# Patient Record
Sex: MALE | Race: WHITE | Employment: FULL TIME | URBAN - NONMETROPOLITAN AREA
[De-identification: names, ages, dates, MRNs, and addresses within clinical notes are randomized per-mention and may not be internally consistent; named-entity substitution may affect disease eponyms.]

---

## 2018-06-04 ENCOUNTER — HOSPITAL ENCOUNTER (EMERGENCY)
Age: 48
Discharge: LEFT BEFORE DISCHARGE | End: 2018-06-04
Attending: EMERGENCY MEDICINE
Payer: COMMERCIAL

## 2018-06-04 ENCOUNTER — APPOINTMENT (OUTPATIENT)
Dept: CT IMAGING | Age: 48
End: 2018-06-04
Payer: COMMERCIAL

## 2018-06-04 ENCOUNTER — APPOINTMENT (OUTPATIENT)
Dept: GENERAL RADIOLOGY | Age: 48
End: 2018-06-04
Payer: COMMERCIAL

## 2018-06-04 VITALS
BODY MASS INDEX: 37.93 KG/M2 | RESPIRATION RATE: 16 BRPM | SYSTOLIC BLOOD PRESSURE: 117 MMHG | HEIGHT: 72 IN | DIASTOLIC BLOOD PRESSURE: 66 MMHG | OXYGEN SATURATION: 95 % | TEMPERATURE: 100.8 F | HEART RATE: 112 BPM | WEIGHT: 280 LBS

## 2018-06-04 DIAGNOSIS — Z91.199 MEDICALLY NONCOMPLIANT: ICD-10-CM

## 2018-06-04 DIAGNOSIS — Z53.29 LEFT AGAINST MEDICAL ADVICE: Primary | ICD-10-CM

## 2018-06-04 DIAGNOSIS — E11.65 TYPE 2 DIABETES MELLITUS WITH HYPERGLYCEMIA, WITHOUT LONG-TERM CURRENT USE OF INSULIN (HCC): ICD-10-CM

## 2018-06-04 DIAGNOSIS — L02.211 ABSCESS OF SKIN OF ABDOMEN: ICD-10-CM

## 2018-06-04 DIAGNOSIS — J40 BRONCHITIS: ICD-10-CM

## 2018-06-04 DIAGNOSIS — J18.9 PNEUMONIA DUE TO ORGANISM: ICD-10-CM

## 2018-06-04 DIAGNOSIS — L03.115 CELLULITIS OF BOTH LOWER EXTREMITIES: ICD-10-CM

## 2018-06-04 DIAGNOSIS — L03.116 CELLULITIS OF BOTH LOWER EXTREMITIES: ICD-10-CM

## 2018-06-04 LAB
ALBUMIN SERPL-MCNC: 3.6 G/DL (ref 3.5–5.1)
ALLEN TEST: POSITIVE
ALP BLD-CCNC: 71 U/L (ref 38–126)
ALT SERPL-CCNC: 16 U/L (ref 11–66)
ANION GAP SERPL CALCULATED.3IONS-SCNC: 11 MEQ/L (ref 8–16)
AST SERPL-CCNC: 15 U/L (ref 5–40)
AVERAGE GLUCOSE: 201 MG/DL (ref 70–126)
BASE EXCESS (CALCULATED): 4.9 MMOL/L (ref -2.5–2.5)
BASOPHILS # BLD: 0.5 %
BASOPHILS ABSOLUTE: 0.1 THOU/MM3 (ref 0–0.1)
BETA-HYDROXYBUTYRATE: 2.13 MG/DL (ref 0.2–2.81)
BILIRUB SERPL-MCNC: 1 MG/DL (ref 0.3–1.2)
BUN BLDV-MCNC: 17 MG/DL (ref 7–22)
CALCIUM SERPL-MCNC: 8.2 MG/DL (ref 8.5–10.5)
CHLORIDE BLD-SCNC: 91 MEQ/L (ref 98–111)
CO2: 30 MEQ/L (ref 23–33)
COLLECTED BY:: ABNORMAL
CREAT SERPL-MCNC: 0.8 MG/DL (ref 0.4–1.2)
DEVICE: ABNORMAL
EKG ATRIAL RATE: 119 BPM
EKG P AXIS: 76 DEGREES
EKG P-R INTERVAL: 182 MS
EKG Q-T INTERVAL: 328 MS
EKG QRS DURATION: 104 MS
EKG QTC CALCULATION (BAZETT): 461 MS
EKG R AXIS: -143 DEGREES
EKG T AXIS: 44 DEGREES
EKG VENTRICULAR RATE: 119 BPM
EOSINOPHIL # BLD: 0 %
EOSINOPHILS ABSOLUTE: 0 THOU/MM3 (ref 0–0.4)
FLU A ANTIGEN: NEGATIVE
FLU B ANTIGEN: NEGATIVE
GFR SERPL CREATININE-BSD FRML MDRD: > 90 ML/MIN/1.73M2
GLUCOSE BLD-MCNC: 275 MG/DL
GLUCOSE BLD-MCNC: 275 MG/DL (ref 70–108)
GLUCOSE BLD-MCNC: 290 MG/DL (ref 70–108)
HBA1C MFR BLD: 8.7 % (ref 4.4–6.4)
HCO3: 29 MMOL/L (ref 23–28)
HCT VFR BLD CALC: 47.5 % (ref 42–52)
HEMOGLOBIN: 16.1 GM/DL (ref 14–18)
LACTIC ACID: 1.8 MMOL/L (ref 0.5–2.2)
LYMPHOCYTES # BLD: 6.6 %
LYMPHOCYTES ABSOLUTE: 0.8 THOU/MM3 (ref 1–4.8)
MCH RBC QN AUTO: 30.6 PG (ref 27–31)
MCHC RBC AUTO-ENTMCNC: 33.9 GM/DL (ref 33–37)
MCV RBC AUTO: 90.5 FL (ref 80–94)
MONOCYTES # BLD: 6.1 %
MONOCYTES ABSOLUTE: 0.8 THOU/MM3 (ref 0.4–1.3)
NUCLEATED RED BLOOD CELLS: 0 /100 WBC
O2 SATURATION: 92 %
OSMOLALITY CALCULATION: 276.7 MOSMOL/KG (ref 275–300)
PCO2: 41 MMHG (ref 35–45)
PDW BLD-RTO: 14.1 % (ref 11.5–14.5)
PH BLOOD GAS: 7.46 (ref 7.35–7.45)
PLATELET # BLD: 153 THOU/MM3 (ref 130–400)
PMV BLD AUTO: 9.9 FL (ref 7.4–10.4)
PO2: 61 MMHG (ref 71–104)
POTASSIUM SERPL-SCNC: 4.2 MEQ/L (ref 3.5–5.2)
PROCALCITONIN: 1.23 NG/ML (ref 0.01–0.09)
RBC # BLD: 5.25 MILL/MM3 (ref 4.7–6.1)
SEG NEUTROPHILS: 86.8 %
SEGMENTED NEUTROPHILS ABSOLUTE COUNT: 10.9 THOU/MM3 (ref 1.8–7.7)
SODIUM BLD-SCNC: 132 MEQ/L (ref 135–145)
SOURCE, BLOOD GAS: ABNORMAL
TOTAL PROTEIN: 7.2 G/DL (ref 6.1–8)
WBC # BLD: 12.6 THOU/MM3 (ref 4.8–10.8)

## 2018-06-04 PROCEDURE — 6360000002 HC RX W HCPCS: Performed by: EMERGENCY MEDICINE

## 2018-06-04 PROCEDURE — 6370000000 HC RX 637 (ALT 250 FOR IP): Performed by: EMERGENCY MEDICINE

## 2018-06-04 PROCEDURE — 83605 ASSAY OF LACTIC ACID: CPT

## 2018-06-04 PROCEDURE — 93005 ELECTROCARDIOGRAM TRACING: CPT | Performed by: EMERGENCY MEDICINE

## 2018-06-04 PROCEDURE — 2580000003 HC RX 258: Performed by: EMERGENCY MEDICINE

## 2018-06-04 PROCEDURE — 93010 ELECTROCARDIOGRAM REPORT: CPT | Performed by: INTERNAL MEDICINE

## 2018-06-04 PROCEDURE — 82010 KETONE BODYS QUAN: CPT

## 2018-06-04 PROCEDURE — 36600 WITHDRAWAL OF ARTERIAL BLOOD: CPT

## 2018-06-04 PROCEDURE — 71275 CT ANGIOGRAPHY CHEST: CPT

## 2018-06-04 PROCEDURE — 85025 COMPLETE CBC W/AUTO DIFF WBC: CPT

## 2018-06-04 PROCEDURE — 36415 COLL VENOUS BLD VENIPUNCTURE: CPT

## 2018-06-04 PROCEDURE — 96367 TX/PROPH/DG ADDL SEQ IV INF: CPT

## 2018-06-04 PROCEDURE — 6360000004 HC RX CONTRAST MEDICATION: Performed by: EMERGENCY MEDICINE

## 2018-06-04 PROCEDURE — 82803 BLOOD GASES ANY COMBINATION: CPT

## 2018-06-04 PROCEDURE — 84145 PROCALCITONIN (PCT): CPT

## 2018-06-04 PROCEDURE — 87804 INFLUENZA ASSAY W/OPTIC: CPT

## 2018-06-04 PROCEDURE — 99284 EMERGENCY DEPT VISIT MOD MDM: CPT

## 2018-06-04 PROCEDURE — 80053 COMPREHEN METABOLIC PANEL: CPT

## 2018-06-04 PROCEDURE — 83036 HEMOGLOBIN GLYCOSYLATED A1C: CPT

## 2018-06-04 PROCEDURE — 71046 X-RAY EXAM CHEST 2 VIEWS: CPT

## 2018-06-04 PROCEDURE — 87040 BLOOD CULTURE FOR BACTERIA: CPT

## 2018-06-04 PROCEDURE — 96365 THER/PROPH/DIAG IV INF INIT: CPT

## 2018-06-04 PROCEDURE — 82948 REAGENT STRIP/BLOOD GLUCOSE: CPT

## 2018-06-04 PROCEDURE — 94640 AIRWAY INHALATION TREATMENT: CPT

## 2018-06-04 RX ORDER — 0.9 % SODIUM CHLORIDE 0.9 %
500 INTRAVENOUS SOLUTION INTRAVENOUS ONCE
Status: COMPLETED | OUTPATIENT
Start: 2018-06-04 | End: 2018-06-04

## 2018-06-04 RX ORDER — IPRATROPIUM BROMIDE AND ALBUTEROL SULFATE 2.5; .5 MG/3ML; MG/3ML
1 SOLUTION RESPIRATORY (INHALATION)
Status: DISCONTINUED | OUTPATIENT
Start: 2018-06-04 | End: 2018-06-04

## 2018-06-04 RX ORDER — IPRATROPIUM BROMIDE AND ALBUTEROL SULFATE 2.5; .5 MG/3ML; MG/3ML
1 SOLUTION RESPIRATORY (INHALATION) ONCE
Status: COMPLETED | OUTPATIENT
Start: 2018-06-04 | End: 2018-06-04

## 2018-06-04 RX ORDER — SODIUM CHLORIDE 9 MG/ML
INJECTION, SOLUTION INTRAVENOUS CONTINUOUS
Status: DISCONTINUED | OUTPATIENT
Start: 2018-06-04 | End: 2018-06-04 | Stop reason: HOSPADM

## 2018-06-04 RX ORDER — ACETAMINOPHEN 325 MG/1
650 TABLET ORAL ONCE
Status: COMPLETED | OUTPATIENT
Start: 2018-06-04 | End: 2018-06-04

## 2018-06-04 RX ADMIN — IOPAMIDOL 85 ML: 755 INJECTION, SOLUTION INTRAVENOUS at 18:00

## 2018-06-04 RX ADMIN — VANCOMYCIN HYDROCHLORIDE 1000 MG: 1 INJECTION, POWDER, LYOPHILIZED, FOR SOLUTION INTRAVENOUS at 18:26

## 2018-06-04 RX ADMIN — SODIUM CHLORIDE: 9 INJECTION, SOLUTION INTRAVENOUS at 18:32

## 2018-06-04 RX ADMIN — SODIUM CHLORIDE 500 ML: 9 INJECTION, SOLUTION INTRAVENOUS at 16:30

## 2018-06-04 RX ADMIN — CEFTRIAXONE SODIUM 1 G: 1 INJECTION, POWDER, FOR SOLUTION INTRAMUSCULAR; INTRAVENOUS at 17:41

## 2018-06-04 RX ADMIN — IPRATROPIUM BROMIDE AND ALBUTEROL SULFATE 1 AMPULE: .5; 3 SOLUTION RESPIRATORY (INHALATION) at 16:48

## 2018-06-04 RX ADMIN — ACETAMINOPHEN 650 MG: 325 TABLET ORAL at 17:18

## 2018-06-04 ASSESSMENT — PAIN DESCRIPTION - DESCRIPTORS: DESCRIPTORS: TIGHTNESS

## 2018-06-04 ASSESSMENT — PAIN SCALES - GENERAL: PAINLEVEL_OUTOF10: 0

## 2018-06-04 ASSESSMENT — ENCOUNTER SYMPTOMS
SHORTNESS OF BREATH: 1
NAUSEA: 0
SINUS PRESSURE: 0
COUGH: 1
TROUBLE SWALLOWING: 0
VOMITING: 0
RHINORRHEA: 0
SORE THROAT: 0
BACK PAIN: 0
CONSTIPATION: 0
CHEST TIGHTNESS: 0
ABDOMINAL PAIN: 0
WHEEZING: 1
VOICE CHANGE: 0
DIARRHEA: 0

## 2018-06-04 ASSESSMENT — PAIN DESCRIPTION - ORIENTATION: ORIENTATION: RIGHT

## 2018-06-04 ASSESSMENT — PAIN DESCRIPTION - FREQUENCY: FREQUENCY: CONTINUOUS

## 2018-06-04 ASSESSMENT — PAIN DESCRIPTION - PAIN TYPE: TYPE: ACUTE PAIN

## 2018-06-04 ASSESSMENT — PAIN DESCRIPTION - LOCATION: LOCATION: LEG

## 2018-06-10 LAB
BLOOD CULTURE, ROUTINE: NORMAL
BLOOD CULTURE, ROUTINE: NORMAL

## 2020-03-25 ENCOUNTER — HOSPITAL ENCOUNTER (INPATIENT)
Age: 50
LOS: 5 days | Discharge: HOME | DRG: 872 | End: 2020-03-30
Attending: EMERGENCY MEDICINE | Admitting: INTERNAL MEDICINE
Payer: COMMERCIAL

## 2020-03-25 ENCOUNTER — APPOINTMENT (OUTPATIENT)
Dept: GENERAL RADIOLOGY | Age: 50
DRG: 872 | End: 2020-03-25
Payer: COMMERCIAL

## 2020-03-25 ENCOUNTER — APPOINTMENT (OUTPATIENT)
Dept: CT IMAGING | Age: 50
DRG: 872 | End: 2020-03-25
Payer: COMMERCIAL

## 2020-03-25 ENCOUNTER — APPOINTMENT (OUTPATIENT)
Dept: INTERVENTIONAL RADIOLOGY/VASCULAR | Age: 50
DRG: 872 | End: 2020-03-25
Payer: COMMERCIAL

## 2020-03-25 PROBLEM — M21.6X2: Status: ACTIVE | Noted: 2020-03-25

## 2020-03-25 PROBLEM — M24.572 EQUINUS CONTRACTURE OF LEFT ANKLE: Status: ACTIVE | Noted: 2020-03-25

## 2020-03-25 PROBLEM — E11.65 TYPE 2 DIABETES MELLITUS WITH HYPERGLYCEMIA, WITH LONG-TERM CURRENT USE OF INSULIN (HCC): Status: ACTIVE | Noted: 2020-03-25

## 2020-03-25 PROBLEM — L03.90 CELLULITIS: Status: ACTIVE | Noted: 2020-03-25

## 2020-03-25 PROBLEM — L97.522 ULCER OF LEFT FOOT, WITH FAT LAYER EXPOSED (HCC): Status: ACTIVE | Noted: 2020-03-25

## 2020-03-25 PROBLEM — Z79.4 TYPE 2 DIABETES MELLITUS WITH HYPERGLYCEMIA, WITH LONG-TERM CURRENT USE OF INSULIN (HCC): Status: ACTIVE | Noted: 2020-03-25

## 2020-03-25 LAB
ALBUMIN SERPL-MCNC: 3.9 G/DL (ref 3.5–5.1)
ALP BLD-CCNC: 95 U/L (ref 38–126)
ALT SERPL-CCNC: 16 U/L (ref 11–66)
ANION GAP SERPL CALCULATED.3IONS-SCNC: 14 MEQ/L (ref 8–16)
AST SERPL-CCNC: 17 U/L (ref 5–40)
AVERAGE GLUCOSE: 228 MG/DL (ref 70–126)
BASOPHILS # BLD: 0.6 %
BASOPHILS ABSOLUTE: 0.1 THOU/MM3 (ref 0–0.1)
BILIRUB SERPL-MCNC: 0.6 MG/DL (ref 0.3–1.2)
BILIRUBIN DIRECT: < 0.2 MG/DL (ref 0–0.3)
BUN BLDV-MCNC: 15 MG/DL (ref 7–22)
CALCIUM SERPL-MCNC: 9.4 MG/DL (ref 8.5–10.5)
CHLORIDE BLD-SCNC: 95 MEQ/L (ref 98–111)
CHP ED QC CHECK: YES
CO2: 23 MEQ/L (ref 23–33)
CREAT SERPL-MCNC: 0.6 MG/DL (ref 0.4–1.2)
EOSINOPHIL # BLD: 0.5 %
EOSINOPHILS ABSOLUTE: 0.1 THOU/MM3 (ref 0–0.4)
ERYTHROCYTE [DISTWIDTH] IN BLOOD BY AUTOMATED COUNT: 13.1 % (ref 11.5–14.5)
ERYTHROCYTE [DISTWIDTH] IN BLOOD BY AUTOMATED COUNT: 44.6 FL (ref 35–45)
GFR SERPL CREATININE-BSD FRML MDRD: > 90 ML/MIN/1.73M2
GLUCOSE BLD-MCNC: 216 MG/DL (ref 70–108)
GLUCOSE BLD-MCNC: 232 MG/DL (ref 70–108)
GLUCOSE BLD-MCNC: 251 MG/DL (ref 70–108)
GLUCOSE BLD-MCNC: 254 MG/DL (ref 70–108)
GLUCOSE BLD-MCNC: 258 MG/DL
GLUCOSE BLD-MCNC: 258 MG/DL (ref 70–108)
HBA1C MFR BLD: 9.6 % (ref 4.4–6.4)
HCT VFR BLD CALC: 54.6 % (ref 42–52)
HEMOGLOBIN: 17.9 GM/DL (ref 14–18)
IMMATURE GRANS (ABS): 0.03 THOU/MM3 (ref 0–0.07)
IMMATURE GRANULOCYTES: 0.2 %
LACTIC ACID, SEPSIS: 1.9 MMOL/L (ref 0.5–1.9)
LACTIC ACID, SEPSIS: 2.2 MMOL/L (ref 0.5–1.9)
LIPASE: 18.4 U/L (ref 5.6–51.3)
LYMPHOCYTES # BLD: 11.5 %
LYMPHOCYTES ABSOLUTE: 1.4 THOU/MM3 (ref 1–4.8)
MAGNESIUM: 1.6 MG/DL (ref 1.6–2.4)
MCH RBC QN AUTO: 30.7 PG (ref 26–33)
MCHC RBC AUTO-ENTMCNC: 32.8 GM/DL (ref 32.2–35.5)
MCV RBC AUTO: 93.7 FL (ref 80–94)
MONOCYTES # BLD: 5 %
MONOCYTES ABSOLUTE: 0.6 THOU/MM3 (ref 0.4–1.3)
NUCLEATED RED BLOOD CELLS: 0 /100 WBC
OSMOLALITY CALCULATION: 274 MOSMOL/KG (ref 275–300)
PLATELET # BLD: 187 THOU/MM3 (ref 130–400)
PMV BLD AUTO: 11.3 FL (ref 9.4–12.4)
POTASSIUM SERPL-SCNC: 4.7 MEQ/L (ref 3.5–5.2)
RBC # BLD: 5.83 MILL/MM3 (ref 4.7–6.1)
SEG NEUTROPHILS: 82.2 %
SEGMENTED NEUTROPHILS ABSOLUTE COUNT: 9.9 THOU/MM3 (ref 1.8–7.7)
SODIUM BLD-SCNC: 132 MEQ/L (ref 135–145)
TOTAL PROTEIN: 8.4 G/DL (ref 6.1–8)
TSH SERPL DL<=0.05 MIU/L-ACNC: 1.85 UIU/ML (ref 0.4–4.2)
WBC # BLD: 12.1 THOU/MM3 (ref 4.8–10.8)

## 2020-03-25 PROCEDURE — 83036 HEMOGLOBIN GLYCOSYLATED A1C: CPT

## 2020-03-25 PROCEDURE — 6360000002 HC RX W HCPCS: Performed by: EMERGENCY MEDICINE

## 2020-03-25 PROCEDURE — 83735 ASSAY OF MAGNESIUM: CPT

## 2020-03-25 PROCEDURE — 6360000004 HC RX CONTRAST MEDICATION: Performed by: EMERGENCY MEDICINE

## 2020-03-25 PROCEDURE — 93970 EXTREMITY STUDY: CPT

## 2020-03-25 PROCEDURE — 2580000003 HC RX 258: Performed by: EMERGENCY MEDICINE

## 2020-03-25 PROCEDURE — 2580000003 HC RX 258: Performed by: NURSE PRACTITIONER

## 2020-03-25 PROCEDURE — 96375 TX/PRO/DX INJ NEW DRUG ADDON: CPT

## 2020-03-25 PROCEDURE — 87070 CULTURE OTHR SPECIMN AEROBIC: CPT

## 2020-03-25 PROCEDURE — 82948 REAGENT STRIP/BLOOD GLUCOSE: CPT

## 2020-03-25 PROCEDURE — 87075 CULTR BACTERIA EXCEPT BLOOD: CPT

## 2020-03-25 PROCEDURE — 80053 COMPREHEN METABOLIC PANEL: CPT

## 2020-03-25 PROCEDURE — 82248 BILIRUBIN DIRECT: CPT

## 2020-03-25 PROCEDURE — 87040 BLOOD CULTURE FOR BACTERIA: CPT

## 2020-03-25 PROCEDURE — 75635 CT ANGIO ABDOMINAL ARTERIES: CPT

## 2020-03-25 PROCEDURE — 85025 COMPLETE CBC W/AUTO DIFF WBC: CPT

## 2020-03-25 PROCEDURE — 83690 ASSAY OF LIPASE: CPT

## 2020-03-25 PROCEDURE — 87077 CULTURE AEROBIC IDENTIFY: CPT

## 2020-03-25 PROCEDURE — 99223 1ST HOSP IP/OBS HIGH 75: CPT | Performed by: NURSE PRACTITIONER

## 2020-03-25 PROCEDURE — 87205 SMEAR GRAM STAIN: CPT

## 2020-03-25 PROCEDURE — 99285 EMERGENCY DEPT VISIT HI MDM: CPT

## 2020-03-25 PROCEDURE — 96365 THER/PROPH/DIAG IV INF INIT: CPT

## 2020-03-25 PROCEDURE — 83605 ASSAY OF LACTIC ACID: CPT

## 2020-03-25 PROCEDURE — 87147 CULTURE TYPE IMMUNOLOGIC: CPT

## 2020-03-25 PROCEDURE — 73630 X-RAY EXAM OF FOOT: CPT

## 2020-03-25 PROCEDURE — 36415 COLL VENOUS BLD VENIPUNCTURE: CPT

## 2020-03-25 PROCEDURE — 96376 TX/PRO/DX INJ SAME DRUG ADON: CPT

## 2020-03-25 PROCEDURE — 84443 ASSAY THYROID STIM HORMONE: CPT

## 2020-03-25 PROCEDURE — 87186 SC STD MICRODIL/AGAR DIL: CPT

## 2020-03-25 PROCEDURE — 2709999900 HC NON-CHARGEABLE SUPPLY

## 2020-03-25 PROCEDURE — 1200000000 HC SEMI PRIVATE

## 2020-03-25 PROCEDURE — 94760 N-INVAS EAR/PLS OXIMETRY 1: CPT

## 2020-03-25 PROCEDURE — 6360000002 HC RX W HCPCS: Performed by: NURSE PRACTITIONER

## 2020-03-25 RX ORDER — NICOTINE 21 MG/24HR
1 PATCH, TRANSDERMAL 24 HOURS TRANSDERMAL DAILY
Status: DISCONTINUED | OUTPATIENT
Start: 2020-03-25 | End: 2020-03-30 | Stop reason: HOSPADM

## 2020-03-25 RX ORDER — POTASSIUM CHLORIDE 20 MEQ/1
40 TABLET, EXTENDED RELEASE ORAL PRN
Status: DISCONTINUED | OUTPATIENT
Start: 2020-03-25 | End: 2020-03-30 | Stop reason: HOSPADM

## 2020-03-25 RX ORDER — FENTANYL CITRATE 50 UG/ML
50 INJECTION, SOLUTION INTRAMUSCULAR; INTRAVENOUS ONCE
Status: COMPLETED | OUTPATIENT
Start: 2020-03-25 | End: 2020-03-25

## 2020-03-25 RX ORDER — IBUPROFEN 200 MG
1000 TABLET ORAL PRN
Status: ON HOLD | COMMUNITY
End: 2020-03-30 | Stop reason: HOSPADM

## 2020-03-25 RX ORDER — ACETAMINOPHEN 325 MG/1
650 TABLET ORAL EVERY 6 HOURS PRN
Status: DISCONTINUED | OUTPATIENT
Start: 2020-03-25 | End: 2020-03-30 | Stop reason: HOSPADM

## 2020-03-25 RX ORDER — ALBUTEROL SULFATE 2.5 MG/3ML
2.5 SOLUTION RESPIRATORY (INHALATION) EVERY 6 HOURS PRN
Status: DISCONTINUED | OUTPATIENT
Start: 2020-03-25 | End: 2020-03-30 | Stop reason: HOSPADM

## 2020-03-25 RX ORDER — SODIUM CHLORIDE 9 MG/ML
INJECTION, SOLUTION INTRAVENOUS CONTINUOUS
Status: DISCONTINUED | OUTPATIENT
Start: 2020-03-25 | End: 2020-03-30

## 2020-03-25 RX ORDER — MAGNESIUM SULFATE IN WATER 40 MG/ML
2 INJECTION, SOLUTION INTRAVENOUS PRN
Status: DISCONTINUED | OUTPATIENT
Start: 2020-03-25 | End: 2020-03-30 | Stop reason: HOSPADM

## 2020-03-25 RX ORDER — OXYCODONE HYDROCHLORIDE AND ACETAMINOPHEN 5; 325 MG/1; MG/1
2 TABLET ORAL EVERY 4 HOURS PRN
Status: DISCONTINUED | OUTPATIENT
Start: 2020-03-25 | End: 2020-03-30 | Stop reason: HOSPADM

## 2020-03-25 RX ORDER — SODIUM CHLORIDE 0.9 % (FLUSH) 0.9 %
10 SYRINGE (ML) INJECTION PRN
Status: DISCONTINUED | OUTPATIENT
Start: 2020-03-25 | End: 2020-03-30 | Stop reason: HOSPADM

## 2020-03-25 RX ORDER — SODIUM CHLORIDE 0.9 % (FLUSH) 0.9 %
10 SYRINGE (ML) INJECTION EVERY 12 HOURS SCHEDULED
Status: DISCONTINUED | OUTPATIENT
Start: 2020-03-25 | End: 2020-03-30 | Stop reason: HOSPADM

## 2020-03-25 RX ORDER — ACETAMINOPHEN 650 MG/1
650 SUPPOSITORY RECTAL EVERY 6 HOURS PRN
Status: DISCONTINUED | OUTPATIENT
Start: 2020-03-25 | End: 2020-03-30 | Stop reason: HOSPADM

## 2020-03-25 RX ORDER — ONDANSETRON 2 MG/ML
4 INJECTION INTRAMUSCULAR; INTRAVENOUS EVERY 6 HOURS PRN
Status: DISCONTINUED | OUTPATIENT
Start: 2020-03-25 | End: 2020-03-30 | Stop reason: HOSPADM

## 2020-03-25 RX ORDER — 0.9 % SODIUM CHLORIDE 0.9 %
1000 INTRAVENOUS SOLUTION INTRAVENOUS ONCE
Status: COMPLETED | OUTPATIENT
Start: 2020-03-25 | End: 2020-03-25

## 2020-03-25 RX ORDER — DEXTROSE MONOHYDRATE 50 MG/ML
100 INJECTION, SOLUTION INTRAVENOUS PRN
Status: DISCONTINUED | OUTPATIENT
Start: 2020-03-25 | End: 2020-03-30 | Stop reason: HOSPADM

## 2020-03-25 RX ORDER — DEXTROSE MONOHYDRATE 25 G/50ML
12.5 INJECTION, SOLUTION INTRAVENOUS PRN
Status: DISCONTINUED | OUTPATIENT
Start: 2020-03-25 | End: 2020-03-30 | Stop reason: HOSPADM

## 2020-03-25 RX ORDER — NICOTINE POLACRILEX 4 MG
15 LOZENGE BUCCAL PRN
Status: DISCONTINUED | OUTPATIENT
Start: 2020-03-25 | End: 2020-03-30 | Stop reason: HOSPADM

## 2020-03-25 RX ORDER — OXYCODONE HYDROCHLORIDE AND ACETAMINOPHEN 5; 325 MG/1; MG/1
1 TABLET ORAL EVERY 4 HOURS PRN
Status: DISCONTINUED | OUTPATIENT
Start: 2020-03-25 | End: 2020-03-30 | Stop reason: HOSPADM

## 2020-03-25 RX ORDER — POTASSIUM CHLORIDE 7.45 MG/ML
10 INJECTION INTRAVENOUS PRN
Status: DISCONTINUED | OUTPATIENT
Start: 2020-03-25 | End: 2020-03-30 | Stop reason: HOSPADM

## 2020-03-25 RX ORDER — PROMETHAZINE HYDROCHLORIDE 25 MG/1
12.5 TABLET ORAL EVERY 6 HOURS PRN
Status: DISCONTINUED | OUTPATIENT
Start: 2020-03-25 | End: 2020-03-30 | Stop reason: HOSPADM

## 2020-03-25 RX ADMIN — IOPAMIDOL 100 ML: 755 INJECTION, SOLUTION INTRAVENOUS at 08:04

## 2020-03-25 RX ADMIN — PIPERACILLIN AND TAZOBACTAM 3.38 G: 3; .375 INJECTION, POWDER, LYOPHILIZED, FOR SOLUTION INTRAVENOUS at 06:17

## 2020-03-25 RX ADMIN — SODIUM CHLORIDE: 9 INJECTION, SOLUTION INTRAVENOUS at 12:42

## 2020-03-25 RX ADMIN — PIPERACILLIN AND TAZOBACTAM 3.38 G: 3; .375 INJECTION, POWDER, FOR SOLUTION INTRAVENOUS at 21:57

## 2020-03-25 RX ADMIN — SODIUM CHLORIDE 1000 ML: 9 INJECTION, SOLUTION INTRAVENOUS at 05:55

## 2020-03-25 RX ADMIN — FENTANYL CITRATE 50 MCG: 50 INJECTION INTRAMUSCULAR; INTRAVENOUS at 09:43

## 2020-03-25 RX ADMIN — VANCOMYCIN HYDROCHLORIDE 2000 MG: 1 INJECTION, POWDER, LYOPHILIZED, FOR SOLUTION INTRAVENOUS at 10:37

## 2020-03-25 RX ADMIN — PIPERACILLIN AND TAZOBACTAM 3.38 G: 3; .375 INJECTION, POWDER, FOR SOLUTION INTRAVENOUS at 12:42

## 2020-03-25 RX ADMIN — FENTANYL CITRATE 50 MCG: 50 INJECTION INTRAMUSCULAR; INTRAVENOUS at 05:55

## 2020-03-25 ASSESSMENT — PAIN SCALES - GENERAL
PAINLEVEL_OUTOF10: 10
PAINLEVEL_OUTOF10: 10
PAINLEVEL_OUTOF10: 0
PAINLEVEL_OUTOF10: 0
PAINLEVEL_OUTOF10: 10

## 2020-03-25 ASSESSMENT — ENCOUNTER SYMPTOMS
SORE THROAT: 0
WHEEZING: 0
TROUBLE SWALLOWING: 0
SINUS PRESSURE: 0
EYE REDNESS: 0
SHORTNESS OF BREATH: 0
EYE DISCHARGE: 0
DIARRHEA: 0
ABDOMINAL PAIN: 0
NAUSEA: 0
BLOOD IN STOOL: 0
PHOTOPHOBIA: 0
CHEST TIGHTNESS: 0
VOMITING: 0
RHINORRHEA: 0
CHOKING: 0
EYE PAIN: 0
BACK PAIN: 0
ABDOMINAL DISTENTION: 0
EYE ITCHING: 0
VOICE CHANGE: 0
COUGH: 0
CONSTIPATION: 0

## 2020-03-25 ASSESSMENT — PAIN - FUNCTIONAL ASSESSMENT: PAIN_FUNCTIONAL_ASSESSMENT: PREVENTS OR INTERFERES SOME ACTIVE ACTIVITIES AND ADLS

## 2020-03-25 ASSESSMENT — PAIN DESCRIPTION - DESCRIPTORS
DESCRIPTORS: ACHING;TIGHTNESS;PRESSURE
DESCRIPTORS: BURNING

## 2020-03-25 ASSESSMENT — PAIN DESCRIPTION - FREQUENCY
FREQUENCY: CONTINUOUS
FREQUENCY: CONTINUOUS

## 2020-03-25 ASSESSMENT — PAIN DESCRIPTION - LOCATION
LOCATION: LEG
LOCATION: LEG

## 2020-03-25 ASSESSMENT — PAIN DESCRIPTION - PROGRESSION
CLINICAL_PROGRESSION: GRADUALLY WORSENING
CLINICAL_PROGRESSION: NOT CHANGED

## 2020-03-25 ASSESSMENT — PAIN DESCRIPTION - PAIN TYPE
TYPE: ACUTE PAIN
TYPE: ACUTE PAIN

## 2020-03-25 ASSESSMENT — PAIN DESCRIPTION - ORIENTATION: ORIENTATION: LEFT

## 2020-03-25 ASSESSMENT — PAIN DESCRIPTION - ONSET
ONSET: ON-GOING
ONSET: PROGRESSIVE

## 2020-03-25 NOTE — ED PROVIDER NOTES
in acute distress. Appearance: He is well-developed. He is not diaphoretic. HENT:      Head: Normocephalic and atraumatic. Right Ear: External ear normal.      Left Ear: External ear normal.      Nose: Nose normal.   Eyes:      General: Lids are normal. No scleral icterus. Right eye: No discharge. Left eye: No discharge. Conjunctiva/sclera: Conjunctivae normal.      Right eye: No exudate. Left eye: No exudate. Pupils: Pupils are equal, round, and reactive to light. Neck:      Musculoskeletal: Normal range of motion and neck supple. Normal range of motion. Thyroid: No thyromegaly. Vascular: No JVD. Trachea: No tracheal deviation. Cardiovascular:      Rate and Rhythm: Normal rate and regular rhythm. Pulses: Normal pulses. Heart sounds: Normal heart sounds, S1 normal and S2 normal. No murmur. No friction rub. No gallop. Pulmonary:      Effort: Pulmonary effort is normal. No respiratory distress. Breath sounds: Normal breath sounds. No stridor. No wheezing or rales. Chest:      Chest wall: No tenderness. Abdominal:      General: Bowel sounds are normal. There is no distension. Palpations: Abdomen is soft. There is no mass. Tenderness: There is no abdominal tenderness. There is no guarding or rebound. Musculoskeletal: Normal range of motion. Right shoulder: He exhibits no tenderness, no bony tenderness, no crepitus and normal strength. Left upper leg: He exhibits tenderness. He exhibits no bony tenderness, no swelling, no edema, no deformity and no laceration. Right lower leg: He exhibits swelling. He exhibits no tenderness, no bony tenderness, no deformity and no laceration. No edema. Left lower leg: He exhibits tenderness and swelling. He exhibits no bony tenderness, no deformity and no laceration. No edema. Feet:       Comments: Severe varicosities bilateral lower extremities.   Severe venous stasis Value    WBC 12.1 (*)     Hematocrit 54.6 (*)     Segs Absolute 9.9 (*)     All other components within normal limits   BASIC METABOLIC PANEL - Abnormal; Notable for the following components:    Sodium 132 (*)     Chloride 95 (*)     Glucose 254 (*)     All other components within normal limits   HEPATIC FUNCTION PANEL - Abnormal; Notable for the following components: Total Protein 8.4 (*)     All other components within normal limits   LACTATE, SEPSIS - Abnormal; Notable for the following components:    Lactic Acid, Sepsis 2.2 (*)     All other components within normal limits   HEMOGLOBIN A1C - Abnormal; Notable for the following components:    Hemoglobin A1C 9.6 (*)     AVERAGE GLUCOSE 228 (*)     All other components within normal limits   OSMOLALITY - Abnormal; Notable for the following components:    Osmolality Calc 274.0 (*)     All other components within normal limits   POCT GLUCOSE - Abnormal; Notable for the following components:    POC Glucose 258 (*)     All other components within normal limits   POCT GLUCOSE - Normal   CULTURE, BLOOD 1   CULTURE, BLOOD 2   LIPASE   MAGNESIUM   TSH WITHOUT REFLEX   ANION GAP   GLOMERULAR FILTRATION RATE, ESTIMATED   LACTATE, SEPSIS       EMERGENCY DEPARTMENT COURSE:   Vitals:    Vitals:    03/25/20 0532 03/25/20 0602   BP: (!) 156/89 138/79   Pulse: 110    Resp: 18 18   Temp: 100.8 °F (38.2 °C)    SpO2: 98% 92%   Weight: 280 lb (127 kg)    Height: 6' (1.829 m)      Patient was assessed at bedside appropriate labs and imaging were ordered. Patient was given fluids and pain control at bedside. At this point I came to the end of my shift. Patient is signed out to my morning colleague in stable condition. CRITICAL CARE:   None    CONSULTS:  None    PROCEDURES:  None    FINAL IMPRESSION      1. Cellulitis of left lower extremity    2. Varicose veins of both lower extremities with complications    3. Foot ulcer, left, with fat layer exposed ([address]    4.  Uncontrolled

## 2020-03-25 NOTE — H&P
History & Physical        Patient:  Elizabeth Echeverria  YOB: 1970    MRN: [id number]     Acct: [de-identified]    PCP: No primary care provider on file. Date of Admission: 3/25/2020    Date of Service: Pt seen/examined on 03/25/20  and Admitted to Inpatient with expected LOS greater than two midnights due to medical therapy. ASSESSMENT/PLAN:    1. Sepsis likely 2nd to Cellulitis, LLE (POA)--Zosyn 3/25; Vanc x 1 dose 3/25; doppler (-) DVT; I marked the area involved with a pen in the emergency department when I assessed the patient; monitor response as we may need infectious disease input  2. Wound to left foot, plantar aspect (POA)--appreciate podiatry input  3. DM-2, uncontrolled--AIC at 9.6; add medium dose Humalog; add hypoglycemia protocol  4. Hyponatremia--corrected Na at 134; monitor  5. Lactic Acidosis--resolved  6. Essential HTN, uncontrolled--add lisinopril 5 mg p.o. daily and monitor  7. Tobacco abuse--cessation counseling; Nicoderm patch 21 mg topically daily  8.  Morbid Obesity with BMI 38.1    Chief Complaint: Left leg pain      History Of Present Illness:    47 y.o. male who presented to [facility] with left leg pain and swelling; patient states in either October or November 2019 he had a wound removed from his left foot the plantar aspect by Dr. Blaire Guillory; he states he was doing well until yesterday he noticed that his left leg was getting a \"rash\" he states it was reddened, swollen, warm to the touch; he states that has progressed today; he is currently rating his pain 5 out of 10 on a 0-to-10 scale and is to the left lower leg the medial aspect of his thigh; he denies lightheadedness or dizziness, unaware that he had a fever, denies shortness of breath he relates to a \"smoker's cough\" that has not changed here recently; no recent travel; he is a diabetic however he does not take any medications; he smokes 1 pack/day; he was started on Zosyn and was given Vanco x1 dose; 6. There is a 0.9 cm hypodense lesion along the anteromedial aspect of the lower pole the left kidney measuring 19 Hounsfield units consistent with a cyst. BOWEL PATTERN: 1. The bowel gas pattern is nonobstructive. There is a moderate amount stool within the colon. The appendix is unremarkable. 2. The distal esophagus, stomach and small bowel are unremarkable. INFLAMMATION: 1. There is no inflammatory process. LYMPHADENOPATHY: 1. There is no pathologically enlarged lymphadenopathy. PELVIS: 1. The urinary bladder is collapsed and not adequately visualized. 2. There are calcifications are within the prostate gland. MUSCULOSKELETAL: 1. Findings there is slight dextroscoliosis of the lumbar spine and mild degenerative changes at several levels along the spine. 2. There are mild degenerative changes at the right hip joint. 1. There is no hemodynamically significant stenosis. 2. There is mild patchy atelectasis and/or infiltrate at the left lung base. 3. Additional chronic findings as described in the body the report. **This report has been created using voice recognition software. It may contain minor errors which are inherent in voice recognition technology. ** Final report electronically signed by Dr. Yvon Muhammad on 3/25/2020 8:55 AM    Vl Dup Lower Extremity Venous Bilateral    Result Date: 3/25/2020  PROCEDURE: VL DUP LOWER EXTREMITY VENOUS BILATERAL CLINICAL INFORMATION: Bilateral lower extremity swelling and pain. . COMPARISON: 4/21/2016 TECHNIQUE: Earna Ng scale compression, Color Flow, and Doppler evaluation of the bilateral common femoral , femoral  popliteal, posterior tibial and peroneal veins was performed. In addition, the origin of the greater saphenous and profunda femoral veins was  evaluated.   Exam limitations: None FINDINGS: The study is normal. There is no evidence of deep vein thrombosis in the right and left lower extremities on the basis of this exam. Spontaneous phasic and augmented venous Doppler signals were obtained at the femoral and popliteal levels. Normal Color Flow is demonstrated in the above named venous segments. There is no evidence of venous reflux at the femoral or popliteal levels using augmentation maneuvers. No evidence of deep vein thrombosis. **This report has been created using voice recognition software. It may contain minor errors which are inherent in voice recognition technology. ** Final report electronically signed by Dr. Oscar Marquez on 3/25/2020 9:16 AM    Thank you No primary care provider on file. for the opportunity to be involved in this patient's care.     Electronically signed by ANA Dumont CNP on 3/25/2020 at 10:13 AM

## 2020-03-25 NOTE — ED NOTES
ED to inpatient nurses report    Chief Complaint   Patient presents with    Leg Pain      Present to ED from home  LOC: alert and orientated to name, place, date  Vital signs   Vitals:    03/25/20 0602 03/25/20 0920 03/25/20 1009 03/25/20 1045   BP: 138/79 (!) 144/75 127/73 (!) 159/87   Pulse:  100 104 106   Resp: 18 16 16 18   Temp:    99.6 °F (37.6 °C)   TempSrc:    Oral   SpO2: 92% 92% 93% 91%   Weight:       Height:          Oxygen Baseline 92 room air    Current needs required None Bipap/Cpap No  LDAs:   Peripheral IV 03/25/20 Left Forearm (Active)   Site Assessment Clean;Dry; Intact 3/25/2020  6:30 AM   Line Status Infusing 3/25/2020  6:30 AM   Dressing Status Clean;Dry; Intact 3/25/2020  6:30 AM     Mobility: Requires assistance * 1  Pending ED orders: None  Present condition: Stable    Electronically signed by Merle Díaz RN on 3/25/2020 at 11:04 AM     Merle Díaz RN  03/25/20 2407

## 2020-03-25 NOTE — ED NOTES
ED nurse-to-nurse bedside report    Chief Complaint   Patient presents with    Leg Pain      LOC: alert and orientated to name, place, date  Vital signs   Vitals:    03/25/20 0532 03/25/20 0602   BP: (!) 156/89 138/79   Pulse: 110    Resp: 18 18   Temp: 100.8 °F (38.2 °C)    SpO2: 98% 92%   Weight: 280 lb (127 kg)    Height: 6' (1.829 m)       Pain:  yes   10/10    Pain Interventions:   Fentanyl 50 given  Pain Goal: 4-5  Oxygen: No    Current needs required        Doppler and CT   Telemetry: No  LDAs:   Peripheral IV 03/25/20 Left Forearm (Active)     Continuous Infusions: 1000 ml infused - none   Mobility: Independent  [place] Fall Risk Score: No flowsheet data found.   Fall Interventions: side rails up x 2    Call light in reach  Report given to: Narinder Shen and Mohan Polanco RN  03/25/20 5299

## 2020-03-25 NOTE — ED PROVIDER NOTES
Transfer of Care Note:   Physician Signing out: Adelai Correia  Receiving Physician: Da Moreira M.D. Sign out time: 7 AM      Brief history:  Left medial thigh cellulitis, possibly developing over hours, multiple stasis ulcers, one particular one of interest on the left fifth metatarsal base    Items pending that need to be checked:  Labs  CT      Tentative Impression of patient:  1. Cellulitis  2. Stasis ulcers  3. Venous stasis    Expected disposition of patient:  Pending results, Possibly admitted. Additional MDM and disposition:   I have personally performed a face to face diagnostic evaluation on this patient. The patient's initial evaluation and plan have been discussed with the prior physician who initially evaluated the patient. Nursing Notes, Past Medical Hx, Past Surgical Hx, Social Hx, Allergies, vital signs and Family Hx were all reviewed. Vitals:    03/25/20 0602   BP: 138/79   Pulse:    Resp: 18   Temp:    SpO2: 92%     Physical Exam  Musculoskeletal:      Comments: Chronically infected appearing ulcer on the left sole with some purulence. Skin:     Comments: Sniffing and erythema of the medial left leg and thigh. It appeared to be progressing throughout observation before antibiotics given by night shift, after my evaluation progression seems to have slowed down or stopped. Labs Reviewed   CBC WITH AUTO DIFFERENTIAL - Abnormal; Notable for the following components:       Result Value    WBC 12.1 (*)     Hematocrit 54.6 (*)     Segs Absolute 9.9 (*)     All other components within normal limits   BASIC METABOLIC PANEL - Abnormal; Notable for the following components:    Sodium 132 (*)     Chloride 95 (*)     Glucose 254 (*)     All other components within normal limits   HEPATIC FUNCTION PANEL - Abnormal; Notable for the following components:     Total Protein 8.4 (*)     All other components within normal limits   LACTATE, SEPSIS - Abnormal; Notable for the following components:    Lactic Acid, Sepsis 2.2 (*)     All other components within normal limits   HEMOGLOBIN A1C - Abnormal; Notable for the following components:    Hemoglobin A1C 9.6 (*)     AVERAGE GLUCOSE 228 (*)     All other components within normal limits   OSMOLALITY - Abnormal; Notable for the following components:    Osmolality Calc 274.0 (*)     All other components within normal limits   POCT GLUCOSE - Abnormal; Notable for the following components:    POC Glucose 258 (*)     All other components within normal limits   POCT GLUCOSE - Normal   CULTURE, BLOOD 1   CULTURE, BLOOD 2   LIPASE   MAGNESIUM   TSH WITHOUT REFLEX   ANION GAP   GLOMERULAR FILTRATION RATE, ESTIMATED   LACTATE, SEPSIS         Medications   0.9 % sodium chloride bolus (0 mLs Intravenous Stopped 3/25/20 0650)   piperacillin-tazobactam (ZOSYN) 3.375 g in dextrose 5 % 50 mL IVPB (mini-bag) (0 g Intravenous Stopped 3/25/20 0645)   fentaNYL (SUBLIMAZE) injection 50 mcg (50 mcg Intravenous Given 3/25/20 0555)         XR FOOT LEFT (MIN 3 VIEWS)   Final Result   . 1. Left lateral plantar soft tissue ulcer. 2. No evidence of obvious osteomyelitis or fracture. 3. Degenerative arthropathy changes present. **This report has been created using voice recognition software. It may contain minor errors which are inherent in voice recognition technology. **      Final report electronically signed by Dr. Ebonie Estrada on 3/25/2020 6:24 AM      VL DUP LOWER EXTREMITY VENOUS BILATERAL    (Results Pending)   CTA ABDOMINAL AORTA W BILAT RUNOFF W WO CONTRAST    (Results Pending)                MDM  Assessment: Colitis, foot ulcer. Plan:  Will admit medical team.    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 It was dictated by use of voice recognition software and electronically transcribed. The transcription may contain errors not detected in proofreading.          Neville Cornell MD  03/25/20 3633

## 2020-03-25 NOTE — CONSULTS
Podiatric Surgery Consult    Reason for Consult: Left foot wound and cellulitis  Requesting Physician: Dr. Coats Share: Left leg pain    HISTORY OF PRESENT ILLNESS:                The patient is a 47 y.o. male with significant past medical history of diabetes and poor medical compliance who being seen at bedside on behalf of of Dr. Margaret Torres. Patient states that he has had a wound to his left foot for some time but he does not know exactly how long. Patient does not know his blood sugar and does not know his A1c. Patient states that he has poor relationship with physicians. Patient states that we have 24-hour to fix him and then he is out the door. Patient denies any fever, chills, nausea, vomiting, diarrhea, chest pain or shortness of breath. Patient states that he smokes a pack a day but is interested in quitting but has no help. Patient wants to do it on his own. Patient has no other pedal complaints at this time. Past Medical History:        Diagnosis Date    CHF (congestive heart failure) ([facility] [address] 02/12/2019    [facility]     Hypertension      Past Surgical History:        Procedure Laterality Date    CHOLECYSTECTOMY      FIBULA FRACTURE SURGERY Right     screws    TIBIA FRACTURE SURGERY Right     screws     Current Medications:    Current Facility-Administered Medications: vancomycin (VANCOCIN) 2,000 mg in dextrose 5 % 500 mL IVPB, 2,000 mg, Intravenous, Once  Allergies:  Morphine    Social History:    TOBACCO:  Currently smokes. Type of tobacco used:  Cigarettes. Quantity per day:  1 pack(s). Family History:   No family history on file. REVIEW OF SYSTEMS:    CONSTITUTIONAL:  negative    PHYSICAL EXAM:    General Appearance: Awake, alert, and oriented. In no acute distress. Well nourished. Resting in bed. HEENT: Atraumatic, normocephalic. Hearing grossly intact b/l. Pupils equal and round. Respiratory: Non-labored breathing.   Cardiovascular: Pulses palpable upper and lower extremity  Neuro: Normal speech. No focal findings  Abdomen: Soft, non-tender, non-distended. Extremities: 5/5 muscle strength of the upper and lower extremities b/l. No joint swelling, deformity, or tenderness. Vitals:    /73   Pulse 104   Temp 100.8 °F (38.2 °C)   Resp 16   Ht 6' (1.829 m)   Wt 280 lb (127 kg)   SpO2 93%   BMI 37.97 kg/m²     Exam:   No dressing with gross erythema and lymphangitis of the left leg    Vascular: Dorsalis pedis and posterior tibial pulses are palpable bilaterally. Skin temperature is warm to warm with left greater than right from proximal tibial tuberosity to distal digits. CFT brisk to exposed digits. Edema grossly pitting. Hair growth absent. Quality of skin taut and shiny    Dermatologic: Nails 1-5 bilaterally are thickened, elongated and dystrophic, with presence of subungual debris. Webspaces 1-4 bilaterally are clean, dry and intact. Hyperkeratotic wound present to the left fifth metatarsal head. The base of the wound is granular with a hyperkeratotic rim. There is extensive periwound erythema extending to the dorsum of the foot leg and lymphangitis extending to the medial thigh. There is serous drainage from the wound but no malodor appreciated. Neurovascular: Gross sensation diminished    Musculoskeletal: Muscle strength testing deferred at this time. Range of motion of ankle joint was decreased and subtalar joint midtarsal joint range of motion within normal limits. Foot has a rectus alignment. Imaging:  Xr Foot Left (min 3 Views)    Result Date: 3/25/2020  PROCEDURE: XR FOOT LEFT (MIN 3 VIEWS) CLINICAL INFORMATION: Foot pain with ulcer at base of left fifth digit. . COMPARISON: No prior study. TECHNIQUE: 4 views of the left foot. FINDINGS:  There are no acute fractures visualized.  Soft tissue swelling is present, lateral and plantar aspects of the foot with a shallow ulcer visualized along the plantar aspect adjacent to the fifth metatarsal head. There are no acute appearing periosteal changes. There is mild degenerative arthropathy. Plantar spurring is present. . 1. Left lateral plantar soft tissue ulcer. 2. No evidence of obvious osteomyelitis or fracture. 3. Degenerative arthropathy changes present. **This report has been created using voice recognition software. It may contain minor errors which are inherent in voice recognition technology. ** Final report electronically signed by Dr. Joselito Carver on 3/25/2020 6:24 AM    Cta Abdominal Aorta W Bilat Runoff W Wo Contrast    Result Date: 3/25/2020  PROCEDURE: CTA ABDOMINAL AORTA W BILAT RUNOFF W WO CONTRAST CLINICAL INFORMATION: Severe varicosities; decreased pulses; uncontrolled diabetes. COMPARISON: No prior study. TECHNIQUE: A noncontrast localizer was obtained. 3 mm axial images were obtained through the abdomen, pelvis and both lower extremities after the administration of intravenous contrast.  3D reconstructions were performed on a dedicated 3-D workstation to include sagittal and coronal mid images through the vasculature. Centerline reconstructions were also obtained. All CT scans at this facility use dose modulation, iterative reconstruction, and/or weight based dosing when appropriate to reduce the radiation dose to as low as reasonably achievable. CONTRAST: 80 mL Isovue 370 intravenously. Next film FINDINGS: ABDOMINAL AORTA: 1. Mild atheromatous plaque distal abdominal aorta. 2. There is no abdominal aortic aneurysm or hemodynamically significant stenosis. CELIAC TRUNK: 1. Unremarkable SUPERIOR/INFERIOR MESENTERIC ARTERIES: 1. There is a small calcified plaque along the mid superior mesenteric artery which is otherwise unremarkable. 2. The inferior mesenteric arteries is unremarkable. RENAL ARTERIES: 1. Unremarkable ILIAC ARTERIES: 1.  Atheromatous calcification common and internal iliac arteries bilaterally and a few small calcified plaques along the external iliac arteries bilaterally. There is no hemodynamically significant stenosis. RIGHT LOWER EXTREMITY: 1. There is mild atheromatous plaque at the right common femoral artery without hemodynamically significant stenosis. 2. There is mild atheromatous plaque along the right femoral artery without hemodynamically significant stenosis. 3. The popliteal artery is unremarkable. 3. The anterior tibial artery is unremarkable. 4. There is mild atheromatous plaque at the right tibioperoneal trunk without hemodynamically significant stenosis. The right posterior tibial and right peroneal arteries are otherwise unremarkable. LEFT LOWER EXTREMITY: 1. There is mild atheromatous plaque at the left common femoral artery without hemodynamically significant stenosis. 2. There are small foci of atheromatous calcification along the left femoral artery without hemodynamically significant stenosis. 3. There is a small calcified plaque along the left anterior tibial artery which is otherwise unremarkable. 4. There is mild atheromatous calcification along the left tibioperitoneal trunk. The right posterior tibial and right peroneal arteries are otherwise unremarkable. LUNG BASES: 1. Mild patchy atelectasis and/or infiltrate at the left lung base. 2. Moderate aortic valvular calcification. 3. Coronary artery calcification present however not quantified. LIVER/GALLBLADDER/BILIARY TREE/PANCREAS/SPLEEN/ADRENAL GLANDS/KIDNEYS: 1. There is fatty infiltration of the liver. 2. Cholecystectomy. 3. The common duct is mildly prominent measuring 0.9 to 1.0 cm. 4. The pancreas and spleen are unremarkable. 5. The bilateral adrenal glands are unremarkable. 6. There is a 0.9 cm hypodense lesion along the anteromedial aspect of the lower pole the left kidney measuring 19 Hounsfield units consistent with a cyst. BOWEL PATTERN: 1. The bowel gas pattern is nonobstructive. There is a moderate amount stool within the colon. The appendix is unremarkable.  2. The distal esophagus, stomach and small bowel are unremarkable. INFLAMMATION: 1. There is no inflammatory process. LYMPHADENOPATHY: 1. There is no pathologically enlarged lymphadenopathy. PELVIS: 1. The urinary bladder is collapsed and not adequately visualized. 2. There are calcifications are within the prostate gland. MUSCULOSKELETAL: 1. Findings there is slight dextroscoliosis of the lumbar spine and mild degenerative changes at several levels along the spine. 2. There are mild degenerative changes at the right hip joint. 1. There is no hemodynamically significant stenosis. 2. There is mild patchy atelectasis and/or infiltrate at the left lung base. 3. Additional chronic findings as described in the body the report. **This report has been created using voice recognition software. It may contain minor errors which are inherent in voice recognition technology. ** Final report electronically signed by Dr. Rosemary Velazquez on 3/25/2020 8:55 AM    Vl Dup Lower Extremity Venous Bilateral    Result Date: 3/25/2020  PROCEDURE: VL DUP LOWER EXTREMITY VENOUS BILATERAL CLINICAL INFORMATION: Bilateral lower extremity swelling and pain. . COMPARISON: 4/21/2016 TECHNIQUE: Cristel Foil scale compression, Color Flow, and Doppler evaluation of the bilateral common femoral , femoral  popliteal, posterior tibial and peroneal veins was performed. In addition, the origin of the greater saphenous and profunda femoral veins was  evaluated. Exam limitations: None FINDINGS: The study is normal. There is no evidence of deep vein thrombosis in the right and left lower extremities on the basis of this exam. Spontaneous phasic and augmented venous Doppler signals were obtained at the femoral and popliteal levels. Normal Color Flow is demonstrated in the above named venous segments. There is no evidence of venous reflux at the femoral or popliteal levels using augmentation maneuvers. No evidence of deep vein thrombosis.  **This report has been created using voice recognition software. It may contain minor errors which are inherent in voice recognition technology. ** Final report electronically signed by Dr. Jayson Littlejohn on 3/25/2020 9:16 AM      LABS:    Recent Labs     03/25/20  0530   WBC 12.1*   HGB 17.9   HCT 54.6*           Recent Labs     03/25/20  0530   *   K 4.7   CL 95*   CO2 23   BUN 15   CREATININE 0.6        Recent Labs     03/25/20  0530   PROT 8.4*      No results for input(s): CKTOTAL, CKMB, CKMBINDEX, TROPONINI in the last 72 hours. Assessment  Principle cellulitis, left lower extremity  Diabetic foot ulceration, left foot    Chronic  Patient Active Problem List   Diagnosis    Cellulitis       Plan  Patient initially examined and evaluated  Radiographs reviewed, impression above  Labs reviewed WBC approximately 12, afebrile  Dressing was applied with iodoform packing to wound with dry sterile dressing  Heel weightbearing to left lower extremity, patient is refusing a shoe  Aerobic and anaerobic cultures taken  Continue IV antibiotics, will monitor cultures  Discussed with patient necessity for IV antibiotics and risk to limb and life and patient stated that we have 24 hours to make him better and then he is out the door. I discussed the risks of leaving too early and patient was confrontational.  Discussed with patient that we will see how he responds in 24 hours and then revisit his stay. Podiatry will continue to follow patient    Dr. Romero Hammonds thank you for the consultation, allowing podiatry to assist in the medical welfare of this patient. Podiatry will continue to follow this patient throughout the duration of hospitalization.      Julianna Correias DPOSEAS  3/25/2020 10:30 AM

## 2020-03-25 NOTE — PROGRESS NOTES
Pt admitted to  5K16 via from ED and via cart/stretcher from ED. Complaints: Cellulitis. IV normal saline infusing into the forearm left, condition patent and no redness at a rate of 75 mls/ hour with about 1,000 mls in the bag still. IV site free of s/s of infection or infiltration. Vital signs obtained. Assessment and data collection initiated. Two nurse skin assessment performed by Tara Byers and Linda Sanchez. Oriented to room. Policies and procedures for  explained. All questions answered with no further questions at this time. Fall prevention and safety brochure discussed with patient. Bed alarm on. Call light in reach. Oriented to room. Machelle Abraham RN 3/25/2020 11:19 AM     Explained patients right to have family, representative or physician notified of their admission. Patient has Declined for physician to be notified. Patient has Declined for family/representative to be notified.

## 2020-03-25 NOTE — ED NOTES
Pt leaving with transport for CTA at this time.          Sharee, [address] [facility]  03/25/20 1180

## 2020-03-25 NOTE — CONSULTS
for exam:     Answer:   suspicious for osteomyelitis of 5th metatarsal head. Order Specific Question:   Reason for exam:     Answer:   Longstanding open wound that does not probe to bone but did at one time. Now has extensive cellulitis to left fower extremity.     CBC auto differential     Standing Status:   Standing     Number of Occurrences:   1    Basic Metabolic Panel     Standing Status:   Standing     Number of Occurrences:   1    Hepatic function panel     Standing Status:   Standing     Number of Occurrences:   1    Lipase     Standing Status:   Standing     Number of Occurrences:   1    Magnesium     Standing Status:   Standing     Number of Occurrences:   1    TSH without Reflex     Standing Status:   Standing     Number of Occurrences:   1    Lactate, Sepsis     Standing Status:   Standing     Number of Occurrences:   2    Hemoglobin A1c     Standing Status:   Standing     Number of Occurrences:   1    Anion Gap     Standing Status:   Standing     Number of Occurrences:   1    Osmolality     Standing Status:   Standing     Number of Occurrences:   1    Glomerular Filtration Rate, Estimated     Standing Status:   Standing     Number of Occurrences:   1    Basic Metabolic Panel w/ Reflex to MG     Standing Status:   Standing     Number of Occurrences:   1    CBC     Standing Status:   Standing     Number of Occurrences:   1    DIET CARB CONTROL;     Standing Status:   Standing     Number of Occurrences:   1    Tobacco cessation education     Standing Status:   Standing     Number of Occurrences:   1    Elevate affected limb     Standing Status:   Standing     Number of Occurrences:   1    Vital signs per unit routine     Standing Status:   Standing     Number of Occurrences:   1    Notify physician     Notify physician for pulse less than 50 or greater than 120, respiratory rate less than 12 or greater than 25, oral temperature greater than 101.3 F (11.5 C), systolic BP less than 90 or greater than 489, diastolic BP less than 50 or greater than 100. Standing Status:   Standing     Number of Occurrences:   1    Up with assistance     Standing Status:   Standing     Number of Occurrences:   41078    Daily weights     Standing Status:   Standing     Number of Occurrences:   1    Intake and output     Call for urine ouput less than 120ml in 4 hours     Standing Status:   Standing     Number of Occurrences:   10    HYPOGLYCEMIA TREATMENT: blood glucose less than 50 mg/dL and patient  ALERT and TOLERATING PO     Give 8 ounces juice or regular soda or 2 tubes glucose gel. Repeat blood glucose in 15 minutes. If blood glucose is less than 70 mg/dL, repeat treatment and recheck blood glucose in 15 minutes x2 and notify provider. Standing Status:   Standing     Number of Occurrences:   58833    HYPOGLYCEMIA TREATMENT: blood glucose less than 70 mg/dL and patient ALERT and TOLERATING PO     Give 4 ounces juice or regular soda or 1 tube glucose gel. Repeat blood glucose in 15 minutes. If blood glucose is less than 70 mg/dL, repeat treatment and recheck blood glucose in 15 minutes x2. If blood glucose remains less than 70 mg/dL, notify provider. Standing Status:   Standing     Number of Occurrences:   95084    HYPOGLYCEMIA TREATMENT: blood glucose less than 70 mg/dL and patient NOT ALERT or NPO     Give dextrose 50% intravenous. If patient does not respond within 5 minutes, repeat dose x1. Start D5W at 100 mL/hour until ordering provider can be reached. Repeat blood glucose in 15 minutes. If blood glucose is less than 70 mg/dL, repeat treatment and recheck blood glucose in 15 minutes x2. Notify provider. If no intravenous access, administer glucagon 1 mg. After administration, attempt intravenous access and start D5W at 100 mL/hr. Repeat blood glucose in 15 minutes x2 and notify provider.      Standing Status:   Standing     Number of Occurrences:   [address] communication     Please obtain and place iodoflex bedside for dressing change tomorrow. Standing Status:   Standing     Number of Occurrences:   1    Full Code     Standing Status:   Standing     Number of Occurrences:   1    Inpatient consult to Podiatry     Standing Status:   Standing     Number of Occurrences:   1     Order Specific Question:   Reason for Consult? Answer:   Infected foot ulcer. Order Specific Question:   Provider Contacted? Answer: Yes    Initiate Oxygen Therapy Protocol     Initiate oxygen therapy if the patient has 1) SpO2 is less than 90%, 2) Cyanosis, Chest Pain, Dyspnea, or Altered level of consciousness AND SpO2 checked and it is less than 90%, or 3) patient on Home oxygen. To initiate oxygen therapy: nurse enters RT51 Nasal cannula oxygen order using Per Protocol order mode (if indication Home Oxygen then change L/min to same amount at home and change Wean to Room Air to No). Notify provider if initiate oxygen therapy unless already on Home Oxygen. Standing Status:   Standing     Number of Occurrences:   4    HHN Treatment     The frequency of this modality order must match the frequency of the associated medication order. By protocol, RT can modify this frequency to accurately reflect changes made to the medication by the physician. Standing Status:   Standing     Number of Occurrences:   82008    POCT glucose     Standing Status:   Standing     Number of Occurrences:   1    POCT Glucose     Standing Status:   Standing     Number of Occurrences:   1    POCT glucose     Standing Status:   Standing     Number of Occurrences:   15    POCT Glucose     Repeat blood glucose 15 minutes following intervention. If blood glucose is less than 70 mg/dL, repeat treatment and recheck blood glucose in 15 minutes x2. If blood glucose remains less than 70 mg/dL, call ordering provider for further instruction.    If patient experienced a hypoglycemic event in last 24 hours, obtain blood glucose at 0200. Standing Status:   Standing     Number of Occurrences:   31467    PATIENT STATUS (FROM ED OR OR/PROCEDURAL) Inpatient     Standing Status:   Standing     Number of Occurrences:   1     Order Specific Question:   Patient Class     Answer:   Inpatient [101]     Order Specific Question:   REQUIRED: Diagnosis     Answer:   Cellulitis [227600]     Order Specific Question:   Estimated Length of Stay     Answer:   Estimated stay of more than 2 midnights     Order Specific Question:   Future Attending Provider     Answer:   Milana Manzanares [[id number]]     Order Specific Question:   Telemetry Bed Required?      Answer:   No       Thank You      Electronically signed by Moira Han DPM on 3/25/2020 at 4:10 PM

## 2020-03-25 NOTE — ED TRIAGE NOTES
Patient is a non compliant diabetic with pain in left leg and redness on inner thigh, up to groin. Redness in thigh started yesterday. Lower bilateral legs are discolored from below knee to toes. Also has a unhealed ulcer on bottom of foot just below 5th toe.

## 2020-03-26 ENCOUNTER — APPOINTMENT (OUTPATIENT)
Dept: NUCLEAR MEDICINE | Age: 50
DRG: 872 | End: 2020-03-26
Payer: COMMERCIAL

## 2020-03-26 ENCOUNTER — TELEPHONE (OUTPATIENT)
Dept: FAMILY MEDICINE CLINIC | Age: 50
End: 2020-03-26

## 2020-03-26 LAB
ANION GAP SERPL CALCULATED.3IONS-SCNC: 12 MEQ/L (ref 8–16)
BUN BLDV-MCNC: 14 MG/DL (ref 7–22)
CALCIUM SERPL-MCNC: 8.5 MG/DL (ref 8.5–10.5)
CHLORIDE BLD-SCNC: 98 MEQ/L (ref 98–111)
CO2: 25 MEQ/L (ref 23–33)
CREAT SERPL-MCNC: 0.7 MG/DL (ref 0.4–1.2)
ERYTHROCYTE [DISTWIDTH] IN BLOOD BY AUTOMATED COUNT: 13.2 % (ref 11.5–14.5)
ERYTHROCYTE [DISTWIDTH] IN BLOOD BY AUTOMATED COUNT: 45 FL (ref 35–45)
GFR SERPL CREATININE-BSD FRML MDRD: > 90 ML/MIN/1.73M2
GLUCOSE BLD-MCNC: 203 MG/DL (ref 70–108)
GLUCOSE BLD-MCNC: 214 MG/DL (ref 70–108)
GLUCOSE BLD-MCNC: 216 MG/DL (ref 70–108)
GLUCOSE BLD-MCNC: 218 MG/DL (ref 70–108)
GLUCOSE BLD-MCNC: 223 MG/DL (ref 70–108)
HCT VFR BLD CALC: 51 % (ref 42–52)
HEMOGLOBIN: 16.5 GM/DL (ref 14–18)
MCH RBC QN AUTO: 30.3 PG (ref 26–33)
MCHC RBC AUTO-ENTMCNC: 32.4 GM/DL (ref 32.2–35.5)
MCV RBC AUTO: 93.8 FL (ref 80–94)
MRSA SCREEN RT-PCR: NEGATIVE
PLATELET # BLD: 167 THOU/MM3 (ref 130–400)
PMV BLD AUTO: 11.3 FL (ref 9.4–12.4)
POTASSIUM REFLEX MAGNESIUM: 4.3 MEQ/L (ref 3.5–5.2)
RBC # BLD: 5.44 MILL/MM3 (ref 4.7–6.1)
SODIUM BLD-SCNC: 135 MEQ/L (ref 135–145)
WBC # BLD: 7.7 THOU/MM3 (ref 4.8–10.8)

## 2020-03-26 PROCEDURE — 6360000002 HC RX W HCPCS: Performed by: NURSE PRACTITIONER

## 2020-03-26 PROCEDURE — 6360000002 HC RX W HCPCS: Performed by: STUDENT IN AN ORGANIZED HEALTH CARE EDUCATION/TRAINING PROGRAM

## 2020-03-26 PROCEDURE — 78800 RP LOCLZJ TUM 1 AREA 1 D IMG: CPT

## 2020-03-26 PROCEDURE — 87641 MR-STAPH DNA AMP PROBE: CPT

## 2020-03-26 PROCEDURE — 36415 COLL VENOUS BLD VENIPUNCTURE: CPT

## 2020-03-26 PROCEDURE — 2709999900 HC NON-CHARGEABLE SUPPLY

## 2020-03-26 PROCEDURE — 94760 N-INVAS EAR/PLS OXIMETRY 1: CPT

## 2020-03-26 PROCEDURE — 80048 BASIC METABOLIC PNL TOTAL CA: CPT

## 2020-03-26 PROCEDURE — 1200000000 HC SEMI PRIVATE

## 2020-03-26 PROCEDURE — 2580000003 HC RX 258: Performed by: NURSE PRACTITIONER

## 2020-03-26 PROCEDURE — 6370000000 HC RX 637 (ALT 250 FOR IP): Performed by: STUDENT IN AN ORGANIZED HEALTH CARE EDUCATION/TRAINING PROGRAM

## 2020-03-26 PROCEDURE — 99232 SBSQ HOSP IP/OBS MODERATE 35: CPT | Performed by: FAMILY MEDICINE

## 2020-03-26 PROCEDURE — 3430000000 HC RX DIAGNOSTIC RADIOPHARMACEUTICAL: Performed by: STUDENT IN AN ORGANIZED HEALTH CARE EDUCATION/TRAINING PROGRAM

## 2020-03-26 PROCEDURE — 85027 COMPLETE CBC AUTOMATED: CPT

## 2020-03-26 PROCEDURE — A9569 TECHNETIUM TC-99M AUTO WBC: HCPCS | Performed by: STUDENT IN AN ORGANIZED HEALTH CARE EDUCATION/TRAINING PROGRAM

## 2020-03-26 PROCEDURE — 82948 REAGENT STRIP/BLOOD GLUCOSE: CPT

## 2020-03-26 RX ORDER — METFORMIN HYDROCHLORIDE 500 MG/1
2000 TABLET, EXTENDED RELEASE ORAL
Status: ON HOLD | COMMUNITY
End: 2020-03-30 | Stop reason: HOSPADM

## 2020-03-26 RX ORDER — METOPROLOL SUCCINATE 25 MG/1
25 TABLET, EXTENDED RELEASE ORAL NIGHTLY
Status: ON HOLD | COMMUNITY
End: 2020-03-30 | Stop reason: HOSPADM

## 2020-03-26 RX ORDER — GABAPENTIN 400 MG/1
400 CAPSULE ORAL EVERY 8 HOURS
COMMUNITY
End: 2020-04-15 | Stop reason: ALTCHOICE

## 2020-03-26 RX ORDER — BUMETANIDE 1 MG/1
1 TABLET ORAL 2 TIMES DAILY
Status: ON HOLD | COMMUNITY
End: 2020-03-30 | Stop reason: HOSPADM

## 2020-03-26 RX ORDER — BUMETANIDE 1 MG/1
1 TABLET ORAL DAILY
Status: DISCONTINUED | OUTPATIENT
Start: 2020-03-26 | End: 2020-03-30 | Stop reason: HOSPADM

## 2020-03-26 RX ORDER — IVABRADINE 5 MG/1
5 TABLET, FILM COATED ORAL 2 TIMES DAILY WITH MEALS
Status: ON HOLD | COMMUNITY
End: 2020-03-30 | Stop reason: HOSPADM

## 2020-03-26 RX ORDER — METOPROLOL SUCCINATE 50 MG/1
50 TABLET, EXTENDED RELEASE ORAL EVERY MORNING
Status: ON HOLD | COMMUNITY
End: 2020-03-30 | Stop reason: HOSPADM

## 2020-03-26 RX ORDER — LOSARTAN POTASSIUM 25 MG/1
25 TABLET ORAL DAILY
Status: ON HOLD | COMMUNITY
End: 2020-03-30 | Stop reason: SDUPTHER

## 2020-03-26 RX ORDER — ISOSORBIDE MONONITRATE 30 MG/1
30 TABLET, EXTENDED RELEASE ORAL DAILY
COMMUNITY
End: 2020-04-15 | Stop reason: ALTCHOICE

## 2020-03-26 RX ORDER — INSULIN GLARGINE 100 [IU]/ML
10 INJECTION, SOLUTION SUBCUTANEOUS NIGHTLY
Status: DISCONTINUED | OUTPATIENT
Start: 2020-03-26 | End: 2020-03-27

## 2020-03-26 RX ORDER — SPIRONOLACTONE 25 MG/1
25 TABLET ORAL DAILY
COMMUNITY
End: 2020-04-15 | Stop reason: ALTCHOICE

## 2020-03-26 RX ORDER — OMEPRAZOLE 20 MG/1
20 CAPSULE, DELAYED RELEASE ORAL DAILY
COMMUNITY
End: 2020-04-15 | Stop reason: ALTCHOICE

## 2020-03-26 RX ORDER — LOSARTAN POTASSIUM 25 MG/1
25 TABLET ORAL DAILY
Status: DISCONTINUED | OUTPATIENT
Start: 2020-03-27 | End: 2020-03-28

## 2020-03-26 RX ORDER — LINEZOLID 2 MG/ML
600 INJECTION, SOLUTION INTRAVENOUS EVERY 12 HOURS
Status: DISCONTINUED | OUTPATIENT
Start: 2020-03-26 | End: 2020-03-30 | Stop reason: HOSPADM

## 2020-03-26 RX ORDER — ATORVASTATIN CALCIUM 40 MG/1
40 TABLET, FILM COATED ORAL NIGHTLY
Status: DISCONTINUED | OUTPATIENT
Start: 2020-03-26 | End: 2020-03-30 | Stop reason: HOSPADM

## 2020-03-26 RX ORDER — METFORMIN HYDROCHLORIDE 500 MG/1
2000 TABLET, EXTENDED RELEASE ORAL
Status: DISCONTINUED | OUTPATIENT
Start: 2020-03-27 | End: 2020-03-30 | Stop reason: HOSPADM

## 2020-03-26 RX ORDER — METOPROLOL SUCCINATE 50 MG/1
50 TABLET, EXTENDED RELEASE ORAL EVERY MORNING
Status: DISCONTINUED | OUTPATIENT
Start: 2020-03-27 | End: 2020-03-30 | Stop reason: HOSPADM

## 2020-03-26 RX ORDER — METOPROLOL SUCCINATE 25 MG/1
25 TABLET, EXTENDED RELEASE ORAL NIGHTLY
Status: DISCONTINUED | OUTPATIENT
Start: 2020-03-26 | End: 2020-03-30 | Stop reason: HOSPADM

## 2020-03-26 RX ORDER — ATORVASTATIN CALCIUM 40 MG/1
40 TABLET, FILM COATED ORAL DAILY
Status: ON HOLD | COMMUNITY
End: 2020-03-30 | Stop reason: HOSPADM

## 2020-03-26 RX ADMIN — LINEZOLID 600 MG: 600 INJECTION, SOLUTION INTRAVENOUS at 20:37

## 2020-03-26 RX ADMIN — PIPERACILLIN AND TAZOBACTAM 3.38 G: 3; .375 INJECTION, POWDER, FOR SOLUTION INTRAVENOUS at 15:27

## 2020-03-26 RX ADMIN — SODIUM CHLORIDE: 9 INJECTION, SOLUTION INTRAVENOUS at 15:29

## 2020-03-26 RX ADMIN — INSULIN GLARGINE 10 UNITS: 100 INJECTION, SOLUTION SUBCUTANEOUS at 20:58

## 2020-03-26 RX ADMIN — PIPERACILLIN AND TAZOBACTAM 3.38 G: 3; .375 INJECTION, POWDER, FOR SOLUTION INTRAVENOUS at 22:19

## 2020-03-26 RX ADMIN — METOPROLOL SUCCINATE 25 MG: 25 TABLET, FILM COATED, EXTENDED RELEASE ORAL at 20:37

## 2020-03-26 RX ADMIN — EXAMETAZIME 18 MILLICURIE: KIT at 11:30

## 2020-03-26 RX ADMIN — SODIUM CHLORIDE: 9 INJECTION, SOLUTION INTRAVENOUS at 06:05

## 2020-03-26 RX ADMIN — PIPERACILLIN AND TAZOBACTAM 3.38 G: 3; .375 INJECTION, POWDER, FOR SOLUTION INTRAVENOUS at 06:05

## 2020-03-26 ASSESSMENT — PAIN SCALES - GENERAL
PAINLEVEL_OUTOF10: 0
PAINLEVEL_OUTOF10: 0

## 2020-03-26 NOTE — PROGRESS NOTES
Pharmacy Note  Vancomycin Consult    Nancy Jamison is a 47 y.o. male started on Vancomycin for left foot wound and cellulitis; consult received from Dr. Arnaud Villafana to manage therapy. Also receiving the following antibiotics: Zosyn. Patient Active Problem List   Diagnosis    Cellulitis    Ulcer of left foot, with fat layer exposed ([address]    Forefoot varus, acquired, left    Equinus contracture of left ankle    Type 2 diabetes mellitus with hyperglycemia, with long-term current use of insulin ([facility])       Allergies:  Morphine     Temp max: 100.8    Recent Labs     03/25/20  0530 03/26/20  0524   BUN 15 14       Recent Labs     03/25/20  0530 03/26/20  0524   CREATININE 0.6 0.7       Recent Labs     03/25/20  0530 03/26/20  0524   WBC 12.1* 7.7         Intake/Output Summary (Last 24 hours) at 3/26/2020 1704  Last data filed at 3/26/2020 0505  Gross per 24 hour   Intake 1902 ml   Output --   Net 1902 ml       Culture Date Source Result   3/25/20 Bc x2 ngtd   3/25/20 Right foot swab Staphylococcus (coag positive); moderate GPB, few GNB       Ht Readings from Last 1 Encounters:   03/25/20 6' (1.829 m)        Wt Readings from Last 1 Encounters:   03/26/20 273 lb 12.8 oz (124.2 kg)         Body mass index is 37.13 kg/m². Estimated Creatinine Clearance: 172 mL/min (based on SCr of 0.7 mg/dL). Goal Trough Level: 10-15 mcg/mL    Assessment/Plan:  Will initiate vancomycin 1750 mg IV every 12 hours. Timing of trough level will be determined based on culture results, renal function, and clinical response. Thank you for the consult. Will continue to follow.     Moncho Reynoso [facility]  3/26/2020  5:09 PM

## 2020-03-26 NOTE — PROGRESS NOTES
infusion, , Intravenous, Continuous  potassium chloride (KLOR-CON M) extended release tablet 40 mEq, 40 mEq, Oral, PRN **OR** potassium bicarb-citric acid (EFFER-K) effervescent tablet 40 mEq, 40 mEq, Oral, PRN **OR** potassium chloride 10 mEq/100 mL IVPB (Peripheral Line), 10 mEq, Intravenous, PRN  magnesium sulfate 2 g in 50 mL IVPB premix, 2 g, Intravenous, PRN  piperacillin-tazobactam (ZOSYN) 3.375 g in dextrose 5 % 50 mL IVPB (mini-bag), 3.375 g, Intravenous, Q8H  bisacodyl (DULCOLAX) EC tablet 5 mg, 5 mg, Oral, Daily PRN  nicotine (NICODERM CQ) 21 MG/24HR 1 patch, 1 patch, Transdermal, Daily  glucose (GLUTOSE) 40 % oral gel 15 g, 15 g, Oral, PRN  dextrose 50 % IV solution, 12.5 g, Intravenous, PRN  glucagon (rDNA) injection 1 mg, 1 mg, Intramuscular, PRN  dextrose 5 % solution, 100 mL/hr, Intravenous, PRN  albuterol (PROVENTIL) nebulizer solution 2.5 mg, 2.5 mg, Nebulization, Q6H PRN  oxyCODONE-acetaminophen (PERCOCET) 5-325 MG per tablet 1 tablet, 1 tablet, Oral, Q4H PRN **OR** oxyCODONE-acetaminophen (PERCOCET) 5-325 MG per tablet 2 tablet, 2 tablet, Oral, Q4H PRN    Objective     BP (!) 183/94   Pulse 92   Temp 98.1 °F (36.7 °C) (Oral)   Resp 16   Ht 6' (1.829 m)   Wt 273 lb 12.8 oz (124.2 kg)   SpO2 97%   BMI 37.13 kg/m²      I/O:    Intake/Output Summary (Last 24 hours) at 3/26/2020 1320  Last data filed at 3/26/2020 0505  Gross per 24 hour   Intake 3552 ml   Output --   Net 3552 ml              Wt Readings from Last 3 Encounters:   03/26/20 273 lb 12.8 oz (124.2 kg)   06/04/18 280 lb (127 kg)       LABS:    Recent Labs     03/25/20  0530 03/26/20  0524   WBC 12.1* 7.7   HGB 17.9 16.5   HCT 54.6* 51.0    167        Recent Labs     03/26/20  0524      K 4.3   CL 98   CO2 25   BUN 14   CREATININE 0.7        Recent Labs     03/25/20  0530   PROT 8.4*      No results for input(s): CKTOTAL, CKMB, CKMBINDEX, TROPONINI in the last 72 hours.     Focused Lower Extremity Examination:    Vitals: BP (!) 183/94   Pulse 92   Temp 98.1 °F (36.7 °C) (Oral)   Resp 16   Ht 6' (1.829 m)   Wt 273 lb 12.8 oz (124.2 kg)   SpO2 97%   BMI 37.13 kg/m²      Vascular: Dorsalis pedis and posterior tibial pulses are palpable bilaterally. Skin temperature is warm to warm with left greater than right from proximal tibial tuberosity to distal digits. CFT brisk to exposed digits. Edema grossly pitting. Hair growth absent. Quality of skin taut and shiny. Darker discoloration today     Dermatologic: Nails 1-5 bilaterally are thickened, elongated and dystrophic, with presence of subungual debris. Webspaces 1-4 bilaterally are clean, dry and intact. Hyperkeratotic wound present to the left fifth metatarsal head. The base of the wound is granular with a hyperkeratotic rim. There is extensive periwound erythema extending to the dorsum of the foot leg and lymphangitis extending to the medial thigh. There is serous drainage from the wound but no malodor appreciated. Cellulitis appears worse today.     Neurovascular: Gross sensation diminished     Musculoskeletal: Muscle strength testing deferred at this time. Range of motion of ankle joint was decreased and subtalar joint midtarsal joint range of motion within normal limits. Foot has a rectus alignment. Imaging:  Xr Foot Left (min 3 Views)    Result Date: 3/25/2020  PROCEDURE: XR FOOT LEFT (MIN 3 VIEWS) CLINICAL INFORMATION: Foot pain with ulcer at base of left fifth digit. . COMPARISON: No prior study. TECHNIQUE: 4 views of the left foot. FINDINGS:  There are no acute fractures visualized. Soft tissue swelling is present, lateral and plantar aspects of the foot with a shallow ulcer visualized along the plantar aspect adjacent to the fifth metatarsal head. There are no acute appearing periosteal changes. There is mild degenerative arthropathy. Plantar spurring is present. . 1. Left lateral plantar soft tissue ulcer.  2. No evidence of obvious osteomyelitis or fracture. 3. Degenerative arthropathy changes present. **This report has been created using voice recognition software. It may contain minor errors which are inherent in voice recognition technology. ** Final report electronically signed by Dr. Verna Murguia on 3/25/2020 6:24 AM    Cta Abdominal Aorta W Bilat Runoff W Wo Contrast    Result Date: 3/25/2020  PROCEDURE: CTA ABDOMINAL AORTA W BILAT RUNOFF W WO CONTRAST CLINICAL INFORMATION: Severe varicosities; decreased pulses; uncontrolled diabetes. COMPARISON: No prior study. TECHNIQUE: A noncontrast localizer was obtained. 3 mm axial images were obtained through the abdomen, pelvis and both lower extremities after the administration of intravenous contrast.  3D reconstructions were performed on a dedicated 3-D workstation to include sagittal and coronal mid images through the vasculature. Centerline reconstructions were also obtained. All CT scans at this facility use dose modulation, iterative reconstruction, and/or weight based dosing when appropriate to reduce the radiation dose to as low as reasonably achievable. CONTRAST: 80 mL Isovue 370 intravenously. Next film FINDINGS: ABDOMINAL AORTA: 1. Mild atheromatous plaque distal abdominal aorta. 2. There is no abdominal aortic aneurysm or hemodynamically significant stenosis. CELIAC TRUNK: 1. Unremarkable SUPERIOR/INFERIOR MESENTERIC ARTERIES: 1. There is a small calcified plaque along the mid superior mesenteric artery which is otherwise unremarkable. 2. The inferior mesenteric arteries is unremarkable. RENAL ARTERIES: 1. Unremarkable ILIAC ARTERIES: 1. Atheromatous calcification common and internal iliac arteries bilaterally and a few small calcified plaques along the external iliac arteries bilaterally. There is no hemodynamically significant stenosis. RIGHT LOWER EXTREMITY: 1. There is mild atheromatous plaque at the right common femoral artery without hemodynamically significant stenosis.  2. There is mild atheromatous plaque along the right femoral artery without hemodynamically significant stenosis. 3. The popliteal artery is unremarkable. 3. The anterior tibial artery is unremarkable. 4. There is mild atheromatous plaque at the right tibioperoneal trunk without hemodynamically significant stenosis. The right posterior tibial and right peroneal arteries are otherwise unremarkable. LEFT LOWER EXTREMITY: 1. There is mild atheromatous plaque at the left common femoral artery without hemodynamically significant stenosis. 2. There are small foci of atheromatous calcification along the left femoral artery without hemodynamically significant stenosis. 3. There is a small calcified plaque along the left anterior tibial artery which is otherwise unremarkable. 4. There is mild atheromatous calcification along the left tibioperitoneal trunk. The right posterior tibial and right peroneal arteries are otherwise unremarkable. LUNG BASES: 1. Mild patchy atelectasis and/or infiltrate at the left lung base. 2. Moderate aortic valvular calcification. 3. Coronary artery calcification present however not quantified. LIVER/GALLBLADDER/BILIARY TREE/PANCREAS/SPLEEN/ADRENAL GLANDS/KIDNEYS: 1. There is fatty infiltration of the liver. 2. Cholecystectomy. 3. The common duct is mildly prominent measuring 0.9 to 1.0 cm. 4. The pancreas and spleen are unremarkable. 5. The bilateral adrenal glands are unremarkable. 6. There is a 0.9 cm hypodense lesion along the anteromedial aspect of the lower pole the left kidney measuring 19 Hounsfield units consistent with a cyst. BOWEL PATTERN: 1. The bowel gas pattern is nonobstructive. There is a moderate amount stool within the colon. The appendix is unremarkable. 2. The distal esophagus, stomach and small bowel are unremarkable. INFLAMMATION: 1. There is no inflammatory process. LYMPHADENOPATHY: 1. There is no pathologically enlarged lymphadenopathy. PELVIS: 1.  The urinary bladder is collapsed and not adequately visualized. 2. There are calcifications are within the prostate gland. MUSCULOSKELETAL: 1. Findings there is slight dextroscoliosis of the lumbar spine and mild degenerative changes at several levels along the spine. 2. There are mild degenerative changes at the right hip joint. 1. There is no hemodynamically significant stenosis. 2. There is mild patchy atelectasis and/or infiltrate at the left lung base. 3. Additional chronic findings as described in the body the report. **This report has been created using voice recognition software. It may contain minor errors which are inherent in voice recognition technology. ** Final report electronically signed by Dr. Zack Christianson on 3/25/2020 8:55 AM    Nm Inflammatory Wbc Limited W Ceretec    Result Date: 3/26/2020  PROCEDURE: NM INFLAMMATORY WBC LIMITED W CERETEC CLINICAL INFORMATION: Left foot cellulitis, suspected osteomyelitis TECHNIQUE: A sample of the patient's blood was removed and the white blood cells were labeled with 18 mCi technetium 99m Ceretec and reinjected back patient. Delayed images of the bilateral ankles and feet were obtained for hours following radiopharmaceutical. COMPARISON: None FINDINGS: There is asymmetrically increased activity at the periphery of the left ankle and foot. Focal activity is not identified centrally in the left foot to suggest osseous involvement. However, evaluation is limited due to patient positioning and overlap of structures. Probable diffuse cellulitis in the left foot. Final report electronically signed by Dr. Donaldo Lambert on 3/26/2020 4:24 PM    Vl Dup Lower Extremity Venous Bilateral    Result Date: 3/25/2020  PROCEDURE: VL DUP LOWER EXTREMITY VENOUS BILATERAL CLINICAL INFORMATION: Bilateral lower extremity swelling and pain. . COMPARISON: 4/21/2016 TECHNIQUE: Marcybeth Gist scale compression, Color Flow, and Doppler evaluation of the bilateral common femoral , femoral  popliteal, posterior tibial and peroneal veins was performed. In addition, the origin of the greater saphenous and profunda femoral veins was  evaluated. Exam limitations: None FINDINGS: The study is normal. There is no evidence of deep vein thrombosis in the right and left lower extremities on the basis of this exam. Spontaneous phasic and augmented venous Doppler signals were obtained at the femoral and popliteal levels. Normal Color Flow is demonstrated in the above named venous segments. There is no evidence of venous reflux at the femoral or popliteal levels using augmentation maneuvers. No evidence of deep vein thrombosis. **This report has been created using voice recognition software. It may contain minor errors which are inherent in voice recognition technology. ** Final report electronically signed by Dr. Regan Busby on 3/25/2020 9:16 AM       ASSESSMENT: Pt. is a 47 y.o. male with:  Principle  cellulitis, left lower extremity  Diabetic foot ulceration, left foot    Chronic  Patient Active Problem List   Diagnosis    Cellulitis    Ulcer of left foot, with fat layer exposed ([address]    Forefoot varus, acquired, left    Equinus contracture of left ankle    Type 2 diabetes mellitus with hyperglycemia, with long-term current use of insulin ([address]       PLAN:     Patient initially examined and evaluated  Imaging reviewed, impression above  Radiology recommends 3 phase bone scan   Labs reviewed WBC 12-->7, afebrile  Dressing was applied with betadine to wound with dry sterile dressing  Heel weightbearing to left lower extremity, patient is refusing a shoe  Culture reviewed, heavy polymicrobial growth  Consult placed to ID  Continue IV antibiotics, will monitor cultures, dicussed case with Dr. Rambo Perkins, adding zyvox  Podiatry will continue to follow patient    Dispo: Pending response from antibiotics, results of 3 phase bone scan, and recommendations from Kamron [address], DPM  Podiatric Surgical Resident  3/26/2020   1:20 PM

## 2020-03-26 NOTE — PROGRESS NOTES
PROGRESS NOTE      Patient:  Hermilo Gauthier      Unit/Bed:5K-16/016-A    YOB: 1970    MRN: [id number]       Acct: [de-identified]     PCP: No primary care provider on file. Date of Admission: 3/25/2020      Assessment/Plan:    Anticipated Discharge in : 2 days    Active Hospital Problems    Diagnosis Date Noted    Cellulitis [L03.90] 03/25/2020    Ulcer of left foot, with fat layer exposed ([address] [L97.522] 03/25/2020    Forefoot varus, acquired, left [M21.6X2] 03/25/2020    Equinus contracture of left ankle [M24.572] 03/25/2020    Type 2 diabetes mellitus with hyperglycemia, with long-term current use of insulin ([facility]) [E11.65, Z79.4] 03/25/2020     Sepsis likely 2nd to Cellulitis of the LLE and possible osteomyelitis of L foot, improving- s/p Zosyn 3/25; Vanc x 1 dose 3/25 in ED; doppler negative for DVT, patient has been afebrile since admission, WBC 12.1 on admission, Tachycardic on admission, RR normal   - Podiatry is following recommendations appreciated. - ID consulted, recommendations appreciated  - Wound culture 3/25 growing Many gram positive cocci in pairs and clusters. Moderate gram positive bacilli. Few gram negative bacilli. Organism: Staphylococcus (coagulase positive)  - Will continue Zosyn and start Zyvox  - CBC in AM  - Continue to monitor vital signs     Wound to left foot, plantar aspect   - Podiatry is following recommendations appreciated. - Bone Scan showed possible cellulitis of the L foot.   - Will get triphasic bone scan tomorrow  - ID consulted, recommendations appreciated  - Will continue Zosyn and start Zyvox    DM-2, uncontrolled- A1c 9.6   - High dose sliding scale  - Will start 10 units of Lantus qhs   - Hypoglycemia protocol  - Diabetic Diet  - May resume oral medication on D/C with close follow up    Hyponatremia- Resolved  - 135 this AM  - BMP monitor    Essential HTN- uncontrolled  - Will resume home metoprolol and Bumex  - Will continue to monitor     History of CHF-  Previously followed with CHF clinic at [facility]. Most recent echo 2/14/2019 showed EF of 15-20%   - Will get repeat echo  - Will resume metoprolol  - May need to consult cardiology based on results of echo    Lactic Acidosis- resolved    Tobacco abuse-  - cessation counseling   - Nicoderm patch 21 mg topically daily    Morbid Obesity with BMI 38.1    Chief Complaint:     Hospital Course:  Please refer to H&P for full HPI, but briefly Bertram Garland is a 74-year-old with a past medical history of myocardial infarction, CHF, uncontrolled type 2 diabetes, uncontrolled hypertension, hyperlipidemia, sleep apnea, tobacco use who presented to [facility] with left leg pain and swelling. He reports that he had a wound repaired on the bottom of his left foot and October November 2019 and that yesterday his leg became worse. He reported that he started getting a \"rash\" that was red swollen and warm to the touch that quickly progressed prior to admission. Patient rated his pain a 5 out of 10 on admission. He was unaware that he was running a fever prior to admission. In the emergency department he was given 1 dose of vancomycin and was started on Zosyn. Podiatry was consulted in the emergency room and recommended to continue IV antibiotics and ceretec bone scan. The results of the bone scan were inconclusive for osteomyelitis however did show cellulitis. Podiatry recommended a three-phase bone scan as further work-up. On March 26, Linezolid was started. Patient was also restarted on metformin, metoprolol, and Bumex. Patient was also started on 10 units of Lantus at night. Subjective:  Patient was seen and examined in his room. There were no acute events overnight. Patient denied any pain this morning and stated that he was ready to go home. At that time he stated that he did not want any further work-up done because he was feeling \"fine\".   He denied headaches, lightheadedness, dizziness, shortness of

## 2020-03-26 NOTE — PROGRESS NOTES
I was present with the resident during the visit and we are awaiting the WBC labeled bone scan to determine Osteomyelitis and treatment. Hopefully today. I discussed the case with the resident and agree with the findings and the plan as documented in the residents note.     Artist Kee French DPM, FACFAS  Foot & Ankle Surgical Residency  [facility]

## 2020-03-26 NOTE — PROGRESS NOTES
Pharmacy Medication History Note      List of current medications patient is taking is as complete as possible. Source of information: RN spoke with patient, I received fill history from [place] made to medication list:  Medications removed (include reason, ex. therapy complete or physician discontinued):  none    Medications added/doses adjusted:  Metformin ER 2000 mg with evening meal  Omeprazole 20 mg before a meal daily    Other notes (ex. Recent course of antibiotics, Coumadin dosing):  Patient reports not having taken meds in over a month per RN. Per fill history this appears to be true - patient should be out of many of his medications at home.        Electronically signed by Jerad Santana [facility] on 3/26/2020 at 7:38 PM

## 2020-03-26 NOTE — PROGRESS NOTES
Phoned wife ankush and gave update on pt condition per pt ok. All questions answered. She will try to talk pt into staying and not signing out ama. At least till Saturday.

## 2020-03-27 ENCOUNTER — TELEPHONE (OUTPATIENT)
Dept: FAMILY MEDICINE CLINIC | Age: 50
End: 2020-03-27

## 2020-03-27 LAB
ANION GAP SERPL CALCULATED.3IONS-SCNC: 11 MEQ/L (ref 8–16)
BUN BLDV-MCNC: 12 MG/DL (ref 7–22)
CALCIUM SERPL-MCNC: 8.6 MG/DL (ref 8.5–10.5)
CHLORIDE BLD-SCNC: 99 MEQ/L (ref 98–111)
CO2: 25 MEQ/L (ref 23–33)
CREAT SERPL-MCNC: 0.7 MG/DL (ref 0.4–1.2)
ERYTHROCYTE [DISTWIDTH] IN BLOOD BY AUTOMATED COUNT: 13.1 % (ref 11.5–14.5)
ERYTHROCYTE [DISTWIDTH] IN BLOOD BY AUTOMATED COUNT: 45.4 FL (ref 35–45)
GFR SERPL CREATININE-BSD FRML MDRD: > 90 ML/MIN/1.73M2
GLUCOSE BLD-MCNC: 149 MG/DL (ref 70–108)
GLUCOSE BLD-MCNC: 210 MG/DL (ref 70–108)
GLUCOSE BLD-MCNC: 211 MG/DL (ref 70–108)
GLUCOSE BLD-MCNC: 232 MG/DL (ref 70–108)
GLUCOSE BLD-MCNC: 288 MG/DL (ref 70–108)
HCT VFR BLD CALC: 50.4 % (ref 42–52)
HEMOGLOBIN: 16.4 GM/DL (ref 14–18)
LV EF: 50 %
LVEF MODALITY: NORMAL
MCH RBC QN AUTO: 30.9 PG (ref 26–33)
MCHC RBC AUTO-ENTMCNC: 32.5 GM/DL (ref 32.2–35.5)
MCV RBC AUTO: 94.9 FL (ref 80–94)
PLATELET # BLD: 177 THOU/MM3 (ref 130–400)
PMV BLD AUTO: 11.2 FL (ref 9.4–12.4)
POTASSIUM SERPL-SCNC: 4.4 MEQ/L (ref 3.5–5.2)
RBC # BLD: 5.31 MILL/MM3 (ref 4.7–6.1)
SODIUM BLD-SCNC: 135 MEQ/L (ref 135–145)
WBC # BLD: 6.5 THOU/MM3 (ref 4.8–10.8)

## 2020-03-27 PROCEDURE — 82948 REAGENT STRIP/BLOOD GLUCOSE: CPT

## 2020-03-27 PROCEDURE — 85027 COMPLETE CBC AUTOMATED: CPT

## 2020-03-27 PROCEDURE — 80048 BASIC METABOLIC PNL TOTAL CA: CPT

## 2020-03-27 PROCEDURE — 6370000000 HC RX 637 (ALT 250 FOR IP): Performed by: STUDENT IN AN ORGANIZED HEALTH CARE EDUCATION/TRAINING PROGRAM

## 2020-03-27 PROCEDURE — 6370000000 HC RX 637 (ALT 250 FOR IP): Performed by: FAMILY MEDICINE

## 2020-03-27 PROCEDURE — 2709999900 HC NON-CHARGEABLE SUPPLY

## 2020-03-27 PROCEDURE — 6360000002 HC RX W HCPCS: Performed by: NURSE PRACTITIONER

## 2020-03-27 PROCEDURE — 1200000000 HC SEMI PRIVATE

## 2020-03-27 PROCEDURE — 2580000003 HC RX 258: Performed by: NURSE PRACTITIONER

## 2020-03-27 PROCEDURE — 36415 COLL VENOUS BLD VENIPUNCTURE: CPT

## 2020-03-27 PROCEDURE — 6360000002 HC RX W HCPCS: Performed by: STUDENT IN AN ORGANIZED HEALTH CARE EDUCATION/TRAINING PROGRAM

## 2020-03-27 PROCEDURE — 94760 N-INVAS EAR/PLS OXIMETRY 1: CPT

## 2020-03-27 PROCEDURE — 99232 SBSQ HOSP IP/OBS MODERATE 35: CPT | Performed by: FAMILY MEDICINE

## 2020-03-27 PROCEDURE — 93306 TTE W/DOPPLER COMPLETE: CPT

## 2020-03-27 RX ORDER — INSULIN GLARGINE 100 [IU]/ML
20 INJECTION, SOLUTION SUBCUTANEOUS NIGHTLY
Status: DISCONTINUED | OUTPATIENT
Start: 2020-03-27 | End: 2020-03-28

## 2020-03-27 RX ADMIN — INSULIN GLARGINE 20 UNITS: 100 INJECTION, SOLUTION SUBCUTANEOUS at 21:09

## 2020-03-27 RX ADMIN — ATORVASTATIN CALCIUM 40 MG: 40 TABLET, FILM COATED ORAL at 21:09

## 2020-03-27 RX ADMIN — LINEZOLID 600 MG: 600 INJECTION, SOLUTION INTRAVENOUS at 17:24

## 2020-03-27 RX ADMIN — METOPROLOL SUCCINATE 50 MG: 50 TABLET, FILM COATED, EXTENDED RELEASE ORAL at 08:45

## 2020-03-27 RX ADMIN — LINEZOLID 600 MG: 600 INJECTION, SOLUTION INTRAVENOUS at 05:18

## 2020-03-27 RX ADMIN — SODIUM CHLORIDE: 9 INJECTION, SOLUTION INTRAVENOUS at 05:21

## 2020-03-27 RX ADMIN — BUMETANIDE 1 MG: 1 TABLET ORAL at 08:45

## 2020-03-27 RX ADMIN — METOPROLOL SUCCINATE 25 MG: 25 TABLET, FILM COATED, EXTENDED RELEASE ORAL at 21:09

## 2020-03-27 RX ADMIN — PIPERACILLIN AND TAZOBACTAM 3.38 G: 3; .375 INJECTION, POWDER, FOR SOLUTION INTRAVENOUS at 06:51

## 2020-03-27 RX ADMIN — LOSARTAN POTASSIUM 25 MG: 25 TABLET, FILM COATED ORAL at 08:45

## 2020-03-27 RX ADMIN — SODIUM CHLORIDE: 9 INJECTION, SOLUTION INTRAVENOUS at 18:40

## 2020-03-27 ASSESSMENT — PAIN SCALES - GENERAL
PAINLEVEL_OUTOF10: 0

## 2020-03-27 NOTE — PROGRESS NOTES
Podiatric Progress Note  Les Flood  Subjective :   3/27/20  Patient seen at bedside on behalf of Dr. Cele Rodrigez. Patient states that he is very frustrated and wants to leave. Patient was very disrespectful to Dr. Domonique Charles and kicked him off his service. Patient states that he has no desire to stay here all weekend because he hates it here. Discussed with the patient the extent of his infection and that if he does not stay he will more than likely forced to return due to widespread infection and then he would have to spend even more time in the hospital.  Patient then agreed to stay until his test is done on Monday. Patient denies any constitutional symptoms at this time. Patient has no other pedal complaints. 3/26/20  Patient seen bedside along Dr. Cele Rodrigez today. Patient states he is feeling great and is going to leave today. Discussed with patient that we need to see results of nuclear bone scan and discuss if there is anything else we need to do. Patient denies pain and any constitutional symptoms. Patient has no other complaints. HISTORY OF PRESENT ILLNESS:                 The patient is a 47 y.o. male with significant past medical history of diabetes and poor medical compliance who being seen at bedside on behalf of of Dr. Cele Rodrigez. Patient states that he has had a wound to his left foot for some time but he does not know exactly how long. Patient does not know his blood sugar and does not know his A1c. Patient states that he has poor relationship with physicians. Patient states that we have 24-hour to fix him and then he is out the door. Patient denies any fever, chills, nausea, vomiting, diarrhea, chest pain or shortness of breath. Patient states that he smokes a pack a day but is interested in quitting but has no help. Patient wants to do it on his own. Patient has no other pedal complaints at this time.      Current Medications:    Current Facility-Administered Medications: insulin lispro (HUMALOG) injection vial 0-18 Units, 0-18 Units, Subcutaneous, TID WC  insulin lispro (HUMALOG) injection vial 0-9 Units, 0-9 Units, Subcutaneous, Nightly  insulin glargine (LANTUS) injection vial 10 Units, 10 Units, Subcutaneous, Nightly  linezolid (ZYVOX) IVPB 600 mg, 600 mg, Intravenous, Q12H  [Held by provider] metFORMIN (GLUCOPHAGE-XR) extended release tablet 2,000 mg, 2,000 mg, Oral, Daily with breakfast  metoprolol succinate (TOPROL XL) extended release tablet 25 mg, 25 mg, Oral, Nightly  metoprolol succinate (TOPROL XL) extended release tablet 50 mg, 50 mg, Oral, QAM  bumetanide (BUMEX) tablet 1 mg, 1 mg, Oral, Daily  losartan (COZAAR) tablet 25 mg, 25 mg, Oral, Daily  atorvastatin (LIPITOR) tablet 40 mg, 40 mg, Oral, Nightly  sodium chloride flush 0.9 % injection 10 mL, 10 mL, Intravenous, 2 times per day  sodium chloride flush 0.9 % injection 10 mL, 10 mL, Intravenous, PRN  acetaminophen (TYLENOL) tablet 650 mg, 650 mg, Oral, Q6H PRN **OR** acetaminophen (TYLENOL) suppository 650 mg, 650 mg, Rectal, Q6H PRN  promethazine (PHENERGAN) tablet 12.5 mg, 12.5 mg, Oral, Q6H PRN **OR** ondansetron (ZOFRAN) injection 4 mg, 4 mg, Intravenous, Q6H PRN  0.9 % sodium chloride infusion, , Intravenous, Continuous  potassium chloride (KLOR-CON M) extended release tablet 40 mEq, 40 mEq, Oral, PRN **OR** potassium bicarb-citric acid (EFFER-K) effervescent tablet 40 mEq, 40 mEq, Oral, PRN **OR** potassium chloride 10 mEq/100 mL IVPB (Peripheral Line), 10 mEq, Intravenous, PRN  magnesium sulfate 2 g in 50 mL IVPB premix, 2 g, Intravenous, PRN  piperacillin-tazobactam (ZOSYN) 3.375 g in dextrose 5 % 50 mL IVPB (mini-bag), 3.375 g, Intravenous, Q8H  bisacodyl (DULCOLAX) EC tablet 5 mg, 5 mg, Oral, Daily PRN  nicotine (NICODERM CQ) 21 MG/24HR 1 patch, 1 patch, Transdermal, Daily  glucose (GLUTOSE) 40 % oral gel 15 g, 15 g, Oral, PRN  dextrose 50 % IV solution, 12.5 g, Intravenous, PRN  glucagon (rDNA) injection 1 mg, 1 mg, Intramuscular, PRN  dextrose 5 % solution, 100 mL/hr, Intravenous, PRN  albuterol (PROVENTIL) nebulizer solution 2.5 mg, 2.5 mg, Nebulization, Q6H PRN  oxyCODONE-acetaminophen (PERCOCET) 5-325 MG per tablet 1 tablet, 1 tablet, Oral, Q4H PRN **OR** oxyCODONE-acetaminophen (PERCOCET) 5-325 MG per tablet 2 tablet, 2 tablet, Oral, Q4H PRN    Objective     BP (!) 176/91   Pulse 76   Temp 97.9 °F (36.6 °C) (Oral)   Resp 16   Ht 6' (1.829 m)   Wt 273 lb 12.8 oz (124.2 kg)   SpO2 98%   BMI 37.13 kg/m²      I/O:    Intake/Output Summary (Last 24 hours) at 3/27/2020 0955  Last data filed at 3/26/2020 2024  Gross per 24 hour   Intake 2626 ml   Output --   Net 2626 ml              Wt Readings from Last 3 Encounters:   03/26/20 273 lb 12.8 oz (124.2 kg)   06/04/18 280 lb (127 kg)       LABS:    Recent Labs     03/26/20  0524 03/27/20  0544   WBC 7.7 6.5   HGB 16.5 16.4   HCT 51.0 50.4    177        Recent Labs     03/27/20  0544      K 4.4   CL 99   CO2 25   BUN 12   CREATININE 0.7        Recent Labs     03/25/20  0530   PROT 8.4*      No results for input(s): CKTOTAL, CKMB, CKMBINDEX, TROPONINI in the last 72 hours. Focused Lower Extremity Examination:    Vitals:    BP (!) 176/91   Pulse 76   Temp 97.9 °F (36.6 °C) (Oral)   Resp 16   Ht 6' (1.829 m)   Wt 273 lb 12.8 oz (124.2 kg)   SpO2 98%   BMI 37.13 kg/m²      Vascular: Dorsalis pedis and posterior tibial pulses are palpable bilaterally. Skin temperature is warm to warm with left greater than right from proximal tibial tuberosity to distal digits. CFT brisk to exposed digits. Edema grossly pitting. Hair growth absent. Quality of skin taut and shiny.      Dermatologic: Nails 1-5 bilaterally are thickened, elongated and dystrophic, with presence of subungual debris. Webspaces 1-4 bilaterally are clean, dry and intact. Hyperkeratotic wound present to the left fifth metatarsal head. The base of the wound is granular with a hyperkeratotic rim. There is extensive periwound erythema extending to the dorsum of the foot leg and lymphangitis extending to the medial thigh. There is serous drainage from the wound but no malodor appreciated. Cellulitis greatly improved today.     Neurovascular: Gross sensation diminished     Musculoskeletal: Muscle strength testing deferred at this time. Range of motion of ankle joint was decreased and subtalar joint midtarsal joint range of motion within normal limits. Foot has a rectus alignment. Imaging:  Xr Foot Left (min 3 Views)    Result Date: 3/25/2020  PROCEDURE: XR FOOT LEFT (MIN 3 VIEWS) CLINICAL INFORMATION: Foot pain with ulcer at base of left fifth digit. . COMPARISON: No prior study. TECHNIQUE: 4 views of the left foot. FINDINGS:  There are no acute fractures visualized. Soft tissue swelling is present, lateral and plantar aspects of the foot with a shallow ulcer visualized along the plantar aspect adjacent to the fifth metatarsal head. There are no acute appearing periosteal changes. There is mild degenerative arthropathy. Plantar spurring is present. . 1. Left lateral plantar soft tissue ulcer. 2. No evidence of obvious osteomyelitis or fracture. 3. Degenerative arthropathy changes present. **This report has been created using voice recognition software. It may contain minor errors which are inherent in voice recognition technology. ** Final report electronically signed by Dr. Jenna Robertson on 3/25/2020 6:24 AM    Cta Abdominal Aorta W Bilat Runoff W Wo Contrast    Result Date: 3/25/2020  PROCEDURE: CTA ABDOMINAL AORTA W BILAT RUNOFF W WO CONTRAST CLINICAL INFORMATION: Severe varicosities; decreased pulses; uncontrolled diabetes. COMPARISON: No prior study. TECHNIQUE: A noncontrast localizer was obtained.  3 mm axial images were obtained through the abdomen, pelvis and both lower extremities after the administration of intravenous contrast.  3D reconstructions were performed on a dedicated 3-D workstation to include sagittal and coronal mid images through the vasculature. Centerline reconstructions were also obtained. All CT scans at this facility use dose modulation, iterative reconstruction, and/or weight based dosing when appropriate to reduce the radiation dose to as low as reasonably achievable. CONTRAST: 80 mL Isovue 370 intravenously. Next film FINDINGS: ABDOMINAL AORTA: 1. Mild atheromatous plaque distal abdominal aorta. 2. There is no abdominal aortic aneurysm or hemodynamically significant stenosis. CELIAC TRUNK: 1. Unremarkable SUPERIOR/INFERIOR MESENTERIC ARTERIES: 1. There is a small calcified plaque along the mid superior mesenteric artery which is otherwise unremarkable. 2. The inferior mesenteric arteries is unremarkable. RENAL ARTERIES: 1. Unremarkable ILIAC ARTERIES: 1. Atheromatous calcification common and internal iliac arteries bilaterally and a few small calcified plaques along the external iliac arteries bilaterally. There is no hemodynamically significant stenosis. RIGHT LOWER EXTREMITY: 1. There is mild atheromatous plaque at the right common femoral artery without hemodynamically significant stenosis. 2. There is mild atheromatous plaque along the right femoral artery without hemodynamically significant stenosis. 3. The popliteal artery is unremarkable. 3. The anterior tibial artery is unremarkable. 4. There is mild atheromatous plaque at the right tibioperoneal trunk without hemodynamically significant stenosis. The right posterior tibial and right peroneal arteries are otherwise unremarkable. LEFT LOWER EXTREMITY: 1. There is mild atheromatous plaque at the left common femoral artery without hemodynamically significant stenosis. 2. There are small foci of atheromatous calcification along the left femoral artery without hemodynamically significant stenosis. 3. There is a small calcified plaque along the left anterior tibial artery which is otherwise unremarkable.  4. There is mild atheromatous calcification along the left tibioperitoneal trunk. The right posterior tibial and right peroneal arteries are otherwise unremarkable. LUNG BASES: 1. Mild patchy atelectasis and/or infiltrate at the left lung base. 2. Moderate aortic valvular calcification. 3. Coronary artery calcification present however not quantified. LIVER/GALLBLADDER/BILIARY TREE/PANCREAS/SPLEEN/ADRENAL GLANDS/KIDNEYS: 1. There is fatty infiltration of the liver. 2. Cholecystectomy. 3. The common duct is mildly prominent measuring 0.9 to 1.0 cm. 4. The pancreas and spleen are unremarkable. 5. The bilateral adrenal glands are unremarkable. 6. There is a 0.9 cm hypodense lesion along the anteromedial aspect of the lower pole the left kidney measuring 19 Hounsfield units consistent with a cyst. BOWEL PATTERN: 1. The bowel gas pattern is nonobstructive. There is a moderate amount stool within the colon. The appendix is unremarkable. 2. The distal esophagus, stomach and small bowel are unremarkable. INFLAMMATION: 1. There is no inflammatory process. LYMPHADENOPATHY: 1. There is no pathologically enlarged lymphadenopathy. PELVIS: 1. The urinary bladder is collapsed and not adequately visualized. 2. There are calcifications are within the prostate gland. MUSCULOSKELETAL: 1. Findings there is slight dextroscoliosis of the lumbar spine and mild degenerative changes at several levels along the spine. 2. There are mild degenerative changes at the right hip joint. 1. There is no hemodynamically significant stenosis. 2. There is mild patchy atelectasis and/or infiltrate at the left lung base. 3. Additional chronic findings as described in the body the report. **This report has been created using voice recognition software. It may contain minor errors which are inherent in voice recognition technology. ** Final report electronically signed by Dr. Andre Shipman on 3/25/2020 8:55 AM    Nm Inflammatory Wbc Limited W Ceretec    Result Date: 3/26/2020  PROCEDURE: NM INFLAMMATORY WBC LIMITED W CERETEC CLINICAL INFORMATION: Left foot cellulitis, suspected osteomyelitis TECHNIQUE: A sample of the patient's blood was removed and the white blood cells were labeled with 18 mCi technetium 99m Ceretec and reinjected back patient. Delayed images of the bilateral ankles and feet were obtained for hours following radiopharmaceutical. COMPARISON: None FINDINGS: There is asymmetrically increased activity at the periphery of the left ankle and foot. Focal activity is not identified centrally in the left foot to suggest osseous involvement. However, evaluation is limited due to patient positioning and overlap of structures. Probable diffuse cellulitis in the left foot. Final report electronically signed by Dr. Elizabeth Watts on 3/26/2020 4:24 PM    Vl Dup Lower Extremity Venous Bilateral    Result Date: 3/25/2020  PROCEDURE: VL DUP LOWER EXTREMITY VENOUS BILATERAL CLINICAL INFORMATION: Bilateral lower extremity swelling and pain. . COMPARISON: 4/21/2016 TECHNIQUE: Espiridion William scale compression, Color Flow, and Doppler evaluation of the bilateral common femoral , femoral  popliteal, posterior tibial and peroneal veins was performed. In addition, the origin of the greater saphenous and profunda femoral veins was  evaluated. Exam limitations: None FINDINGS: The study is normal. There is no evidence of deep vein thrombosis in the right and left lower extremities on the basis of this exam. Spontaneous phasic and augmented venous Doppler signals were obtained at the femoral and popliteal levels. Normal Color Flow is demonstrated in the above named venous segments. There is no evidence of venous reflux at the femoral or popliteal levels using augmentation maneuvers. No evidence of deep vein thrombosis. **This report has been created using voice recognition software. It may contain minor errors which are inherent in voice recognition technology. ** Final report electronically signed by Dr. Sadaf Erazo on 3/25/2020 9:16 AM       ASSESSMENT: Pt. is a 47 y.o. male with:  Principle  cellulitis, left lower extremity  Diabetic foot ulceration, left foot    Chronic  Patient Active Problem List   Diagnosis    Cellulitis    Foot ulcer, left, with fat layer exposed ([address]    Forefoot varus, acquired, left    Equinus contracture of left ankle    Type 2 diabetes mellitus with hyperglycemia, with long-term current use of insulin ([address]    Severe sepsis ([address]    Uncontrolled type 2 diabetes mellitus with hyperglycemia ([address]    Hyponatremia    Hypertension, uncontrolled    PFO (patent foramen ovale)    Hyperlipidemia    Smoker    Morbid obesity ([address]    Chronic diastolic heart failure ([address]       PLAN:     Patient initially examined and evaluated  Imaging reviewed, impression above  Radiology recommends 3 phase bone scan , due to the presence of the radionucleotide from his white blood cell scan we will have to wait until Monday for triphasic bone scan. Labs reviewed WBC 12-->6.5, afebrile  Dressing was applied with betadine to wound with dry sterile dressing  Heel weightbearing to left lower extremity, patient is refusing a shoe  Culture reviewed, heavy polymicrobial growth. Staphylococcus pseudintermedius   Continue IV antibiotics, will monitor cultures, dicussed case with Dr. Ann Coon  Patient will likely be discharged with doxycycline for home  Podiatry will continue to follow patient    Dispo: Pending response from antibiotics, results of 3 phase bone scan, and recommendations from ID.   Probable discharge Monday      Robert Higgins DPM  Podiatric Surgical Resident  3/27/2020   9:55 AM

## 2020-03-27 NOTE — CONSULTS
patch Transdermal Daily     Continuous Infusions:   sodium chloride 75 mL/hr at 03/27/20 0521    dextrose       PRN Meds:sodium chloride flush, acetaminophen **OR** acetaminophen, promethazine **OR** ondansetron, potassium chloride **OR** potassium alternative oral replacement **OR** potassium chloride, magnesium sulfate, bisacodyl, glucose, dextrose, glucagon (rDNA), dextrose, albuterol, oxyCODONE-acetaminophen **OR** oxyCODONE-acetaminophen  Allergies:   ALLERGIES:    Morphine        SOCIAL HISTORY:     TOBACCO:   reports that he has been smoking cigarettes. He has been smoking about 1.00 pack per day. He has never used smokeless tobacco.     ETOH:   has no history on file for alcohol. FAMILY HISTORY:     No family history on file. REVIEW OF SYSTEMS:     Constitutional: no fever, no night sweats, no fatigue. Head: no head ache , no head injury, no migranes. Eye: no blurring of vision, no double vision. Ears: no hearing difficulty, no tinnitus  Mouth/throat: no ulceration, dental caries , dysphagia  Lungs: no cough no chest pain  CVS: no palpitation, no chest pain, no shortness of breath  GI: no abdominal pain, no nausea , no vomiting, no constipation  MEDINA: no dysuria, frequency and urgency, no hematuria, no kidney stones  Musculoskeletal: no joint pain, swelling , stiffness,  Endocrine: no polyuria, polydipsia, no cold or heat intolerance  Hematology: hx of vein disease. Dermatology: no skin rash, no eczema, no pruritis,  Psychiatry: no depression, no anxiety,no panic attacks, no suicide ideation    PHYSICAL EXAM:     BP (!) 176/91   Pulse 76   Temp 97.9 °F (36.6 °C) (Oral)   Resp 16   Ht 6' (1.829 m)   Wt 273 lb 12.8 oz (124.2 kg)   SpO2 98%   BMI 37.13 kg/m²   General:  Awake, alert, not in distress. HEENT: pink conjunctiva, unicteric sclera, moist oral mucosa. Chest:  clear  Cardiovascular:  RRR ,S1S2, no murmur or gallop. Abdomen:  Soft, non tender to palpation.   Extremities: chronic stay due to the severe cellulites. Discussed with hospitalist and Podiatry. Advised to continue iv zyvox, ok to stop zosyn. if he agrees to stay  I will sign off. Thank you Nava Ayala MD for allowing me to participate in this patient's care.     Any Patten MD,FACP 3/27/2020 9:27 AM

## 2020-03-27 NOTE — PLAN OF CARE
Problem: Pain:  Goal: Pain level will decrease  Description: Pain level will decrease  Outcome: Ongoing  Note: Denies pain     Problem: Discharge Planning:  Goal: Discharged to appropriate level of care  Description: Discharged to appropriate level of care  Outcome: Ongoing  Note: Pt plans home at discharge. Care manager and social working helping with discharge needs. Problem: Skin Integrity:  Goal: Demonstration of wound healing without infection will improve  Description: Demonstration of wound healing without infection will improve  Outcome: Ongoing  Note: no other skin impairments noted. Pt understands the importance of frequent repositioning in order to prevent any skin breakdown. Problem: Falls - Risk of:  Goal: Will remain free from falls  Description: Will remain free from falls  Outcome: Not Met This Shift  Note: Pt not using call light appropriately to call for assistance with ambulation to the bathroom and to chair. Pt is also compliant with use of non-skid slippers. Pt reports understanding of fall prevention when discussed. Care plan reviewed with patient. Patient verbalize understanding of the plan of care and contribute to goal setting.

## 2020-03-27 NOTE — PROGRESS NOTES
extremity  - Will get triphasic bone scan on monday  - ID signed off, recommend to continue Zyvox, and stop zosyn    Leukocytosis, resolved- 2/2 cellulitis of the LLE  - WBC 6.5 today  - plan as above    DM-2, uncontrolled- A1c 9.6 on admission, has not had medications as outpatient for a long time  - High dose sliding scale  - Increase Lantus to 20 units qhs  - Hypoglycemia protocol  - Diabetic Diet  - Concider resuming oral medication on D/C  - Will need outpatient referral to diabetes clinic and supplies on discharge. Hyponatremia- Resolved  - 135 this AM  - BMP monitor    Essential HTN- uncontrolled  - Continue Losartan, metoprolol and Bumex  - Will continue to monitor     History of CHF, compensated-  Previously followed with CHF clinic at [facility]. Most recent echo 2/14/2019 showed EF of 15-20%. Was prescribed aldactone, losartan, imdur, Toprol XL, Bumex, however he has not been on these medications in a while  - Echo 3/27/2020 showed EF of 50%, mildly enlarged left atrium, grade 1 diastolic dysfunction  - Continue Bumex, metoprolol and losartan     History of HLD  - continue statin     Hx of PFO- was seen by Cardiologist in [facility]    Lactic Acidosis- resolved    Tobacco abuse-  - cessation counseling   - Nicoderm patch 21 mg topically daily    Morbid Obesity with BMI 38.1    Chief Complaint: Left leg pain    Hospital Course:  Please refer to H&P for full HPI, but briefly Adore Olivas is a 50-year-old with a past medical history of myocardial infarction, CHF, uncontrolled type 2 diabetes, uncontrolled hypertension, hyperlipidemia, sleep apnea, tobacco use who presented to [facility] with left leg pain and swelling. He reports that he had a wound repaired on the bottom of his left foot and October November 2019 and that yesterday his leg became worse. He reported that he started getting a \"rash\" that was red swollen and warm to the touch that quickly progressed prior to admission.   Patient rated his pain hemodynamically significant stenosis. 2. There is mild patchy atelectasis and/or infiltrate at the left lung base. 3. Additional chronic findings as described in the body the report. **This report has been created using voice recognition software. It may contain minor errors which are inherent in voice recognition technology. **      Final report electronically signed by Dr. John Encinas on 3/25/2020 8:55 AM      XR FOOT LEFT (MIN 3 VIEWS)   Final Result   . 1. Left lateral plantar soft tissue ulcer. 2. No evidence of obvious osteomyelitis or fracture. 3. Degenerative arthropathy changes present. **This report has been created using voice recognition software. It may contain minor errors which are inherent in voice recognition technology. **      Final report electronically signed by Dr. Farrukh Brady on 3/25/2020 6:24 AM      NM BONE SCAN 3 PHASE    (Results Pending)       Diet: DIET CARB CONTROL;    DVT prophylaxis: [] Lovenox                                 [x] SCDs                                 [] SQ Heparin                                 [] Encourage ambulation           [] Already on Anticoagulation     Disposition:    [] Home       [] TCU       [] Rehab       [] Psych       [] SNF       [] [facility]       [x] Other- Continue inpatient care    Code Status: Full Code    PT/OT Eval Status: Not ordered    Electronically signed by Olga Grajeda MD on 3/27/2020 at 8:23 AM

## 2020-03-27 NOTE — PROGRESS NOTES
Spoke with patient's wife on phone several times throughout shift; patient also calls wife to update

## 2020-03-28 LAB
ANION GAP SERPL CALCULATED.3IONS-SCNC: 13 MEQ/L (ref 8–16)
BUN BLDV-MCNC: 13 MG/DL (ref 7–22)
CALCIUM SERPL-MCNC: 8.7 MG/DL (ref 8.5–10.5)
CHLORIDE BLD-SCNC: 100 MEQ/L (ref 98–111)
CO2: 23 MEQ/L (ref 23–33)
CREAT SERPL-MCNC: 0.7 MG/DL (ref 0.4–1.2)
ERYTHROCYTE [DISTWIDTH] IN BLOOD BY AUTOMATED COUNT: 13.1 % (ref 11.5–14.5)
ERYTHROCYTE [DISTWIDTH] IN BLOOD BY AUTOMATED COUNT: 45.8 FL (ref 35–45)
GFR SERPL CREATININE-BSD FRML MDRD: > 90 ML/MIN/1.73M2
GLUCOSE BLD-MCNC: 209 MG/DL (ref 70–108)
GLUCOSE BLD-MCNC: 213 MG/DL (ref 70–108)
GLUCOSE BLD-MCNC: 234 MG/DL (ref 70–108)
GLUCOSE BLD-MCNC: 240 MG/DL (ref 70–108)
GLUCOSE BLD-MCNC: 362 MG/DL (ref 70–108)
HCT VFR BLD CALC: 49 % (ref 42–52)
HEMOGLOBIN: 16 GM/DL (ref 14–18)
MCH RBC QN AUTO: 30.9 PG (ref 26–33)
MCHC RBC AUTO-ENTMCNC: 32.7 GM/DL (ref 32.2–35.5)
MCV RBC AUTO: 94.6 FL (ref 80–94)
PLATELET # BLD: 191 THOU/MM3 (ref 130–400)
PMV BLD AUTO: 10.8 FL (ref 9.4–12.4)
POTASSIUM SERPL-SCNC: 4.5 MEQ/L (ref 3.5–5.2)
RBC # BLD: 5.18 MILL/MM3 (ref 4.7–6.1)
SODIUM BLD-SCNC: 136 MEQ/L (ref 135–145)
WBC # BLD: 6.4 THOU/MM3 (ref 4.8–10.8)

## 2020-03-28 PROCEDURE — 6360000002 HC RX W HCPCS: Performed by: STUDENT IN AN ORGANIZED HEALTH CARE EDUCATION/TRAINING PROGRAM

## 2020-03-28 PROCEDURE — 36415 COLL VENOUS BLD VENIPUNCTURE: CPT

## 2020-03-28 PROCEDURE — 2580000003 HC RX 258: Performed by: NURSE PRACTITIONER

## 2020-03-28 PROCEDURE — 6370000000 HC RX 637 (ALT 250 FOR IP): Performed by: STUDENT IN AN ORGANIZED HEALTH CARE EDUCATION/TRAINING PROGRAM

## 2020-03-28 PROCEDURE — 99232 SBSQ HOSP IP/OBS MODERATE 35: CPT | Performed by: FAMILY MEDICINE

## 2020-03-28 PROCEDURE — 1200000000 HC SEMI PRIVATE

## 2020-03-28 PROCEDURE — 85027 COMPLETE CBC AUTOMATED: CPT

## 2020-03-28 PROCEDURE — 82948 REAGENT STRIP/BLOOD GLUCOSE: CPT

## 2020-03-28 PROCEDURE — 94760 N-INVAS EAR/PLS OXIMETRY 1: CPT

## 2020-03-28 PROCEDURE — 2709999900 HC NON-CHARGEABLE SUPPLY

## 2020-03-28 PROCEDURE — 80048 BASIC METABOLIC PNL TOTAL CA: CPT

## 2020-03-28 PROCEDURE — 6370000000 HC RX 637 (ALT 250 FOR IP): Performed by: FAMILY MEDICINE

## 2020-03-28 RX ORDER — ALOGLIPTIN 25 MG/1
25 TABLET, FILM COATED ORAL DAILY
Status: DISCONTINUED | OUTPATIENT
Start: 2020-03-28 | End: 2020-03-29

## 2020-03-28 RX ORDER — INSULIN GLARGINE 100 [IU]/ML
30 INJECTION, SOLUTION SUBCUTANEOUS NIGHTLY
Status: DISCONTINUED | OUTPATIENT
Start: 2020-03-28 | End: 2020-03-29

## 2020-03-28 RX ORDER — LOSARTAN POTASSIUM 50 MG/1
50 TABLET ORAL DAILY
Status: DISCONTINUED | OUTPATIENT
Start: 2020-03-29 | End: 2020-03-30

## 2020-03-28 RX ADMIN — LOSARTAN POTASSIUM 25 MG: 25 TABLET, FILM COATED ORAL at 07:56

## 2020-03-28 RX ADMIN — ATORVASTATIN CALCIUM 40 MG: 40 TABLET, FILM COATED ORAL at 20:21

## 2020-03-28 RX ADMIN — METOPROLOL SUCCINATE 25 MG: 25 TABLET, FILM COATED, EXTENDED RELEASE ORAL at 20:21

## 2020-03-28 RX ADMIN — BUMETANIDE 1 MG: 1 TABLET ORAL at 07:56

## 2020-03-28 RX ADMIN — LINEZOLID 600 MG: 600 INJECTION, SOLUTION INTRAVENOUS at 17:45

## 2020-03-28 RX ADMIN — INSULIN GLARGINE 30 UNITS: 100 INJECTION, SOLUTION SUBCUTANEOUS at 20:24

## 2020-03-28 RX ADMIN — LINEZOLID 600 MG: 600 INJECTION, SOLUTION INTRAVENOUS at 05:44

## 2020-03-28 RX ADMIN — METOPROLOL SUCCINATE 50 MG: 50 TABLET, FILM COATED, EXTENDED RELEASE ORAL at 07:56

## 2020-03-28 RX ADMIN — SODIUM CHLORIDE: 9 INJECTION, SOLUTION INTRAVENOUS at 10:04

## 2020-03-28 ASSESSMENT — PAIN SCALES - GENERAL: PAINLEVEL_OUTOF10: 0

## 2020-03-28 NOTE — PROGRESS NOTES
Patient refusing to have bed or chair alarm on and in place. Patient turns off alarm if put in place. Educated patient on the risk of falling and patient verbalizes understanding. Hourly rounding in place. Non-skid shoes in place. Will monitor.

## 2020-03-28 NOTE — PROGRESS NOTES
PROGRESS NOTE      Patient:  Oma Urbina      Unit/Bed:5K-16/016-A    YOB: 1970    MRN: [id number]       Acct: [de-identified]     PCP: No primary care provider on file. Date of Admission: 3/25/2020    Assessment/Plan:    Anticipated Discharge in:  >2 days    Active Hospital Problems    Diagnosis Date Noted    Noncompliance with medications [Z91.14]     Severe sepsis ([facility] [A41.9, R65.20]     Uncontrolled type 2 diabetes mellitus with hyperglycemia ([facility] [E11.65]     Hyponatremia [E87.1]     Hypertension, uncontrolled [I10]     PFO (patent foramen ovale) [Q21.1]     Hyperlipidemia [E78.5]     Smoker [F17.200]     Morbid obesity ([facility] [E66.01]     Chronic diastolic heart failure (HCC) [I50.32]     Cellulitis [L03.90] 03/25/2020    Foot ulcer, left, with fat layer exposed ([facility] [L97.522] 03/25/2020    Forefoot varus, acquired, left [M21.6X2] 03/25/2020    Equinus contracture of left ankle [M24.572] 03/25/2020    Type 2 diabetes mellitus with hyperglycemia, with long-term current use of insulin (HCC) [E11.65, Z79.4] 03/25/2020     Severe Sepsis likely 2/2 to Cellulitis of the LLE and possible osteomyelitis of L foot, resolved- s/p Zosyn 3/25; Vanc x 1 dose 3/25 in ED; doppler negative for DVT, patient has been afebrile since admission, WBC 12.1 on admission, Tachycardic on admission, RR normal   - Podiatry is following recommendations appreciated. - Wound culture 3/25 growing Many gram positive cocci in pairs and clusters. Moderate gram positive bacilli. Few gram negative bacilli. Organism: Staphylococcus pseudintermedius, S. Aureus, Corynebacterium species, and Enterococcus species.  Staph species sensitive to Zyvox  - Blood cultures preliminary no growth  - ID signed off, recommend to continue Zyvox and discharge on doxycycline 100 mg p.o. twice daily for 14 days  - CBC in AM  - Continue to monitor vital signs     Wound to left foot, plantar aspect near 5th metatarsal - Bone Scan showed possible cellulitis of the L foot. - Podiatry is following recommendations appreciated. - Today leg appears better  - Heel weightbearing to left lower extremity  - Will get triphasic bone scan on Monday  - cultures as above  - ID signed off, recommend to continue Zyvox    Leukocytosis, resolved- 2/2 cellulitis of the LLE  - WBC 6.5 today  - plan as above    DM type 2, uncontrolled- A1c 9.6 on admission, has not had medications as outpatient for a long time  - Sugars increased last night as a result of his wife bringing snacks  - High dose sliding scale  - Increase Lantus to 30 units qhs  - Hypoglycemia protocol  - Diabetic Diet  - Holding oral medications while inpatient, concider resuming oral medication on D/C  - Will need outpatient referral to diabetes clinic and supplies on discharge. Hyponatremia- Resolved  - 135 this AM  - BMP monitor    Essential HTN- uncontrolled  - Increase Losartan to 50 mg daily  - Continue metoprolol and Bumex  - Will continue to monitor     History of chronic HFrEF, EF 15-20%, now grade 1 diastolic dysfunction, EF 95% compensated -  Previously followed with CHF clinic at [facility]. Most recent echo 2/14/2019 showed EF of 15-20%.  Was prescribed aldactone, losartan, imdur, Toprol XL, Bumex, however he has not been on these medications in a while  - Echo 3/27/2020 showed EF of 50%, mildly enlarged left atrium, grade 1 diastolic dysfunction  - Continue Bumex, metoprolol and losartan     History of HLD  - continue statin     Hx of PFO- was seen by Cardiologist in [facility]    Lactic Acidosis- resolved    Tobacco abuse-  - cessation counseling   - Nicoderm patch 21 mg topically daily    Morbid Obesity with BMI 38.1    Chief Complaint: Left leg pain    Hospital Course:  Please refer to H&P for full HPI, but briefly Myranda Garcia is a 39-year-old with a past medical history of myocardial infarction, CHF, uncontrolled type 2 diabetes, uncontrolled hypertension, hyperlipidemia, sleep apnea, tobacco use who presented to [facility] with left leg pain and swelling. He reports that he had a wound repaired on the bottom of his left foot and October November 2019 and that yesterday his leg became worse. He reported that he started getting a \"rash\" that was red swollen and warm to the touch that quickly progressed prior to admission. Patient rated his pain a 5 out of 10 on admission. He was unaware that he was running a fever prior to admission. In the emergency department he was given 1 dose of vancomycin and was started on Zosyn. Podiatry was consulted in the emergency room and recommended to continue IV antibiotics and ceretec bone scan. The results of the bone scan were inconclusive for osteomyelitis however did show cellulitis. Podiatry recommended a three-phase bone scan as further work-up. On March 26, Linezolid was started. Patient was also restarted on metformin, metoprolol, and Bumex. Patient was also started on 10 units of Lantus at night. Subjective:  Patient was seen and examined in his room. There were no acute events overnight. Patient has no complaints or concerns this morning. He patient reports that he only had a small amount of his snacks his wife brought him yesterday. He reports that he is never taken insulin for diabetes in the past.  He denies fever, chills, shortness of breath, cough, chest pain, abdominal pain, nausea, vomiting, diarrhea, constipation, difficulty urinating.       Medications:  Reviewed    Infusion Medications    sodium chloride 75 mL/hr at 03/27/20 1840    dextrose       Scheduled Medications    insulin glargine  20 Units Subcutaneous Nightly    insulin lispro  0-18 Units Subcutaneous TID     insulin lispro  0-9 Units Subcutaneous Nightly    linezolid  600 mg Intravenous Q12H    [Held by provider] metFORMIN  2,000 mg Oral Daily with breakfast    metoprolol succinate  25 mg Oral Nightly    metoprolol succinate  50 mg Oral QAM   

## 2020-03-28 NOTE — PROGRESS NOTES
denies any fever, chills, nausea, vomiting, diarrhea, chest pain or shortness of breath. Patient states that he smokes a pack a day but is interested in quitting but has no help. Patient wants to do it on his own. Patient has no other pedal complaints at this time.      Current Medications:    Current Facility-Administered Medications: [START ON 3/29/2020] losartan (COZAAR) tablet 50 mg, 50 mg, Oral, Daily  insulin glargine (LANTUS) injection vial 30 Units, 30 Units, Subcutaneous, Nightly  insulin lispro (HUMALOG) injection vial 0-18 Units, 0-18 Units, Subcutaneous, TID WC  insulin lispro (HUMALOG) injection vial 0-9 Units, 0-9 Units, Subcutaneous, Nightly  linezolid (ZYVOX) IVPB 600 mg, 600 mg, Intravenous, Q12H  [Held by provider] metFORMIN (GLUCOPHAGE-XR) extended release tablet 2,000 mg, 2,000 mg, Oral, Daily with breakfast  metoprolol succinate (TOPROL XL) extended release tablet 25 mg, 25 mg, Oral, Nightly  metoprolol succinate (TOPROL XL) extended release tablet 50 mg, 50 mg, Oral, QAM  bumetanide (BUMEX) tablet 1 mg, 1 mg, Oral, Daily  atorvastatin (LIPITOR) tablet 40 mg, 40 mg, Oral, Nightly  sodium chloride flush 0.9 % injection 10 mL, 10 mL, Intravenous, 2 times per day  sodium chloride flush 0.9 % injection 10 mL, 10 mL, Intravenous, PRN  acetaminophen (TYLENOL) tablet 650 mg, 650 mg, Oral, Q6H PRN **OR** acetaminophen (TYLENOL) suppository 650 mg, 650 mg, Rectal, Q6H PRN  promethazine (PHENERGAN) tablet 12.5 mg, 12.5 mg, Oral, Q6H PRN **OR** ondansetron (ZOFRAN) injection 4 mg, 4 mg, Intravenous, Q6H PRN  0.9 % sodium chloride infusion, , Intravenous, Continuous  potassium chloride (KLOR-CON M) extended release tablet 40 mEq, 40 mEq, Oral, PRN **OR** potassium bicarb-citric acid (EFFER-K) effervescent tablet 40 mEq, 40 mEq, Oral, PRN **OR** potassium chloride 10 mEq/100 mL IVPB (Peripheral Line), 10 mEq, Intravenous, PRN  magnesium sulfate 2 g in 50 mL IVPB premix, 2 g, Intravenous, PRN  bisacodyl improving. Hair growth absent. Quality of skin taut and shiny.      Dermatologic: Hyperkeratotic wound present to the plantar left fifth metatarsal head. The base of the wound is fibrogranular with a hyperkeratotic rim. Lymphangitis extending to the medial thigh is improving. No active drainage from the wound noted at this time, no malodor, purulence or necrosis. Cellulitis greatly improved today.     Neurovascular: Gross sensation diminished     Musculoskeletal: Muscle strength testing deferred at this time. Range of motion of ankle joint was decreased and subtalar joint midtarsal joint range of motion within normal limits. Foot has a rectus alignment. Imaging:  Xr Foot Left (min 3 Views)    Result Date: 3/25/2020  PROCEDURE: XR FOOT LEFT (MIN 3 VIEWS) CLINICAL INFORMATION: Foot pain with ulcer at base of left fifth digit. . COMPARISON: No prior study. TECHNIQUE: 4 views of the left foot. FINDINGS:  There are no acute fractures visualized. Soft tissue swelling is present, lateral and plantar aspects of the foot with a shallow ulcer visualized along the plantar aspect adjacent to the fifth metatarsal head. There are no acute appearing periosteal changes. There is mild degenerative arthropathy. Plantar spurring is present. . 1. Left lateral plantar soft tissue ulcer. 2. No evidence of obvious osteomyelitis or fracture. 3. Degenerative arthropathy changes present. **This report has been created using voice recognition software. It may contain minor errors which are inherent in voice recognition technology. ** Final report electronically signed by Dr. Coy Miller on 3/25/2020 6:24 AM    Cta Abdominal Aorta W Bilat Runoff W Wo Contrast    Result Date: 3/25/2020  PROCEDURE: CTA ABDOMINAL AORTA W BILAT RUNOFF W WO CONTRAST CLINICAL INFORMATION: Severe varicosities; decreased pulses; uncontrolled diabetes. COMPARISON: No prior study. TECHNIQUE: A noncontrast localizer was obtained.  3 mm axial images

## 2020-03-28 NOTE — PROGRESS NOTES
Patient does not want any family called at this time. Patient understands plan of care and will communicate to his family. on unit/friend/significant other

## 2020-03-28 NOTE — PLAN OF CARE
Problem: Pain:  Goal: Pain level will decrease  Description: Pain level will decrease  3/28/2020 1408 by Jaida Connors RN  Outcome: Ongoing  Note: No complaints of pain at this time. Problem: Falls - Risk of:  Goal: Will remain free from falls  Description: Will remain free from falls  Outcome: Ongoing  Note: Bed in lowest position. Call light within reach. Educated patient to use call light for assistance. Problem: Discharge Planning:  Goal: Discharged to appropriate level of care  Description: Discharged to appropriate level of care  3/28/2020 1408 by Jaida Connors RN  Outcome: Ongoing  Note: Plans to return to home once discharged. Problem: Skin Integrity:  Goal: Demonstration of wound healing without infection will improve  Description: Demonstration of wound healing without infection will improve  3/28/2020 1408 by Jaida Connors RN  Outcome: Ongoing  Note: Skin assessment completed. Patient turned every 2 hours and as needed. No skin breakdown this shift. Care plan reviewed with patient. Patient verbalizes understanding of the plan of care and contribute to goal setting.

## 2020-03-28 NOTE — PLAN OF CARE
Problem: Pain:  Goal: Pain level will decrease  Description: Pain level will decrease  Outcome: Ongoing  Note: Pain Assessment: 0-10  Pain Level: 0   Patient's Stated Pain Goal: No pain   Is pain goal met at this time? Yes   Patient is resting comfortably without pain medication being administered. Patient denies any pain at this time. Non-Pharmaceutical Pain Intervention(s): Rest, Repositioned      Problem: Discharge Planning:  Goal: Discharged to appropriate level of care  Description: Discharged to appropriate level of care  Outcome: Ongoing  Note: Pt plans home with wife at discharge. Care manager and social working helping with discharge needs. Problem: Skin Integrity:  Goal: Demonstration of wound healing without infection will improve  Description: Demonstration of wound healing without infection will improve  Outcome: Ongoing  Note: Wound on bottom of foot near 5th metatarsal. Podiatry performing dressing changes with iodoform, dry dressing and ACE wrap. Dressing remains in place and is clean, dry and intact. Patient is non-complaint with heel weight bearing status. Education provided. Patient is to have a 3 phase bone scan on Monday to r/o osteomyelitis. Will monitor. Problem: Neurological  Goal: Maximum potential motor/sensory/cognitive function  Outcome: Ongoing  Note: Patient is alert and oriented x4. Patient states chronic N/T to BLE. Patient is up with standby assist. Patient ambulates in room independently due to refusing alarm. Education to call prior to ambulation and the risk of falls educated. Patient verbalizes understanding; however, continues to not call prior to ambulation. Hourly round in place. Will monitor. Problem: Cardiovascular  Goal: No DVT, peripheral vascular complications  Outcome: Ongoing  Note: Pt with s/s (redness) of DVT. Dopplers of legs were negative and patient has no other s/s of DVT.  Pt able to take prescribed anticoagulant (Xarelto) to help prevent

## 2020-03-29 LAB
AEROBIC CULTURE: ABNORMAL
ANAEROBIC CULTURE: ABNORMAL
ANION GAP SERPL CALCULATED.3IONS-SCNC: 13 MEQ/L (ref 8–16)
BUN BLDV-MCNC: 13 MG/DL (ref 7–22)
CALCIUM SERPL-MCNC: 8.8 MG/DL (ref 8.5–10.5)
CHLORIDE BLD-SCNC: 97 MEQ/L (ref 98–111)
CO2: 25 MEQ/L (ref 23–33)
CREAT SERPL-MCNC: 0.6 MG/DL (ref 0.4–1.2)
GFR SERPL CREATININE-BSD FRML MDRD: > 90 ML/MIN/1.73M2
GLUCOSE BLD-MCNC: 239 MG/DL (ref 70–108)
GLUCOSE BLD-MCNC: 255 MG/DL (ref 70–108)
GLUCOSE BLD-MCNC: 256 MG/DL (ref 70–108)
GLUCOSE BLD-MCNC: 316 MG/DL (ref 70–108)
GLUCOSE BLD-MCNC: 360 MG/DL (ref 70–108)
GRAM STAIN RESULT: ABNORMAL
ORGANISM: ABNORMAL
POTASSIUM SERPL-SCNC: 4.4 MEQ/L (ref 3.5–5.2)
SODIUM BLD-SCNC: 135 MEQ/L (ref 135–145)

## 2020-03-29 PROCEDURE — 1200000000 HC SEMI PRIVATE

## 2020-03-29 PROCEDURE — 80048 BASIC METABOLIC PNL TOTAL CA: CPT

## 2020-03-29 PROCEDURE — 6370000000 HC RX 637 (ALT 250 FOR IP): Performed by: STUDENT IN AN ORGANIZED HEALTH CARE EDUCATION/TRAINING PROGRAM

## 2020-03-29 PROCEDURE — 99232 SBSQ HOSP IP/OBS MODERATE 35: CPT | Performed by: FAMILY MEDICINE

## 2020-03-29 PROCEDURE — 36415 COLL VENOUS BLD VENIPUNCTURE: CPT

## 2020-03-29 PROCEDURE — 82948 REAGENT STRIP/BLOOD GLUCOSE: CPT

## 2020-03-29 PROCEDURE — 2580000003 HC RX 258: Performed by: NURSE PRACTITIONER

## 2020-03-29 PROCEDURE — 94760 N-INVAS EAR/PLS OXIMETRY 1: CPT

## 2020-03-29 PROCEDURE — 2709999900 HC NON-CHARGEABLE SUPPLY

## 2020-03-29 PROCEDURE — 6360000002 HC RX W HCPCS: Performed by: STUDENT IN AN ORGANIZED HEALTH CARE EDUCATION/TRAINING PROGRAM

## 2020-03-29 PROCEDURE — 6370000000 HC RX 637 (ALT 250 FOR IP): Performed by: FAMILY MEDICINE

## 2020-03-29 RX ORDER — INSULIN GLARGINE 100 [IU]/ML
42 INJECTION, SOLUTION SUBCUTANEOUS NIGHTLY
Status: DISCONTINUED | OUTPATIENT
Start: 2020-03-29 | End: 2020-03-30

## 2020-03-29 RX ORDER — HYDRALAZINE HYDROCHLORIDE 20 MG/ML
10 INJECTION INTRAMUSCULAR; INTRAVENOUS EVERY 4 HOURS PRN
Status: DISCONTINUED | OUTPATIENT
Start: 2020-03-29 | End: 2020-03-30 | Stop reason: HOSPADM

## 2020-03-29 RX ADMIN — ATORVASTATIN CALCIUM 40 MG: 40 TABLET, FILM COATED ORAL at 21:25

## 2020-03-29 RX ADMIN — LINEZOLID 600 MG: 600 INJECTION, SOLUTION INTRAVENOUS at 05:46

## 2020-03-29 RX ADMIN — INSULIN LISPRO 5 UNITS: 100 INJECTION, SOLUTION INTRAVENOUS; SUBCUTANEOUS at 12:16

## 2020-03-29 RX ADMIN — SODIUM CHLORIDE: 9 INJECTION, SOLUTION INTRAVENOUS at 01:08

## 2020-03-29 RX ADMIN — LOSARTAN POTASSIUM 50 MG: 50 TABLET, FILM COATED ORAL at 09:52

## 2020-03-29 RX ADMIN — SODIUM CHLORIDE, PRESERVATIVE FREE 10 ML: 5 INJECTION INTRAVENOUS at 22:14

## 2020-03-29 RX ADMIN — METOPROLOL SUCCINATE 50 MG: 50 TABLET, FILM COATED, EXTENDED RELEASE ORAL at 09:53

## 2020-03-29 RX ADMIN — SODIUM CHLORIDE: 9 INJECTION, SOLUTION INTRAVENOUS at 15:00

## 2020-03-29 RX ADMIN — INSULIN LISPRO 5 UNITS: 100 INJECTION, SOLUTION INTRAVENOUS; SUBCUTANEOUS at 17:31

## 2020-03-29 RX ADMIN — LINEZOLID 600 MG: 600 INJECTION, SOLUTION INTRAVENOUS at 17:24

## 2020-03-29 RX ADMIN — BUMETANIDE 1 MG: 1 TABLET ORAL at 09:51

## 2020-03-29 RX ADMIN — METOPROLOL SUCCINATE 25 MG: 25 TABLET, FILM COATED, EXTENDED RELEASE ORAL at 21:25

## 2020-03-29 RX ADMIN — INSULIN GLARGINE 42 UNITS: 100 INJECTION, SOLUTION SUBCUTANEOUS at 21:25

## 2020-03-29 ASSESSMENT — PAIN SCALES - GENERAL: PAINLEVEL_OUTOF10: 0

## 2020-03-29 NOTE — PROGRESS NOTES
states that he has had a wound to his left foot for some time but he does not know exactly how long. Patient does not know his blood sugar and does not know his A1c. Patient states that he has poor relationship with physicians. Patient states that we have 24-hour to fix him and then he is out the door. Patient denies any fever, chills, nausea, vomiting, diarrhea, chest pain or shortness of breath. Patient states that he smokes a pack a day but is interested in quitting but has no help. Patient wants to do it on his own. Patient has no other pedal complaints at this time.      Current Medications:    Current Facility-Administered Medications: hydrALAZINE (APRESOLINE) injection 10 mg, 10 mg, Intravenous, Q4H PRN  insulin glargine (LANTUS) injection vial 42 Units, 42 Units, Subcutaneous, Nightly  insulin lispro (HUMALOG) injection vial 5 Units, 5 Units, Subcutaneous, TID WC  losartan (COZAAR) tablet 50 mg, 50 mg, Oral, Daily  insulin lispro (HUMALOG) injection vial 0-18 Units, 0-18 Units, Subcutaneous, TID WC  insulin lispro (HUMALOG) injection vial 0-9 Units, 0-9 Units, Subcutaneous, Nightly  linezolid (ZYVOX) IVPB 600 mg, 600 mg, Intravenous, Q12H  [Held by provider] metFORMIN (GLUCOPHAGE-XR) extended release tablet 2,000 mg, 2,000 mg, Oral, Daily with breakfast  metoprolol succinate (TOPROL XL) extended release tablet 25 mg, 25 mg, Oral, Nightly  metoprolol succinate (TOPROL XL) extended release tablet 50 mg, 50 mg, Oral, QAM  bumetanide (BUMEX) tablet 1 mg, 1 mg, Oral, Daily  atorvastatin (LIPITOR) tablet 40 mg, 40 mg, Oral, Nightly  sodium chloride flush 0.9 % injection 10 mL, 10 mL, Intravenous, 2 times per day  sodium chloride flush 0.9 % injection 10 mL, 10 mL, Intravenous, PRN  acetaminophen (TYLENOL) tablet 650 mg, 650 mg, Oral, Q6H PRN **OR** acetaminophen (TYLENOL) suppository 650 mg, 650 mg, Rectal, Q6H PRN  promethazine (PHENERGAN) tablet 12.5 mg, 12.5 mg, Oral, Q6H PRN **OR** ondansetron (ZOFRAN) injection 4 mg, 4 mg, Intravenous, Q6H PRN  0.9 % sodium chloride infusion, , Intravenous, Continuous  potassium chloride (KLOR-CON M) extended release tablet 40 mEq, 40 mEq, Oral, PRN **OR** potassium bicarb-citric acid (EFFER-K) effervescent tablet 40 mEq, 40 mEq, Oral, PRN **OR** potassium chloride 10 mEq/100 mL IVPB (Peripheral Line), 10 mEq, Intravenous, PRN  magnesium sulfate 2 g in 50 mL IVPB premix, 2 g, Intravenous, PRN  bisacodyl (DULCOLAX) EC tablet 5 mg, 5 mg, Oral, Daily PRN  nicotine (NICODERM CQ) 21 MG/24HR 1 patch, 1 patch, Transdermal, Daily  glucose (GLUTOSE) 40 % oral gel 15 g, 15 g, Oral, PRN  dextrose 50 % IV solution, 12.5 g, Intravenous, PRN  glucagon (rDNA) injection 1 mg, 1 mg, Intramuscular, PRN  dextrose 5 % solution, 100 mL/hr, Intravenous, PRN  albuterol (PROVENTIL) nebulizer solution 2.5 mg, 2.5 mg, Nebulization, Q6H PRN  oxyCODONE-acetaminophen (PERCOCET) 5-325 MG per tablet 1 tablet, 1 tablet, Oral, Q4H PRN **OR** oxyCODONE-acetaminophen (PERCOCET) 5-325 MG per tablet 2 tablet, 2 tablet, Oral, Q4H PRN    Objective     BP (!) 193/91   Pulse 68   Temp 98.4 °F (36.9 °C) (Oral)   Resp 18   Ht 6' (1.829 m)   Wt 276 lb 8 oz (125.4 kg)   SpO2 91%   BMI 37.50 kg/m²      I/O:    Intake/Output Summary (Last 24 hours) at 3/29/2020 1033  Last data filed at 3/29/2020 0315  Gross per 24 hour   Intake 3383.59 ml   Output 6 ml   Net 3377.59 ml              Wt Readings from Last 3 Encounters:   03/28/20 276 lb 8 oz (125.4 kg)   06/04/18 280 lb (127 kg)       LABS:    Recent Labs     03/27/20  0544 03/28/20  0430   WBC 6.5 6.4   HGB 16.4 16.0   HCT 50.4 49.0    191        Recent Labs     03/29/20  0604      K 4.4   CL 97*   CO2 25   BUN 13   CREATININE 0.6        No results for input(s): PROT, INR, APTT in the last 72 hours. No results for input(s): CKTOTAL, CKMB, CKMBINDEX, TROPONINI in the last 72 hours.     Focused Lower Extremity Examination:    Vitals:    BP (!) 193/91 electronically signed by Dr. Eva Pollock on 3/25/2020 6:24 AM    Cta Abdominal Aorta W Bilat Runoff W Wo Contrast    Result Date: 3/25/2020  PROCEDURE: CTA ABDOMINAL AORTA W BILAT RUNOFF W WO CONTRAST CLINICAL INFORMATION: Severe varicosities; decreased pulses; uncontrolled diabetes. COMPARISON: No prior study. TECHNIQUE: A noncontrast localizer was obtained. 3 mm axial images were obtained through the abdomen, pelvis and both lower extremities after the administration of intravenous contrast.  3D reconstructions were performed on a dedicated 3-D workstation to include sagittal and coronal mid images through the vasculature. Centerline reconstructions were also obtained. All CT scans at this facility use dose modulation, iterative reconstruction, and/or weight based dosing when appropriate to reduce the radiation dose to as low as reasonably achievable. CONTRAST: 80 mL Isovue 370 intravenously. Next film FINDINGS: ABDOMINAL AORTA: 1. Mild atheromatous plaque distal abdominal aorta. 2. There is no abdominal aortic aneurysm or hemodynamically significant stenosis. CELIAC TRUNK: 1. Unremarkable SUPERIOR/INFERIOR MESENTERIC ARTERIES: 1. There is a small calcified plaque along the mid superior mesenteric artery which is otherwise unremarkable. 2. The inferior mesenteric arteries is unremarkable. RENAL ARTERIES: 1. Unremarkable ILIAC ARTERIES: 1. Atheromatous calcification common and internal iliac arteries bilaterally and a few small calcified plaques along the external iliac arteries bilaterally. There is no hemodynamically significant stenosis. RIGHT LOWER EXTREMITY: 1. There is mild atheromatous plaque at the right common femoral artery without hemodynamically significant stenosis. 2. There is mild atheromatous plaque along the right femoral artery without hemodynamically significant stenosis. 3. The popliteal artery is unremarkable. 3. The anterior tibial artery is unremarkable.  4. There is mild atheromatous plaque at the right tibioperoneal trunk without hemodynamically significant stenosis. The right posterior tibial and right peroneal arteries are otherwise unremarkable. LEFT LOWER EXTREMITY: 1. There is mild atheromatous plaque at the left common femoral artery without hemodynamically significant stenosis. 2. There are small foci of atheromatous calcification along the left femoral artery without hemodynamically significant stenosis. 3. There is a small calcified plaque along the left anterior tibial artery which is otherwise unremarkable. 4. There is mild atheromatous calcification along the left tibioperitoneal trunk. The right posterior tibial and right peroneal arteries are otherwise unremarkable. LUNG BASES: 1. Mild patchy atelectasis and/or infiltrate at the left lung base. 2. Moderate aortic valvular calcification. 3. Coronary artery calcification present however not quantified. LIVER/GALLBLADDER/BILIARY TREE/PANCREAS/SPLEEN/ADRENAL GLANDS/KIDNEYS: 1. There is fatty infiltration of the liver. 2. Cholecystectomy. 3. The common duct is mildly prominent measuring 0.9 to 1.0 cm. 4. The pancreas and spleen are unremarkable. 5. The bilateral adrenal glands are unremarkable. 6. There is a 0.9 cm hypodense lesion along the anteromedial aspect of the lower pole the left kidney measuring 19 Hounsfield units consistent with a cyst. BOWEL PATTERN: 1. The bowel gas pattern is nonobstructive. There is a moderate amount stool within the colon. The appendix is unremarkable. 2. The distal esophagus, stomach and small bowel are unremarkable. INFLAMMATION: 1. There is no inflammatory process. LYMPHADENOPATHY: 1. There is no pathologically enlarged lymphadenopathy. PELVIS: 1. The urinary bladder is collapsed and not adequately visualized. 2. There are calcifications are within the prostate gland. MUSCULOSKELETAL: 1.  Findings there is slight dextroscoliosis of the lumbar spine and mild degenerative changes at several levels along the spine. 2. There are mild degenerative changes at the right hip joint. 1. There is no hemodynamically significant stenosis. 2. There is mild patchy atelectasis and/or infiltrate at the left lung base. 3. Additional chronic findings as described in the body the report. **This report has been created using voice recognition software. It may contain minor errors which are inherent in voice recognition technology. ** Final report electronically signed by Dr. Ana Maria Manzo on 3/25/2020 8:55 AM    Nm Inflammatory Wbc Limited W Ceretec    Result Date: 3/26/2020  PROCEDURE: NM INFLAMMATORY WBC LIMITED W CERETEC CLINICAL INFORMATION: Left foot cellulitis, suspected osteomyelitis TECHNIQUE: A sample of the patient's blood was removed and the white blood cells were labeled with 18 mCi technetium 99m Ceretec and reinjected back patient. Delayed images of the bilateral ankles and feet were obtained for hours following radiopharmaceutical. COMPARISON: None FINDINGS: There is asymmetrically increased activity at the periphery of the left ankle and foot. Focal activity is not identified centrally in the left foot to suggest osseous involvement. However, evaluation is limited due to patient positioning and overlap of structures. Probable diffuse cellulitis in the left foot. Final report electronically signed by Dr. Todd Moore on 3/26/2020 4:24 PM    Vl Dup Lower Extremity Venous Bilateral    Result Date: 3/25/2020  PROCEDURE: VL DUP LOWER EXTREMITY VENOUS BILATERAL CLINICAL INFORMATION: Bilateral lower extremity swelling and pain. . COMPARISON: 4/21/2016 TECHNIQUE: Antonia Makos scale compression, Color Flow, and Doppler evaluation of the bilateral common femoral , femoral  popliteal, posterior tibial and peroneal veins was performed. In addition, the origin of the greater saphenous and profunda femoral veins was  evaluated.   Exam limitations: None FINDINGS: The study is normal. There is no evidence of deep vein thrombosis in the right and left lower extremities on the basis of this exam. Spontaneous phasic and augmented venous Doppler signals were obtained at the femoral and popliteal levels. Normal Color Flow is demonstrated in the above named venous segments. There is no evidence of venous reflux at the femoral or popliteal levels using augmentation maneuvers. No evidence of deep vein thrombosis. **This report has been created using voice recognition software. It may contain minor errors which are inherent in voice recognition technology. ** Final report electronically signed by Dr. Neil Palacio on 3/25/2020 9:16 AM       ASSESSMENT and PLAN    Cellulitis, left lower extremity  Diabetic foot ulceration, left foot    Patient examined and evaluated  Labs reviewed WBC 12.1-->6.4; Afebrile  Imaging reviewed, impression above  3 phase bone scan scheduled for Monday 3/30/20 due to the presence of the radionucleotide from his white blood cell scan he will have to wait until Monday for triphasic bone scan  Culture reviewed, heavy polymicrobial growth. Staphylococcus pseudintermedius   Continue IV antibiotics,  patient was unpleasant to ID. ID signed off  Case previously discussed w/ ID, will likely be discharged with PO doxycycline or PO linezolid  Applied betadine to wound and dressed w/ gauze, kerlix and ACE  Heel weightbearing to left lower extremity, patient wearing slide on slipper  Podiatry will continue to follow patient and see after bone scan on Monday 3/30/20    Dispo: Pending  results of 3 phase bone scan and oral abx. Probable discharge Monday 3/30/20.       Baudilio Mora [place]  Podiatric Surgical Resident  3/29/2020   10:33 AM

## 2020-03-29 NOTE — PROGRESS NOTES
Hospitalist Progress Note    Patient:  Sabino Serrano      Unit/Bed:5K-16/016-A    YOB: 1970    MRN: [id number]       Acct: [de-identified]     PCP: No primary care provider on file. Date of Admission: 3/25/2020    Chief Complaint: Left leg pain    Hospital Course:     Please see H/P for details. In summary, [facility] 47 [facility] type 2, chronic HFrEF, EF 15-20%, now grade 1 diastolic dysfunction, EF 87%, hx PFO, non-compliant, obesity, ARIANE, smoker, who presented to [facility] on 3/25/2020 due to left leg pain. He reports that he had a wound repaired on the bottom of his left foot and October November 2019 and that yesterday his leg became worse. He reported that he started getting a \"rash\" that was red swollen and warm to the touch that quickly progressed prior to admission. Patient rated his pain a 5 out of 10 on admission. He was unaware that he was running a fever prior to admission. In the emergency department he was given 1 dose of vancomycin and was started on Zosyn. Podiatry was consulted in the emergency room and recommended to continue IV antibiotics and ceretec bone scan. Patient was admitted under [address] [place] [address] service for left leg cellulitis. The results of the bone scan were inconclusive for osteomyelitis however did show cellulitis. Podiatry recommended a three-phase bone scan as further work-up. Wound culture 3/25 growing Many gram positive cocci in pairs and clusters. Moderate gram positive bacilli. Few gram negative bacilli. Organism: Staphylococcus pseudintermedius, S. Aureus, Corynebacterium species, and Enterococcus species. Staph species sensitive to Zyvox. ID was consulted, who recommend add Zyvox. ID signed off and recommend to continue Zyvox while inpatient, and discontinue Zosyn, and on discharge doxycycline 100 mg p.o. twice daily for 14 days. Awaiting bone scan. Subjective:     Patient seen and examined.   Patient reports he is doing fine Musculoskeletal: passive and active ROM x 4 extremities. Extremities:      Pictures from 3/28/2020              Labs:   Recent Labs     03/27/20  0544 03/28/20  0430   WBC 6.5 6.4   HGB 16.4 16.0   HCT 50.4 49.0    191     Recent Labs     03/27/20  0544 03/28/20  0430 03/29/20  0604    136 135   K 4.4 4.5 4.4   CL 99 100 97*   CO2 25 23 25   BUN 12 13 13   CREATININE 0.7 0.7 0.6   CALCIUM 8.6 8.7 8.8     No results for input(s): AST, ALT, BILIDIR, BILITOT, ALKPHOS in the last 72 hours. No results for input(s): INR in the last 72 hours. No results for input(s): Worrell Deacon in the last 72 hours. Urinalysis:    No results found for: [place], BACTERIA, RBCUA, BLOODU, [address], [address]    Radiology:  RADIOLOGY REPORT   Final Result      NM INFLAMMATORY WBC LIMITED W CERETEC   Final Result      Probable diffuse cellulitis in the left foot. Final report electronically signed by Dr. Dottie Polk on 3/26/2020 4:24 PM      VL DUP LOWER EXTREMITY VENOUS BILATERAL   Final Result   No evidence of deep vein thrombosis. **This report has been created using voice recognition software. It may contain minor errors which are inherent in voice recognition technology. **      Final report electronically signed by Dr. Hola Adan on 3/25/2020 9:16 AM      CTA ABDOMINAL AORTA W BILAT RUNOFF W WO CONTRAST   Final Result    1. There is no hemodynamically significant stenosis. 2. There is mild patchy atelectasis and/or infiltrate at the left lung base. 3. Additional chronic findings as described in the body the report. **This report has been created using voice recognition software. It may contain minor errors which are inherent in voice recognition technology. **      Final report electronically signed by Dr. Toño Shabazz on 3/25/2020 8:55 AM      XR FOOT LEFT (MIN 3 VIEWS)   Final Result   . 1. Left lateral plantar soft tissue ulcer.    2. No evidence of obvious osteomyelitis or fracture. 3. Degenerative arthropathy changes present. **This report has been created using voice recognition software. It may contain minor errors which are inherent in voice recognition technology. **      Final report electronically signed by Dr. Ana Bell on 3/25/2020 6:24 AM      NM BONE SCAN 3 PHASE    (Results Pending)         Assessment/Plan:      Severe Sepsis (POA) likely 2/2 to Cellulitis of the LLE and possible osteomyelitis of L foot, resolved      -on arrival, temperature 100.8 °F, heart rate 110, WBC 12.1, lactic acid 2.2. Now vital signs normal, leukocytosis and lactic acidosis resolved. -s/p Zosyn 3/25; Vanc x 1 dose 3/25 in ED; doppler negative for DVT, patient has been afebrile since admission, WBC 12.1 on admission, Tachycardic on admission, RR normal   - Podiatry on board. Appreciate podiatry input.  - Wound culture 3/25 growing Many gram positive cocci in pairs and clusters. Moderate gram positive bacilli. Few gram negative bacilli. Organism: Staphylococcus pseudintermedius, Corynebacterium species, and Enterococcus species. - Blood cultures preliminary no growth  - ID signed off, recommend to continue Zyvox, and stop zosyn and on discharge, doxycycline 100 mg p.o. twice daily for 14 days  - CBC in AM       Wound to left foot, plantar aspect near 5th metatarsal     - Bone Scan showed possible cellulitis of the L foot. - Podiatry is following recommendations appreciated. - Heel weightbearing to left lower extremity  - Will get triphasic bone scan on Monday   - ID signed off, recommend to continue Zyvox, and stop zosyn      DM type 2, uncontrolled    - A1c 9.6 percent on admission, has not had medications as outpatient for a long time  -For past 24 hours patient used total of 85 units of insulin. Alogliptin was started yesterday, however patient is concerned about his history of pancreatitis.   Patient agreed to stop alogliptin and start lispro 3 times daily with meals. Continue high dose sliding scale  - Increase Lantus from 30 units to 42 units nightly  - Hypoglycemia protocol  - Diabetic Diet and insulin teaching prior discharge, discussed with RN  - Continue to hold metformin while inpatient, resume on discharge  -DM clinic referral on discharge      Essential HTN, uncontrolled    - Increased Losartan to 50 mg from 25 mg p.o. daily on 3/28/2020  -Also on metoprolol and Bumex  -Continue current regimen for now  -Add hydralazine IV as needed  -Vital signs per protocol    History of chronic HFrEF, EF 15-20%, now grade 1 diastolic dysfunction, EF 18% compensated     -Previously followed with CHF clinic at [facility]. Most recent echo 2/14/2019 showed EF of 15-20%. Was prescribed aldactone, losartan, imdur, Toprol XL, Bumex, however he has not been on these medications in a while  - Echo 3/27/2020 showed EF of 50%, mildly enlarged left atrium, grade 1 diastolic dysfunction  - Continue Bumex, metoprolol and losartan      History of HLD    - continue statin      Hx of PFO- was seen by Cardiologist in [facility]       Tobacco abuse-  - cessation counseling   - Nicoderm patch 21 mg topically daily     Morbid Obesity with BMI 38.1      Pseudohyponatremia, secondary to hyperglycemia, resolved     -Sodium of 132 and glucose of 254 on 3/25/2020. Corrected sodium 136.   -BMP in a.m.     Mild aortic stenosis     -Noted on echocardiogram 3/27/2020  -Patient was informed about this result and he was  advised to follow-up with PCP on discharge.     Medical noncompliance     -Patient was educated regarding the importance of taking her medications regularly as prescribed     Hx of ARIANE     -cont CPAP        Anticipated Discharge in : pending         Diet: DIET CARB CONTROL;    DVT prophylaxis: [] Lovenox                                 [] SCDs                                 [] SQ Heparin                                 [] Encourage ambulation           [] Already on Anticoagulation     Disposition: [] Home       [] TCU       [] Rehab       [] Psych       [] SNF       [] [place]       [x] Other-awaiting bone scan and further recs from podiatry     Code Status: Full Code        Electronically signed by Nava Ayala MD on 3/29/2020 at 8:47 AM

## 2020-03-30 ENCOUNTER — APPOINTMENT (OUTPATIENT)
Dept: NUCLEAR MEDICINE | Age: 50
DRG: 872 | End: 2020-03-30
Payer: COMMERCIAL

## 2020-03-30 ENCOUNTER — TELEPHONE (OUTPATIENT)
Dept: WOUND CARE | Age: 50
End: 2020-03-30

## 2020-03-30 VITALS
HEART RATE: 72 BPM | BODY MASS INDEX: 37.45 KG/M2 | SYSTOLIC BLOOD PRESSURE: 161 MMHG | OXYGEN SATURATION: 93 % | TEMPERATURE: 98 F | WEIGHT: 276.5 LBS | HEIGHT: 72 IN | DIASTOLIC BLOOD PRESSURE: 82 MMHG | RESPIRATION RATE: 16 BRPM

## 2020-03-30 LAB
ANION GAP SERPL CALCULATED.3IONS-SCNC: 13 MEQ/L (ref 8–16)
BASOPHILS # BLD: 0.9 %
BASOPHILS ABSOLUTE: 0.1 THOU/MM3 (ref 0–0.1)
BLOOD CULTURE, ROUTINE: NORMAL
BLOOD CULTURE, ROUTINE: NORMAL
BUN BLDV-MCNC: 15 MG/DL (ref 7–22)
CALCIUM SERPL-MCNC: 9.4 MG/DL (ref 8.5–10.5)
CHLORIDE BLD-SCNC: 97 MEQ/L (ref 98–111)
CO2: 26 MEQ/L (ref 23–33)
CREAT SERPL-MCNC: 0.6 MG/DL (ref 0.4–1.2)
EOSINOPHIL # BLD: 2.2 %
EOSINOPHILS ABSOLUTE: 0.1 THOU/MM3 (ref 0–0.4)
ERYTHROCYTE [DISTWIDTH] IN BLOOD BY AUTOMATED COUNT: 13 % (ref 11.5–14.5)
ERYTHROCYTE [DISTWIDTH] IN BLOOD BY AUTOMATED COUNT: 45.2 FL (ref 35–45)
GFR SERPL CREATININE-BSD FRML MDRD: > 90 ML/MIN/1.73M2
GLUCOSE BLD-MCNC: 204 MG/DL (ref 70–108)
GLUCOSE BLD-MCNC: 207 MG/DL (ref 70–108)
GLUCOSE BLD-MCNC: 260 MG/DL (ref 70–108)
GLUCOSE BLD-MCNC: 279 MG/DL (ref 70–108)
HCT VFR BLD CALC: 53 % (ref 42–52)
HEMOGLOBIN: 17.4 GM/DL (ref 14–18)
IMMATURE GRANS (ABS): 0.03 THOU/MM3 (ref 0–0.07)
IMMATURE GRANULOCYTES: 0.4 %
LYMPHOCYTES # BLD: 30.9 %
LYMPHOCYTES ABSOLUTE: 2.1 THOU/MM3 (ref 1–4.8)
MCH RBC QN AUTO: 31.1 PG (ref 26–33)
MCHC RBC AUTO-ENTMCNC: 32.8 GM/DL (ref 32.2–35.5)
MCV RBC AUTO: 94.6 FL (ref 80–94)
MONOCYTES # BLD: 8.8 %
MONOCYTES ABSOLUTE: 0.6 THOU/MM3 (ref 0.4–1.3)
NUCLEATED RED BLOOD CELLS: 0 /100 WBC
PLATELET # BLD: 218 THOU/MM3 (ref 130–400)
PMV BLD AUTO: 10.7 FL (ref 9.4–12.4)
POTASSIUM SERPL-SCNC: 4.4 MEQ/L (ref 3.5–5.2)
RBC # BLD: 5.6 MILL/MM3 (ref 4.7–6.1)
SEG NEUTROPHILS: 56.8 %
SEGMENTED NEUTROPHILS ABSOLUTE COUNT: 3.9 THOU/MM3 (ref 1.8–7.7)
SODIUM BLD-SCNC: 136 MEQ/L (ref 135–145)
WBC # BLD: 6.8 THOU/MM3 (ref 4.8–10.8)

## 2020-03-30 PROCEDURE — 94760 N-INVAS EAR/PLS OXIMETRY 1: CPT

## 2020-03-30 PROCEDURE — 3430000000 HC RX DIAGNOSTIC RADIOPHARMACEUTICAL: Performed by: STUDENT IN AN ORGANIZED HEALTH CARE EDUCATION/TRAINING PROGRAM

## 2020-03-30 PROCEDURE — 99238 HOSP IP/OBS DSCHRG MGMT 30/<: CPT | Performed by: FAMILY MEDICINE

## 2020-03-30 PROCEDURE — 78315 BONE IMAGING 3 PHASE: CPT

## 2020-03-30 PROCEDURE — A9503 TC99M MEDRONATE: HCPCS | Performed by: STUDENT IN AN ORGANIZED HEALTH CARE EDUCATION/TRAINING PROGRAM

## 2020-03-30 PROCEDURE — 82948 REAGENT STRIP/BLOOD GLUCOSE: CPT

## 2020-03-30 PROCEDURE — 85025 COMPLETE CBC W/AUTO DIFF WBC: CPT

## 2020-03-30 PROCEDURE — 6360000002 HC RX W HCPCS: Performed by: STUDENT IN AN ORGANIZED HEALTH CARE EDUCATION/TRAINING PROGRAM

## 2020-03-30 PROCEDURE — 36415 COLL VENOUS BLD VENIPUNCTURE: CPT

## 2020-03-30 PROCEDURE — 80048 BASIC METABOLIC PNL TOTAL CA: CPT

## 2020-03-30 PROCEDURE — 6370000000 HC RX 637 (ALT 250 FOR IP): Performed by: STUDENT IN AN ORGANIZED HEALTH CARE EDUCATION/TRAINING PROGRAM

## 2020-03-30 RX ORDER — BUMETANIDE 1 MG/1
1 TABLET ORAL DAILY
Qty: 30 TABLET | Refills: 3 | Status: SHIPPED | OUTPATIENT
Start: 2020-03-31 | End: 2020-07-30

## 2020-03-30 RX ORDER — METOPROLOL SUCCINATE 25 MG/1
25 TABLET, EXTENDED RELEASE ORAL NIGHTLY
Qty: 30 TABLET | Refills: 3 | Status: SHIPPED | OUTPATIENT
Start: 2020-03-30 | End: 2020-07-30

## 2020-03-30 RX ORDER — METOPROLOL SUCCINATE 50 MG/1
50 TABLET, EXTENDED RELEASE ORAL EVERY MORNING
Qty: 30 TABLET | Refills: 3 | Status: SHIPPED | OUTPATIENT
Start: 2020-03-31 | End: 2020-03-30

## 2020-03-30 RX ORDER — ATORVASTATIN CALCIUM 40 MG/1
40 TABLET, FILM COATED ORAL NIGHTLY
Qty: 30 TABLET | Refills: 3 | Status: SHIPPED | OUTPATIENT
Start: 2020-03-30 | End: 2020-03-30

## 2020-03-30 RX ORDER — ATORVASTATIN CALCIUM 40 MG/1
40 TABLET, FILM COATED ORAL NIGHTLY
Qty: 30 TABLET | Refills: 3 | Status: SHIPPED | OUTPATIENT
Start: 2020-03-30 | End: 2020-07-30

## 2020-03-30 RX ORDER — METOPROLOL SUCCINATE 50 MG/1
50 TABLET, EXTENDED RELEASE ORAL EVERY MORNING
Qty: 30 TABLET | Refills: 3 | Status: SHIPPED | OUTPATIENT
Start: 2020-03-31 | End: 2020-09-13

## 2020-03-30 RX ORDER — LOSARTAN POTASSIUM 25 MG/1
50 TABLET ORAL DAILY
Qty: 30 TABLET | Refills: 3 | Status: SHIPPED | OUTPATIENT
Start: 2020-03-30 | End: 2020-05-27 | Stop reason: SDUPTHER

## 2020-03-30 RX ORDER — BUMETANIDE 1 MG/1
1 TABLET ORAL DAILY
Qty: 30 TABLET | Refills: 3 | Status: SHIPPED | OUTPATIENT
Start: 2020-03-31 | End: 2020-03-30

## 2020-03-30 RX ORDER — METFORMIN HYDROCHLORIDE EXTENDED-RELEASE TABLETS 1000 MG/1
2000 TABLET, FILM COATED, EXTENDED RELEASE ORAL
Qty: 30 TABLET | Refills: 3 | Status: SHIPPED | OUTPATIENT
Start: 2020-03-30 | End: 2020-03-30

## 2020-03-30 RX ORDER — TC 99M MEDRONATE 20 MG/10ML
28.2 INJECTION, POWDER, LYOPHILIZED, FOR SOLUTION INTRAVENOUS
Status: COMPLETED | OUTPATIENT
Start: 2020-03-30 | End: 2020-03-30

## 2020-03-30 RX ORDER — DOXYCYCLINE HYCLATE 100 MG
100 TABLET ORAL 2 TIMES DAILY
Qty: 28 TABLET | Refills: 0 | Status: SHIPPED | OUTPATIENT
Start: 2020-03-30 | End: 2020-03-30 | Stop reason: SDUPTHER

## 2020-03-30 RX ORDER — DOXYCYCLINE HYCLATE 100 MG
100 TABLET ORAL 2 TIMES DAILY
Qty: 28 TABLET | Refills: 0 | Status: SHIPPED | OUTPATIENT
Start: 2020-03-30 | End: 2020-04-13

## 2020-03-30 RX ORDER — LOSARTAN POTASSIUM 100 MG/1
100 TABLET ORAL DAILY
Status: DISCONTINUED | OUTPATIENT
Start: 2020-03-31 | End: 2020-03-30 | Stop reason: HOSPADM

## 2020-03-30 RX ORDER — LOSARTAN POTASSIUM 25 MG/1
50 TABLET ORAL DAILY
Qty: 30 TABLET | Refills: 3 | Status: SHIPPED | OUTPATIENT
Start: 2020-03-30 | End: 2020-03-30 | Stop reason: SDUPTHER

## 2020-03-30 RX ORDER — INSULIN GLARGINE 100 [IU]/ML
50 INJECTION, SOLUTION SUBCUTANEOUS NIGHTLY
Status: DISCONTINUED | OUTPATIENT
Start: 2020-03-30 | End: 2020-03-30 | Stop reason: HOSPADM

## 2020-03-30 RX ORDER — METOPROLOL SUCCINATE 25 MG/1
25 TABLET, EXTENDED RELEASE ORAL NIGHTLY
Qty: 30 TABLET | Refills: 3 | Status: SHIPPED | OUTPATIENT
Start: 2020-03-30 | End: 2020-03-30

## 2020-03-30 RX ORDER — METFORMIN HYDROCHLORIDE EXTENDED-RELEASE TABLETS 1000 MG/1
2000 TABLET, FILM COATED, EXTENDED RELEASE ORAL
Qty: 30 TABLET | Refills: 3 | Status: SHIPPED | OUTPATIENT
Start: 2020-03-30 | End: 2020-05-27 | Stop reason: SDUPTHER

## 2020-03-30 RX ADMIN — TC 99M MEDRONATE 28.2 MILLICURIE: 20 INJECTION, POWDER, LYOPHILIZED, FOR SOLUTION INTRAVENOUS at 11:10

## 2020-03-30 RX ADMIN — METOPROLOL SUCCINATE 50 MG: 50 TABLET, FILM COATED, EXTENDED RELEASE ORAL at 08:24

## 2020-03-30 RX ADMIN — INSULIN LISPRO 5 UNITS: 100 INJECTION, SOLUTION INTRAVENOUS; SUBCUTANEOUS at 12:30

## 2020-03-30 RX ADMIN — LINEZOLID 600 MG: 600 INJECTION, SOLUTION INTRAVENOUS at 05:39

## 2020-03-30 RX ADMIN — BUMETANIDE 1 MG: 1 TABLET ORAL at 08:24

## 2020-03-30 RX ADMIN — INSULIN LISPRO 5 UNITS: 100 INJECTION, SOLUTION INTRAVENOUS; SUBCUTANEOUS at 08:25

## 2020-03-30 RX ADMIN — LOSARTAN POTASSIUM 50 MG: 50 TABLET, FILM COATED ORAL at 08:24

## 2020-03-30 ASSESSMENT — PAIN SCALES - GENERAL
PAINLEVEL_OUTOF10: 0
PAINLEVEL_OUTOF10: 0

## 2020-03-30 NOTE — DISCHARGE SUMMARY
obesity, ARIANE, smoker, who presented to [facility] on 3/25/2020 due to left leg pain. He reports that he had a wound repaired on the bottom of his left foot and October November 2019 and that yesterday his leg became worse. [facility] reported that he started getting a \"rash\" that was red swollen and warm to the touch that quickly progressed prior to admission.  Patient rated his pain a 5 out of 10 on admission. [facility] was unaware that he was running a fever prior to admission.  In the emergency department he was given 1 dose of vancomycin and was started on Zosyn.  Podiatry was consulted in the emergency room and recommended to continue IV antibiotics and ceretec bone scan. Patient was admitted under [address] for left leg cellulitis. The results of the bone scan were inconclusive for osteomyelitis however did show cellulitis.  Podiatry recommended a three-phase bone scan as further work-up. Wound culture 3/25 growing Many gram positive cocci in pairs and clusters. Moderate gram positive bacilli. Few gram negative bacilli. Organism: Staphylococcus pseudintermedius, S. Aureus, Corynebacterium species, and Enterococcus species. Staph species sensitive to Zyvox.   ID was consulted, who recommend add Zyvox.  ID signed off and recommend to continue Zyvox while inpatient, and discontinue Zosyn, and on discharge doxycycline 100 mg p.o. twice daily for 14 days.  Bone scan on 3/30/2020 resulted in likely osteomyelitis of his left 5th metatarsal. Patient was very persistent in asking to be discharged from hospital. Per podiatry patient's Osteomyelitis could be managed as an outpatient with follow up to the Wound clinic. Although patient continued to have labile blood pressure and blood sugars, patient stated that he needed to leave today otherwise he would leave AMA.  Patient was educated on the risks of being discharged without having these conditions more controlled, he accepted the risks and still was willing to be

## 2020-03-30 NOTE — PROGRESS NOTES
as further work-up. Wound culture 3/25 growing Many gram positive cocci in pairs and clusters. Moderate gram positive bacilli. Few gram negative bacilli. Organism: Staphylococcus pseudintermedius, S. Aureus, Corynebacterium species, and Enterococcus species. Staph species sensitive to Zyvox.   ID was consulted, who recommend add Zyvox. ID signed off and recommend to continue Zyvox while inpatient, and discontinue Zosyn, and on discharge doxycycline 100 mg p.o. twice daily for 14 days. Awaiting bone scan. Subjective: Patient seen and examined. Patient reports that he is doing good today. He is denying any pain, chills, sob, cough. He was waiting to head down to get his bine scan today.         Medications:  Reviewed    Infusion Medications    dextrose       Scheduled Medications    [START ON 3/31/2020] losartan  100 mg Oral Daily    insulin glargine  42 Units Subcutaneous Nightly    insulin lispro  5 Units Subcutaneous TID WC    insulin lispro  0-18 Units Subcutaneous TID WC    insulin lispro  0-9 Units Subcutaneous Nightly    linezolid  600 mg Intravenous Q12H    [Held by provider] metFORMIN  2,000 mg Oral Daily with breakfast    metoprolol succinate  25 mg Oral Nightly    metoprolol succinate  50 mg Oral QAM    bumetanide  1 mg Oral Daily    atorvastatin  40 mg Oral Nightly    sodium chloride flush  10 mL Intravenous 2 times per day    nicotine  1 patch Transdermal Daily     PRN Meds: hydrALAZINE, sodium chloride flush, acetaminophen **OR** acetaminophen, promethazine **OR** ondansetron, potassium chloride **OR** potassium alternative oral replacement **OR** potassium chloride, magnesium sulfate, bisacodyl, glucose, dextrose, glucagon (rDNA), dextrose, albuterol, oxyCODONE-acetaminophen **OR** oxyCODONE-acetaminophen      Intake/Output Summary (Last 24 hours) at 3/30/2020 1131  Last data filed at 3/30/2020 0645  Gross per 24 hour   Intake 3766.11 ml   Output --   Net 3766.11 ml       Diet:  DIET CARB CONTROL; Carb Control: 4 carb choices (60 gms)/meal    Exam:  BP (!) 167/83   Pulse 69   Temp 97.9 °F (36.6 °C) (Oral)   Resp (!) 161   Ht 6' (1.829 m)   Wt 276 lb 8 oz (125.4 kg)   SpO2 93%   BMI 37.50 kg/m²     General appearance: No apparent distress, appears stated age and cooperative. .  Neck: Supple, with full range of motion. No jugular venous distention. Trachea midline. Respiratory:  Normal respiratory effort. + for wheezes in lower lung bases. Cardiovascular: Regular rate and rhythm grade 2/6 systolic murmur in aortic area. Abdomen: Soft, non-tender, non-distended with normal bowel sounds. Musculoskeletal: passive and active ROM x 4 extremities. Extremities: Grossly unchanged from prior exam. Wound images in media tab      Labs:   Recent Labs     03/28/20  0430 03/30/20  0332   WBC 6.4 6.8   HGB 16.0 17.4   HCT 49.0 53.0*    218     Recent Labs     03/28/20  0430 03/29/20  0604 03/30/20  0332    135 136   K 4.5 4.4 4.4    97* 97*   CO2 23 25 26   BUN 13 13 15   CREATININE 0.7 0.6 0.6   CALCIUM 8.7 8.8 9.4     No results for input(s): AST, ALT, BILIDIR, BILITOT, ALKPHOS in the last 72 hours. No results for input(s): INR in the last 72 hours. No results for input(s): Levonia Citrin in the last 72 hours. Urinalysis:    No results found for: Bobbye Novel, BACTERIA, RBCUA, BLOODU, [address], [address]    Radiology:  RADIOLOGY REPORT   Final Result      NM INFLAMMATORY WBC LIMITED W CERETEC   Final Result      Probable diffuse cellulitis in the left foot. Final report electronically signed by Dr. Abiola Harris on 3/26/2020 4:24 PM      VL DUP LOWER EXTREMITY VENOUS BILATERAL   Final Result   No evidence of deep vein thrombosis. **This report has been created using voice recognition software. It may contain minor errors which are inherent in voice recognition technology. **      Final report electronically signed by Dr. Addison Mcginnis on 3/25/2020 9:16 AM      CTA ABDOMINAL AORTA W BILAT RUNOFF W WO CONTRAST   Final Result    1. There is no hemodynamically significant stenosis. 2. There is mild patchy atelectasis and/or infiltrate at the left lung base. 3. Additional chronic findings as described in the body the report. **This report has been created using voice recognition software. It may contain minor errors which are inherent in voice recognition technology. **      Final report electronically signed by Dr. Dianna Garcia on 3/25/2020 8:55 AM      XR FOOT LEFT (MIN 3 VIEWS)   Final Result   . 1. Left lateral plantar soft tissue ulcer. 2. No evidence of obvious osteomyelitis or fracture. 3. Degenerative arthropathy changes present. **This report has been created using voice recognition software. It may contain minor errors which are inherent in voice recognition technology. **      Final report electronically signed by Dr. Osman Jean on 3/25/2020 6:24 AM      NM BONE SCAN 3 PHASE    (Results Pending)       Diet: DIET CARB CONTROL; Carb Control: 4 carb choices (60 gms)/meal    DVT prophylaxis: [] Lovenox                                 [] SCDs                                 [] SQ Heparin                                 [] Encourage ambulation           [] Already on Anticoagulation     Disposition:    [x] Home       [] TCU       [] Rehab       [] Psych       [] SNF       [] [place]       [] Other-    Code Status: Full Code      Electronically signed by Rosario Sandhoff, DO on 3/30/2020 at 11:31 AM

## 2020-03-30 NOTE — PROGRESS NOTES
report has been created using voice recognition software. It may contain minor errors which are inherent in voice recognition technology. ** Final report electronically signed by Dr. Ana Bell on 3/25/2020 6:24 AM    Cta Abdominal Aorta W Bilat Runoff W Wo Contrast    Result Date: 3/25/2020  PROCEDURE: CTA ABDOMINAL AORTA W BILAT RUNOFF W WO CONTRAST CLINICAL INFORMATION: Severe varicosities; decreased pulses; uncontrolled diabetes. COMPARISON: No prior study. TECHNIQUE: A noncontrast localizer was obtained. 3 mm axial images were obtained through the abdomen, pelvis and both lower extremities after the administration of intravenous contrast.  3D reconstructions were performed on a dedicated 3-D workstation to include sagittal and coronal mid images through the vasculature. Centerline reconstructions were also obtained. All CT scans at this facility use dose modulation, iterative reconstruction, and/or weight based dosing when appropriate to reduce the radiation dose to as low as reasonably achievable. CONTRAST: 80 mL Isovue 370 intravenously. Next film FINDINGS: ABDOMINAL AORTA: 1. Mild atheromatous plaque distal abdominal aorta. 2. There is no abdominal aortic aneurysm or hemodynamically significant stenosis. CELIAC TRUNK: 1. Unremarkable SUPERIOR/INFERIOR MESENTERIC ARTERIES: 1. There is a small calcified plaque along the mid superior mesenteric artery which is otherwise unremarkable. 2. The inferior mesenteric arteries is unremarkable. RENAL ARTERIES: 1. Unremarkable ILIAC ARTERIES: 1. Atheromatous calcification common and internal iliac arteries bilaterally and a few small calcified plaques along the external iliac arteries bilaterally. There is no hemodynamically significant stenosis. RIGHT LOWER EXTREMITY: 1. There is mild atheromatous plaque at the right common femoral artery without hemodynamically significant stenosis.  2. There is mild atheromatous plaque along the right femoral artery without

## 2020-03-30 NOTE — CARE COORDINATION
3/30/20, 1:40 PM EDT    DISCHARGE ON GOING [address] day: 5  Location: -16/016-A Reason for admit: Cellulitis [L03.90]   Procedure: N/A  Treatment Plan of Care: Podiatry and ID following, DM management, IV Zyvox, Nicotine patch, prn Tylenol, Proventil, Percocet, Potassium replacement protocol, Phenergan and Zofran, 3-phase bone scan, SCD's, wound care, up with assistance. Barriers to Discharge: Medical stability  PCP: No primary care provider on file. Readmission Risk Score: 8%  Patient Goals/Plan/Treatment Preferences: Home with his wife. She is arranging a PCP for him per pervious CM note.
program?  No  Type of Home Care Services:  None  Patient expects to be discharged to:  home  Expected Discharge date:  03/19/20  Follow Up Appointment: Best Day/ Time: Monday AM    Patient Goals/Plan/Treatment Preferences: Smiley Hudson is from home with wife. Planning home ASAP on oral antibiotics. Monitor for antibiotics plan. I offered to set him up with a PCP. Smiley Hudson said his wife is getting him established with Dr Dalila Diez - he had no current PCP. He declined offer for [facility] or any other services. Transportation/Food Security/Housekeeping Addressed:  No issues identified.     Evaluation: no

## 2020-03-30 NOTE — PLAN OF CARE
Problem: Pain:  Goal: Pain level will decrease  Description: Pain level will decrease  Outcome: Ongoing  Note: Pain Assessment: 0-10  Pain Level: 0   Patient's Stated Pain Goal: No pain   Is pain goal met at this time? Yes     Non-Pharmaceutical Pain Intervention(s): Rest, Relaxation techniques       Problem: Falls - Risk of:  Goal: Will remain free from falls  Description: Will remain free from falls  Outcome: Ongoing  Note: Pt remains free from falls this shift. Bed exit alarm activated. Bed in lowest position with brakes on. 2/4 side rails raised for increased safety. Pt utilizes non-skid footwear and standby assist for ambulation. Pathway clear and possessions within reach. Call light within reach. Pt rounded on hourly. Problem: Discharge Planning:  Goal: Discharged to appropriate level of care  Description: Discharged to appropriate level of care  Outcome: Ongoing  Note: Pt's discharge plan reviewed with pt. Pt is from private residence. Upon discharge, pt plans to return to private residence. Problem: Skin Integrity:  Goal: Demonstration of wound healing without infection will improve  Description: Demonstration of wound healing without infection will improve  Outcome: Ongoing  Note: Pt has MRSA to left foot. Pt's wound has been dressed by podiatry. LIZBETH secondary to dressing in place. Swelling to left foot and leg. Will continue to monitor. Care plan reviewed with patient. Patient verbalize understanding of the plan of care and contribute to goal setting.

## 2020-03-30 NOTE — FLOWSHEET NOTE
was rounding through the unit via telephone call to offer support, if desired. There was no answer to the telephone encounter. Chaplains will attempt to follow-up, and remain available for any emotional or spiritual support as needed.      03/30/20 1330   Encounter Summary   Services provided to: Patient   Referral/Consult From: Rounding  (Via telephone call)   Continue Visiting Yes  (3/30 - NR)   Spiritual/Zoroastrianism   Type Spiritual support

## 2020-03-30 NOTE — TELEPHONE ENCOUNTER
Attempted to notify inpatient unit to reschedule appointment in outpatient wound care. Unable to reach at this time.

## 2020-04-03 ENCOUNTER — TELEPHONE (OUTPATIENT)
Dept: FAMILY MEDICINE CLINIC | Age: 50
End: 2020-04-03

## 2020-04-03 NOTE — TELEPHONE ENCOUNTER
Patient wife Jade Arias) is current with Dr Priyank Watts and is asking if Dr Priyank Watts will accept [facility] as a NP. Wife stated that he was discharged from [facility] on 3/30/20 for Cellulitis. Wife stated that he currently does not have a family physican to follow and will need help managing medications, and patient currently is following Wound care. Wife stated that they would like to do a Virtual Visit with Dr Priyank Watts if possible to establish care. Patient sees's wound care on Tuesdays and works 5a-5p but can work out a time if ok'd for VV.

## 2020-04-03 NOTE — TELEPHONE ENCOUNTER
I called Arcenio Zuniga back and received voicemail. I left a detailed message regarding Dr. Ronen Howell response and to call [phone] to schedule an appointment.

## 2020-04-06 ENCOUNTER — TELEPHONE (OUTPATIENT)
Dept: WOUND CARE | Age: 50
End: 2020-04-06

## 2020-04-06 NOTE — TELEPHONE ENCOUNTER
Attempt to screen patient prior to appointment tomorrow for COVID-19. Voicemail left for patient to return call.

## 2020-04-07 ENCOUNTER — HOSPITAL ENCOUNTER (OUTPATIENT)
Dept: WOUND CARE | Age: 50
Discharge: HOME | End: 2020-04-07
Payer: COMMERCIAL

## 2020-04-07 VITALS
SYSTOLIC BLOOD PRESSURE: 151 MMHG | TEMPERATURE: 98.2 F | DIASTOLIC BLOOD PRESSURE: 73 MMHG | OXYGEN SATURATION: 95 % | WEIGHT: 276 LBS | RESPIRATION RATE: 18 BRPM | HEIGHT: 72 IN | HEART RATE: 97 BPM | BODY MASS INDEX: 37.38 KG/M2

## 2020-04-07 PROCEDURE — 99213 OFFICE O/P EST LOW 20 MIN: CPT

## 2020-04-07 PROCEDURE — 2709999900 HC NON-CHARGEABLE SUPPLY

## 2020-04-07 RX ORDER — DOXYCYCLINE HYCLATE 100 MG
100 TABLET ORAL 2 TIMES DAILY
Qty: 28 TABLET | Refills: 1 | Status: SHIPPED | OUTPATIENT
Start: 2020-04-07 | End: 2020-05-05

## 2020-04-07 ASSESSMENT — PAIN SCALES - GENERAL: PAINLEVEL_OUTOF10: 0

## 2020-04-07 NOTE — PROGRESS NOTES
Teaching Note:    I was present with the resident during the visit. I discussed the case with the resident and agree with the findings and the plan as documented in the residents note.     Shelia CANO, [address] [facility]
Take 2 tablets by mouth daily (with breakfast) 30 tablet 3    losartan (COZAAR) 25 MG tablet Take 2 tablets by mouth daily 30 tablet 3    bumetanide (BUMEX) 1 MG tablet Take 1 tablet by mouth daily 30 tablet 3    doxycycline hyclate (VIBRA-TABS) 100 MG tablet Take 1 tablet by mouth 2 times daily for 14 days 28 tablet 0    atorvastatin (LIPITOR) 40 MG tablet Take 1 tablet by mouth nightly 30 tablet 3    metoprolol succinate (TOPROL XL) 50 MG extended release tablet Take 1 tablet by mouth every morning 30 tablet 3    metoprolol succinate (TOPROL XL) 25 MG extended release tablet Take 1 tablet by mouth nightly 30 tablet 3    aspirin 81 MG tablet Take 81 mg by mouth daily      isosorbide mononitrate (IMDUR) 30 MG extended release tablet Take 30 mg by mouth daily      spironolactone (ALDACTONE) 25 MG tablet Take 25 mg by mouth daily      gabapentin (NEURONTIN) 400 MG capsule Take 400 mg by mouth every 8 hours.  SITagliptin (JANUVIA) 100 MG tablet Take 100 mg by mouth daily      omeprazole (PRILOSEC) 20 MG delayed release capsule Take 20 mg by mouth daily Take 1 capsule daily before meals       No current facility-administered medications on file prior to encounter. REVIEW OF SYSTEMS    A comprehensive review of systems was negative. Objective:      BP (!) 151/73   Pulse 97   Temp 98.2 °F (36.8 °C) (Oral)   Resp 18   Ht 6' (1.829 m)   Wt 276 lb (125.2 kg)   SpO2 95%   BMI 37.43 kg/m²     Wt Readings from Last 3 Encounters:   04/07/20 276 lb (125.2 kg)   03/28/20 276 lb 8 oz (125.4 kg)   06/04/18 280 lb (127 kg)       PHYSICAL EXAM    General Appearance: alert and oriented to person, place and time    Physical Exam:   Vascular: Dorsalis pedis and posterior tibial pulses are palpable bilaterally. Skin temperature is warm to warm with left greater than right from proximal tibial tuberosity to distal digits. CFT brisk to exposed digits. Edema, improving. Hair growth absent.  Quality of

## 2020-04-15 ENCOUNTER — TELEPHONE (OUTPATIENT)
Dept: WOUND CARE | Age: 50
End: 2020-04-15

## 2020-04-15 ENCOUNTER — VIRTUAL VISIT (OUTPATIENT)
Dept: FAMILY MEDICINE CLINIC | Age: 50
End: 2020-04-15

## 2020-04-15 ENCOUNTER — NURSE ONLY (OUTPATIENT)
Dept: LAB | Age: 50
End: 2020-04-15

## 2020-04-15 LAB
CHOLESTEROL, TOTAL: 152 MG/DL (ref 100–199)
HDLC SERPL-MCNC: 30 MG/DL
LDL CHOLESTEROL CALCULATED: 48 MG/DL
TOTAL PROTEIN: 8.2 G/DL (ref 6.1–8)
TRIGL SERPL-MCNC: 370 MG/DL (ref 0–199)
VITAMIN D 25-HYDROXY: 9 NG/ML (ref 30–100)

## 2020-04-15 PROCEDURE — 99204 OFFICE O/P NEW MOD 45 MIN: CPT | Performed by: FAMILY MEDICINE

## 2020-04-15 RX ORDER — VARENICLINE TARTRATE
KIT
Qty: 1 BOX | Refills: 0 | Status: SHIPPED | OUTPATIENT
Start: 2020-04-15 | End: 2020-06-22

## 2020-04-15 RX ORDER — OMEPRAZOLE 20 MG/1
20 CAPSULE, DELAYED RELEASE ORAL
Qty: 90 CAPSULE | Refills: 1 | Status: SHIPPED | OUTPATIENT
Start: 2020-04-15 | End: 2020-09-13 | Stop reason: SDUPTHER

## 2020-04-15 NOTE — PROGRESS NOTES
in the last several months has been 1 pack/day. He was given patches when he was discharged from the hospital but he states that they have not worked in the past and does not want to use them. He has also tried Chantix but he believe he has some side effects that is why he did not take it. This time he is really willing to quit smoking so after a discussion he will try again Chantix. FINDINGS:   ABDOMINAL AORTA:   1. Mild atheromatous plaque distal abdominal aorta. 2. There is no abdominal aortic aneurysm or hemodynamically significant stenosis.       CELIAC TRUNK:   1. Unremarkable       SUPERIOR/INFERIOR MESENTERIC ARTERIES:   1. There is a small calcified plaque along the mid superior mesenteric artery which is otherwise unremarkable. 2. The inferior mesenteric arteries is unremarkable.       RENAL ARTERIES:   1. Unremarkable       ILIAC ARTERIES:   1. Atheromatous calcification common and internal iliac arteries bilaterally and a few small calcified plaques along the external iliac arteries bilaterally. There is no hemodynamically significant stenosis.       RIGHT LOWER EXTREMITY:   1. There is mild atheromatous plaque at the right common femoral artery without hemodynamically significant stenosis. 2. There is mild atheromatous plaque along the right femoral artery without hemodynamically significant stenosis. 3. The popliteal artery is unremarkable. 3. The anterior tibial artery is unremarkable. 4. There is mild atheromatous plaque at the right tibioperoneal trunk without hemodynamically significant stenosis. The right posterior tibial and right peroneal arteries are otherwise unremarkable.       LEFT LOWER EXTREMITY:   1. There is mild atheromatous plaque at the left common femoral artery without hemodynamically significant stenosis. 2. There are small foci of atheromatous calcification along the left femoral artery without hemodynamically significant stenosis.    3. There is a small software.  It may contain minor errors which are inherent in voice recognition technology. **       Final report electronically signed by Dr. Morgan Leyva on 3/25/2020 8:55 AM     Has diabetes and is taking metformin 500 mg 4 tablets by mouth daily. Last hemoglobin A1c done March 25, 2020 was 9.6%        Review of Systems: Fever, chills, nausea, vomiting, diarrhea. No chest pain, shortness of breath. Positive for mild edema both lower extremities and redness of the left leg. Prior to Visit Medications    Medication Sig Taking? Authorizing Provider   varenicline (CHANTIX STARTING MONTH PAK) 0.5 MG X 11 & 1 MG X 42 tablet Take by mouth.  Yes Kale Awan MD   omeprazole (PRILOSEC) 20 MG delayed release capsule Take 1 capsule by mouth every morning (before breakfast) Yes Kale Awan MD   omega-3 acid ethyl esters (LOVAZA) 1 g capsule Take 2 capsules by mouth 2 times daily  Kale Awan MD   Cholecalciferol (VITAMIN D3) 25 MCG (1000 UT) TABS Take 2 tablets by mouth daily  Kale Awan MD   doxycycline hyclate (VIBRA-TABS) 100 MG tablet Take 1 tablet by mouth 2 times daily for 28 days  Lamar Presley DPM   metFORMIN, OSM, (FORTAMET) 1000 MG extended release tablet Take 2 tablets by mouth daily (with breakfast)  Serjio Pratt, DO   losartan (COZAAR) 25 MG tablet Take 2 tablets by mouth daily  Serjio Pratt, DO   bumetanide (BUMEX) 1 MG tablet Take 1 tablet by mouth daily  Serjio Pratt DO   atorvastatin (LIPITOR) 40 MG tablet Take 1 tablet by mouth nightly  Serjio Pratt, DO   metoprolol succinate (TOPROL XL) 50 MG extended release tablet Take 1 tablet by mouth every morning  Serjio Ayoubnale, DO   metoprolol succinate (TOPROL XL) 25 MG extended release tablet Take 1 tablet by mouth nightly  Serjio Pratt, DO   aspirin 81 MG tablet Take 81 mg by mouth daily  Historical Provider, MD       Social History     Tobacco Use    Smoking status: Current Every Day Smoker Packs/day: 0.50     Types: Cigarettes    Smokeless tobacco: Never Used   Substance Use Topics    Alcohol use: Not on file    Drug use: No        Past Medical History:   Diagnosis Date    CAD (coronary artery disease) 2019    CHF (congestive heart failure) ([facility] 02/12/2019    Diabetes mellitus ([facility] 1990's    Hypertension 1990's   ,   Past Surgical History:   Procedure Laterality Date    CHOLECYSTECTOMY      FIBULA FRACTURE SURGERY Right     screws    TIBIA FRACTURE SURGERY Right     screws   ,   Social History     Tobacco Use    Smoking status: Current Every Day Smoker     Packs/day: 0.50     Types: Cigarettes    Smokeless tobacco: Never Used   Substance Use Topics    Alcohol use: Not on file    Drug use: No   , No family history on file. PHYSICAL EXAMINATION:    Vital Signs: (As obtained by patient/caregiver or practitioner observation)    Blood pressure- 132/69 Heart rate-  89  Pulse oximetry- 96%    Constitutional: [x] Appears well-developed and well-nourished [x] No apparent distress      [] Abnormal-   Mental status  [x] Alert and awake  [x] Oriented to person/place/time [x]Able to follow commands      Eyes:  EOM    [x]  Normal  [] Abnormal-  Sclera  [x]  Normal  [] Abnormal -         Discharge []  None visible  [] Abnormal -    HENT:   [x] Normocephalic, atraumatic.   [] Abnormal   [x] Mouth/Throat: Mucous membranes are moist.     External Ears [x] Normal  [] Abnormal-     Neck: [] No visualized mass     Pulmonary/Chest: [x] Respiratory effort normal.  [] No visualized signs of difficulty breathing or respiratory distress        [] Abnormal-      Musculoskeletal:   [x] Normal gait with no signs of ataxia         [x] Normal range of motion of neck        [] Abnormal-       Neurological:        [] No Facial Asymmetry (Cranial nerve 7 motor function) (limited exam to video visit)          [x] No gaze palsy        [] Abnormal-         Skin:        [x] No significant exanthematous lesions or

## 2020-04-15 NOTE — TELEPHONE ENCOUNTER
Patient called and states wound has redness and tightness and had fever yesterday but not today. Called and Dr. Anayeli Hester of patients symptoms and he wants patient to go to the ER to be evaluated. Called patient and instructed of Dr. Halima Nguyen recommendation and patient verbalized understanding and will try to go to ER today.

## 2020-04-16 LAB
CREATININE, URINE: 147.4 MG/DL
MICROALBUMIN UR-MCNC: 109.38 MG/DL
MICROALBUMIN/CREAT UR-RTO: 742 MG/G (ref 0–30)
VITAMIN B-12: 366 PG/ML (ref 211–911)

## 2020-04-16 RX ORDER — OMEGA-3-ACID ETHYL ESTERS 1 G/1
2 CAPSULE, LIQUID FILLED ORAL 2 TIMES DAILY
Qty: 120 CAPSULE | Refills: 5 | Status: SHIPPED | OUTPATIENT
Start: 2020-04-16 | End: 2020-10-13

## 2020-04-16 RX ORDER — MELATONIN
2000 DAILY
Qty: 180 TABLET | Refills: 5 | Status: SHIPPED | OUTPATIENT
Start: 2020-04-16 | End: 2021-07-22 | Stop reason: SDUPTHER

## 2020-04-20 ENCOUNTER — TELEPHONE (OUTPATIENT)
Dept: WOUND CARE | Age: 50
End: 2020-04-20

## 2020-04-21 ENCOUNTER — HOSPITAL ENCOUNTER (OUTPATIENT)
Dept: WOUND CARE | Age: 50
Discharge: HOME | End: 2020-04-21
Payer: COMMERCIAL

## 2020-04-21 ENCOUNTER — TELEPHONE (OUTPATIENT)
Dept: FAMILY MEDICINE CLINIC | Age: 50
End: 2020-04-21

## 2020-04-21 VITALS
SYSTOLIC BLOOD PRESSURE: 186 MMHG | OXYGEN SATURATION: 96 % | TEMPERATURE: 98 F | HEART RATE: 74 BPM | RESPIRATION RATE: 16 BRPM | DIASTOLIC BLOOD PRESSURE: 88 MMHG

## 2020-04-21 PROCEDURE — 99213 OFFICE O/P EST LOW 20 MIN: CPT

## 2020-04-21 ASSESSMENT — PAIN SCALES - GENERAL: PAINLEVEL_OUTOF10: 0

## 2020-04-21 NOTE — PROGRESS NOTES
Medications on File Prior to Encounter   Medication Sig Dispense Refill    omega-3 acid ethyl esters (LOVAZA) 1 g capsule Take 2 capsules by mouth 2 times daily 120 capsule 5    Cholecalciferol (VITAMIN D3) 25 MCG (1000 UT) TABS Take 2 tablets by mouth daily 180 tablet 5    varenicline (CHANTIX STARTING MONTH IBETH) 0.5 MG X 11 & 1 MG X 42 tablet Take by mouth. 1 box 0    omeprazole (PRILOSEC) 20 MG delayed release capsule Take 1 capsule by mouth every morning (before breakfast) 90 capsule 1    doxycycline hyclate (VIBRA-TABS) 100 MG tablet Take 1 tablet by mouth 2 times daily for 28 days 28 tablet 1    metFORMIN, OSM, (FORTAMET) 1000 MG extended release tablet Take 2 tablets by mouth daily (with breakfast) 30 tablet 3    losartan (COZAAR) 25 MG tablet Take 2 tablets by mouth daily 30 tablet 3    bumetanide (BUMEX) 1 MG tablet Take 1 tablet by mouth daily 30 tablet 3    atorvastatin (LIPITOR) 40 MG tablet Take 1 tablet by mouth nightly 30 tablet 3    metoprolol succinate (TOPROL XL) 50 MG extended release tablet Take 1 tablet by mouth every morning 30 tablet 3    metoprolol succinate (TOPROL XL) 25 MG extended release tablet Take 1 tablet by mouth nightly 30 tablet 3    aspirin 81 MG tablet Take 81 mg by mouth daily       No current facility-administered medications on file prior to encounter. REVIEW OF SYSTEMS    Pertinent items are noted in HPI. Objective:      BP (!) 186/88   Pulse 74   Temp 98 °F (36.7 °C) (Oral)   Resp 16   SpO2 96%     Wt Readings from Last 3 Encounters:   04/07/20 276 lb (125.2 kg)   03/28/20 276 lb 8 oz (125.4 kg)   06/04/18 280 lb (127 kg)       PHYSICAL EXAM    General Appearance: alert and oriented to person, place and time, well-developed and well-nourished, in no acute distress    Physical Exam:   Vascular: Dorsalis pedis and posterior tibial pulses are palpable bilaterally.  Skin temperature is warm to warm with left greater than right from proximal tibial 03/30/2020    HGB 17.4 03/30/2020    HCT 53.0 03/30/2020    MCV 94.6 03/30/2020     03/30/2020     BMP:   Lab Results   Component Value Date     03/30/2020    K 4.4 03/30/2020    K 4.3 03/26/2020    CL 97 03/30/2020    CO2 26 03/30/2020    BUN 15 03/30/2020    CREATININE 0.6 03/30/2020     PT/INR: No results found for: PROTIME, INR  Prealbumin: No results found for: PREALBUMIN  Albumin:  Lab Results   Component Value Date    LABALBU 3.9 03/25/2020     Sed Rate:No results found for: [address]  CRP: No results found for: CRP  Micro:   Lab Results   Component Value Date    BC No growth-preliminary No growth  03/25/2020      Hemoglobin A1C:   Lab Results   Component Value Date    LABA1C 9.6 03/25/2020       Assessment:     Ulcer Identification:  Ulcer Type: diabetic, pressure and neuropathic  Contributing Factors: diabetes, chronic pressure, shear force, obesity and smoking    Wound: N/A    Depth of Diabetic/Pressure/Non Pressure Ulcers or Wound:  Wound, full thickness    Patient Active Problem List   Diagnosis Code    Cellulitis L03.90    Foot ulcer, left, with fat layer exposed ([facility] [address] L97.522    Forefoot varus, acquired, left M21.6X2    Equinus contracture of left ankle M24.572    Type 2 diabetes mellitus with hyperglycemia, with long-term current use of insulin (HCC) E11.65, Z79.4    Severe sepsis ([facility]) A41.9, R65.20    Uncontrolled type 2 diabetes mellitus with hyperglycemia (HCC) E11.65    Hyponatremia E87.1    Hypertension, uncontrolled I10    PFO (patent foramen ovale) Q21.1    Hyperlipidemia E78.5    Smoker F17.200    Morbid obesity ([facility]) E66.01    Chronic diastolic heart failure ([facility]) I50.32    Noncompliance with medications Z91.14    Aortic valve stenosis I35.0       Procedure Note  Indications:  Based on my examination of this patient's wound(s)/ulcer(s) today, debridement is required to promote healing and evaluate the extent healing.     Performed by: JEROME Karimi obtained: Yes    Time out taken:  Yes    Pain control: none      Debridement:Non-excisional Debridement    Using #15 blade scalpel the wound(s)/ulcer(s) was/were sharply debrided down through and including the removal of epidermis, dermis and subcutaneous tissue. Devitalized Tissue Debrided:  biofilm and callus    Pre Debridement Measurements:  Are located in the Wound/Ulcer Documentation Flow Sheet    Wound/Ulcer #: 1    Post Debridement Measurements:    Wound/Ulcer Descriptions are listed under Physical Exam above. Wound/Ulcer Descriptions are Pre Debridement except measurements    Percent of Wound/Ulcer Debrided: 100%    Total Surface Area Debrided:  2 sq cm     Bleeding:  None    Hemostasis Achieved:  not needed    Procedural Pain:  0  / 10     Post Procedural Pain:  0 / 10     Response to treatment:  Well tolerated by patient. Plan:     Patient examined and evaluated  Selective debridement performed without incident   Discussed importance of offloading left foot. Patient states he does not want to do that and he refuses to inquire about any light duty at work. Advised patient that wound will not heal if he continues to walk on it   Dispensed inlay to help offload area   Continue betadine with alginate to wound bed  Continue doxycycline   Educated patient on signs of infection will follow up in two weeks     Treatment:   Orders Placed This Encounter   Procedures    Debridement     Standing Status:   Standing     Number of Occurrences:   1    Apply dressing     Standing Status:   Standing     Number of Occurrences:   1         Antibiotics: Yes    Follow up: 2 weeks    Please see attached Discharge Instructions    Written patient dismissal instructions given to patient and signed by patient or POA. Discharge Instructions       Visit Discharge/Physician Orders:  -Continue oral antibiotic. New prescription sent into your pharmacy.  Will need total of 6 weeks of antibiotic.  -Recommend disinfectant to clean things that you touch often. · Stay home if you are sick or have been exposed to the virus. Don't go to school, work, or public areas. And don't use public transportation. · If you are sick:  ? Leave your home only if you need to get medical care. But call the doctor's office first so they know you're coming. And wear a face mask, if you have one.  ? If you have a face mask, wear it whenever you're around other people. It can help stop the spread of the virus when you cough or sneeze. ? Clean and disinfect your home every day. Use household  and disinfectant wipes or sprays. Take special care to clean things that you grab with your hands. These include doorknobs, remote controls, phones, and handles on your refrigerator and microwave. And don't forget countertops, tabletops, bathrooms, and computer keyboards. When to call for help  Call 911 anytime you think you may need emergency care. For example, call if:  · You have severe trouble breathing. (You can't talk at all.)  · You have constant chest pain or pressure. · You are severely dizzy or lightheaded. · You are confused or can't think clearly. · Your face and lips have a blue color. · You pass out (lose consciousness) or are very hard to wake up. Call your doctor now if you develop symptoms such as:  · Shortness of breath. · Fever. · Cough. If you need to get care, call ahead to the doctor's office for instructions before you go. Make sure you wear a face mask, if you have one, to prevent exposing other people to the virus. Where can you get the latest information? The following health organizations are tracking and studying this virus. Their websites contain the most up-to-date information. [place] also learn what to do if you think you may have been exposed to the virus. · U.S. Centers for Disease Control and Prevention (CDC): The CDC provides updated news about the disease and travel advice.  The website also tells you how to prevent the spread of infection. www.cdc.gov  · World Health Organization [facility]): WHO offers information about the virus outbreaks. WHO also has travel advice. www.who.int  Current as of: April 1, 2020               Content Version: 12.4  © 2006-2020 Health[employer]. Care instructions adapted under license by your healthcare professional. If you have questions about a medical condition or this instruction, always ask your healthcare professional. [address] any warranty or liability for your use of this information.         Wound Location: Left foot     Dressing orders:      1) Gather wound care supplies and arrange on clean table.      2) Wash your hands with soap and water or use alcohol based hand  for 20 seconds (sing \"Happy Birthday\" twice).    3) Cleanse wounds with normal saline or wound cleanser and gauze. Pat dry with clean gauze. 4) Left foot- Apply betadine to wound then apply alginate to wound. Cover with gauze. Wrap with roll gauze. Secure with tape. Change daily.      Keep all dressings clean & dry.     Do not shower, take baths or get wound wet, unless otherwise instructed by your Wound Care doctor.      Follow up visit:   2 Weeks on Tuesday      Keep next scheduled appointment. Please give 24 hour notice if unable to keep appointment. [phone]     If you experience any of the following, please call the Wound Care Service during business hours: Monday through Friday 8:00 am - 4:30 pm  ([phone]).               *Increase in pain              *Temperature over 101              *Increase in drainage from your wound or a foul odor              *Uncontrolled swelling              *Need for compression bandage changes due to slippage, breakthrough drainage     If you need medical attention outside of business hours, please contact your Primary Care Doctor or go to the nearest emergency room.            Electronically signed by Darlyn Ashley SUDHAM on 4/21/2020 at 8:20 AM

## 2020-04-21 NOTE — TELEPHONE ENCOUNTER
----- Message from Tab Dobbs MD sent at 4/16/2020  3:04 PM EDT -----  Vitamin D is very low (9), it should be above 30. Recommend to start vitamin D3 over-the-counter 2000 international units daily. Will recheck vitamin D in 3 months. Vitamin B12 is normal but on the low side I would recommend to take a complex be over-the-counter. Total protein is better to 8.2 from 8.43 weeks ago. Will recheck in 3 months. Lipid profile showed total cholesterol 152 which was good, triglycerides 370 which was too elevated, even though better than 40 years ago that was 440, HDL or good cholesterol 30, should be above 40 ideally, and LDL or bad cholesterol of 3 8. I would add Lovaza 1 g 2 tablets by mouth twice a day and repeat lipid profile in 3 months. Controlling blood sugar will help also with a triglyceride level.

## 2020-05-04 ENCOUNTER — TELEPHONE (OUTPATIENT)
Dept: WOUND CARE | Age: 50
End: 2020-05-04

## 2020-05-05 ENCOUNTER — HOSPITAL ENCOUNTER (OUTPATIENT)
Dept: WOUND CARE | Age: 50
Discharge: HOME | End: 2020-05-05
Payer: COMMERCIAL

## 2020-05-05 VITALS
DIASTOLIC BLOOD PRESSURE: 82 MMHG | RESPIRATION RATE: 16 BRPM | HEART RATE: 85 BPM | SYSTOLIC BLOOD PRESSURE: 176 MMHG | OXYGEN SATURATION: 93 % | TEMPERATURE: 98.2 F

## 2020-05-05 PROCEDURE — 97597 DBRDMT OPN WND 1ST 20 CM/<: CPT

## 2020-05-05 ASSESSMENT — PAIN SCALES - GENERAL: PAINLEVEL_OUTOF10: 0

## 2020-05-05 NOTE — PLAN OF CARE
Problem: Wound:  Goal: Will show signs of wound healing; wound closure and no evidence of infection  Description: Will show signs of wound healing; wound closure and no evidence of infection  Outcome: Ongoing     Patient presents to wound clinic for left foot wound. No s/s of infection noted. See AVS for discharge instructions. Follow up visit: 3 weeks on Tuesday May 26th at 8:00 am.    Care plan reviewed with patient. Patient verbalizes understanding of the plan of care and contribute to goal setting.

## 2020-05-05 NOTE — PROGRESS NOTES
Teaching Note:    I was present with the resident during the visit. I discussed the case with the resident and agree with the findings and the plan as documented in the residents note.     Javier Veliz DPM, [address] [facility]
has the virus on it, such as a doorknob or a tabletop. What can you do to protect yourself from coronavirus (COVID-19)? The best way to protect yourself from getting sick is to:  · Avoid areas where there is an outbreak. · Avoid contact with people who may be infected. · Wash your hands often with soap or alcohol-based hand sanitizers. · Avoid crowds and try to stay at least 6 feet away from other people. · Wash your hands often, especially after you cough or sneeze. Use soap and water, and scrub for at least 20 seconds. If soap and water aren't available, use an alcohol-based hand . · Avoid touching your mouth, nose, and eyes. What can you do to avoid spreading the virus to others? To help avoid spreading the virus to others:  · Cover your mouth with a tissue when you cough or sneeze. Then throw the tissue in the trash. · Use a disinfectant to clean things that you touch often. · Stay home if you are sick or have been exposed to the virus. Don't go to school, work, or public areas. And don't use public transportation. · If you are sick:  ? Leave your home only if you need to get medical care. But call the doctor's office first so they know you're coming. And wear a face mask, if you have one.  ? If you have a face mask, wear it whenever you're around other people. It can help stop the spread of the virus when you cough or sneeze. ? Clean and disinfect your home every day. Use household  and disinfectant wipes or sprays. Take special care to clean things that you grab with your hands. These include doorknobs, remote controls, phones, and handles on your refrigerator and microwave. And don't forget countertops, tabletops, bathrooms, and computer keyboards. When to call for help  Call 911 anytime you think you may need emergency care. For example, call if:  · You have severe trouble breathing. (You can't talk at all.)  · You have constant chest pain or pressure.   · You are severely dizzy

## 2020-05-27 ENCOUNTER — TELEPHONE (OUTPATIENT)
Dept: WOUND CARE | Age: 50
End: 2020-05-27

## 2020-05-27 RX ORDER — LOSARTAN POTASSIUM 25 MG/1
50 TABLET ORAL DAILY
Qty: 30 TABLET | Refills: 3 | Status: SHIPPED | OUTPATIENT
Start: 2020-05-27 | End: 2020-07-23

## 2020-05-27 RX ORDER — METFORMIN HYDROCHLORIDE EXTENDED-RELEASE TABLETS 1000 MG/1
2000 TABLET, FILM COATED, EXTENDED RELEASE ORAL
Qty: 30 TABLET | Refills: 3 | Status: SHIPPED | OUTPATIENT
Start: 2020-05-27 | End: 2020-06-02

## 2020-05-27 NOTE — TELEPHONE ENCOUNTER
[facility] called requesting a refill on the following medications:  Requested Prescriptions     Pending Prescriptions Disp Refills    losartan (COZAAR) 25 MG tablet 30 tablet 3     Sig: Take 2 tablets by mouth daily    metFORMIN, OSM, (FORTAMET) 1000 MG extended release tablet 30 tablet 3     Sig: Take 2 tablets by mouth daily (with breakfast)     Pharmacy verified:  .[address]  Date of last visit: 04/15/20  Date of next visit (if applicable): Visit date not found

## 2020-06-01 NOTE — TELEPHONE ENCOUNTER
[facility] states he needs a PA for his Fortamet. If it is written as metformin HCL ER it will not need a PA, change or keep the same? If keeping the same I need a reason so that I can fight with insurance.

## 2020-06-02 RX ORDER — METFORMIN HYDROCHLORIDE 500 MG/1
2000 TABLET, EXTENDED RELEASE ORAL
Qty: 120 TABLET | Refills: 3 | Status: SHIPPED | OUTPATIENT
Start: 2020-06-02 | End: 2020-10-01

## 2020-06-15 ENCOUNTER — TELEPHONE (OUTPATIENT)
Dept: WOUND CARE | Age: 50
End: 2020-06-15

## 2020-06-16 ENCOUNTER — HOSPITAL ENCOUNTER (OUTPATIENT)
Dept: WOUND CARE | Age: 50
Discharge: HOME | End: 2020-06-16
Payer: COMMERCIAL

## 2020-06-16 VITALS
RESPIRATION RATE: 16 BRPM | TEMPERATURE: 98.9 F | SYSTOLIC BLOOD PRESSURE: 170 MMHG | DIASTOLIC BLOOD PRESSURE: 87 MMHG | OXYGEN SATURATION: 95 % | HEART RATE: 82 BPM

## 2020-06-16 PROCEDURE — 97597 DBRDMT OPN WND 1ST 20 CM/<: CPT

## 2020-06-16 RX ORDER — DOXYCYCLINE HYCLATE 100 MG
100 TABLET ORAL 2 TIMES DAILY
Qty: 20 TABLET | Refills: 0 | Status: SHIPPED | OUTPATIENT
Start: 2020-06-16 | End: 2020-06-26

## 2020-06-16 ASSESSMENT — PAIN SCALES - GENERAL: PAINLEVEL_OUTOF10: 0

## 2020-06-16 NOTE — PROGRESS NOTES
Teaching Note:    I was present with the resident during the visit. I discussed the case with the resident and agree with the findings and the plan as documented in the residents note.     Shelia CANO, [address] [facility]

## 2020-06-16 NOTE — PROGRESS NOTES
[facility]          Progress Note and Procedure Note      Constance Tran  MEDICAL RECORD NUMBER:  [id number]  AGE: 47 y.o. GENDER: male  : 1970  EPISODE DATE:  2020    Subjective:     Chief Complaint   Patient presents with    Wound Check     left foot         HISTORY of PRESENT ILLNESS HPI     Constance Tran is a 47 y.o. male Established patient, who presents today for wound/ulcer evaluation. History of Wound Context: neuropathic, trauma  Wound/Ulcer Pain Timing/Severity: none  Quality of pain: N/A  Severity:  0 / 10   Modifying Factors: None  Associated Signs/Symptoms: edema    Interval History:   Patient presents today for follow up on wound/ulcer's progression. The patient is currently not on antibiotics. Current dressing care includes betadine to wound bed and nothing to anterior right leg. Patient states his left leg got very swollen and red the past several days. He is wearing his compression stockings but does not want to wear an unna boot. He denies any f/c/n/v/sob/cp at this time. PAST MEDICAL HISTORY        Diagnosis Date    CAD (coronary artery disease)     CHF (congestive heart failure) ([facility] [address] 2019    Diabetes mellitus ([facility] [address] 's    Hypertension 's       PAST SURGICAL HISTORY    Past Surgical History:   Procedure Laterality Date    CHOLECYSTECTOMY      FIBULA FRACTURE SURGERY Right     screws    TIBIA FRACTURE SURGERY Right     screws       FAMILY HISTORY    No family history on file.     SOCIAL HISTORY    Social History     Tobacco Use    Smoking status: Current Every Day Smoker     Packs/day: 0.50     Types: Cigarettes    Smokeless tobacco: Never Used   Substance Use Topics    Alcohol use: Not on file    Drug use: No       ALLERGIES    Allergies   Allergen Reactions    Morphine      \"goes nuts\"       MEDICATIONS    Current Outpatient Medications on File Prior to Encounter   Medication Sig Dispense Refill    metFORMIN (GLUCOPHAGE-XR) 500 MG extended release tablet Take 4 tablets by mouth daily (with breakfast) 120 tablet 3    losartan (COZAAR) 25 MG tablet Take 2 tablets by mouth daily 30 tablet 3    omega-3 acid ethyl esters (LOVAZA) 1 g capsule Take 2 capsules by mouth 2 times daily 120 capsule 5    Cholecalciferol (VITAMIN D3) 25 MCG (1000 UT) TABS Take 2 tablets by mouth daily 180 tablet 5    varenicline (CHANTIX STARTING MONTH IBETH) 0.5 MG X 11 & 1 MG X 42 tablet Take by mouth. 1 box 0    omeprazole (PRILOSEC) 20 MG delayed release capsule Take 1 capsule by mouth every morning (before breakfast) 90 capsule 1    bumetanide (BUMEX) 1 MG tablet Take 1 tablet by mouth daily 30 tablet 3    atorvastatin (LIPITOR) 40 MG tablet Take 1 tablet by mouth nightly 30 tablet 3    metoprolol succinate (TOPROL XL) 50 MG extended release tablet Take 1 tablet by mouth every morning 30 tablet 3    metoprolol succinate (TOPROL XL) 25 MG extended release tablet Take 1 tablet by mouth nightly 30 tablet 3    aspirin 81 MG tablet Take 81 mg by mouth daily       No current facility-administered medications on file prior to encounter. REVIEW OF SYSTEMS    Review of systems not obtained due to patient factors. Objective:      BP (!) 170/87   Pulse 82   Temp 98.9 °F (37.2 °C) (Tympanic)   Resp 16   SpO2 95%     Wt Readings from Last 3 Encounters:   04/07/20 276 lb (125.2 kg)   03/28/20 276 lb 8 oz (125.4 kg)   06/04/18 280 lb (127 kg)       PHYSICAL EXAM    General Appearance: alert and oriented to person, place and time, well-developed and well-nourished, in no acute distress    Vascular: Dorsalis pedis and posterior tibial pulses are palpable bilaterally. Increased warmth noted to left lower leg. Edema to bilateral legs left greater than right  CFT brisk to exposed digits. Hair growth absent.      Dermatologic: wound plantar left 5th met head clinically healed at this time after hyperkeratotic lesion removed.  Anterior right 6/16/2020  8:36 AM   Margins Attached edges 6/16/2020  8:36 AM   Angela-wound Assessment Maceration; White 6/16/2020  8:36 AM   [place]%Wound Bed 30 6/16/2020  8:36 AM   Red%Wound Bed 30 6/16/2020  8:36 AM   Yellow%Wound Bed 40 6/16/2020  8:36 AM   Number of days: 0       LABS      CBC:   Lab Results   Component Value Date    WBC 6.8 03/30/2020    HGB 17.4 03/30/2020    HCT 53.0 03/30/2020    MCV 94.6 03/30/2020     03/30/2020     BMP:   Lab Results   Component Value Date     03/30/2020    K 4.4 03/30/2020    K 4.3 03/26/2020    CL 97 03/30/2020    CO2 26 03/30/2020    BUN 15 03/30/2020    CREATININE 0.6 03/30/2020     PT/INR: No results found for: PROTIME, INR  Prealbumin: No results found for: PREALBUMIN  Albumin:  Lab Results   Component Value Date    LABALBU 3.9 03/25/2020     Sed Rate:No results found for: [place]  CRP: No results found for: CRP  Micro:   Lab Results   Component Value Date    BC No growth-preliminary No growth  03/25/2020      Hemoglobin A1C:   Lab Results   Component Value Date    LABA1C 9.6 03/25/2020       Assessment:     Ulcer Identification:  Ulcer Type: diabetic and traumatic  Contributing Factors: edema, venous stasis, lymphedema and shear force    Wound: Abrasion    Depth of Diabetic/Pressure/Non Pressure Ulcers or Wound:  Wound, full thickness    Patient Active Problem List   Diagnosis Code    Cellulitis L03.90    Foot ulcer, left, with fat layer exposed ([facility] [address] L97.522    Forefoot varus, acquired, left M21.6X2    Equinus contracture of left ankle M24.572    Type 2 diabetes mellitus with hyperglycemia, with long-term current use of insulin ([facility]) E11.65, Z79.4    Severe sepsis ([facility]) A41.9, R65.20    Uncontrolled type 2 diabetes mellitus with hyperglycemia ([facility]) E11.65    Hyponatremia E87.1    Hypertension, uncontrolled I10    PFO (patent foramen ovale) Q21.1    Hyperlipidemia E78.5    Smoker F17.200    Morbid obesity ([facility]) E66.01    Chronic diastolic heart failure ([email or web address] I50.32    Noncompliance with medications Z91.14    Aortic valve stenosis I35.0       Procedure Note  Indications:  Based on my examination of this patient's wound(s)/ulcer(s) today, debridement is required to promote healing and evaluate the extent healing. Performed by: Sofi Mayberry DPM    Consent obtained: Yes    Time out taken:  Yes    Pain control: none      Debridement:Non-excisional Debridement    Using #15 blade scalpel the wound(s)/ulcer(s) was/were sharply debrided down through and including the removal of epidermis and dermis. Devitalized Tissue Debrided:  callus    Pre Debridement Measurements:  Are located in the Wound/Ulcer Documentation Flow Sheet    Wound/Ulcer #: 1    Post Debridement Measurements:    Wound/Ulcer Descriptions are listed under Physical Exam above. Wound/Ulcer Descriptions are Pre Debridement except measurements    Percent of Wound/Ulcer Debrided: 100%    Total Surface Area Debrided:  1 sq cm     Bleeding:  None    Hemostasis Achieved:  not needed    Procedural Pain:  0  / 10     Post Procedural Pain:  0 / 10     Response to treatment:  Well tolerated by patient. Plan:     Patient examined and evaluated    Dry dressing to left foot. Sharp debridement left foot   mepitel ag to anterior right leg. Change every 2-3 days   Script for doxycycline written for 10 days   Advised patient to have unna boots but he declined   Compression stockings at all times when out of bed   Follow up in 3-6 months or call if needed sooner     Treatment:   Orders Placed This Encounter   Procedures    Debridement     Standing Status:   Standing     Number of Occurrences:   1    Apply dressing     Standing Status:   Standing     Number of Occurrences:   1         Antibiotics: Yes    Follow up: 3-6 months     Please see attached Discharge Instructions    Written patient dismissal instructions given to patient and signed by patient or POA.          Discharge Instructions       Visit

## 2020-06-22 ENCOUNTER — OFFICE VISIT (OUTPATIENT)
Dept: NEPHROLOGY | Age: 50
End: 2020-06-22
Payer: COMMERCIAL

## 2020-06-22 VITALS
BODY MASS INDEX: 38.27 KG/M2 | HEART RATE: 83 BPM | DIASTOLIC BLOOD PRESSURE: 76 MMHG | SYSTOLIC BLOOD PRESSURE: 146 MMHG | WEIGHT: 282.2 LBS | TEMPERATURE: 99.5 F | OXYGEN SATURATION: 95 %

## 2020-06-22 PROCEDURE — 99204 OFFICE O/P NEW MOD 45 MIN: CPT | Performed by: INTERNAL MEDICINE

## 2020-06-22 NOTE — PROGRESS NOTES
Social History:  Social History     Socioeconomic History    Marital status:      Spouse name: Not on file    Number of children: Not on file    Years of education: Not on file    Highest education level: Not on file   Occupational History    Not on file   Social Needs    Financial resource strain: Not on file    Food insecurity     Worry: Not on file     Inability: Not on file    Transportation needs     Medical: Not on file     Non-medical: Not on file   Tobacco Use    Smoking status: Current Every Day Smoker     Packs/day: 0.50     Types: Cigarettes    Smokeless tobacco: Never Used   Substance and Sexual Activity    Alcohol use: Not on file    Drug use: No    Sexual activity: Not on file   Lifestyle    Physical activity     Days per week: Not on file     Minutes per session: Not on file    Stress: Not on file   Relationships    Social connections     Talks on phone: Not on file     Gets together: Not on file     Attends Jainism service: Not on file     Active member of club or organization: Not on file     Attends meetings of clubs or organizations: Not on file     Relationship status: Not on file    Intimate partner violence     Fear of current or ex partner: Not on file     Emotionally abused: Not on file     Physically abused: Not on file     Forced sexual activity: Not on file   Other Topics Concern    Not on file   Social History Narrative    Not on file        Review of Systems:  Constitutional: no fever or chills  Head: No headaches  Eyes: no blurry vision, no discharge  Ears: no ear pain or hearing changes  Nose: no runny nose or epistaxis  Respiratory: no shortness of breath or cough or sputum production  Cardiovascular: no chest pain  GI: no nausea, no vomiting or diarrhea  : denies any discharge  Skin: L leg wound, swelling   Musculoskeletal: no joint pain, moves all ext  Neuro: no numbness or tingling, no slurred speech  Psychiatric: stable mood, no depression or left eye or right eye,  no pallor conjunctiva in left or right eye, no discharge seen from left eye or right eye  Ears and Nose: normal external appearance of left and right ear. Both ear lobules are nontender to palpation. Normal external appearance of nose. No active drainage from nose. Oral: moist oral mucus membranes  Neck: No jugular venous distention, appears symmetric, good ROM  Lungs: Air entry B/L, no crackles or rales, no use of accessory muscles or labored breathing  Chest: No chest wall tenderness  Heart: regular rate, S1, S2  Extremities: B/L 2+  LE edema, no tenderness  GI: soft, non-tender, no guarding, no distention  Skin: no rash seen on exposed extremities, warm to touch  Musculo: moves all extremities, no clubbing or cyanosis of digits of either upper extremity. Neuro: no slurred speech, no facial drooping, symmetric strength  Psychiatric: Normal mood and affect, Not agitated     Laboratory & Diagnostics:  Old progress notes from referring physician reviewed. Old labs reviewed:  March 2020: K 4.4, Creat 0.6  Echo EF 56%, grade I diastolic dysfunction  April 2020: UACR 740 mg/g, LDL 48  Vit D 25 hydroxy 9        Impression/Plan:   1. Proteinuria: likely due to DM nephropathy. AIC was close to 10% in April 2020. Advised good sugar control to minimize progression of kidney disease. He is already on cozaar 50 mg po daily, may need to maximize the dose to 100 mg po daily but first will repeat BMP to ensure that his electrolytes are stable. . Many diabetics are salt sensitive and reducing salt intake will help achieve blood pressure goals with secondary benefits of proteinuria reduction. Advised low salt diet. Advised weight loss. 2. HTN: on cozaar and Toprol. Advised low salt diet. Bring a log of BP readings next visit. Discussed with patient  3. Vit D def: on vit d replacement   4. Renal: creat is 0.6, but needs good sugar control to minimize risk of kidney disease.  This was discussed with

## 2020-06-29 ENCOUNTER — TELEPHONE (OUTPATIENT)
Dept: WOUND CARE | Age: 50
End: 2020-06-29

## 2020-07-15 ENCOUNTER — HOSPITAL ENCOUNTER (OUTPATIENT)
Dept: ULTRASOUND IMAGING | Age: 50
Discharge: HOME | End: 2020-07-15
Payer: COMMERCIAL

## 2020-07-15 ENCOUNTER — HOSPITAL ENCOUNTER (OUTPATIENT)
Age: 50
Discharge: HOME | End: 2020-07-15
Payer: COMMERCIAL

## 2020-07-15 LAB
ANION GAP SERPL CALCULATED.3IONS-SCNC: 10 MEQ/L (ref 8–16)
BACTERIA: ABNORMAL
BILIRUBIN URINE: NEGATIVE
BLOOD, URINE: NEGATIVE
BUN BLDV-MCNC: 17 MG/DL (ref 7–22)
CALCIUM SERPL-MCNC: 9.7 MG/DL (ref 8.5–10.5)
CASTS: ABNORMAL /LPF
CASTS: ABNORMAL /LPF
CHARACTER, URINE: CLEAR
CHLORIDE BLD-SCNC: 98 MEQ/L (ref 98–111)
CO2: 30 MEQ/L (ref 23–33)
COLOR: YELLOW
CREAT SERPL-MCNC: 0.7 MG/DL (ref 0.4–1.2)
CRYSTALS: ABNORMAL
EPITHELIAL CELLS, UA: ABNORMAL /HPF
ERYTHROCYTE [DISTWIDTH] IN BLOOD BY AUTOMATED COUNT: 13.8 % (ref 11.5–14.5)
ERYTHROCYTE [DISTWIDTH] IN BLOOD BY AUTOMATED COUNT: 48.3 FL (ref 35–45)
GFR SERPL CREATININE-BSD FRML MDRD: > 90 ML/MIN/1.73M2
GLUCOSE BLD-MCNC: 403 MG/DL (ref 70–108)
GLUCOSE, URINE: >= 1000 MG/DL
HCT VFR BLD CALC: 50.1 % (ref 42–52)
HEMOGLOBIN: 16.8 GM/DL (ref 14–18)
KETONES, URINE: NEGATIVE
LEUKOCYTE ESTERASE, URINE: NEGATIVE
MCH RBC QN AUTO: 32.1 PG (ref 26–33)
MCHC RBC AUTO-ENTMCNC: 33.5 GM/DL (ref 32.2–35.5)
MCV RBC AUTO: 95.8 FL (ref 80–94)
MISCELLANEOUS LAB TEST RESULT: ABNORMAL
NITRITE, URINE: NEGATIVE
PH UA: 6.5 (ref 5–9)
PLATELET # BLD: 173 THOU/MM3 (ref 130–400)
PMV BLD AUTO: 11.3 FL (ref 9.4–12.4)
POTASSIUM SERPL-SCNC: 4.6 MEQ/L (ref 3.5–5.2)
PROTEIN UA: 100 MG/DL
RBC # BLD: 5.23 MILL/MM3 (ref 4.7–6.1)
RBC URINE: ABNORMAL /HPF
RENAL EPITHELIAL, UA: ABNORMAL
SODIUM BLD-SCNC: 138 MEQ/L (ref 135–145)
SPECIFIC GRAVITY UA: > 1.03 (ref 1–1.03)
UROBILINOGEN, URINE: 1 EU/DL (ref 0–1)
WBC # BLD: 7.2 THOU/MM3 (ref 4.8–10.8)
WBC UA: ABNORMAL /HPF
YEAST: ABNORMAL

## 2020-07-15 PROCEDURE — 36415 COLL VENOUS BLD VENIPUNCTURE: CPT

## 2020-07-15 PROCEDURE — 80048 BASIC METABOLIC PNL TOTAL CA: CPT

## 2020-07-15 PROCEDURE — 76770 US EXAM ABDO BACK WALL COMP: CPT

## 2020-07-15 PROCEDURE — 85027 COMPLETE CBC AUTOMATED: CPT

## 2020-07-15 PROCEDURE — 81001 URINALYSIS AUTO W/SCOPE: CPT

## 2020-07-16 ENCOUNTER — TELEPHONE (OUTPATIENT)
Dept: NEPHROLOGY | Age: 50
End: 2020-07-16

## 2020-07-23 ENCOUNTER — OFFICE VISIT (OUTPATIENT)
Dept: NEPHROLOGY | Age: 50
End: 2020-07-23
Payer: COMMERCIAL

## 2020-07-23 VITALS
DIASTOLIC BLOOD PRESSURE: 73 MMHG | TEMPERATURE: 99.1 F | HEART RATE: 84 BPM | BODY MASS INDEX: 37.84 KG/M2 | WEIGHT: 279 LBS | SYSTOLIC BLOOD PRESSURE: 156 MMHG | OXYGEN SATURATION: 97 %

## 2020-07-23 PROCEDURE — 99213 OFFICE O/P EST LOW 20 MIN: CPT | Performed by: INTERNAL MEDICINE

## 2020-07-23 RX ORDER — LOSARTAN POTASSIUM 100 MG/1
100 TABLET ORAL DAILY
Qty: 90 TABLET | Refills: 3 | Status: SHIPPED | OUTPATIENT
Start: 2020-07-23 | End: 2021-07-22 | Stop reason: SDUPTHER

## 2020-07-23 RX ORDER — ERGOCALCIFEROL 1.25 MG/1
50000 CAPSULE ORAL WEEKLY
Qty: 4 CAPSULE | Refills: 3 | Status: SHIPPED | OUTPATIENT
Start: 2020-07-23 | End: 2020-11-09

## 2020-07-23 NOTE — PROGRESS NOTES
[address] [facility]  [address]  [facility] [address]  Dept: [phone]  Loc: [phone]  Office Progress Note  7/23/2020 8:58 AM      Pt Name:    Franki Turk  YOB: 1970  Primary Care Physician:  Kyle Espinal MD     Chief Complaint:   Chief Complaint   Patient presents with    Follow-up: here for follow-up for proteinuria and hypertension        Background Information/Interval History:   The patient is a 45 y.o. white male with hx DM for several years, HTN, CHF who is here for follow-up evaluation of proteinuria. He has been smoking for 40+ years, 1 ppd. Social alcohol use. His last AIC was 9.6%. he was admitted to hospital in March 2020 with left leg wound. He took ibuprofen 800-1000 mg as needed 2-3 times per week. He says he used to take it daily in the past and did this for several years. He has neuropathy from DM. Here for follow-up evaluation to go over test results and further plan of care. Feels well. No chest pain, no shortness of breath. No hematuria. Sugars are high. He says BP usually in 150-160 range. He says sugars are running high at times. He is following with PCP     Past History:  Past Medical History:   Diagnosis Date    CAD (coronary artery disease) 2019    CHF [facility]congestive heart failure) ([facility] 02/12/2019    Diabetes mellitus ([facility] 1990's    Hypertension 1990's     Past Surgical History:   Procedure Laterality Date    CHOLECYSTECTOMY      FIBULA FRACTURE SURGERY Right     screws    TIBIA FRACTURE SURGERY Right     screws        VITALS:  BP (!) 156/73 (Site: Left Upper Arm, Position: Sitting, Cuff Size: Large Adult)   Pulse 84   Temp 99.1 °F (37.3 °C)   Wt 279 lb (126.6 kg)   SpO2 97%   BMI 37.84 kg/m²   Wt Readings from Last 3 Encounters:   07/23/20 279 lb (126.6 kg)   06/22/20 282 lb 3.2 oz (128 kg)   04/07/20 276 lb (125.2 kg)     Body mass index is 37.84 kg/m².      General Appearance: alert and cooperative with exam, appears comfortable, no distress  Oral: moist oral mucus membranes  Neck: No jugular venous distention  Lungs: Air entry B/L, no crackles or rales, no use of accessory muscles  Heart: S1, S2 heard  GI: soft, non-tender, no guarding  Extremities: trace edema, chronic stasis changes     Medications:  Current Outpatient Medications   Medication Sig Dispense Refill    metFORMIN (GLUCOPHAGE-XR) 500 MG extended release tablet Take 4 tablets by mouth daily (with breakfast) 120 tablet 3    losartan (COZAAR) 25 MG tablet Take 2 tablets by mouth daily 30 tablet 3    omega-3 acid ethyl esters (LOVAZA) 1 g capsule Take 2 capsules by mouth 2 times daily 120 capsule 5    Cholecalciferol (VITAMIN D3) 25 MCG (1000 UT) TABS Take 2 tablets by mouth daily 180 tablet 5    omeprazole (PRILOSEC) 20 MG delayed release capsule Take 1 capsule by mouth every morning (before breakfast) 90 capsule 1    bumetanide (BUMEX) 1 MG tablet Take 1 tablet by mouth daily 30 tablet 3    atorvastatin (LIPITOR) 40 MG tablet Take 1 tablet by mouth nightly 30 tablet 3    metoprolol succinate (TOPROL XL) 50 MG extended release tablet Take 1 tablet by mouth every morning 30 tablet 3    metoprolol succinate (TOPROL XL) 25 MG extended release tablet Take 1 tablet by mouth nightly 30 tablet 3     No current facility-administered medications for this visit. Laboratory & Diagnostics:  US July 2020: R 14 cm, L 13 cm, left cyst 0.8 cm  March 2020: K 4.4, Creat 0.6  Echo EF 60%, grade I diastolic dysfunction  April 2020: UACR 740 mg/g, LDL 48  Vit D 25 hydroxy 9     July 2020: creat 0.7, Ca 9.7, K 4.6, Hb 16.8, UA: no blood, 100 protein     Impression/Plan:   1. Proteinuria: likely due to DM nephropathy. AIC was close to 10% in April 2020. Advised good sugar control to minimize progression of kidney disease. Increase cozaar 100 mg po daily. Needs better BP control. Needs weight loss. Advised lifestyle modification. Advised low salt diet. 2. HTN: on cozaar and Toprol. Increase cozaar 100 mg po daily. Monitor BP and call me with some readings in 2 weeks. He voiced understanding. Check BMP in 2 weeks  3. Vit D def: on vit d replacement 1000 units po daily, Vit D level was very low, will prescribe ergocalciferol 50,000 units po weekly. D/W pt and he voiced understanding. 4. Smoking: advised cessation  5. Chronic diastolic dysfunction: on bumex, advised low salt and fluid restriction. He declined any adjustments in bumex dose, may need to increase the dose. 6. Neuropathy  7. Sleep Apnea: on CPAP  8. Left renal cyst    Orders Placed This Encounter   Procedures    Basic Metabolic Panel    Protein / creatinine ratio, urine    Vitamin D 25 Hydroxy    Basic Metabolic Panel     Return in about 9 months (around 4/23/2021).       Kenya Delatorre MD  [facility]

## 2020-09-11 ENCOUNTER — TELEPHONE (OUTPATIENT)
Dept: FAMILY MEDICINE CLINIC | Age: 50
End: 2020-09-11

## 2020-09-11 NOTE — TELEPHONE ENCOUNTER
Please approve or deny     Last Visit Date:  4/15/2020       Next Visit Date:    Visit date not found     Left message for patient to call and schedule appointment

## 2020-09-11 NOTE — TELEPHONE ENCOUNTER
Eddie Kapoor called requesting a refill on the following medications:  Requested Prescriptions     Pending Prescriptions Disp Refills    bumetanide (BUMEX) 1 MG tablet 30 tablet 0    atorvastatin (LIPITOR) 40 MG tablet 30 tablet 0     Sig: Take 1 tablet by mouth nightly    metoprolol succinate (TOPROL XL) 25 MG extended release tablet 30 tablet 0    omeprazole (PRILOSEC) 20 MG delayed release capsule 90 capsule 1     Sig: Take 1 capsule by mouth every morning (before breakfast)     Pharmacy verified: [facility]      Date of last visit: 4/15/2020  Date of next visit (if applicable): Visit date not found

## 2020-09-13 RX ORDER — OMEPRAZOLE 20 MG/1
20 CAPSULE, DELAYED RELEASE ORAL
Qty: 90 CAPSULE | Refills: 1 | Status: SHIPPED | OUTPATIENT
Start: 2020-09-13 | End: 2021-04-21

## 2020-09-13 RX ORDER — ATORVASTATIN CALCIUM 40 MG/1
40 TABLET, FILM COATED ORAL NIGHTLY
Qty: 30 TABLET | Refills: 0 | Status: SHIPPED | OUTPATIENT
Start: 2020-09-13 | End: 2020-09-18 | Stop reason: SDUPTHER

## 2020-09-13 RX ORDER — METOPROLOL SUCCINATE 25 MG/1
TABLET, EXTENDED RELEASE ORAL
Qty: 30 TABLET | Refills: 0 | Status: SHIPPED | OUTPATIENT
Start: 2020-09-13 | End: 2020-09-18 | Stop reason: SDUPTHER

## 2020-09-13 RX ORDER — BUMETANIDE 1 MG/1
TABLET ORAL
Qty: 30 TABLET | Refills: 0 | Status: SHIPPED | OUTPATIENT
Start: 2020-09-13 | End: 2020-09-18 | Stop reason: SDUPTHER

## 2020-09-15 ENCOUNTER — TELEPHONE (OUTPATIENT)
Dept: WOUND CARE | Age: 50
End: 2020-09-15

## 2020-09-18 ENCOUNTER — VIRTUAL VISIT (OUTPATIENT)
Dept: FAMILY MEDICINE CLINIC | Age: 50
End: 2020-09-18

## 2020-09-18 ENCOUNTER — NURSE ONLY (OUTPATIENT)
Dept: LAB | Age: 50
End: 2020-09-18

## 2020-09-18 LAB
ALBUMIN SERPL-MCNC: 4 G/DL (ref 3.5–5.1)
ALP BLD-CCNC: 87 U/L (ref 38–126)
ALT SERPL-CCNC: 27 U/L (ref 11–66)
ANION GAP SERPL CALCULATED.3IONS-SCNC: 10 MEQ/L (ref 8–16)
AST SERPL-CCNC: 16 U/L (ref 5–40)
AVERAGE GLUCOSE: 267 MG/DL (ref 70–126)
BILIRUB SERPL-MCNC: 0.5 MG/DL (ref 0.3–1.2)
BILIRUBIN DIRECT: < 0.2 MG/DL (ref 0–0.3)
BUN BLDV-MCNC: 19 MG/DL (ref 7–22)
CALCIUM SERPL-MCNC: 9.9 MG/DL (ref 8.5–10.5)
CHLORIDE BLD-SCNC: 94 MEQ/L (ref 98–111)
CHOLESTEROL, TOTAL: 243 MG/DL (ref 100–199)
CO2: 32 MEQ/L (ref 23–33)
CREAT SERPL-MCNC: 0.6 MG/DL (ref 0.4–1.2)
CREATININE URINE: 52.8 MG/DL
GFR SERPL CREATININE-BSD FRML MDRD: > 90 ML/MIN/1.73M2
GLUCOSE BLD-MCNC: 299 MG/DL (ref 70–108)
HBA1C MFR BLD: 10.9 % (ref 4.4–6.4)
HBV SURFACE AB TITR SER: NEGATIVE {TITER}
HDLC SERPL-MCNC: 25 MG/DL
LDL CHOLESTEROL CALCULATED: ABNORMAL MG/DL
POTASSIUM SERPL-SCNC: 4.1 MEQ/L (ref 3.5–5.2)
PROT/CREAT RATIO, UR: 3.49
PROTEIN, URINE: 184.2 MG/DL
SODIUM BLD-SCNC: 136 MEQ/L (ref 135–145)
TOTAL PROTEIN: 7.7 G/DL (ref 6.1–8)
TRIGL SERPL-MCNC: 1157 MG/DL (ref 0–199)
VITAMIN D 25-HYDROXY: 18 NG/ML (ref 30–100)

## 2020-09-18 PROCEDURE — 99215 OFFICE O/P EST HI 40 MIN: CPT | Performed by: FAMILY MEDICINE

## 2020-09-18 RX ORDER — METOPROLOL SUCCINATE 25 MG/1
TABLET, EXTENDED RELEASE ORAL
Qty: 90 TABLET | Refills: 1 | Status: SHIPPED | OUTPATIENT
Start: 2020-09-18 | End: 2021-04-21

## 2020-09-18 RX ORDER — BUMETANIDE 1 MG/1
TABLET ORAL
Qty: 90 TABLET | Refills: 1 | Status: SHIPPED | OUTPATIENT
Start: 2020-09-18 | End: 2021-04-21

## 2020-09-18 RX ORDER — INSULIN GLARGINE 100 [IU]/ML
20 INJECTION, SOLUTION SUBCUTANEOUS NIGHTLY
Qty: 5 PEN | Refills: 3 | Status: SHIPPED | OUTPATIENT
Start: 2020-09-18 | End: 2020-10-06 | Stop reason: SDUPTHER

## 2020-09-18 RX ORDER — FLASH GLUCOSE SENSOR
1 KIT MISCELLANEOUS CONTINUOUS
Qty: 6 EACH | Refills: 1 | Status: SHIPPED | OUTPATIENT
Start: 2020-09-18

## 2020-09-18 RX ORDER — FLASH GLUCOSE SCANNING READER
1 EACH MISCELLANEOUS CONTINUOUS
Qty: 1 DEVICE | Refills: 0 | Status: SHIPPED | OUTPATIENT
Start: 2020-09-18

## 2020-09-18 RX ORDER — ATORVASTATIN CALCIUM 40 MG/1
40 TABLET, FILM COATED ORAL NIGHTLY
Qty: 90 TABLET | Refills: 1 | Status: SHIPPED | OUTPATIENT
Start: 2020-09-18 | End: 2021-04-21

## 2020-09-18 NOTE — PROGRESS NOTES
2020    TELEHEALTH EVALUATION -- Audio/Visual (During [facility]-78 public health emergency)    HPI:    Seferino Robertson (:  1970) has requested an audio/video evaluation for the following concern(s): Diabetes mellitus, hypertension, hypercholesterolemia, aortic stenosis, history of congestive heart failure. ARIANE and smoking. He established with me back in April after he was admitted to the hospital due to osteomyelitis of his left foot complicated with sepsis. Diabetes mellitus: Diagnosed in the . He is taking Glucophage Exar 500 mg 4 tablets by mouth daily. He also was supposed to be taking Lantus, but he states that he did not have a prescription so he has not been using it. Hemoglobin A1c in 2020 was 9.6%. Microalbuminuria: His microalbumin was 742. He was referred to Dr. Chris Capone, nephrology. He saw him 2020. He diagnosed him with prooteinuria: likely due to DM nephropathy. AIC was close to 10% in 2020. He Increased Cozaar 100 mg 1 tablet PO daily and continued Toprol XL 25 mg 1 tablet mouth daily. He ordered a BMP in 2 weeks which he has not done yet. For his vitamin D he prescribed ergocalciferol 50,000 units po weekly and stopped the Vitamin D 3. He is a smoker and has decreased the amount of cigarretes to half. He is trying to quit. Chronic diastolic dysfunction: on bumex, advised low salt and fluid restriction. He declined any adjustments in bumex dose as per Dr. Chris Capone; he felt that he may need to increase the dose. He has seen cardiologist in the past, Dr. Baruch Kanner [address] [place] [address].  He has not been back to her for over 2 years according to him, and he rather does not go back. He does not like  going to see different specialists. ARIANE: diagnosed 27 years ago. Uses CPAP every day. Not sure if heart failure is due to Pulmonary HTN due to ARIANE.     Lipid profile: , triglycerides, 370, HDL 30 and LDL 48.     2020 K 4.6 , BUN 17,  Na 138       Review of Systems:  Negative for fever or chills. Negative for congestion or drainage. Negative for chest pain or shortness of breath. Negative for abdominal pain, nausea, vomiting, diarrhea, constipation. Positive for swelling of lower extremities. Prior to Visit Medications    Medication Sig Taking?  Authorizing Provider   Continuous Blood Gluc  (FREESTYLE MACIEL 14 DAY READER) ANÍBAL 1 Units by Does not apply route continuous Patient is diabetic treated with insulin Yes Consuelo George MD   Continuous Blood Gluc Sensor (FREESTYLE MACIEL 14 DAY SENSOR) MISC 1 Units by Does not apply route continuous Patient is Diabetic treated with Insulin Yes Consuelo George MD   insulin glargine (LANTUS SOLOSTAR) 100 UNIT/ML injection pen Inject 20 Units into the skin nightly Yes Consuelo George MD   atorvastatin (LIPITOR) 40 MG tablet Take 1 tablet by mouth nightly Yes Consuelo George MD   bumetanide (BUMEX) 1 MG tablet take 1 tablet by mouth once daily Yes Consuelo George MD   metoprolol succinate (TOPROL XL) 25 MG extended release tablet take 1 tablet nightly Yes Consuelo George MD   omeprazole (PRILOSEC) 20 MG delayed release capsule Take 1 capsule by mouth every morning (before breakfast)  Consuelo George MD   losartan (COZAAR) 100 MG tablet Take 1 tablet by mouth daily  Nathalia Roy MD   vitamin D (ERGOCALCIFEROL) 1.25 MG (56476 UT) CAPS capsule Take 1 capsule by mouth once a week  Nathalia Roy MD   metFORMIN (GLUCOPHAGE-XR) 500 MG extended release tablet Take 4 tablets by mouth daily (with breakfast)  ANA Rivero - CNP   omega-3 acid ethyl esters (LOVAZA) 1 g capsule Take 2 capsules by mouth 2 times daily  Consuelo George MD   Cholecalciferol (VITAMIN D3) 25 MCG (1000 UT) TABS Take 2 tablets by mouth daily  Consuelo George MD       Social History     Tobacco Use    Smoking status: Current Every Day Smoker     Packs/day: 0.50     Types: Cigarettes    Smokeless tobacco: Never Used   Substance Use Topics    Alcohol use: Not on file    Drug use: No        Past Medical History:   Diagnosis Date    CAD (coronary artery disease) 2019    CHF (congestive heart failure) ([facility] [address] 02/12/2019    Diabetes mellitus ([facility] 1990's    Hypertension 1990's       PHYSICAL EXAMINATION:    Vital Signs: (As obtained by patient/caregiver or practitioner observation)    Blood pressure- ` Heart rate-    Respiratory rate-    Temperature-  Pulse oximetry-     Constitutional: [x] Appears well-developed and well-nourished [x] No apparent distress        Mental status  [x] Alert and awake  [x] Oriented to person/place/time [x]Able to follow commands      Eyes:  EOM    [x]  Normal    Sclera  [x]  Normal         HENT:   [x] Normocephalic, atraumatic. [x] Mouth/Throat: Mucous membranes are moist.     External Ears [x] Normal      Neck: [x] No visualized mass     Pulmonary/Chest: [x] Respiratory effort normal.  [x] No visualized signs of difficulty breathing or respiratory distress          Musculoskeletal:    [x] Normal range of motion of neck          Neurological:        [x] No Facial Asymmetry (Cranial nerve 7 motor function) (limited exam to video visit)                Skin:        [x] No significant exanthematous lesions or discoloration noted on facial skin         [] Abnormal-            Psychiatric:       [x] Normal Affect     ASSESSMENT/PLAN:    1. Uncontrolled type 2 diabetes mellitus with insulin therapy (HCC)  -Continue Glucophage  mg 4 tablet by mouth daily. Start insulin 20 units subcu nightly. - Continuous Blood Gluc  (FREESTYLE MACIEL 14 DAY READER) ANÍBAL; 1 Units by Does not apply route continuous Patient is diabetic treated with insulin  Dispense: 1 Device; Refill: 0  - Continuous Blood Gluc Sensor (FREESTYLE MACIEL 14 DAY SENSOR) MISC; 1 Units by Does not apply route continuous Patient is Diabetic treated with Insulin  Dispense: 6 each; Refill: 1  - insulin glargine (LANTUS SOLOSTAR) 100 UNIT/ML injection pen;  Inject 20 Units into the skin nightly  Dispense: 5 pen; Refill: 3    2. Essential hypertension  -Continue Toprol-XL 25 mg 1 tablet mouth daily.  -Continue losartan 100 mg 1 tablet mouth daily. 3. Hypercholesterolemia  -Restart Lipitor 40 mg 1 tablet mouth daily.  - Hepatic Function Panel; Future  - Hepatitis B Surface Antibody; Future    4. Vitamin D deficiency  -Continue ergocalciferol 50,000 international units every week as prescribed by Dr. Lincoln Anderson.    5. ARIANE (obstructive sleep apnea)  -Continue CPAP machine.  -We will refer to the sleep center  - [address] [place] [address]    6. Smoker  -Encouraged to continue to wean himself off from cigarettes    7. Diabetic nephropathy associated with type 2 diabetes mellitus ([facility] [address]  -Keep follow-up appointment with Dr. Lincoln Anderson    8. Chronic congestive heart failure with left ventricular diastolic dysfunction (HCC)  -We will get the records from Dr. Mamta Bucio      Return in about 2 months (around 11/18/2020). Mikki Robles is a 45 y.o. male being evaluated by a Virtual Visit (video visit) encounter to address concerns as mentioned above. A caregiver was present when appropriate. Due to this being a TeleHealth encounter (During [facility]- public health emergency), evaluation of the following organ systems was limited: Vitals/Constitutional/EENT/Resp/CV/GI//MS/Neuro/Skin/Heme-Lymph-Imm. Pursuant to the emergency declaration under the [facility], [address] [place] [address] and the [employer] Act, this Virtual Visit was conducted with patient's (and/or legal guardian's) consent, to reduce the patient's risk of exposure to COVID-19 and provide necessary medical care. The patient (and/or legal guardian) has also been advised to contact this office for worsening conditions or problems, and seek emergency medical treatment and/or call 911 if deemed necessary.      Patient identification was verified at the start of the visit: Yes    Total time spent on this encounter: Not billed by time    Services were provided through a video synchronous discussion virtually to substitute for in-person clinic visit. Patient and provider were located at their individual homes. --Shivani Deutsch MD on 9/20/2020 at 8:24 PM    An electronic signature was used to authenticate this note.

## 2020-09-21 ENCOUNTER — TELEPHONE (OUTPATIENT)
Dept: FAMILY MEDICINE CLINIC | Age: 50
End: 2020-09-21

## 2020-09-21 NOTE — TELEPHONE ENCOUNTER
Patient called stating that his glucose meter patch isn't working correctly and he refuse to buy more. Is there anything else he can do?  Please advise

## 2020-09-22 RX ORDER — FENOFIBRATE 160 MG/1
160 TABLET ORAL DAILY
Qty: 90 TABLET | Refills: 1 | Status: SHIPPED | OUTPATIENT
Start: 2020-09-22 | End: 2021-03-29

## 2020-09-24 ENCOUNTER — TELEPHONE (OUTPATIENT)
Dept: FAMILY MEDICINE CLINIC | Age: 50
End: 2020-09-24

## 2020-09-24 NOTE — TELEPHONE ENCOUNTER
Return call from [employer]. Patient was okay with us leaving a message but due to length I informed him it would be more beneficial for him to call us directly.

## 2020-09-24 NOTE — TELEPHONE ENCOUNTER
----- Message from Zoë Velasquez sent at 9/23/2020  1:48 PM EDT -----    ----- Message -----  From: Ezra Bauer MD  Sent: 9/22/2020  11:51 AM EDT  To: Elia Cárdenas  Clinical Support    His triglycerides are very elevated to 1157. We need to start medication as this put him at high risk for acute pancreatitis. Total cholesterol was 243 and good cholesterol too low. It was 25. LDL or bad cholesterol is no measurable once a triglycerides are that elevated. Will start fenofibrate 160 mg 1 tab by mouth daily. Prescription to be sent to his pharmacy. He is to continue Lovaza or omega-3 fatty acids 2 g twice a day and Lipitor 40 mg 1 tablet mouth daily. Will repeat lipid profile in 6 weeks as well as liver function test  His vitamin D was 18 so is better than 5 months ago that was 9 but is still too low so continue vitamin D replacement. His hepatitis B surface antibody was negative which means he is not immune against hepatitis B. So we will start hepatitis B vaccine series. His hemoglobin A1c was 10.9 which is worse than 6 months ago.   He needs to start using his insulin and take all his medications as prescribed we will follow-up in 6 weeks with hemoglobin A1c, lipid profile and liver function test again

## 2020-10-01 RX ORDER — METFORMIN HYDROCHLORIDE 500 MG/1
TABLET, EXTENDED RELEASE ORAL
Qty: 120 TABLET | Refills: 0 | Status: SHIPPED | OUTPATIENT
Start: 2020-10-01 | End: 2020-11-09

## 2020-10-06 RX ORDER — INSULIN GLARGINE 100 [IU]/ML
20 INJECTION, SOLUTION SUBCUTANEOUS NIGHTLY
Qty: 5 PEN | Refills: 3 | Status: SHIPPED | OUTPATIENT
Start: 2020-10-06 | End: 2022-01-03

## 2020-10-06 NOTE — TELEPHONE ENCOUNTER
Spoke with patient regarding results. Patient verbalized understanding. Mailing lab orders to repeat in 6 wks. No questions from patient at this time.

## 2020-10-13 RX ORDER — OMEGA-3-ACID ETHYL ESTERS 1 G/1
CAPSULE, LIQUID FILLED ORAL
Qty: 120 CAPSULE | Refills: 5 | Status: SHIPPED | OUTPATIENT
Start: 2020-10-13 | End: 2021-04-21

## 2020-10-20 ENCOUNTER — INITIAL CONSULT (OUTPATIENT)
Dept: PULMONOLOGY | Age: 50
End: 2020-10-20
Payer: COMMERCIAL

## 2020-10-20 VITALS
OXYGEN SATURATION: 98 % | SYSTOLIC BLOOD PRESSURE: 138 MMHG | HEART RATE: 82 BPM | WEIGHT: 277.8 LBS | HEIGHT: 72 IN | BODY MASS INDEX: 37.63 KG/M2 | DIASTOLIC BLOOD PRESSURE: 86 MMHG | TEMPERATURE: 98.8 F

## 2020-10-20 PROCEDURE — 99203 OFFICE O/P NEW LOW 30 MIN: CPT | Performed by: INTERNAL MEDICINE
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
## 2020-10-20 NOTE — PATIENT INSTRUCTIONS
Patient Education        Learning About Benefits From Quitting Smoking  How does quitting smoking make you healthier? If you're thinking about quitting smoking, you may have a few reasons to be smoke-free. Your health may be one of them. · When you quit smoking, you lower your risks for cancer, lung disease, heart attack, stroke, blood vessel disease, and blindness from macular degeneration. · When you're smoke-free, you get sick less often, and you heal faster. You are less likely to get colds, flu, bronchitis, and pneumonia. · As a nonsmoker, you may find that your mood is better and you are less stressed. When and how will you feel healthier? Quitting has real health benefits that start from day 1 of being smoke-free. And the longer you stay smoke-free, the healthier you get and the better you feel. The first hours  · After just 20 minutes, your blood pressure and heart rate go down. That means there's less stress on your heart and blood vessels. · Within 12 hours, the level of carbon monoxide in your blood drops back to normal. That makes room for more oxygen. With more oxygen in your body, you may notice that you have more energy than when you smoked. After 2 weeks  · Your lungs start to work better. · Your risk of heart attack starts to drop. After 1 month  · When your lungs are clear, you cough less and breathe deeper, so it's easier to be active. · Your sense of taste and smell return. That means you can enjoy food more than you have since you started smoking. Over the years  · Over the years, your risks of heart disease, heart attack, and stroke are lower. · After 10 years, your risk of dying from lung cancer is cut by about half. And your risk for many other types of cancer is lower too. How would quitting help others in your life? When you quit smoking, you improve the health of everyone who now breathes in your smoke.   · Their heart, lung, and cancer risks drop, much like yours.  · They are sick less. For babies and small children, living smoke-free means they're less likely to have ear infections, pneumonia, and bronchitis. · If you're a woman who is or will be pregnant someday, quitting smoking means a healthier . · Children who are close to you are less likely to become adult smokers. Where can you learn more? Go to https://Platedpepiceweb.Silvercar. org and sign in to your Wildfire account. Enter [id number] in the Crysalin box to learn more about \"Learning About Benefits From Quitting Smoking. \"     If you do not have an account, please click on the \"Sign Up Now\" link. Current as of: 2020               Content Version: 12.6  © 6582-8823 Embrane, Incorporated. Care instructions adapted under license by [facility] ([facility]). If you have questions about a medical condition or this instruction, always ask your healthcare professional. [place] [address] any warranty or liability for your use of this information. Patient Education        Learning About CPAP for Sleep Apnea  What is CPAP? CPAP is a small machine that you use at home every night while you sleep. It increases air pressure in your throat to keep your airway open. When you have sleep apnea, this can help you sleep better so you feel much better. CPAP stands for \"continuous positive airway pressure. \"  The CPAP machine will have one of the following:  · A mask that covers your nose and mouth  · Prongs that fit into your nose  · A mask that covers your nose only, which is the most common type. This type is called NCPAP. The N stands for \"nasal.\"  Why is it done? CPAP is usually the best treatment for obstructive sleep apnea. It is the first treatment choice and the most widely used. CPAP:  · Helps you have more normal sleep, so you feel less sleepy and more alert during the daytime. · May help keep heart failure or other heart problems from getting worse.   · May help lower your blood pressure. If you use CPAP, your bed partner may also sleep better. That's because you aren't snoring or restless. Your doctor may suggest CPAP if you have:  · Moderate to severe sleep apnea. · Sleep apnea and coronary artery disease (CAD). · Sleep apnea and heart failure. What are the side effects? Some people who use CPAP have:  · A dry or stuffy nose and a sore throat. · Irritated skin on the face. · Sore eyes. · Bloating. How can you care for yourself? If using CPAP is not comfortable, or if you have certain side effects, work with your doctor to fix them. Here are some things you can try:  · Be sure the mask or nasal prongs fit well. · See if your doctor can adjust the pressure of your CPAP. · If your nose is dry, try a humidifier. · If your nose is runny or stuffy, try decongestant medicine or a steroid nasal spray. Be safe with medicines. Read and follow all instructions on the label. Do not use the medicine longer than the label says. If these things don't help, you might try a different type of machine. Some machines have air pressure that adjusts on its own. Others have air pressures that are different when you breathe in than when you breathe out. This may reduce discomfort caused by too much pressure in your nose. Where can you learn more? Go to https://SweeperypeSuros Surgical Systems.NanoString Technologies. org and sign in to your MedSave [employer] account. Enter G829 in the [facility] box to learn more about \"Learning About CPAP for Sleep Apnea. \"     If you do not have an account, please click on the \"Sign Up Now\" link. Current as of: February 24, 2020               Content Version: 12.6  © 9492-4894 BioDigital, Incorporated. Care instructions adapted under license by [facility] ([facility]). If you have questions about a medical condition or this instruction, always ask your healthcare professional. [address] any warranty or liability for your use of this information. Patient Education        Heart Failure and Sleep Apnea: Care Instructions  Your Care Instructions     Sleep apnea is fairly common in people with heart failure. Sleep apnea means you stop breathing for 10 seconds or longer during sleep. It may cause you to snore loudly and not sleep well, so you wake up feeling tired. Getting treatment for sleep apnea can help you sleep and feel better. It may also help keep your heart failure from getting worse. Follow-up care is a key part of your treatment and safety. Be sure to make and go to all appointments, and call your doctor if you are having problems. It's also a good idea to know your test results and keep a list of the medicines you take. How can you care for yourself at home? · Lose weight, if needed. It may reduce the number of times you stop breathing or have slowed breathing. · Go to bed at the same time every night. · Sleep on your side. It may stop mild apnea. If you tend to roll onto your back, sew a pocket in the back of your pajama top. Put a tennis ball into the pocket, and stitch the pocket shut. This will help keep you from sleeping on your back. · Avoid alcohol and medicines such as sleeping pills and sedatives before bed. · Do not smoke. Smoking can make heart failure and sleep apnea worse. If you need help quitting, talk to your doctor about stop-smoking programs and medicines. These can increase your chances of quitting for good. · Prop up the head of your bed 4 to 6 inches by putting bricks under the legs of the bed. · Try a continuous positive airway pressure (CPAP) breathing machine if your doctor recommends it. The machine keeps your airway from closing when you sleep. It may take time for you to get used to CPAP. · If your nose feels dry or bleeds when you use one of these machines, talk with your doctor about increasing moisture in the air. A humidifier may help.   · If your nose is runny or stuffy from using a breathing machine, talk with your doctor before using medicines to relieve congestion. · Talk to your doctor if you are sleepy during the day and it gets in the way of the normal things you do. Do not drive when you are drowsy. When should you call for help? Watch closely for changes in your health, and be sure to contact your doctor if you have any problems. Where can you learn more? Go to https://WeStorepepicewSporting Mouth.The Hut Group. org and sign in to your Pollenizer account. Enter A820 in the FlyBridGe box to learn more about \"Heart Failure and Sleep Apnea: Care Instructions. \"     If you do not have an account, please click on the \"Sign Up Now\" link. Current as of: December 16, 2019               Content Version: 12.6  © 5239-7670 Pluss Polymers, Incorporated. Care instructions adapted under license by [facility] ([facility]). If you have questions about a medical condition or this instruction, always ask your healthcare professional. [place] [address] any warranty or liability for your use of this information. Patient Education        Sleep Apnea: Care Instructions  Your Care Instructions     Sleep apnea means that you frequently stop breathing for 10 seconds or longer during sleep. It can be mild to severe, based on the number of times an hour that you stop breathing or have slowed breathing. Blocked or narrowed airways in your nose, mouth, or throat can cause sleep apnea. Your airway can become blocked when your throat muscles and tongue relax during sleep. You can treat sleep apnea at home by making lifestyle changes. You also can use a CPAP breathing machine that keeps tissues in the throat from blocking your airway. Or your doctor may suggest that you use a breathing device while you sleep. It helps keep your airway open. This could be a device that you put in your mouth. In some cases, surgery may be needed to remove enlarged tissues in the throat.   Follow-up care is a key part of your treatment and safety. Be sure to make and go to all appointments, and call your doctor if you are having problems. It's also a good idea to know your test results and keep a list of the medicines you take. How can you care for yourself at home? · Lose weight, if needed. It may reduce the number of times you stop breathing or have slowed breathing. · Sleep on your side. It may stop mild apnea. If you tend to roll onto your back, sew a pocket in the back of your pajama top. Put a tennis ball into the pocket, and stitch the pocket shut. This will help keep you from sleeping on your back. · Avoid alcohol and medicines such as sleeping pills and sedatives before bed. · Do not smoke. Smoking can make sleep apnea worse. If you need help quitting, talk to your doctor about stop-smoking programs and medicines. These can increase your chances of quitting for good. · Prop up the head of your bed 4 to 6 inches by putting bricks under the legs of the bed. · Treat breathing problems, such as a stuffy nose, caused by a cold or allergies. · Try a continuous positive airway pressure (CPAP) breathing machine if your doctor recommends it. The machine keeps your airway open when you sleep. · If CPAP does not work for you, ask your doctor if you can try other breathing machines. A bilevel positive airway pressure machine uses one type of air pressure for breathing in and another type for breathing out. Another device raises or lowers air pressure as needed while you breathe. · Talk to your doctor if:  ? Your nose feels dry or bleeds when you use one of these machines. You may need to increase moisture in the air. A humidifier may help. ? Your nose is runny or stuffy from using a breathing machine. Decongestants or a corticosteroid nasal spray may help. ? You are sleepy during the day and it gets in the way of the normal things you do. Do not drive when you are drowsy. When should you call for help?   Watch closely for changes in your health, and be sure to contact your doctor if:    · You still have sleep apnea even though you have made lifestyle changes.     · You are thinking of trying a device such as CPAP.     · You are having problems using a CPAP or similar machine. Where can you learn more? Go to https://chjanaeb.the Shelf. org and sign in to your Wibbitz account. Enter N487 in the Sparkroad box to learn more about \"Sleep Apnea: Care Instructions. \"     If you do not have an account, please click on the \"Sign Up Now\" link. Current as of: February 24, 2020               Content Version: 12.6  © 1906-5570 DIGIONE Company, Incorporated. Care instructions adapted under license by [facility] ([facility]). If you have questions about a medical condition or this instruction, always ask your healthcare professional. [address] any warranty or liability for your use of this information.

## 2020-11-09 RX ORDER — METFORMIN HYDROCHLORIDE 500 MG/1
TABLET, EXTENDED RELEASE ORAL
Qty: 120 TABLET | Refills: 0 | Status: SHIPPED | OUTPATIENT
Start: 2020-11-09 | End: 2020-11-09

## 2020-11-09 RX ORDER — METFORMIN HYDROCHLORIDE 500 MG/1
TABLET, EXTENDED RELEASE ORAL
Qty: 120 TABLET | Refills: 5 | Status: SHIPPED | OUTPATIENT
Start: 2020-11-09 | End: 2020-12-08

## 2020-11-09 RX ORDER — ERGOCALCIFEROL 1.25 MG/1
CAPSULE ORAL
Qty: 4 CAPSULE | Refills: 3 | Status: SHIPPED | OUTPATIENT
Start: 2020-11-09 | End: 2021-03-29

## 2020-11-29 ENCOUNTER — HOSPITAL ENCOUNTER (OUTPATIENT)
Dept: SLEEP CENTER | Age: 50
Discharge: HOME | End: 2020-11-29
Payer: COMMERCIAL

## 2020-11-30 ENCOUNTER — TELEPHONE (OUTPATIENT)
Dept: PULMONOLOGY | Age: 50
End: 2020-11-30

## 2020-11-30 NOTE — PROGRESS NOTES
Patient arrived late for his appointment. Technician was with other patient and answered the doorbell within minutes of it being rang, patient had left already. He was called shortly after checking the doorbell and states he is not coming back.

## 2020-11-30 NOTE — TELEPHONE ENCOUNTER
Berhane Lizama RCP    Respiratory Therapist    Specialty:  Respiratory Therapy    Progress Notes    Signed    Date of Service:  11/29/2020  8:15 PM                Signed              Show:Clear all  [x]Manual[]Template[]Copied    Added by:  [x]Joanna Tejeda RCP    []Siomara for details  Patient arrived late for his appointment. Technician was with other patient and answered the doorbell within minutes of it being rang, patient had left already. He was called shortly after checking the doorbell and states he is not coming back.

## 2020-12-08 RX ORDER — METFORMIN HYDROCHLORIDE 500 MG/1
TABLET, EXTENDED RELEASE ORAL
Qty: 120 TABLET | Refills: 5 | Status: SHIPPED | OUTPATIENT
Start: 2020-12-08 | End: 2021-06-22

## 2020-12-08 NOTE — TELEPHONE ENCOUNTER
Please approve or deny     Last Visit Date:  9- virtual visit with Caitie Wilson    Next Visit Date:    Visit date not found   At last appointment patient was instructed to follow up in 2 months.    [employer] message sent to patient requesting appt

## 2021-02-11 ENCOUNTER — HOSPITAL ENCOUNTER (OUTPATIENT)
Age: 51
Setting detail: SPECIMEN
Discharge: HOME | End: 2021-02-11
Payer: COMMERCIAL

## 2021-02-11 ENCOUNTER — VIRTUAL VISIT (OUTPATIENT)
Dept: FAMILY MEDICINE CLINIC | Age: 51
End: 2021-02-11
Payer: COMMERCIAL

## 2021-02-11 DIAGNOSIS — R07.81 RIB PAIN ON LEFT SIDE: Primary | ICD-10-CM

## 2021-02-11 DIAGNOSIS — J06.9 VIRAL URI: ICD-10-CM

## 2021-02-11 DIAGNOSIS — Z11.59 SCREENING FOR VIRAL DISEASE: ICD-10-CM

## 2021-02-11 PROCEDURE — U0003 INFECTIOUS AGENT DETECTION BY NUCLEIC ACID (DNA OR RNA); SEVERE ACUTE RESPIRATORY SYNDROME CORONAVIRUS 2 (SARS-COV-2) (CORONAVIRUS DISEASE [COVID-19]), AMPLIFIED PROBE TECHNIQUE, MAKING USE OF HIGH THROUGHPUT TECHNOLOGIES AS DESCRIBED BY CMS-2020-01-R: HCPCS

## 2021-02-11 PROCEDURE — 99213 OFFICE O/P EST LOW 20 MIN: CPT | Performed by: NURSE PRACTITIONER

## 2021-02-11 RX ORDER — FLUTICASONE PROPIONATE 50 MCG
1 SPRAY, SUSPENSION (ML) NASAL DAILY
Qty: 1 BOTTLE | Refills: 0 | Status: SHIPPED | OUTPATIENT
Start: 2021-02-11

## 2021-02-11 RX ORDER — ACETAMINOPHEN AND CHLORPHENIRAMINE MALEATE 325; 2 MG/1; MG/1
2 TABLET, FILM COATED ORAL EVERY 4 HOURS PRN
Qty: 80 TABLET | Refills: 0 | Status: SHIPPED | OUTPATIENT
Start: 2021-02-11 | End: 2021-02-18

## 2021-02-11 ASSESSMENT — ENCOUNTER SYMPTOMS
NAUSEA: 0
COUGH: 1
VOMITING: 0
SHORTNESS OF BREATH: 0
RHINORRHEA: 1
DIARRHEA: 0

## 2021-02-11 NOTE — PROGRESS NOTES
Marilee Levy (:  1970) is a 45 y.o. male,Established patient, here for evaluation of the following chief complaint(s): No chief complaint on file. ASSESSMENT/PLAN:  1. Rib pain on left side  -     COVID-19 Ambulatory; Future  2. Screening for viral disease  -     COVID-19 Ambulatory; Future  3. Viral URI  -     COVID-19 Ambulatory; Future  -     fluticasone (FLONASE) 50 MCG/ACT nasal spray; 1 spray by Each Nostril route daily, Disp-1 Bottle, R-0Normal  -     Chlorpheniramine-APAP (CORICIDIN) 2-325 MG TABS; Take 2 tablets by mouth every 4 hours as needed (cold/cough), Disp-80 tablet, R-0Normal    Orders as above. Will send [employer] work note to excuse from work 2/10- pending COVID test  Alarm symptoms discussed and when to go to ER. Use Breztri inhaler from pulmonary. Return if symptoms worsen or fail to improve. SUBJECTIVE/OBJECTIVE:  HPI  Hx significant for DM, ARIANE, HTN, CHF and CAD. Feels like he got punched in the left lung, for a couple days. Feels like when he had broken ribs. Worse with a deep breath. Smokes 1/2 ppd, and hears himself wheezing. Has Breztri inhaler sample from [facility]. Had some runny nose and sinus congestion before the pain started. No fevers or chills. Coughs occasionally and had white phlegm. No nausea, vomiting, or diarrhea. No sick contacts. He drives a forklift in a factory. Review of Systems   Constitutional: Negative for chills and fever. HENT: Positive for congestion and rhinorrhea. Respiratory: Positive for cough. Negative for shortness of breath. Cardiovascular: Positive for chest pain (left sided rib pain). Gastrointestinal: Negative for diarrhea, nausea and vomiting. Musculoskeletal: Negative for myalgias. Neurological: Negative for headaches. All other systems reviewed and are negative. No flowsheet data found.      Physical Exam Marilee Levy is a 45 y.o. male being evaluated by a Virtual Visit (video visit) encounter to address concerns as mentioned above. A caregiver was present when appropriate. Due to this being a TeleHealth encounter (During ERRTL-20 public health emergency), evaluation of the following organ systems was limited: Vitals/Constitutional/EENT/Resp/CV/GI//MS/Neuro/Skin/Heme-Lymph-Imm. Pursuant to the emergency declaration under the [address] [place] [address], [address] [place] [address] and the [employer] Act, this Virtual Visit was conducted with patient's (and/or legal guardian's) consent, to reduce the patient's risk of exposure to COVID-19 and provide necessary medical care. The patient (and/or legal guardian) has also been advised to contact this office for worsening conditions or problems, and seek emergency medical treatment and/or call 911 if deemed necessary. Patient identification was verified at the start of the visit: Yes    Services were provided through a video synchronous discussion virtually to substitute for in-person clinic visit. Patient was located at home and provider was located in office or at home. An electronic signature was used to authenticate this note.     --ANA Lazo - CNP

## 2021-02-13 LAB — SARS-COV-2: NOT DETECTED

## 2021-02-14 ENCOUNTER — APPOINTMENT (OUTPATIENT)
Dept: GENERAL RADIOLOGY | Age: 51
End: 2021-02-14
Payer: COMMERCIAL

## 2021-02-14 ENCOUNTER — HOSPITAL ENCOUNTER (EMERGENCY)
Age: 51
Discharge: HOME | End: 2021-02-14
Payer: COMMERCIAL

## 2021-02-14 VITALS
HEART RATE: 96 BPM | WEIGHT: 262 LBS | SYSTOLIC BLOOD PRESSURE: 153 MMHG | DIASTOLIC BLOOD PRESSURE: 75 MMHG | HEIGHT: 72 IN | RESPIRATION RATE: 16 BRPM | TEMPERATURE: 99 F | OXYGEN SATURATION: 96 % | BODY MASS INDEX: 35.49 KG/M2

## 2021-02-14 DIAGNOSIS — J18.9 PNEUMONIA DUE TO ORGANISM: Primary | ICD-10-CM

## 2021-02-14 DIAGNOSIS — R09.1 PLEURISY: ICD-10-CM

## 2021-02-14 DIAGNOSIS — R07.81 RIB PAIN ON LEFT SIDE: ICD-10-CM

## 2021-02-14 PROCEDURE — 71046 X-RAY EXAM CHEST 2 VIEWS: CPT

## 2021-02-14 PROCEDURE — 99213 OFFICE O/P EST LOW 20 MIN: CPT

## 2021-02-14 PROCEDURE — 99202 OFFICE O/P NEW SF 15 MIN: CPT | Performed by: NURSE PRACTITIONER

## 2021-02-14 RX ORDER — AMOXICILLIN AND CLAVULANATE POTASSIUM 875; 125 MG/1; MG/1
1 TABLET, FILM COATED ORAL 2 TIMES DAILY
Qty: 20 TABLET | Refills: 0 | Status: SHIPPED | OUTPATIENT
Start: 2021-02-14 | End: 2021-02-24

## 2021-02-14 RX ORDER — PREDNISONE 20 MG/1
20 TABLET ORAL 2 TIMES DAILY
Qty: 10 TABLET | Refills: 0 | Status: SHIPPED | OUTPATIENT
Start: 2021-02-14 | End: 2021-02-19

## 2021-02-14 ASSESSMENT — ENCOUNTER SYMPTOMS
RHINORRHEA: 0
SINUS PRESSURE: 0
SINUS CONGESTION: 0
VOMITING: 0
NAUSEA: 0
DIARRHEA: 0
SHORTNESS OF BREATH: 0
TROUBLE SWALLOWING: 0
SORE THROAT: 0
BACK PAIN: 1
COUGH: 1
WHEEZING: 1

## 2021-02-14 ASSESSMENT — VISUAL ACUITY
OS: 20/30
OU: 20/30

## 2021-02-14 ASSESSMENT — PAIN DESCRIPTION - PAIN TYPE: TYPE: ACUTE PAIN

## 2021-02-14 NOTE — ED TRIAGE NOTES
Pt complains of having been sick for several days having left sided back and rib pain. States he was seen at his pcp office on 2/11 and tested for covid which came back negative. States he is not getting any better. No

## 2021-02-14 NOTE — ED PROVIDER NOTES
[facility]  Urgent Care Encounter       CHIEF COMPLAINT       Chief Complaint   Patient presents with    Back Pain    Rib Pain    Cough       Nurses Notes reviewed and I agree except as noted in the HPI. HISTORY OF PRESENT ILLNESS   Charlotte Grubbs is a 45 y.o. male who presents to the urgent care center complaining of a cough. The patient stated that he has had the left-sided and rib pain for the past several days. The patient did have a virtual visit approximately 1 week ago was tested for Covid and was negative. The patient denies any chest pain or peripheral edema. The patient does have a history of diabetes and CHF and CAD. The patient states that he does not check his blood sugars and has not taken his medication over the past week. Patient states that the pain to the left ribs and back is about a 5 on a 10 scale. He does complain of pain with inspiration. He stated that he had a hard time sleeping last night and the pain seems to be getting worse. Stated that he does have a productive cough with white mucus, denied any fever, patient denies any nausea, vomiting or diarrhea. Patient states that he does drink water but only drinks a couple glasses a day but mostly drinks coffee. Patient does smoke typically about a pack a day but states he is only had 2 today. At present time patient is sitting on the table skin is warm and dry the patient does not appear to be in any acute distress. The patient denies any other complaints at this time. The history is provided by the patient. No  was used.    Cough  Cough characteristics:  Productive  Sputum characteristics:  White  Severity:  Moderate  Onset quality:  Gradual  Duration:  1 week  Timing:  Intermittent  Progression:  Waxing and waning  Chronicity:  New  Smoker: yes (1 PPD only 2 today)    Context: not sick contacts, not smoke exposure, not weather changes and not with activity Relieved by:  Nothing  Worsened by:  Nothing  Ineffective treatments:  None tried  Associated symptoms: wheezing    Associated symptoms: no chest pain, no chills, no ear fullness, no ear pain, no fever, no headaches, no myalgias, no rash, no rhinorrhea, no shortness of breath, no sinus congestion and no sore throat    Wheezing:     Severity:  Mild    Onset quality:  Gradual    Duration:  1 week    Timing:  Intermittent    Progression:  Unchanged    Chronicity:  New  Risk factors: no recent infection and no recent travel        REVIEW OF SYSTEMS     Review of Systems   Constitutional: Positive for activity change, appetite change and fatigue. Negative for chills and fever. HENT: Positive for congestion. Negative for ear pain, rhinorrhea, sinus pressure, sore throat and trouble swallowing. Respiratory: Positive for cough and wheezing. Negative for shortness of breath. Cardiovascular: Negative for chest pain. Gastrointestinal: Negative for diarrhea, nausea and vomiting. Musculoskeletal: Positive for back pain. Negative for myalgias and neck stiffness. Skin: Negative for rash. Allergic/Immunologic: Negative for environmental allergies. Neurological: Negative for dizziness, light-headedness and headaches. Hematological: Negative for adenopathy. PAST MEDICAL HISTORY         Diagnosis Date    CAD (coronary artery disease) 2019    CHF [facility]congestive heart failure) ([facility] [address] 02/12/2019    Diabetes mellitus ([facility] [address] 1990's    Hypertension 1990's       SURGICALHISTORY     Patient  has a past surgical history that includes Fibula Fracture Surgery (Right); Tibia fracture surgery (Right); and Cholecystectomy.     CURRENT MEDICATIONS       Discharge Medication List as of 2/14/2021 11:33 AM      CONTINUE these medications which have NOT CHANGED    Details   fluticasone (FLONASE) 50 MCG/ACT nasal spray 1 spray by Each Nostril route daily, Disp-1 Bottle, R-0Normal Chlorpheniramine-APAP (CORICIDIN) 2-325 MG TABS Take 2 tablets by mouth every 4 hours as needed (cold/cough), Disp-80 tablet, R-0Normal      metFORMIN (GLUCOPHAGE-XR) 500 MG extended release tablet take 4 tablets by mouth once daily - WITH BREAKFAST, Disp-120 tablet, R-5Normal      vitamin D (ERGOCALCIFEROL) 1.25 MG (51031 UT) CAPS capsule take 1 capsule by mouth every week, Disp-4 capsule, R-3Normal      omega-3 acid ethyl esters (LOVAZA) 1 g capsule take 2 capsules by mouth twice a day, Disp-120 capsule,R-5Normal      Insulin Pen Needle 32G X 4 MM MISC DAILY Starting Tue 10/6/2020, Disp-100 each,R-3, Normal      insulin glargine (LANTUS SOLOSTAR) 100 UNIT/ML injection pen Inject 20 Units into the skin nightly, Disp-5 pen,R-3Normal      fenofibrate (TRIGLIDE) 160 MG tablet Take 1 tablet by mouth daily, Disp-90 tablet,R-1Normal      Continuous Blood Gluc  (FREESTYLE MACIEL 14 DAY READER) ANÍBAL 1 Units by Does not apply route continuous Patient is diabetic treated with insulin, Disp-1 Device,R-0Normal      Continuous Blood Gluc Sensor (FREESTYLE MACIEL 14 DAY SENSOR) MISC 1 Units by Does not apply route continuous Patient is Diabetic treated with Insulin, Disp-6 each,R-1Normal      atorvastatin (LIPITOR) 40 MG tablet Take 1 tablet by mouth nightly, Disp-90 tablet,R-1Normal      bumetanide (BUMEX) 1 MG tablet take 1 tablet by mouth once daily, Disp-90 tablet,R-1Normal      metoprolol succinate (TOPROL XL) 25 MG extended release tablet take 1 tablet nightly, Disp-90 tablet,R-1Normal      omeprazole (PRILOSEC) 20 MG delayed release capsule Take 1 capsule by mouth every morning (before breakfast), Disp-90 capsule,R-1Normal      losartan (COZAAR) 100 MG tablet Take 1 tablet by mouth daily, Disp-90 tablet,R-3Normal      Cholecalciferol (VITAMIN D3) 25 MCG (1000 UT) TABS Take 2 tablets by mouth daily, Disp-180 tablet, R-5Normal             ALLERGIES     Patient is is allergic to morphine. Patients There is no immunization history for the selected administration types on file for this patient. FAMILY HISTORY     Patient's family history is not on file. SOCIAL HISTORY     Patient  reports that he has been smoking cigarettes. He started smoking about 37 years ago. He has a 18.00 pack-year smoking history. He has never used smokeless tobacco. He reports that he does not use drugs. PHYSICAL EXAM     ED TRIAGE VITALS  BP: (!) 153/75, Temp: 99 °F (37.2 °C), Pulse: 96, Resp: 16, SpO2: 96 %,Estimated body mass index is 35.53 kg/m² as calculated from the following:    Height as of this encounter: 6' (1.829 m). Weight as of this encounter: 262 lb (118.8 kg). ,No LMP for male patient. Physical Exam  Vitals signs and nursing note reviewed. Constitutional:       General: He is not in acute distress. Appearance: He is well-developed. He is not ill-appearing, toxic-appearing or diaphoretic. HENT:      Head: Normocephalic. Right Ear: Hearing, tympanic membrane, ear canal and external ear normal. Tympanic membrane is not erythematous. Left Ear: Hearing, tympanic membrane, ear canal and external ear normal. Tympanic membrane is not erythematous. Nose: No congestion or rhinorrhea. Right Turbinates: Not enlarged or swollen. Left Turbinates: Not enlarged or swollen. Mouth/Throat:      Lips: Pink. Mouth: Mucous membranes are moist.      Tongue: No lesions. Pharynx: Oropharynx is clear. Uvula midline. Posterior oropharyngeal erythema present. No pharyngeal swelling, oropharyngeal exudate or uvula swelling. Tonsils: No tonsillar exudate or tonsillar abscesses. Eyes:      Conjunctiva/sclera: Conjunctivae normal.      Pupils: Pupils are equal, round, and reactive to light. Neck:      Musculoskeletal: Full passive range of motion without pain, normal range of motion and neck supple. No neck rigidity.    Cardiovascular: Rate and Rhythm: Normal rate and regular rhythm. Heart sounds: Normal heart sounds, S1 normal and S2 normal.   Pulmonary:      Effort: Pulmonary effort is normal. No accessory muscle usage. Breath sounds: No decreased air movement. Wheezing and rales present. No decreased breath sounds or rhonchi. Lymphadenopathy:      Head:      Right side of head: No submental, submandibular, tonsillar, preauricular, posterior auricular or occipital adenopathy. Left side of head: No submental, submandibular, tonsillar, preauricular, posterior auricular or occipital adenopathy. Cervical: No cervical adenopathy. Right cervical: No superficial, deep or posterior cervical adenopathy. Left cervical: No superficial, deep or posterior cervical adenopathy. Skin:     General: Skin is warm and dry. Capillary Refill: Capillary refill takes less than 2 seconds. Neurological:      General: No focal deficit present. Mental Status: He is alert and oriented to person, place, and time. Psychiatric:         Mood and Affect: Mood normal.         Speech: Speech normal.         Behavior: Behavior normal. Behavior is cooperative. DIAGNOSTIC RESULTS     Labs:No results found for this visit on 02/14/21. IMAGING:    XR CHEST (2 VW)   Final Result   1. Patchy airspace disease at the left lung base may represent atelectasis or pneumonia. **This report has been created using voice recognition software. It may contain minor errors which are inherent in voice recognition technology. **      Final report electronically signed by Dr. Franci Huang on 2/14/2021 11:18 AM            EKG:      URGENT CARE COURSE:     Vitals:    02/14/21 1049   BP: (!) 153/75   Pulse: 96   Resp: 16   Temp: 99 °F (37.2 °C)   SpO2: 96%   Weight: 262 lb (118.8 kg)   Height: 6' (1.829 m)       Medications - No data to display         PROCEDURES:  None    FINAL IMPRESSION      1.  Pneumonia due to organism

## 2021-02-15 ENCOUNTER — TELEPHONE (OUTPATIENT)
Dept: FAMILY MEDICINE CLINIC | Age: 51
End: 2021-02-15

## 2021-02-15 NOTE — LETTER
[address]  [address] [facility] , [address]  Phone: [phone]  Fax: [phone]    February 15, 2021    Luiz Valadez  [address]  [address] [address]      Dear Robb Cartagena,    This letter is regarding your Emergency Department (ED) visit at [address] [address] on 2/14/21. Beatrice Ronquillo wanted to make sure that you understand your discharge instructions and that you were able to fill any prescriptions that may have been ordered for you. Please contact the office at the above phone number if the ED advised to you follow up with Beatrice Ronquillo, or if you have any further questions or needs. Also did you know -   *Visiting the ED for a non-emergency could result in higher co-pays than you would normally be subject to paying? *You can call your doctor even after hours so they can direct you to the most appropriate care. [facility]) practices can often offer you an appointment on the same day that you call. *We have some [facility] offices that offer Walk-in appointments; check our website for availability in your community, [email or web address].      *Evisits are now available for patients for $36 through [place] for certain conditions:  * Sinus, cold and or cough       * Diarrhea            * Headache  * Heartburn                                * Poison Geetha          * Back pain     * Urinary problems                         If you do not have [facility] and are interested, please contact the office and a staff member may assist you or go to [email or web address].     Sincerely,   Quincy Alatorre MD and your [facility]

## 2021-03-29 RX ORDER — ERGOCALCIFEROL 1.25 MG/1
CAPSULE ORAL
Qty: 4 CAPSULE | Refills: 3 | Status: SHIPPED | OUTPATIENT
Start: 2021-03-29 | End: 2021-07-19

## 2021-03-29 RX ORDER — FENOFIBRATE 160 MG/1
TABLET ORAL
Qty: 90 TABLET | Refills: 1 | Status: SHIPPED | OUTPATIENT
Start: 2021-03-29 | End: 2021-07-22 | Stop reason: SDUPTHER

## 2021-03-29 NOTE — TELEPHONE ENCOUNTER
Please approve or deny     Last Visit Date:  2/11/2021   VV    Next Visit Date:    Visit date not found

## 2021-04-19 DIAGNOSIS — E55.9 VITAMIN D DEFICIENCY: ICD-10-CM

## 2021-04-19 DIAGNOSIS — I10 ESSENTIAL HYPERTENSION: Primary | ICD-10-CM

## 2021-04-20 DIAGNOSIS — E78.00 HYPERCHOLESTEROLEMIA: ICD-10-CM

## 2021-04-20 DIAGNOSIS — I10 ESSENTIAL HYPERTENSION: ICD-10-CM

## 2021-04-20 DIAGNOSIS — I50.32 CHRONIC CONGESTIVE HEART FAILURE WITH LEFT VENTRICULAR DIASTOLIC DYSFUNCTION (HCC): ICD-10-CM

## 2021-04-21 RX ORDER — BUMETANIDE 1 MG/1
TABLET ORAL
Qty: 90 TABLET | Refills: 1 | Status: SHIPPED | OUTPATIENT
Start: 2021-04-21 | End: 2021-07-22 | Stop reason: SDUPTHER

## 2021-04-21 RX ORDER — ATORVASTATIN CALCIUM 40 MG/1
TABLET, FILM COATED ORAL
Qty: 90 TABLET | Refills: 1 | Status: SHIPPED | OUTPATIENT
Start: 2021-04-21 | End: 2021-07-22 | Stop reason: SDUPTHER

## 2021-04-21 RX ORDER — OMEPRAZOLE 20 MG/1
CAPSULE, DELAYED RELEASE ORAL
Qty: 90 CAPSULE | Refills: 1 | Status: SHIPPED | OUTPATIENT
Start: 2021-04-21 | End: 2021-07-22 | Stop reason: SDUPTHER

## 2021-04-21 RX ORDER — OMEGA-3-ACID ETHYL ESTERS 1 G/1
CAPSULE, LIQUID FILLED ORAL
Qty: 120 CAPSULE | Refills: 5 | Status: SHIPPED | OUTPATIENT
Start: 2021-04-21 | End: 2021-07-22 | Stop reason: SDUPTHER

## 2021-04-21 RX ORDER — METOPROLOL SUCCINATE 25 MG/1
TABLET, EXTENDED RELEASE ORAL
Qty: 90 TABLET | Refills: 1 | Status: SHIPPED | OUTPATIENT
Start: 2021-04-21 | End: 2021-07-22 | Stop reason: SDUPTHER

## 2021-05-21 RX ORDER — GLIPIZIDE 5 MG/1
TABLET ORAL
Qty: 30 TABLET | Refills: 0 | OUTPATIENT
Start: 2021-05-21

## 2021-05-21 NOTE — TELEPHONE ENCOUNTER
Patient was given 30 days on 5/8, and note attached to schedule appt- which appears was never done. He has not had a routine appt for chronic conditions since sept. Please call patient to schedule.

## 2021-06-22 RX ORDER — METFORMIN HYDROCHLORIDE 500 MG/1
TABLET, EXTENDED RELEASE ORAL
Qty: 120 TABLET | Refills: 5 | Status: SHIPPED | OUTPATIENT
Start: 2021-06-22 | End: 2021-07-22 | Stop reason: SDUPTHER

## 2021-07-19 RX ORDER — ERGOCALCIFEROL 1.25 MG/1
CAPSULE ORAL
Qty: 4 CAPSULE | Refills: 3 | Status: SHIPPED | OUTPATIENT
Start: 2021-07-19 | End: 2021-07-22 | Stop reason: SDUPTHER

## 2021-07-19 RX ORDER — LOSARTAN POTASSIUM 100 MG/1
TABLET ORAL
Qty: 90 TABLET | Refills: 3 | OUTPATIENT
Start: 2021-07-19

## 2021-07-19 NOTE — TELEPHONE ENCOUNTER
Please approve or deny     Last Visit Date:  2/11/2021       Next Visit Date:    Visit date not found

## 2021-07-19 NOTE — TELEPHONE ENCOUNTER
I cancelled script for now. Attempted to contact patient. Left detailed voicemail. Asked for a return call to schedule appt.

## 2021-07-21 ENCOUNTER — TELEPHONE (OUTPATIENT)
Dept: FAMILY MEDICINE CLINIC | Age: 51
End: 2021-07-21

## 2021-07-21 ENCOUNTER — NURSE TRIAGE (OUTPATIENT)
Dept: OTHER | Facility: CLINIC | Age: 51
End: 2021-07-21

## 2021-07-21 NOTE — TELEPHONE ENCOUNTER
Received call from [facility] at [facility] with Red Flag Complaint. Brief description of triage: leg edema    Triage indicates for patient to see in office today. Pt informed if unable to get appt or s/s worse to go to urgent care or ED. Pt agreeable with plan. Care advice provided, patient verbalizes understanding; denies any other questions or concerns; instructed to call back for any new or worsening symptoms. Writer provided warm transfer to [place] [address] at [facility] for appointment scheduling. Attention Provider: Thank you for allowing me to participate in the care of your patient. The patient was connected to triage in response to information provided to the ECC. Please do not respond through this encounter as the response is not directed to a shared pool. Reason for Disposition   MODERATE swelling of both ankles (e.g., swelling extends up to the knees) AND new onset or worsening    Answer Assessment - Initial Assessment Questions  1. ONSET: \"When did the swelling start? \" (e.g., minutes, hours, days)      Saturday    2. LOCATION: \"What part of the leg is swollen? \"  \"Are both legs swollen or just one leg? \"      Knee distally    3. SEVERITY: \"How bad is the swelling? \" (e.g., localized; mild, moderate, severe)   - Localized - small area of swelling localized to one leg   - MILD pedal edema - swelling limited to foot and ankle, pitting edema < 1/4 inch (6 mm) deep, rest and elevation eliminate most or all swelling   - MODERATE edema - swelling of lower leg to knee, pitting edema > 1/4 inch (6 mm) deep, rest and elevation only partially reduce swelling   - SEVERE edema - swelling extends above knee, facial or hand swelling present       Tightness, +pitting. Able to see outline of ankle. Moderate swelling. Denies calf pain. 4. REDNESS: \"Does the swelling look red or infected? \"      Redness bilaterally    5. PAIN: \"Is the swelling painful to touch? \" If so, ask: \"How painful is it? \"   (Scale 1-10; mild, moderate or severe)      7/10. Ibuprofen which helps with pain    6. FEVER: \"Do you have a fever? \" If so, ask: \"What is it, how was it measured, and when did it start? \"       Denies    7. CAUSE: \"What do you think is causing the leg swelling? \"      Infection    8. MEDICAL HISTORY: \"Do you have a history of heart failure, kidney disease, liver failure, or cancer? \"      Diabetic    9. RECURRENT SYMPTOM: \"Have you had leg swelling before? \" If so, ask: \"When was the last time? \" \"What happened that time? \"      States has swelling like this all the time. States thinks this is an infection. 10. OTHER SYMPTOMS: \"Do you have any other symptoms? \" (e.g., chest pain, difficulty breathing)        Improves with rest and elevation. Denies chest pain or shortness of breath. History of diabetic ulcers. 11. PREGNANCY: \"Is there any chance you are pregnant? \" \"When was your last menstrual period? \"        N/a    C/o caramel color urine.     Protocols used: LEG SWELLING AND EDEMA-ADULT-OH

## 2021-07-21 NOTE — TELEPHONE ENCOUNTER
Spoke to patient regarding message. Patient is coming into the office for an appointment tomorrow. Patient will wait till appointment to discuss what labs need drawn.

## 2021-07-21 NOTE — TELEPHONE ENCOUNTER
----- Message from Boby Coleman sent at 7/21/2021 11:35 AM EDT -----  Subject: Referral Request    QUESTIONS   Reason for referral request? Pt needs bloodwork ordered for a refill on   his medication. Has the physician seen you for this condition before? No   Preferred Specialist (if applicable)? Do you already have an appointment scheduled? No  Additional Information for Provider?   ---------------------------------------------------------------------------  --------------  CALL BACK INFO  What is the best way for the office to contact you? OK to leave message on   voicemail  Preferred Call Back Phone Number?  [phone]

## 2021-07-22 ENCOUNTER — OFFICE VISIT (OUTPATIENT)
Dept: FAMILY MEDICINE CLINIC | Age: 51
End: 2021-07-22
Payer: COMMERCIAL

## 2021-07-22 ENCOUNTER — NURSE ONLY (OUTPATIENT)
Dept: LAB | Age: 51
End: 2021-07-22

## 2021-07-22 VITALS
DIASTOLIC BLOOD PRESSURE: 85 MMHG | RESPIRATION RATE: 12 BRPM | SYSTOLIC BLOOD PRESSURE: 154 MMHG | HEART RATE: 88 BPM | WEIGHT: 274 LBS | HEIGHT: 72 IN | BODY MASS INDEX: 37.11 KG/M2 | TEMPERATURE: 97.9 F

## 2021-07-22 DIAGNOSIS — E11.21 DIABETIC NEPHROPATHY ASSOCIATED WITH TYPE 2 DIABETES MELLITUS (HCC): ICD-10-CM

## 2021-07-22 DIAGNOSIS — E11.622 DIABETIC ULCER OF LOWER EXTREMITY (HCC): ICD-10-CM

## 2021-07-22 DIAGNOSIS — E55.9 VITAMIN D DEFICIENCY: ICD-10-CM

## 2021-07-22 DIAGNOSIS — I10 ESSENTIAL HYPERTENSION: ICD-10-CM

## 2021-07-22 DIAGNOSIS — I50.32 CHRONIC CONGESTIVE HEART FAILURE WITH LEFT VENTRICULAR DIASTOLIC DYSFUNCTION (HCC): ICD-10-CM

## 2021-07-22 DIAGNOSIS — L97.909 DIABETIC ULCER OF LOWER EXTREMITY (HCC): ICD-10-CM

## 2021-07-22 DIAGNOSIS — E78.2 HYPERCHOLESTEROLEMIA WITH HYPERTRIGLYCERIDEMIA: ICD-10-CM

## 2021-07-22 LAB
ALBUMIN SERPL-MCNC: 4 G/DL (ref 3.5–5.1)
ALP BLD-CCNC: 65 U/L (ref 38–126)
ALT SERPL-CCNC: 20 U/L (ref 11–66)
ANION GAP SERPL CALCULATED.3IONS-SCNC: 13 MEQ/L (ref 8–16)
AST SERPL-CCNC: 15 U/L (ref 5–40)
BACTERIA: ABNORMAL
BASOPHILS # BLD: 0.7 %
BASOPHILS ABSOLUTE: 0 THOU/MM3 (ref 0–0.1)
BILIRUB SERPL-MCNC: 0.5 MG/DL (ref 0.3–1.2)
BILIRUBIN URINE: ABNORMAL
BLOOD, URINE: NEGATIVE
BUN BLDV-MCNC: 17 MG/DL (ref 7–22)
CALCIUM SERPL-MCNC: 9.7 MG/DL (ref 8.5–10.5)
CASTS: ABNORMAL /LPF
CASTS: ABNORMAL /LPF
CHARACTER, URINE: CLEAR
CHLORIDE BLD-SCNC: 97 MEQ/L (ref 98–111)
CHOLESTEROL, TOTAL: 228 MG/DL (ref 100–199)
CO2: 26 MEQ/L (ref 23–33)
COLOR: ABNORMAL
CREAT SERPL-MCNC: 0.8 MG/DL (ref 0.4–1.2)
CREATININE, URINE: 246.9 MG/DL
CRYSTALS: ABNORMAL
EOSINOPHIL # BLD: 2 %
EOSINOPHILS ABSOLUTE: 0.1 THOU/MM3 (ref 0–0.4)
EPITHELIAL CELLS, UA: ABNORMAL /HPF
ERYTHROCYTE [DISTWIDTH] IN BLOOD BY AUTOMATED COUNT: 12.8 % (ref 11.5–14.5)
ERYTHROCYTE [DISTWIDTH] IN BLOOD BY AUTOMATED COUNT: 42.5 FL (ref 35–45)
GFR SERPL CREATININE-BSD FRML MDRD: > 90 ML/MIN/1.73M2
GLUCOSE BLD-MCNC: 182 MG/DL (ref 70–108)
GLUCOSE, URINE: 250 MG/DL
HBA1C MFR BLD: 10.8 %
HCT VFR BLD CALC: 46.7 % (ref 42–52)
HDLC SERPL-MCNC: 25 MG/DL
HEMOGLOBIN: 15.3 GM/DL (ref 14–18)
ICTOTEST: NEGATIVE
IMMATURE GRANS (ABS): 0.02 THOU/MM3 (ref 0–0.07)
IMMATURE GRANULOCYTES: 0.4 %
KETONES, URINE: ABNORMAL
LDL CHOLESTEROL CALCULATED: ABNORMAL MG/DL
LEUKOCYTE ESTERASE, URINE: ABNORMAL
LYMPHOCYTES # BLD: 34.5 %
LYMPHOCYTES ABSOLUTE: 1.9 THOU/MM3 (ref 1–4.8)
MCH RBC QN AUTO: 29.8 PG (ref 26–33)
MCHC RBC AUTO-ENTMCNC: 32.8 GM/DL (ref 32.2–35.5)
MCV RBC AUTO: 90.9 FL (ref 80–94)
MICROALBUMIN UR-MCNC: 221.58 MG/DL
MICROALBUMIN/CREAT UR-RTO: 897 MG/G (ref 0–30)
MISCELLANEOUS LAB TEST RESULT: ABNORMAL
MONOCYTES # BLD: 7.3 %
MONOCYTES ABSOLUTE: 0.4 THOU/MM3 (ref 0.4–1.3)
NITRITE, URINE: NEGATIVE
NUCLEATED RED BLOOD CELLS: 0 /100 WBC
PH UA: 5.5 (ref 5–9)
PLATELET # BLD: 196 THOU/MM3 (ref 130–400)
PMV BLD AUTO: 11.9 FL (ref 9.4–12.4)
POTASSIUM SERPL-SCNC: 4 MEQ/L (ref 3.5–5.2)
PROTEIN UA: 300 MG/DL
RBC # BLD: 5.14 MILL/MM3 (ref 4.7–6.1)
RBC URINE: ABNORMAL /HPF
RENAL EPITHELIAL, UA: ABNORMAL
SEG NEUTROPHILS: 55.1 %
SEGMENTED NEUTROPHILS ABSOLUTE COUNT: 3.1 THOU/MM3 (ref 1.8–7.7)
SODIUM BLD-SCNC: 136 MEQ/L (ref 135–145)
SPECIFIC GRAVITY UA: > 1.03 (ref 1–1.03)
TOTAL PROTEIN: 7.4 G/DL (ref 6.1–8)
TRIGL SERPL-MCNC: 657 MG/DL (ref 0–199)
TSH SERPL DL<=0.05 MIU/L-ACNC: 2.06 UIU/ML (ref 0.4–4.2)
UROBILINOGEN, URINE: 1 EU/DL (ref 0–1)
VITAMIN B-12: 533 PG/ML (ref 211–911)
VITAMIN D 25-HYDROXY: 18 NG/ML (ref 30–100)
WBC # BLD: 5.6 THOU/MM3 (ref 4.8–10.8)
WBC UA: ABNORMAL /HPF
YEAST: ABNORMAL

## 2021-07-22 PROCEDURE — 90472 IMMUNIZATION ADMIN EACH ADD: CPT | Performed by: FAMILY MEDICINE

## 2021-07-22 PROCEDURE — 99215 OFFICE O/P EST HI 40 MIN: CPT | Performed by: FAMILY MEDICINE

## 2021-07-22 PROCEDURE — 83036 HEMOGLOBIN GLYCOSYLATED A1C: CPT | Performed by: FAMILY MEDICINE

## 2021-07-22 PROCEDURE — 90715 TDAP VACCINE 7 YRS/> IM: CPT | Performed by: FAMILY MEDICINE

## 2021-07-22 PROCEDURE — 90732 PPSV23 VACC 2 YRS+ SUBQ/IM: CPT | Performed by: FAMILY MEDICINE

## 2021-07-22 PROCEDURE — 90471 IMMUNIZATION ADMIN: CPT | Performed by: FAMILY MEDICINE

## 2021-07-22 RX ORDER — OMEPRAZOLE 20 MG/1
CAPSULE, DELAYED RELEASE ORAL
Qty: 90 CAPSULE | Refills: 1 | Status: SHIPPED | OUTPATIENT
Start: 2021-07-22 | End: 2022-02-22 | Stop reason: SDUPTHER

## 2021-07-22 RX ORDER — BLOOD-GLUCOSE,RECEIVER,CONT
1 EACH MISCELLANEOUS CONTINUOUS
Qty: 1 DEVICE | Refills: 0 | Status: SHIPPED | OUTPATIENT
Start: 2021-07-22

## 2021-07-22 RX ORDER — OMEGA-3-ACID ETHYL ESTERS 1 G/1
CAPSULE, LIQUID FILLED ORAL
Qty: 360 CAPSULE | Refills: 1 | Status: SHIPPED | OUTPATIENT
Start: 2021-07-22 | End: 2022-06-14

## 2021-07-22 RX ORDER — SULFAMETHOXAZOLE AND TRIMETHOPRIM 800; 160 MG/1; MG/1
1 TABLET ORAL 2 TIMES DAILY
Qty: 14 TABLET | Refills: 0 | Status: SHIPPED | OUTPATIENT
Start: 2021-07-22 | End: 2021-07-29

## 2021-07-22 RX ORDER — BUMETANIDE 1 MG/1
1 TABLET ORAL DAILY
Qty: 90 TABLET | Refills: 0 | Status: SHIPPED | OUTPATIENT
Start: 2021-07-22 | End: 2021-10-18

## 2021-07-22 RX ORDER — INSULIN GLARGINE 100 [IU]/ML
30 INJECTION, SOLUTION SUBCUTANEOUS NIGHTLY
Qty: 5 PEN | Refills: 0 | Status: SHIPPED | OUTPATIENT
Start: 2021-07-22 | End: 2021-09-08

## 2021-07-22 RX ORDER — GLIPIZIDE 5 MG/1
TABLET ORAL
Qty: 90 TABLET | Refills: 0 | Status: SHIPPED | OUTPATIENT
Start: 2021-07-22 | End: 2021-07-22

## 2021-07-22 RX ORDER — LOSARTAN POTASSIUM 100 MG/1
100 TABLET ORAL DAILY
Qty: 90 TABLET | Refills: 0 | Status: SHIPPED | OUTPATIENT
Start: 2021-07-22 | End: 2021-10-18

## 2021-07-22 RX ORDER — METOPROLOL SUCCINATE 25 MG/1
TABLET, EXTENDED RELEASE ORAL
Qty: 90 TABLET | Refills: 0 | Status: SHIPPED | OUTPATIENT
Start: 2021-07-22 | End: 2022-02-09 | Stop reason: ALTCHOICE

## 2021-07-22 RX ORDER — ATORVASTATIN CALCIUM 40 MG/1
TABLET, FILM COATED ORAL
Qty: 90 TABLET | Refills: 0 | Status: SHIPPED | OUTPATIENT
Start: 2021-07-22 | End: 2021-07-27

## 2021-07-22 RX ORDER — FENOFIBRATE 160 MG/1
TABLET ORAL
Qty: 90 TABLET | Refills: 0 | Status: SHIPPED | OUTPATIENT
Start: 2021-07-22 | End: 2022-01-03

## 2021-07-22 RX ORDER — MELATONIN
2000 DAILY
Qty: 180 TABLET | Refills: 5 | Status: SHIPPED | OUTPATIENT
Start: 2021-07-22 | End: 2022-08-29

## 2021-07-22 RX ORDER — INSULIN ASPART 100 [IU]/ML
INJECTION, SOLUTION INTRAVENOUS; SUBCUTANEOUS
Qty: 5 PEN | Refills: 3 | Status: SHIPPED | OUTPATIENT
Start: 2021-07-22 | End: 2021-07-26 | Stop reason: SDUPTHER

## 2021-07-22 RX ORDER — METFORMIN HYDROCHLORIDE 500 MG/1
TABLET, EXTENDED RELEASE ORAL
Qty: 360 TABLET | Refills: 0 | Status: SHIPPED | OUTPATIENT
Start: 2021-07-22 | End: 2022-02-22 | Stop reason: SDUPTHER

## 2021-07-22 RX ORDER — ERGOCALCIFEROL 1.25 MG/1
CAPSULE ORAL
Qty: 4 CAPSULE | Refills: 2 | Status: SHIPPED | OUTPATIENT
Start: 2021-07-22 | End: 2022-03-14

## 2021-07-22 RX ORDER — BLOOD-GLUCOSE SENSOR
1 EACH MISCELLANEOUS CONTINUOUS
Qty: 6 EACH | Refills: 1 | Status: SHIPPED | OUTPATIENT
Start: 2021-07-22

## 2021-07-22 SDOH — ECONOMIC STABILITY: FOOD INSECURITY: WITHIN THE PAST 12 MONTHS, THE FOOD YOU BOUGHT JUST DIDN'T LAST AND YOU DIDN'T HAVE MONEY TO GET MORE.: PATIENT DECLINED

## 2021-07-22 SDOH — ECONOMIC STABILITY: FOOD INSECURITY: WITHIN THE PAST 12 MONTHS, YOU WORRIED THAT YOUR FOOD WOULD RUN OUT BEFORE YOU GOT MONEY TO BUY MORE.: PATIENT DECLINED

## 2021-07-22 ASSESSMENT — SOCIAL DETERMINANTS OF HEALTH (SDOH): HOW HARD IS IT FOR YOU TO PAY FOR THE VERY BASICS LIKE FOOD, HOUSING, MEDICAL CARE, AND HEATING?: PATIENT DECLINED

## 2021-07-22 ASSESSMENT — PATIENT HEALTH QUESTIONNAIRE - PHQ9
SUM OF ALL RESPONSES TO PHQ9 QUESTIONS 1 & 2: 0
SUM OF ALL RESPONSES TO PHQ QUESTIONS 1-9: 0
2. FEELING DOWN, DEPRESSED OR HOPELESS: 0
1. LITTLE INTEREST OR PLEASURE IN DOING THINGS: 0

## 2021-07-22 NOTE — PROGRESS NOTES
[address]  [address] [place], [address]  Ph:   [phone]  Fax: [phone]    Provider:  Dr. Esteban Goddard Patient Progress Note    Patient:  Kevin Diego  YOB: 1970      Visit Date:  7/22/2021                                              Reason For Visit:   Chief Complaint   Patient presents with    Follow-up    Hypertension    Hyperlipidemia    Diabetes    Leg Swelling     Bilateral leg swelling x5 days -- feels tight with pain         Josephine Marquez is a 45 y.o. male who comes today to the office because of leg ulcers. He has hx of venous insufficiency. He has hx of diabetes, HTN, microalbuminuria, Chronic diastolic dysfunction , ARIANE and hypercholesterolemia. Swelling started about 5 days ago. He has hx of diabetic ulcers. Treated by the wound care at [facility] in the past for the same problem. Legs are erythematosus. .      Diabetes Mellitus:  taking Metformin 2000 mg PO daily, Lantus 20 U once daily. Hemoglobin A1c today was 10.8%. He has history of microalbuminuria and is taking losartan 100 mg every day. Hypercholesterolemia with hypertriglyceridemia: Last time he check his cholesterol in September 18, 2020 it was 243, triglycerides 1157, HDL 25 and LDL was unable to calculate due to elevated triglycerides. He is taking Lipitor 40 mg 1 tablet mouth daily, Triglide (fenofibrate) 160 mg 1 tab mouth daily and Lovaza 1000 capsules 2 capsules by mouth twice a day. He did not do his lab work as recommended. Vitamin D deficiency. Diagnosed with level 9, after 2000 international units every day he went up to 18. He has not done the lab work after that 1. His hepatitis B surface value was negative so he started the series which he already finished. Hypertension: He is taking Toprol-XL 25 mg 1 tab mouth daily, and Cozaar 100 mg 1 tablet mouth daily.   Blood pressure today was 154/85    He also has history of diastolic dysfunction and is taking Bumex 1 mg daily.    Significant Past Medical History:      Past Medical History:   Diagnosis Date    CAD (coronary artery disease) 2019    CHF (congestive heart failure) ([facility] 02/12/2019    Diabetes mellitus ([facility] 1990's    Hypertension 1990's         Past Surgical History:   Procedure Laterality Date    CHOLECYSTECTOMY      FIBULA FRACTURE SURGERY Right     screws    TIBIA FRACTURE SURGERY Right     screws     History reviewed. No pertinent family history. Social History     Socioeconomic History    Marital status:      Spouse name: None    Number of children: None    Years of education: None    Highest education level: None   Occupational History    None   Tobacco Use    Smoking status: Current Every Day Smoker     Packs/day: 0.50     Years: 36.00     Pack years: 18.00     Types: Cigarettes     Start date: 1984    Smokeless tobacco: Never Used   Substance and Sexual Activity    Alcohol use: None    Drug use: No    Sexual activity: None   Other Topics Concern    None   Social History Narrative    None     Social Determinants of Health     Financial Resource Strain: Unknown    Difficulty of Paying Living Expenses: Patient refused   Food Insecurity: Unknown    Worried About Running Out of Food in the Last Year: Patient refused    [address] in the Last Year: Patient refused   Transportation Needs:     Lack of Transportation (Medical):      Lack of Transportation (Non-Medical):    Physical Activity:     Days of Exercise per Week:     Minutes of Exercise per Session:    Stress:     Feeling of Stress :    Social Connections:     Frequency of Communication with Friends and Family:     Frequency of Social Gatherings with Friends and Family:     Attends [employer] Services:     Active Member of Clubs or Organizations:     Attends Club or Organization Meetings:     Marital Status:    Intimate Partner Violence:     Fear of Current or Ex-Partner:     Emotionally Abused:     Physically plus a sliding scale according to group 2 sliding scale insulin. 5 pen 3    sulfamethoxazole-trimethoprim (BACTRIM DS;SEPTRA DS) 800-160 MG per tablet Take 1 tablet by mouth 2 times daily for 7 days 14 tablet 0    fluticasone (FLONASE) 50 MCG/ACT nasal spray 1 spray by Each Nostril route daily 1 Bottle 0    Insulin Pen Needle 32G X 4 MM MISC 1 each by Does not apply route daily 100 each 3    insulin glargine (LANTUS SOLOSTAR) 100 UNIT/ML injection pen Inject 20 Units into the skin nightly 5 pen 3    Continuous Blood Gluc  (FREESTYLE MACIEL 14 DAY READER) ANÍBAL 1 Units by Does not apply route continuous Patient is diabetic treated with insulin 1 Device 0    Continuous Blood Gluc Sensor (FREESTYLE MACIEL 14 DAY SENSOR) MISC 1 Units by Does not apply route continuous Patient is Diabetic treated with Insulin 6 each 1     No current facility-administered medications for this visit. Review of systems:  Review of Systems - History obtained from chart review and the patient  General ROS: negative for - chills, fatigue or fever  Psychological ROS: negative for - anxiety, depression or sleep disturbances  ENT ROS: negative for - headaches, nasal congestion or nasal discharge  Allergy and Immunology ROS: negative for - seasonal allergies  Hematological and Lymphatic ROS: negative for - bruising  Endocrine ROS: negative for - malaise/lethargy, polydipsia/polyuria or temperature intolerance  Respiratory ROS: negative for - cough,  shortness of breath or wheezing  Cardiovascular ROS: negative for - chest pain or edema  Gastrointestinal ROS: negative for - abdominal pain, constipation, diarrhea or nausea/vomiting  Musculoskeletal ROS: negative for - joint pain or muscle pain  Neurological ROS: negative for - dizziness  Dermatological ROS: positive for - wounds in leg    Physical Examination:  Blood pressure (!) 154/85, pulse 88, temperature 97.9 °F (36.6 °C), temperature source Temporal, resp.  rate 12, height 6' (1.829 m), weight 274 lb (124.3 kg). General-  Alert and oriented x 3, NAD  HEENT: NC, AT, PERRLA, EOMI, anicteric sclerae  Ears: Normal tympanic membranes bilaterally  Nose: patent, no lesions  Mouth: no lesions, moist mucosas  Neck - supple, no significant adenopathy  Chest - clear to auscultation, no wheezes, rales or rhonchi, symmetric air entry  Heart - normal rate, regular rhythm, normal S1, S2, no murmurs, rubs, clicks or gallops  Abdomen - soft, nontender, nondistended, no masses or organomegaly  Extremities - peripheral pulses normal, no pedal edema, no clubbing or cyanosis  Skin - normal in turgor and texture. There are several skin deep ulcers in yhe anterior aspect of the right leg and few scattered superficial ulcer in the left leg. There is sweling  And erythema of the lower extremities. Impression:   Diagnosis Orders   1. Uncontrolled type 2 diabetes mellitus with insulin therapy ([facility]  metFORMIN (GLUCOPHAGE-XR) 500 MG extended release tablet    POCT glycosylated hemoglobin (Hb A1C)    Urinalysis with Microscopic    Microalbumin / Creatinine Urine Ratio    Continuous Blood Gluc  (DEXCOM G4 PLAT PED ) ANÍBAL    Continuous Blood Gluc Sensor (DEXCOM G6 SENSOR) MISC    DISCONTINUED: glipiZIDE (GLUCOTROL) 5 MG tablet   2. Essential hypertension  metoprolol succinate (TOPROL XL) 25 MG extended release tablet    losartan (COZAAR) 100 MG tablet    CBC Auto Differential    Comprehensive Metabolic Panel    Vitamin B12   3. Vitamin D deficiency  vitamin D (ERGOCALCIFEROL) 1.25 MG (35061 UT) CAPS capsule    vitamin D3 (CHOLECALCIFEROL) 25 MCG (1000 UT) TABS tablet    Vitamin D 25 Hydroxy   4. Diabetic nephropathy associated with type 2 diabetes mellitus (HCC)  POCT glycosylated hemoglobin (Hb A1C)    Continuous Blood Gluc  (DEXCOM G4 PLAT PED ) ANÍBAL    Continuous Blood Gluc Sensor (DEXCOM G6 SENSOR) MISC   5.  Chronic congestive heart failure with left ventricular diastolic dysfunction ([address]     6. Diabetic ulcer of lower extremity ([facility]  RADHA - Laurie Vargas MD, Infectious Disease, [facility]   7. Hypercholesterolemia with hypertriglyceridemia  omega-3 acid ethyl esters (LOVAZA) 1 g capsule    atorvastatin (LIPITOR) 40 MG tablet    fenofibrate (TRIGLIDE) 160 MG tablet    Lipid Panel    TSH with Reflex       Plan:  Increase Lantus to 30 units in the evening. Start Humalog 8 units before each meal plus sliding scale group 2  We will give him freestyle allan continuous monitoring  Will refer to Dr. Brennan Carl for wound care. We will give him Bactrim DS 1 p.o. twice daily and to he sees ID  Continue Toprol-XL 25 mg 1 tab mouth daily. Continue losartan 100 mg 1 tablet daily. Continue Lipitor 40 mg 1 tablet daily. Continue Triglide 160 mg 1 tablet by mouth daily. Continue with the 3 fatty acids 1000 mg 2 caplets p.o. twice daily  Do blood tests including CBC, comp panel, lipid file, liver test, UA with marked gross copy, microalbuminuria, B12, vitamin D. He will get Pneumovax and Tdap today    Orders Placed This Encounter   Procedures    PNEUMOVAX 23 subcutaneous/IM (Pneumococcal polysaccharide vaccine 23-valent >= 1yo)    Tdap (age 6y and older) IM ([address])    Lipid Panel     Standing Status:   Future     Standing Expiration Date:   7/22/2022     Order Specific Question:   Is Patient Fasting?/# of Hours     Answer:   12 hours    CBC Auto Differential     Standing Status:   Future     Standing Expiration Date:   7/22/2022    Comprehensive Metabolic Panel     Standing Status:   Future     Standing Expiration Date:   7/22/2022    TSH with Reflex     Standing Status:   Future     Standing Expiration Date:   7/22/2022    Urinalysis with Microscopic     Standing Status:   Future     Standing Expiration Date:   7/22/2022     Order Specific Question:   SPECIFY(EX-CATH,MIDSTREAM,CYSTO,ETC)?      Answer:   midstream    Vitamin B12     Standing Status:   Future     Standing Expiration Date: 7/22/2022    Vitamin D 25 Hydroxy     Standing Status:   Future     Standing Expiration Date:   7/22/2022    Microalbumin / Creatinine Urine Ratio     Standing Status:   Future     Standing Expiration Date:   7/22/2022    RADHA - Osvaldo Hayes MD, Infectious Disease, [facility]     Referral Priority:   Routine     Referral Type:   Eval and Treat     Referral Reason:   Specialty Services Required     Referred to Provider:   Maribel Lauren MD     Requested Specialty:   Infectious Diseases     Number of Visits Requested:   1    POCT glycosylated hemoglobin (Hb A1C)       Follow Up:  Return in about 3 weeks (around 8/12/2021).     Cali Wyatt MD

## 2021-07-22 NOTE — PROGRESS NOTES
Immunizations Administered     Name Date Dose Route    Pneumococcal Polysaccharide ([id number]) 7/22/2021 0.5 mL Intramuscular    Site: Deltoid- Right    Lot: [id number]    NDC: 0447-3682-64    Tdap (Boostrix, Adacel) 7/22/2021 0.5 mL Intramuscular    Site: Deltoid- Right    Lot: [id number]    NDC: 96838-687-31

## 2021-07-23 ENCOUNTER — TELEPHONE (OUTPATIENT)
Dept: FAMILY MEDICINE CLINIC | Age: 51
End: 2021-07-23

## 2021-07-23 DIAGNOSIS — E78.2 HYPERCHOLESTEROLEMIA WITH HYPERTRIGLYCERIDEMIA: ICD-10-CM

## 2021-07-23 DIAGNOSIS — I10 HYPERTENSION, UNCONTROLLED: Primary | ICD-10-CM

## 2021-07-23 NOTE — TELEPHONE ENCOUNTER
Pt called office back for results, states understanding   Pt called stating pharmacist (Cindy at [facility] in [place]) advised him to not take Vitamin D and C together, also states insurance wouldn't pay for Insulin due to the way it was written

## 2021-07-23 NOTE — TELEPHONE ENCOUNTER
Esequiel Dixon MD   7/23/2021  8:42 AM EDT Back to Top      Vitamin D was 18.  Normal is between 30 and 100, ideally between 45 and 55.  Needs to restart his vitamin D.  Prescription was given to him yesterday. Johanne Duron repeat level in 3 months.  Lipid profile showed a total cholesterol 228 triglycerides 657, HDL 25 and LDL was unable to calculate due to the elevated triglycerides.  Will increase his Lipitor to 80 mg 1 tablet mouth daily, continue omega-3 fatty acids 2 capsules twice a day and the Triglide 160 mg daily.  Repeat fasting lipid profile in 3 months.  Very important he adjust his diet and decrease his processed carbohydrates, alcohol, fruit juices and saturated fats. Microalbumin was 897, normal is up to 27.  A year ago was 56 and 5 years ago 36.  This is kidney disease from diabetes.  The best way to reverse her health this is controlling the diabetes very well.  Also taking Cozaar which is a kidney protector. [facility] is already on the max dose of this medication. Vitamin B12 was normal.  The urine test show a lot of glucose and protein in the urine.  Also show a lot of infectious cells.  I would send this urine for culture.  If the urine has been disposed send him back to the lab to get a urine culture to treat accordingly.   Please call the lab and see if we can add screening PSA to the blood test that I did yesterday. Pita Peralta when he goes back to the lab he needs to do that.  Diagnosis: prostate cancer screening     Left message to call office back

## 2021-07-25 NOTE — TELEPHONE ENCOUNTER
Need more information to see what was wrong with the insulin order so I can correct it. I can't find any interactions between the Vitamin C and Vitamin D, so not sure what the pharmacist's rationale for that would be.

## 2021-07-26 ENCOUNTER — TELEPHONE (OUTPATIENT)
Dept: WOUND CARE | Age: 51
End: 2021-07-26

## 2021-07-26 RX ORDER — LANCETS
EACH MISCELLANEOUS
Qty: 100 EACH | Refills: 0 | Status: SHIPPED | OUTPATIENT
Start: 2021-07-26

## 2021-07-26 RX ORDER — BLOOD SUGAR DIAGNOSTIC
STRIP MISCELLANEOUS
Qty: 60 STRIP | Refills: 0 | Status: SHIPPED | OUTPATIENT
Start: 2021-07-26 | End: 2021-12-20 | Stop reason: SDUPTHER

## 2021-07-26 RX ORDER — INSULIN ASPART 100 [IU]/ML
INJECTION, SOLUTION INTRAVENOUS; SUBCUTANEOUS
Qty: 5 PEN | Refills: 3 | Status: SHIPPED | OUTPATIENT
Start: 2021-07-26

## 2021-07-26 NOTE — TELEPHONE ENCOUNTER
Pt states he is going to try financial assistance for dexacom but expresses he has tried freestyle ib the passed and if open to trying them again, the pharmacist suggested he shouldn't be taking Vitamin D with Vitamin D3, not vitamin C & B there was an error in communication I apologize

## 2021-07-26 NOTE — TELEPHONE ENCOUNTER
Can check on FreeStyle Christy if not done already. Not all continuous glucose monitors are covered, unfortunately, but some brands offer financial assistance. Patient can check their websites to see if he may qualify for assistance.

## 2021-07-26 NOTE — TELEPHONE ENCOUNTER
Please approve or deny     Last Visit Date:  7/22/2021       Next Visit Date:    Visit date not found

## 2021-07-26 NOTE — TELEPHONE ENCOUNTER
Pen needles were sent to pharmacy. Still need clarification on Insulin to calculate max daily dose. Also advise on Vitamin d.

## 2021-07-26 NOTE — TELEPHONE ENCOUNTER
Referral received to wound clinic. Called patient to schedule appt. No answer. Unable to leave voicemail.

## 2021-07-27 RX ORDER — ATORVASTATIN CALCIUM 80 MG/1
80 TABLET, FILM COATED ORAL DAILY
Qty: 90 TABLET | Refills: 1 | Status: SHIPPED | OUTPATIENT
Start: 2021-07-27 | End: 2022-02-22 | Stop reason: SDUPTHER

## 2021-07-27 NOTE — TELEPHONE ENCOUNTER
Please approve or deny     Last Visit Date:  7/22/2021       Next Visit Date:    7/23/2021       Increase of Lipitor per Wilkeegann Siecandy

## 2021-07-27 NOTE — TELEPHONE ENCOUNTER
Order him the [facility]. [facility] so maybe we can get the Tony faster. As per previous note, he is to take the Vit D 50,0000 unit capsule weekly for 12 weeks THEN start the Vit D3 2000 IU daily. The point is to have a higher dose for a while to get his levels up, then drop down to a maintenance dose.

## 2021-08-03 ENCOUNTER — HOSPITAL ENCOUNTER (OUTPATIENT)
Dept: WOUND CARE | Age: 51
Discharge: HOME | End: 2021-08-03
Payer: COMMERCIAL

## 2021-08-03 VITALS
HEART RATE: 87 BPM | OXYGEN SATURATION: 94 % | BODY MASS INDEX: 37.11 KG/M2 | WEIGHT: 274 LBS | HEIGHT: 72 IN | SYSTOLIC BLOOD PRESSURE: 163 MMHG | DIASTOLIC BLOOD PRESSURE: 79 MMHG | TEMPERATURE: 97.8 F | RESPIRATION RATE: 16 BRPM

## 2021-08-03 PROCEDURE — 29580 STRAPPING UNNA BOOT: CPT

## 2021-08-03 PROCEDURE — 97597 DBRDMT OPN WND 1ST 20 CM/<: CPT

## 2021-08-03 RX ORDER — GLIPIZIDE 5 MG/1
5 TABLET ORAL
COMMUNITY
End: 2021-10-18

## 2021-08-03 NOTE — PROGRESS NOTES
[employer] Application   Below Knee    NAME:  Mert Zendejas  YOB: 1970  MEDICAL RECORD NUMBER:  [id number]  DATE:  8/3/2021    Troy Rowley boot: Applied Unna roll from toes to knee overlapping each time. Applied ace wrap or coban from toes to below the knee. Secured with tape and/or metal clips covered with tape. Instructed patient/caregiver to keep dressing dry and intact. DO NOT REMOVE DRESSING. Instructed pt/family/caregiver to report excessive draining, loose bandage, wet dressing, severe pain or tingling in toes. Applied [employer] dressing below the knee to right lower leg. Applied [employer] dressing below the knee to left lower leg. Unna Boot(s) were applied per  Guidelines.      Electronically signed by Andrei Meyer RN on 8/3/2021 at 9:47 AM

## 2021-08-03 NOTE — PROGRESS NOTES
[facility]          Progress Note and Procedure Note      Melia Mendoza  MEDICAL RECORD NUMBER:  [id number]  AGE: 45 y.o. GENDER: male  : 1970  EPISODE DATE:  8/3/2021    Subjective:     Chief Complaint   Patient presents with    Wound Check     Bilateral legs   Ulceration left 5th MPJ     HISTORY of PRESENT ILLNESS HPI     Melia Mendoza is a 45 y.o. male Established patient referred by self, who presents today for wound/ulcer evaluation. History of Wound Context: Patient is a diabetic he also has severe chronic venous hypertension and has been seen in the past he presents today because a week ago he noticed that there was an ulceration on his left foot on the lateral aspect of his 5th MPJ and then he also has 2 ulcerations on his right leg secondary to the dystrophic and thin skin that is hyperpigmented from his severe chronic venous hypertension. He is having minimal to no pain presenting per recommendation of his primary physician  Wound/Ulcer Pain Timing/Severity: none  Quality of pain: N/A  Severity:  0 / 10   Modifying Factors: Pain worsens with walking  Associated Signs/Symptoms: edema, erythema and drainage    Interval History:   Patient presents today for follow up on wound/ulcer's progression. The patient is currently not on antibiotics. 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 Presenting for follow-up and treatment.         PAST MEDICAL HISTORY        Diagnosis Date    CAD (coronary artery disease)     CHF (congestive heart failure) ([facility] 2019    Diabetes mellitus ([facility] 's    Hypertension 's       PAST SURGICAL HISTORY    Past Surgical History:   Procedure Laterality Date    CHOLECYSTECTOMY      FIBULA FRACTURE SURGERY Right     screws    TIBIA FRACTURE SURGERY Right     screws       FAMILY HISTORY    History reviewed. No pertinent family history. SOCIAL HISTORY    Social History     Tobacco Use    Smoking status: Current Every Day Smoker     Packs/day: 0.25     Years: 36.00     Pack years: 9.00     Types: Cigarettes     Start date: 1984    Smokeless tobacco: Never Used    Tobacco comment: \"nicotine pouches\"   Substance Use Topics    Alcohol use: Not on file    Drug use: No       ALLERGIES    Allergies   Allergen Reactions    Morphine      \"goes nuts\"       MEDICATIONS    Current Outpatient Medications on File Prior to Encounter   Medication Sig Dispense Refill    glipiZIDE (GLUCOTROL) 5 MG tablet Take 5 mg by mouth 2 times daily (before meals) 0.5 tab      atorvastatin (LIPITOR) 80 MG tablet Take 1 tablet by mouth daily take 1 tablet by mouth at bedtime 90 tablet 1    insulin aspart (NOVOLOG FLEXPEN) 100 UNIT/ML injection pen Inject 8 units subcu baseline plus a sliding scale according to group 2 sliding scale insulin.  Max daily dose  70 units daily 5 pen 3    vitamin D (ERGOCALCIFEROL) 1.25 MG (32195 UT) CAPS capsule take 1 capsule by mouth every week 4 capsule 2    omega-3 acid ethyl esters (LOVAZA) 1 g capsule take 2 capsules by mouth twice a day 360 capsule 1    omeprazole (PRILOSEC) 20 MG delayed release capsule take 1 capsule by mouth every morning BEFORE BREAKFAST 90 capsule 1    bumetanide (BUMEX) 1 MG tablet Take 1 tablet by mouth daily 90 tablet 0    metoprolol succinate (TOPROL XL) 25 MG extended release tablet take 1 tablet by mouth at bedtime 90 tablet 0    metFORMIN (GLUCOPHAGE-XR) 500 MG extended release tablet take 4 tablets by mouth once daily - WITH BREAKFAST 360 tablet 0    losartan (COZAAR) 100 MG tablet Take 1 tablet by mouth daily 90 tablet 0    vitamin D3 (CHOLECALCIFEROL) 25 MCG (1000 UT) He has an ulceration on the lateral aspect of the left 5th MPJ extending a small 0.1 x 0.1 area on the bottom there is hyperkeratosis slough and also granular tissue with biofilm then there is some hyperkeratosis around it with maceration there is no deep tracking but it does go into the subcutaneous space. There patient has no pain no drainage no purulence and there is no deep tracking no cellulitis. There is an inversion deformity of his forefoot and he does have some tightness to the posterior muscle group. Wound 08/03/21 Foot Left;Lateral (Active)   Wound Image   08/03/21 0848   Wound Etiology Diabetic 08/03/21 0848   Dressing Status Intact; New dressing applied 08/03/21 0848   Wound Cleansed Cleansed with saline 08/03/21 0848   Dressing/Treatment Collagen; Foam 08/03/21 0848   Wound Length (cm) 1.2 cm 08/03/21 0848   Wound Width (cm) 0.8 cm 08/03/21 0848   Wound Depth (cm) 0.1 cm 08/03/21 0848   Wound Surface Area (cm^2) 0.96 cm^2 08/03/21 0848   Wound Volume (cm^3) 0.096 cm^3 08/03/21 0848   Wound Assessment Granulation tissue;Slough 08/03/21 0848   Drainage Amount Moderate 08/03/21 0848   Drainage Description Serosanguinous 08/03/21 0848   Odor None 08/03/21 0848   Angela-wound Assessment Maceration 08/03/21 0848   Margins Attached edges 08/03/21 0848   Wound Thickness Description not for Pressure Injury Full thickness 08/03/21 0848   Number of days: 0       Wound 08/03/21 Pretibial Left (Active)   Wound Image   08/03/21 0848   Wound Etiology Venous 08/03/21 0848   Dressing Status New dressing applied 08/03/21 0848   Wound Cleansed Cleansed with saline 08/03/21 0848   Offloading for Diabetic Foot Ulcers No offloading required 08/03/21 0848   Wound Length (cm) 0.5 cm 08/03/21 0848   Wound Width (cm) 0.5 cm 08/03/21 0848   Wound Depth (cm) 0.05 cm 08/03/21 0848   Wound Surface Area (cm^2) 0.25 cm^2 08/03/21 0848   Wound Volume (cm^3) 0.0125 cm^3 08/03/21 0848   Wound Assessment Eschar dry 08/03/21 0848 Drainage Amount None 08/03/21 0848   Odor None 08/03/21 0848   Angela-wound Assessment Dry/flaky; Hemosiderin staining (brown yellow) 08/03/21 0848   Margins Attached edges 08/03/21 0848   Wound Thickness Description not for Pressure Injury Full thickness 08/03/21 0848   Number of days: 0       Wound 08/03/21 Pretibial Right (Active)   Wound Image   08/03/21 0848   Wound Etiology Venous 08/03/21 0848   Dressing Status New dressing applied 08/03/21 0848   Wound Cleansed Cleansed with saline 08/03/21 0848   Offloading for Diabetic Foot Ulcers No offloading required 08/03/21 0848   Wound Length (cm) 0.5 cm 08/03/21 0848   Wound Width (cm) 0.5 cm 08/03/21 0848   Wound Depth (cm) 0.05 cm 08/03/21 0848   Wound Surface Area (cm^2) 0.25 cm^2 08/03/21 0848   Wound Volume (cm^3) 0.0125 cm^3 08/03/21 0848   Wound Assessment Eschar dry 08/03/21 0848   Drainage Amount None 08/03/21 0848   Odor None 08/03/21 0848   Angela-wound Assessment Dry/flaky; Hemosiderin staining (brown yellow) 08/03/21 0848   Margins Attached edges 08/03/21 0848   Wound Thickness Description not for Pressure Injury Full thickness 08/03/21 0848   Number of days: 0       LABS      CBC:   Lab Results   Component Value Date    WBC 5.6 07/22/2021    HGB 15.3 07/22/2021    HCT 46.7 07/22/2021    MCV 90.9 07/22/2021     07/22/2021     BMP:   Lab Results   Component Value Date     07/22/2021    K 4.0 07/22/2021    K 4.3 03/26/2020    CL 97 07/22/2021    CO2 26 07/22/2021    BUN 17 07/22/2021    CREATININE 0.8 07/22/2021     PT/INR: No results found for: PROTIME, INR  Prealbumin: No results found for: PREALBUMIN  Albumin:  Lab Results   Component Value Date    LABALBU 4.0 07/22/2021     Sed Rate:No results found for: Luciana Mejia  CRP: No results found for: CRP  Micro:   Lab Results   Component Value Date    BC No growth-preliminary No growth  03/25/2020      Hemoglobin A1C:   Lab Results   Component Value Date    LABA1C 10.8 07/22/2021       Assessment: Ulcer Identification:  Ulcer Type: venous and diabetic  Contributing Factors: edema, lymphedema, diabetes, chronic pressure and shear force    Wound: Abrasion    Depth of Diabetic/Pressure/Non Pressure Ulcers or Wound:  Diabetic ulcer, fat layer exposed    Patient Active Problem List   Diagnosis Code    Cellulitis L03.90    Foot ulcer, left, with fat layer exposed ([facility] L97.522    Forefoot varus, acquired, left M21.6X2    Equinus contracture of left ankle M24.572    Type 2 diabetes mellitus with hyperglycemia, with long-term current use of insulin ([facility]) E11.65, Z79.4    Severe sepsis ([facility]) A41.9, R65.20    Uncontrolled type 2 diabetes mellitus with hyperglycemia ([facility]) E11.65    Hyponatremia E87.1    Hypertension, uncontrolled I10    PFO (patent foramen ovale) Q21.1    Hyperlipidemia E78.5    Smoker F17.200    Morbid obesity ([facility]) E66.01    Chronic diastolic heart failure ([facility]) I50.32    Noncompliance with medications Z91.14    Aortic valve stenosis I35.0       Procedure Note  Indications:  Based on my examination of this patient's wound(s)/ulcer(s) today, debridement is required to promote healing and evaluate the extent healing. Performed by: Dilma Mosqueda DPM    Consent obtained: Yes    Time out taken:  Yes    Pain control: neuropathic      Debridement:Non-excisional Debridement    Using #15 blade scalpel the wound(s)/ulcer(s) was/were sharply debrided down through and including the removal of dermis and subcutaneous tissue. Devitalized Tissue Debrided:  biofilm and slough    Pre Debridement Measurements:  Are located in the Wound/Ulcer Documentation Flow Sheet    Wound/Ulcer #: 2    Post Debridement Measurements:    Wound/Ulcer Descriptions are listed under Physical Exam above.     Wound/Ulcer Descriptions are Pre Debridement except measurements    Percent of Wound/Ulcer Debrided: 100%    Total Surface Area Debrided:  1 sq cm     Bleeding:  None    Hemostasis Achieved:  by pressure    Procedural Pain:  0  / 10     Post Procedural Pain:  0 / 10     Response to treatment:  Well tolerated by patient. Plan:     Patient examined and evaluated  Discussed the problem with the patient and he is going to use compression wraps for a week and then he is going to transition to either a juxta light or something along the lines of a compression wrap. Its the tissue quality and the swelling that affect the legs. We talked about offloading on the left foot he is not excited and does not want to stop work so I did a debridement he tolerated it well and then we are going to utilize collagen and a dressing for a week and then he will start changing them when the [address] comes off his self. We talked about he needs to figure out a way to keep the pressure off work may be an issue and we will have to address this if there is no healing. We will need to also get hemoglobin A1c is and the patient needs to be assessed for nutrition. Treatment:   Orders Placed This Encounter   Procedures    Debridement     Non-Excisional of necrotic tissue     Standing Status:   Standing     Number of Occurrences:   1    Nursing communication     Apply zinc unna to bilateral leg. Do not remove do not get wet. Standing Status:   Standing     Number of Occurrences:   1    Wound dressing     Standing Status:   Standing     Number of Occurrences:   1    Wound care     apply foam to (ulcer/wound) every 2-3 days. Change more frequently if macerated periwound     Standing Status:   Standing     Number of Occurrences:   3    Nursing communication     Apply promogran to wound followed by a dry gauze     Standing Status:   Standing     Number of Occurrences:   1         Antibiotics: No    Follow up: 2 weeks    Please see attached Discharge Instructions    Written patient dismissal instructions given to patient and signed by patient or POA.          Discharge Instructions       Visit Discharge/Physician Orders:  -Wound debrided today. -Dressing supplies will be ordered through [address] lower legs elevated throughout the day. -Juxta lite HD compression wraps ordered through [facility] to see if these are covered by insurance. If they are covered then they will be delivered to your home. If they are not covered then they will call and offer you discount pricing options.  -Carefully cut off/remove unna boot in 1 week then apply compression stockings or juxta lite HD compression wraps daily in the morning and remove at bedtime. Also will need to apply moisturizing cream to legs daily.  -Can order knee high compression stockings, strength of 20-30 mmHg, either online at [employer], [employer], etc. Can also get at local pharmacies over the counter or at medical supply stores. - Right ankle- 29.5 cm   Right calf- 44 cm    Right heel to knee length- 44 cm  - Left ankle- 28 cm      Left calf- 45 cm       Left heel to knee length- 44 cm    Wound Location: Right and Left lower legs, Left foot    Dressing orders:     1) Gather wound care supplies and arrange on clean table. 2) Wash your hands with soap and water or use alcohol based hand  for 20 seconds (sing \"Happy Birthday\" twice). 3) Cleanse wounds with normal saline or wound cleanser and gauze. Pat dry with clean gauze. 4) Right and Left lower legs- Unna boots applied today. Leave on for 1 week then carefully remove and apply compression stockings daily. 5) Left foot- After you remove your unna boot in 1 week then apply collage and a bordered foam dressing. Change every 3 days until healed. If unna boot becomes too tight- raise/elevate legs above the level of the heart for 15-20 minutes if swelling does not go down then carefully cut off unna boot and call clinic or go to local ER or family physician. If unna boot becomes wet or starts to roll down then carefully remove unna boot and call clinic.      Keep all dressings clean &

## 2021-08-03 NOTE — PLAN OF CARE
Problem: Wound:  Goal: Will show signs of wound healing; wound closure and no evidence of infection  Description: Will show signs of wound healing; wound closure and no evidence of infection  Outcome: Ongoing   Patient presents to wound clinic for evaluation and treatment of left foot and bilateral leg wounds. Wound debrided today. Dressing supplies ordered through [employer]. Juxta lite HD compression wraps ordered through [employer]. Right and Left lower legs- Unna boots applied today. Leave on for 1 week then carefully remove and apply compression stockings daily. Left foot- After removing unna boot in 1 week then apply collagen and a bordered foam dressing. Change every 3 days until healed. Follow up visit:   Tuesday August 31st at 8:15 am.    Care plan reviewed with patient. Patient verbalize understanding of the plan of care and contribute to goal setting.

## 2021-08-03 NOTE — PROGRESS NOTES
[address] f: [phone] f: [phone] p: [phone] [email or web address]      Ordering Center:     [place] [address]  [phone]  WOUND CARE Dept: [phone]   FAX NUMBER [phone]    Patient Information:      Ponce Carbajal  [address]   [phone]   : 1970  AGE: 45 y.o. GENDER: male   EPISODE DATE: 8/3/2021    Insurance:      PRIMARY INSURANCE:  Plan: Lj aHnks  Coverage: AETNA  Effective Date: 2015  Group Number: [unfilled]  Subscriber Number: [id number] - (Commercial)    Payor/Plan Subscr  Sex Relation Sub. Ins. ID Effective Group Num   1. Suleiman Cage A 1970 Male Self [id number] 7/12/15 [id number]                                   [address] [facility] [address]       Patient Wound Information:      Problem List Items Addressed This Visit     None          WOUNDS REQUIRING DRESSING SUPPLIES:     Wound 21 Foot Left;Lateral (Active)   Wound Image   21 0848   Wound Etiology Diabetic 21 0848   Dressing Status Intact; New dressing applied 21 0848   Wound Cleansed Cleansed with saline 21 0848   Dressing/Treatment Collagen; Foam 21 0848   Wound Length (cm) 1.2 cm 21 0848   Wound Width (cm) 0.8 cm 21 0848   Wound Depth (cm) 0.1 cm 21 0848   Wound Surface Area (cm^2) 0.96 cm^2 21 0848   Wound Volume (cm^3) 0.096 cm^3 21 0848   Wound Assessment Granulation tissue;Slough 21 0848   Drainage Amount Moderate 21 0848   Drainage Description Serosanguinous 21 0848   Odor None 21 0848   Angela-wound Assessment Maceration 21 0848   Margins Attached edges 21 0848   Wound Thickness Description not for Pressure Injury Full thickness 21 0848   Number of days: 0       Wound 21 Pretibial Left (Active)   Wound Image   21 0848   Wound Etiology Venous 08/03/21 0848   Dressing Status New dressing applied 08/03/21 0848   Wound Cleansed Cleansed with saline 08/03/21 0848   Offloading for Diabetic Foot Ulcers No offloading required 08/03/21 0848   Wound Length (cm) 0.5 cm 08/03/21 0848   Wound Width (cm) 0.5 cm 08/03/21 0848   Wound Depth (cm) 0.05 cm 08/03/21 0848   Wound Surface Area (cm^2) 0.25 cm^2 08/03/21 0848   Wound Volume (cm^3) 0.0125 cm^3 08/03/21 0848   Wound Assessment Eschar dry 08/03/21 0848   Drainage Amount None 08/03/21 0848   Odor None 08/03/21 0848   Angela-wound Assessment Dry/flaky; Hemosiderin staining (brown yellow) 08/03/21 0848   Margins Attached edges 08/03/21 0848   Wound Thickness Description not for Pressure Injury Full thickness 08/03/21 0848   Number of days: 0       Wound 08/03/21 Pretibial Right (Active)   Wound Image   08/03/21 0848   Wound Etiology Venous 08/03/21 0848   Dressing Status New dressing applied 08/03/21 0848   Wound Cleansed Cleansed with saline 08/03/21 0848   Offloading for Diabetic Foot Ulcers No offloading required 08/03/21 0848   Wound Length (cm) 0.5 cm 08/03/21 0848   Wound Width (cm) 0.5 cm 08/03/21 0848   Wound Depth (cm) 0.05 cm 08/03/21 0848   Wound Surface Area (cm^2) 0.25 cm^2 08/03/21 0848   Wound Volume (cm^3) 0.0125 cm^3 08/03/21 0848   Wound Assessment Eschar dry 08/03/21 0848   Drainage Amount None 08/03/21 0848   Odor None 08/03/21 0848   Angela-wound Assessment Dry/flaky; Hemosiderin staining (brown yellow) 08/03/21 0848   Margins Attached edges 08/03/21 0848   Wound Thickness Description not for Pressure Injury Full thickness 08/03/21 0848   Number of days: 0          Supplies Requested :      WOUND #: 1   PRIMARY DRESSING:  Collagen    Cover and Secure with:  Other 3x3 inch silicone bordered foam     FREQUENCY OF DRESSING CHANGES:  Three times per week       ADDITIONAL ITEMS:  [] Gloves Small  [x] Gloves Medium [] Gloves Large [] Gloves XLarge  [] Tape 1\" [] Tape 2\" [] Tape 3\"  [] Medipore Tape  [x] Saline  [] Skin Prep   [] Adhesive Remover   [] Cotton Tip Applicators   [] Other:    Patient Wound(s) Debrided: [x] Yes if yes please add date 8/3/2021   [] No    Debribement Type: Excisional/Sharp    Patient currently being seen by Home Health: [] Yes   [x] No    Duration for needed supplies:  []15  []30  [x]60  []90 Days    Electronically signed by Aleta Munson RN on 8/3/2021 at 9:36 AM     Provider Information:      PROVIDER'S NAME: Blanquita Urena DPM    NPI: [id number]

## 2021-08-03 NOTE — PROGRESS NOTES
[facility]:     [address] [address] f: [phone] f: [phone] p: [phone] [email or web address]      Ordering Center:     [place] [address] [address]  WOUND CARE Dept: [phone]   FAX NUMBER [phone]    Patient Information:      Roxy Wells  [address]   [phone]   : 1970  AGE: 45 y.o. GENDER: male   TODAYS DATE:  8/3/2021    Insurance:      PRIMARY INSURANCE:  Plan: [employer]  Coverage: AETNA  Effective Date: 2015  Group Number: [unfilled]  Subscriber Number: [id number] - (Commercial)    Payor/Plan Subscr  Sex Relation Sub. Ins. ID Effective Group Num   1. Lovena Filter A 1970 Male Self [id number] 7/12/15 [id number]                                   [address] [facility] [address]       Patient Information:      Problem List Items Addressed This Visit     None          Wound 21 Foot Left;Lateral (Active)   Wound Image   21 0848   Wound Etiology Diabetic 21 0848   Dressing Status Intact; New dressing applied 21 0848   Wound Cleansed Cleansed with saline 21 0848   Dressing/Treatment Collagen; Foam 21 0848   Offloading for Diabetic Foot Ulcers Other (comment) 21 0848   Wound Length (cm) 1.2 cm 21 0848   Wound Width (cm) 0.8 cm 21 0848   Wound Depth (cm) 0.1 cm 21 0848   Wound Surface Area (cm^2) 0.96 cm^2 21 0848   Wound Volume (cm^3) 0.096 cm^3 21 0848   Wound Assessment Granulation tissue;Slough 21 0848   Drainage Amount Moderate 21 0848   Drainage Description Serosanguinous 21 0848   Odor None 21 0848   Angela-wound Assessment Maceration 21 0848   Margins Attached edges 21 0848   Wound Thickness Description not for Pressure Injury Full thickness 21 0848   Number of days: 0       Wound 21 Pretibial Left (Active)   Wound Image   08/03/21 0848   Wound Etiology Venous 08/03/21 0848   Dressing Status New dressing applied 08/03/21 0848   Wound Cleansed Cleansed with saline 08/03/21 0848   Offloading for Diabetic Foot Ulcers No offloading required 08/03/21 0848   Wound Length (cm) 0.5 cm 08/03/21 0848   Wound Width (cm) 0.5 cm 08/03/21 0848   Wound Depth (cm) 0.05 cm 08/03/21 0848   Wound Surface Area (cm^2) 0.25 cm^2 08/03/21 0848   Wound Volume (cm^3) 0.0125 cm^3 08/03/21 0848   Wound Assessment Eschar dry 08/03/21 0848   Drainage Amount None 08/03/21 0848   Odor None 08/03/21 0848   Angela-wound Assessment Dry/flaky; Hemosiderin staining (brown yellow) 08/03/21 0848   Margins Attached edges 08/03/21 0848   Wound Thickness Description not for Pressure Injury Full thickness 08/03/21 0848   Number of days: 0       Wound 08/03/21 Pretibial Right (Active)   Wound Image   08/03/21 0848   Wound Etiology Venous 08/03/21 0848   Dressing Status New dressing applied 08/03/21 0848   Wound Cleansed Cleansed with saline 08/03/21 0848   Offloading for Diabetic Foot Ulcers No offloading required 08/03/21 0848   Wound Length (cm) 0.5 cm 08/03/21 0848   Wound Width (cm) 0.5 cm 08/03/21 0848   Wound Depth (cm) 0.05 cm 08/03/21 0848   Wound Surface Area (cm^2) 0.25 cm^2 08/03/21 0848   Wound Volume (cm^3) 0.0125 cm^3 08/03/21 0848   Wound Assessment Eschar dry 08/03/21 0848   Drainage Amount None 08/03/21 0848   Odor None 08/03/21 0848   Angela-wound Assessment Dry/flaky; Hemosiderin staining (brown yellow) 08/03/21 0848   Margins Attached edges 08/03/21 0848   Wound Thickness Description not for Pressure Injury Full thickness 08/03/21 0848   Number of days: 0       Right Leg Measurements: (ALL measurements are in cm)  Right Leg Edema Point of Measurement  Heel to ankle: 10 cm  Heel to calf: 30  Leg circumference: 44 cm  Ankle circumference: 29.5 cm  Foot circumference:  (heel to knee length- 44 cm)  Compression Therapy: Bozena Smith boot    Left Leg Measurements: (ALL measurements are in cm)  Left Leg Edema Point of Measurement  Heel to ankle: 10 cm  Heel to calf: 30  Leg circumference: 45 cm  Ankle circumference: 28 cm  Foot circumference:  (Heel to knee length- 44 cm)  Compression Therapy: Unna boot    Supplies Requested :     Medicare Requirements  Patient must have a qualifying Active Venus Ulcer if ordering Bilateral Compression Wounds MUST be present on both legs for Medicare Coverage. The patient can Not be on home health or have had a Medicare part A stay in the past 24 hours.     Patient Wound(s) Debrided: [x] Yes if yes please add date 8/3/2021   [] No    Debribement Type: Excisional/Sharp    Patient currently being seen by Home Health: [] Yes   [x] No     Compression Type: Circaid Juxtalite, HD, 30-40 mm/Hg, BILATERAL lower legs     Provider Information:      PROVIDER'S NAME: Geneva Estrada DPM    NPI: [id number]

## 2021-09-08 ENCOUNTER — TELEPHONE (OUTPATIENT)
Dept: FAMILY MEDICINE CLINIC | Age: 51
End: 2021-09-08

## 2021-09-08 RX ORDER — INSULIN GLARGINE 100 [IU]/ML
INJECTION, SOLUTION SUBCUTANEOUS
Qty: 15 ML | Refills: 2 | Status: SHIPPED | OUTPATIENT
Start: 2021-09-08 | End: 2022-01-03

## 2021-09-08 NOTE — TELEPHONE ENCOUNTER
----- Message from Faiza Hoffmann sent at 9/7/2021  4:56 PM EDT -----  Subject: Message to Provider    QUESTIONS  Information for Provider? Cindy from [employer] in [place]. There is a   question/clarification needed for the pen needles. Rx is for 1 a day but   patient uses three a day. Please call to confirm [phone].  ---------------------------------------------------------------------------  --------------  Eros Feliciano INFO  What is the best way for the office to contact you? Do not leave any   message, patient will call back for answer  Preferred Call Back Phone Number? [phone]  ---------------------------------------------------------------------------  --------------  SCRIPT ANSWERS  Relationship to Patient? Third Party  Representative Name?  [facility]

## 2021-10-16 DIAGNOSIS — I10 ESSENTIAL HYPERTENSION: ICD-10-CM

## 2021-10-18 RX ORDER — GLIPIZIDE 5 MG/1
TABLET ORAL
Qty: 90 TABLET | Refills: 1 | Status: SHIPPED | OUTPATIENT
Start: 2021-10-18 | End: 2022-02-22 | Stop reason: SDUPTHER

## 2021-10-18 RX ORDER — BUMETANIDE 1 MG/1
TABLET ORAL
Qty: 90 TABLET | Refills: 0 | Status: SHIPPED | OUTPATIENT
Start: 2021-10-18 | End: 2022-02-22 | Stop reason: SDUPTHER

## 2021-10-18 RX ORDER — LOSARTAN POTASSIUM 100 MG/1
TABLET ORAL
Qty: 90 TABLET | Refills: 0 | Status: SHIPPED | OUTPATIENT
Start: 2021-10-18 | End: 2022-02-07

## 2021-12-09 ENCOUNTER — HOSPITAL ENCOUNTER (INPATIENT)
Age: 51
LOS: 6 days | Discharge: HOME | DRG: 240 | End: 2021-12-15
Attending: PEDIATRICS | Admitting: PEDIATRICS
Payer: COMMERCIAL

## 2021-12-09 ENCOUNTER — APPOINTMENT (OUTPATIENT)
Dept: GENERAL RADIOLOGY | Age: 51
DRG: 240 | End: 2021-12-09
Payer: COMMERCIAL

## 2021-12-09 DIAGNOSIS — I96 TOE GANGRENE (HCC): ICD-10-CM

## 2021-12-09 DIAGNOSIS — I96 GANGRENE (HCC): ICD-10-CM

## 2021-12-09 DIAGNOSIS — L03.115 CELLULITIS OF RIGHT LOWER EXTREMITY: Primary | ICD-10-CM

## 2021-12-09 LAB
ALBUMIN SERPL-MCNC: 3.6 G/DL (ref 3.5–5.1)
ALP BLD-CCNC: 106 U/L (ref 38–126)
ALT SERPL-CCNC: 15 U/L (ref 11–66)
ANION GAP SERPL CALCULATED.3IONS-SCNC: 10 MEQ/L (ref 8–16)
AST SERPL-CCNC: 11 U/L (ref 5–40)
BASOPHILS # BLD: 0.4 %
BASOPHILS ABSOLUTE: 0 THOU/MM3 (ref 0–0.1)
BILIRUB SERPL-MCNC: 0.4 MG/DL (ref 0.3–1.2)
BILIRUBIN DIRECT: < 0.2 MG/DL (ref 0–0.3)
BUN BLDV-MCNC: 19 MG/DL (ref 7–22)
C-REACTIVE PROTEIN: 17.81 MG/DL (ref 0–1)
CALCIUM SERPL-MCNC: 9.5 MG/DL (ref 8.5–10.5)
CHLORIDE BLD-SCNC: 96 MEQ/L (ref 98–111)
CO2: 26 MEQ/L (ref 23–33)
CREAT SERPL-MCNC: 1.1 MG/DL (ref 0.4–1.2)
EOSINOPHIL # BLD: 0.7 %
EOSINOPHILS ABSOLUTE: 0.1 THOU/MM3 (ref 0–0.4)
ERYTHROCYTE [DISTWIDTH] IN BLOOD BY AUTOMATED COUNT: 12.4 % (ref 11.5–14.5)
ERYTHROCYTE [DISTWIDTH] IN BLOOD BY AUTOMATED COUNT: 42.1 FL (ref 35–45)
GFR SERPL CREATININE-BSD FRML MDRD: 70 ML/MIN/1.73M2
GLUCOSE BLD-MCNC: 343 MG/DL (ref 70–108)
GLUCOSE BLD-MCNC: 434 MG/DL (ref 70–108)
HCT VFR BLD CALC: 42.2 % (ref 42–52)
HEMOGLOBIN: 14.1 GM/DL (ref 14–18)
IMMATURE GRANS (ABS): 0.03 THOU/MM3 (ref 0–0.07)
IMMATURE GRANULOCYTES: 0.3 %
LACTIC ACID: 1.3 MMOL/L (ref 0.5–2)
LIPASE: 23.9 U/L (ref 5.6–51.3)
LYMPHOCYTES # BLD: 17.9 %
LYMPHOCYTES ABSOLUTE: 2.1 THOU/MM3 (ref 1–4.8)
MCH RBC QN AUTO: 30.9 PG (ref 26–33)
MCHC RBC AUTO-ENTMCNC: 33.4 GM/DL (ref 32.2–35.5)
MCV RBC AUTO: 92.5 FL (ref 80–94)
MONOCYTES # BLD: 7.7 %
MONOCYTES ABSOLUTE: 0.9 THOU/MM3 (ref 0.4–1.3)
NUCLEATED RED BLOOD CELLS: 0 /100 WBC
OSMOLALITY CALCULATION: 280.4 MOSMOL/KG (ref 275–300)
PLATELET # BLD: 265 THOU/MM3 (ref 130–400)
PMV BLD AUTO: 10.7 FL (ref 9.4–12.4)
POTASSIUM SERPL-SCNC: 5 MEQ/L (ref 3.5–5.2)
PROCALCITONIN: 0.12 NG/ML (ref 0.01–0.09)
RBC # BLD: 4.56 MILL/MM3 (ref 4.7–6.1)
SEDIMENTATION RATE, ERYTHROCYTE: 100 MM/HR (ref 0–10)
SEG NEUTROPHILS: 73 %
SEGMENTED NEUTROPHILS ABSOLUTE COUNT: 8.4 THOU/MM3 (ref 1.8–7.7)
SODIUM BLD-SCNC: 132 MEQ/L (ref 135–145)
TOTAL PROTEIN: 8.2 G/DL (ref 6.1–8)
WBC # BLD: 11.5 THOU/MM3 (ref 4.8–10.8)

## 2021-12-09 PROCEDURE — 87077 CULTURE AEROBIC IDENTIFY: CPT

## 2021-12-09 PROCEDURE — 6360000002 HC RX W HCPCS: Performed by: NURSE PRACTITIONER

## 2021-12-09 PROCEDURE — 83690 ASSAY OF LIPASE: CPT

## 2021-12-09 PROCEDURE — 82948 REAGENT STRIP/BLOOD GLUCOSE: CPT

## 2021-12-09 PROCEDURE — 96365 THER/PROPH/DIAG IV INF INIT: CPT

## 2021-12-09 PROCEDURE — 84145 PROCALCITONIN (PCT): CPT

## 2021-12-09 PROCEDURE — 2580000003 HC RX 258: Performed by: NURSE PRACTITIONER

## 2021-12-09 PROCEDURE — 99285 EMERGENCY DEPT VISIT HI MDM: CPT

## 2021-12-09 PROCEDURE — 73630 X-RAY EXAM OF FOOT: CPT

## 2021-12-09 PROCEDURE — 87040 BLOOD CULTURE FOR BACTERIA: CPT

## 2021-12-09 PROCEDURE — 87205 SMEAR GRAM STAIN: CPT

## 2021-12-09 PROCEDURE — 99223 1ST HOSP IP/OBS HIGH 75: CPT | Performed by: PEDIATRICS

## 2021-12-09 PROCEDURE — 87070 CULTURE OTHR SPECIMN AEROBIC: CPT

## 2021-12-09 PROCEDURE — 85025 COMPLETE CBC W/AUTO DIFF WBC: CPT

## 2021-12-09 PROCEDURE — 87147 CULTURE TYPE IMMUNOLOGIC: CPT

## 2021-12-09 PROCEDURE — 73590 X-RAY EXAM OF LOWER LEG: CPT

## 2021-12-09 PROCEDURE — 80053 COMPREHEN METABOLIC PANEL: CPT

## 2021-12-09 PROCEDURE — 96367 TX/PROPH/DG ADDL SEQ IV INF: CPT

## 2021-12-09 PROCEDURE — 2580000003 HC RX 258: Performed by: PEDIATRICS

## 2021-12-09 PROCEDURE — 87186 SC STD MICRODIL/AGAR DIL: CPT

## 2021-12-09 PROCEDURE — 83605 ASSAY OF LACTIC ACID: CPT

## 2021-12-09 PROCEDURE — 36415 COLL VENOUS BLD VENIPUNCTURE: CPT

## 2021-12-09 PROCEDURE — 86140 C-REACTIVE PROTEIN: CPT

## 2021-12-09 PROCEDURE — 85651 RBC SED RATE NONAUTOMATED: CPT

## 2021-12-09 PROCEDURE — 82248 BILIRUBIN DIRECT: CPT

## 2021-12-09 PROCEDURE — 1200000000 HC SEMI PRIVATE

## 2021-12-09 RX ORDER — INSULIN GLARGINE 100 [IU]/ML
30 INJECTION, SOLUTION SUBCUTANEOUS NIGHTLY
Status: DISCONTINUED | OUTPATIENT
Start: 2021-12-10 | End: 2021-12-12

## 2021-12-09 RX ORDER — ONDANSETRON 4 MG/1
4 TABLET, ORALLY DISINTEGRATING ORAL EVERY 8 HOURS PRN
Status: DISCONTINUED | OUTPATIENT
Start: 2021-12-09 | End: 2021-12-14 | Stop reason: SDUPTHER

## 2021-12-09 RX ORDER — SODIUM CHLORIDE 9 MG/ML
25 INJECTION, SOLUTION INTRAVENOUS PRN
Status: DISCONTINUED | OUTPATIENT
Start: 2021-12-09 | End: 2021-12-14 | Stop reason: SDUPTHER

## 2021-12-09 RX ORDER — SODIUM CHLORIDE 0.9 % (FLUSH) 0.9 %
10 SYRINGE (ML) INJECTION EVERY 12 HOURS SCHEDULED
Status: DISCONTINUED | OUTPATIENT
Start: 2021-12-09 | End: 2021-12-14 | Stop reason: SDUPTHER

## 2021-12-09 RX ORDER — NICOTINE POLACRILEX 4 MG
15 LOZENGE BUCCAL PRN
Status: DISCONTINUED | OUTPATIENT
Start: 2021-12-09 | End: 2021-12-15 | Stop reason: HOSPADM

## 2021-12-09 RX ORDER — ACETAMINOPHEN 325 MG/1
650 TABLET ORAL EVERY 6 HOURS PRN
Status: DISCONTINUED | OUTPATIENT
Start: 2021-12-09 | End: 2021-12-15 | Stop reason: HOSPADM

## 2021-12-09 RX ORDER — METOPROLOL SUCCINATE 25 MG/1
25 TABLET, EXTENDED RELEASE ORAL DAILY
Status: DISCONTINUED | OUTPATIENT
Start: 2021-12-10 | End: 2021-12-15 | Stop reason: HOSPADM

## 2021-12-09 RX ORDER — SODIUM CHLORIDE 9 MG/ML
INJECTION, SOLUTION INTRAVENOUS CONTINUOUS
Status: DISCONTINUED | OUTPATIENT
Start: 2021-12-10 | End: 2021-12-10

## 2021-12-09 RX ORDER — LOSARTAN POTASSIUM 100 MG/1
100 TABLET ORAL DAILY
Status: DISCONTINUED | OUTPATIENT
Start: 2021-12-10 | End: 2021-12-15 | Stop reason: HOSPADM

## 2021-12-09 RX ORDER — PANTOPRAZOLE SODIUM 40 MG/1
40 TABLET, DELAYED RELEASE ORAL
Status: DISCONTINUED | OUTPATIENT
Start: 2021-12-10 | End: 2021-12-15 | Stop reason: HOSPADM

## 2021-12-09 RX ORDER — POLYETHYLENE GLYCOL 3350 17 G/17G
17 POWDER, FOR SOLUTION ORAL DAILY PRN
Status: DISCONTINUED | OUTPATIENT
Start: 2021-12-09 | End: 2021-12-15 | Stop reason: HOSPADM

## 2021-12-09 RX ORDER — ACETAMINOPHEN 650 MG/1
650 SUPPOSITORY RECTAL EVERY 6 HOURS PRN
Status: DISCONTINUED | OUTPATIENT
Start: 2021-12-09 | End: 2021-12-15 | Stop reason: HOSPADM

## 2021-12-09 RX ORDER — DEXTROSE MONOHYDRATE 25 G/50ML
12.5 INJECTION, SOLUTION INTRAVENOUS PRN
Status: DISCONTINUED | OUTPATIENT
Start: 2021-12-09 | End: 2021-12-15 | Stop reason: HOSPADM

## 2021-12-09 RX ORDER — DEXTROSE MONOHYDRATE 50 MG/ML
100 INJECTION, SOLUTION INTRAVENOUS PRN
Status: DISCONTINUED | OUTPATIENT
Start: 2021-12-09 | End: 2021-12-15 | Stop reason: HOSPADM

## 2021-12-09 RX ORDER — MAGNESIUM HYDROXIDE/ALUMINUM HYDROXICE/SIMETHICONE 120; 1200; 1200 MG/30ML; MG/30ML; MG/30ML
30 SUSPENSION ORAL EVERY 6 HOURS PRN
Status: DISCONTINUED | OUTPATIENT
Start: 2021-12-09 | End: 2021-12-15 | Stop reason: HOSPADM

## 2021-12-09 RX ORDER — SODIUM CHLORIDE 0.9 % (FLUSH) 0.9 %
10 SYRINGE (ML) INJECTION PRN
Status: DISCONTINUED | OUTPATIENT
Start: 2021-12-09 | End: 2021-12-14 | Stop reason: SDUPTHER

## 2021-12-09 RX ORDER — FLUTICASONE PROPIONATE 50 MCG
1 SPRAY, SUSPENSION (ML) NASAL DAILY
Status: DISCONTINUED | OUTPATIENT
Start: 2021-12-10 | End: 2021-12-15 | Stop reason: HOSPADM

## 2021-12-09 RX ORDER — ONDANSETRON 2 MG/ML
4 INJECTION INTRAMUSCULAR; INTRAVENOUS EVERY 6 HOURS PRN
Status: DISCONTINUED | OUTPATIENT
Start: 2021-12-09 | End: 2021-12-14 | Stop reason: SDUPTHER

## 2021-12-09 RX ORDER — ATORVASTATIN CALCIUM 80 MG/1
80 TABLET, FILM COATED ORAL DAILY
Status: DISCONTINUED | OUTPATIENT
Start: 2021-12-10 | End: 2021-12-15 | Stop reason: HOSPADM

## 2021-12-09 RX ORDER — VITAMIN B COMPLEX
2000 TABLET ORAL DAILY
Status: DISCONTINUED | OUTPATIENT
Start: 2021-12-10 | End: 2021-12-15 | Stop reason: HOSPADM

## 2021-12-09 RX ADMIN — VANCOMYCIN HYDROCHLORIDE 1750 MG: 1 INJECTION, POWDER, LYOPHILIZED, FOR SOLUTION INTRAVENOUS at 20:58

## 2021-12-09 RX ADMIN — SODIUM CHLORIDE: 9 INJECTION, SOLUTION INTRAVENOUS at 23:44

## 2021-12-09 RX ADMIN — PIPERACILLIN AND TAZOBACTAM 3375 MG: 3; .375 INJECTION, POWDER, LYOPHILIZED, FOR SOLUTION INTRAVENOUS at 19:45

## 2021-12-09 NOTE — ED TRIAGE NOTES
Patient presents to ED with chief complaint of Toe injury to right little toe. Patient states that he slammed the toe in the front door by accident. Toe has now turned a dark color, and toe nail has fallen off. Patient states that he is a diabetic. Patient states he is not in pain, because he cannot feel it. Patient resting in bed. Respirations easy and unlabored. No distress noted. Call light within reach.

## 2021-12-09 NOTE — H&P
Department of Podiatric Surgery  History and Physical        CHIEF COMPLAINT:  Right 5th toe pain    Reason for Admission:  Cellulitis, RLE    History Obtained From:  patient, spouse    HISTORY OF PRESENT ILLNESS:      The patient is a 45 y.o. male with significant past medical history of CAD, CHF, DM, and HTN who presents with complaints of right 5th toe pain. Patient is well known to Dr. Shawn Salguero in the wound care center. Patient says that he has been continuing dressing care as he was instructed to at his last wound care visit. Patient says that last week his right first toe was hit with a door. This avulsed the toenail. Patient transilluminates with a Band-Aid. Patient says that 1 to 2 days ago, patient was starting to see the right fifth toe getting darker. Patient was also seen redness creep up his leg. Patient also says that he had the medial aspect of his right leg at work a couple of days ago. Patient denies any pain in his right lower extremity at this point in time. Patient denies any nausea, vomiting, fever, chills, chest pain, shortness of breath. No other pedal complaints    Past Medical History:        Diagnosis Date    CAD (coronary artery disease) 2019    CHF (congestive heart failure) ([facility] [address] 02/12/2019    Diabetes mellitus ([facility] [address] 1990's    Hypertension 1990's     Past Surgical History:        Procedure Laterality Date    CHOLECYSTECTOMY      FIBULA FRACTURE SURGERY Right     screws    TIBIA FRACTURE SURGERY Right     screws     Immunizations:              Tetanus:  unknown              Medications Prior to Admission:   Not in a hospital admission. Allergies:  Morphine    Social History:   TOBACCO:   reports that he has been smoking cigarettes. He started smoking about 37 years ago. He has a 9.00 pack-year smoking history. He has never used smokeless tobacco.  ETOH:   has no history on file for alcohol use. DRUGS:   reports no history of drug use.   Family History:   No family history on file. REVIEW OF SYSTEMS:  Pertinent ROS in HPI    PHYSICAL EXAM:  VITALS:  BP (!) 175/82   Pulse 87   Temp 97.7 °F (36.5 °C)   Resp 18   Wt 260 lb (117.9 kg)   SpO2 97%   BMI 35.26 kg/m²   CONSTITUTIONAL:  awake, alert, cooperative, no apparent distress, and appears stated age    LOWER EXTREMITY:  VASCULAR: Pedal pulses are palpable bilaterally. Capillary fill time grossly diminished to the fifth digit of the right foot, sluggish but still intact. Cap fill time less than 3 seconds to all other exposed digits of the bilateral lower extremities. Right foot erythematous and edematous. Temperature warm to hot from tibial tuberosity to digits of right foot. Temperature warm to cool from tibial tuberosity to digits of left foot. MUSCULOSKELETAL: No pain on palpation to the bilateral lower extremities. Rectus foot and ankle bilaterally. NEUROLOGIC: Light touch sensation grossly diminished to the digits bilaterally, light touch sensation is intact to the metatarsal heads bilaterally. SKIN: Patient has discoloration to the right fifth digit. The digit is hard to touch and has epidermal lysis at this point in time. There is no area that frankly probes deep. There is some serosanguineous drainage exuding from the medial aspect of the wound. There is erythema and edema that extends from this digit to the knee. Patient has a full-thickness ulceration on the medial aspect of the right leg. There was purulence from this wound. Wound undermines inferiorly and posteriorly. The wound does not probe to bone. Erythema present around this wound continuous with erythema that is present exuding from the foot. Multiple other stable partial-thickness ulcerations to the anterior, posterior right lower extremity.                       IMAGING:  XR FOOT RIGHT (MIN 3 VIEWS)    Result Date: 12/9/2021  PROCEDURE: XR FOOT RIGHT (MIN 3 VIEWS) CLINICAL INFORMATION: 19-year-old male who had an injury to the fifth digit of his right foot 7 days ago. Nonhealing wound. COMPARISON: No prior study. TECHNIQUE: 4 views of the right foot were obtained. FINDINGS: There is a linear lucency at the tip of the distal phalanx of the fifth digit of the right foot which is only appreciated on the oblique film. The remaining osseous structures are intact. Postsurgical changes are seen at the distal right fibula. There is no dislocation. The calcaneus is within normal limits. The base of the fifth metatarsal is intact. There is irregularity of the soft tissues of the fifth digit. There is soft tissue swelling throughout the foot. IMPRESSION: 1. There is a linear lucency at the tip of the distal phalanx of the fifth digit which is only appreciated on one of the oblique films. This could represent a nondisplaced fracture. 2. Diffuse soft tissue swelling throughout the foot with irregularity of the soft tissues of the fifth digit. **This report has been created using voice recognition software. It may contain minor errors which are inherent in voice recognition technology. ** Final report electronically signed by Dr Kristin Meyer on 12/9/2021 5:34 PM      LABS:  CBC:   Lab Results   Component Value Date    WBC 11.5 12/09/2021    RBC 4.56 12/09/2021    HGB 14.1 12/09/2021    HCT 42.2 12/09/2021    MCV 92.5 12/09/2021    MCH 30.9 12/09/2021    MCHC 33.4 12/09/2021    RDW 13.2 03/23/2021     12/09/2021    MPV 10.7 12/09/2021     CMP:    Lab Results   Component Value Date     12/09/2021    K 5.0 12/09/2021    K 4.3 03/26/2020    CL 96 12/09/2021    CO2 26 12/09/2021    BUN 19 12/09/2021    CREATININE 1.1 12/09/2021    LABGLOM 70 12/09/2021    GLUCOSE 343 12/09/2021    GLUCOSE 269 03/23/2021    PROT 8.2 12/09/2021    LABALBU 3.6 12/09/2021    CALCIUM 9.5 12/09/2021    BILITOT 0.4 12/09/2021    ALKPHOS 106 12/09/2021    AST 11 12/09/2021    ALT 15 12/09/2021     LDH:  No results found for: LDH  PT/INR:  No results found for: PROTIME, INR  HgBA1c:    Lab Results   Component Value Date    LABA1C 10.8 07/22/2021        Treatment:   Orders Placed This Encounter   Procedures    Culture, Blood 1     Standing Status:   Standing     Number of Occurrences:   1    Culture, Blood 2     Standing Status:   Standing     Number of Occurrences:   1    Culture, Aerobic     Standing Status:   Standing     Number of Occurrences:   1    XR FOOT RIGHT (MIN 3 VIEWS)     Standing Status:   Standing     Number of Occurrences:   1     Order Specific Question:   Reason for exam:     Answer:   wound    Basic Metabolic Panel     Standing Status:   Standing     Number of Occurrences:   1    CBC Auto Differential     Standing Status:   Standing     Number of Occurrences:   1    Hepatic Function Panel     Standing Status:   Standing     Number of Occurrences:   1    Lactic Acid, Plasma     Standing Status:   Standing     Number of Occurrences:   1    Lipase     Standing Status:   Standing     Number of Occurrences:   1    Procalcitonin     Standing Status:   Standing     Number of Occurrences:   1    C-reactive protein     Standing Status:   Standing     Number of Occurrences:   1    Sedimentation Rate     Standing Status:   Standing     Number of Occurrences:   1     ASSESSMENT AND PLAN:  1. Gangrene, right 5th toe  2. Cellulitis, right leg  3. Diabetic Leg Ulceration  -Patient seen and evaluated  -Labs reviewed, white blood cell count 11.5, CRP 17.81, pro calcitonin 0.12  -Culture obtained at bedside, sent for aerobic and anaerobic analysis. -Foot x-ray reviewed, see report above  -Ordered x-ray tib-fib right leg  -Discussed with patient that with the redness extending up his leg, he will need admission for IV antibiotics  -The fifth toe is looking dusky, this will likely need amputation in the near future, but not immediately.   We will trend the appearance of this toe over the coming couple of days with IV antibiotics and see if it revascularizes or not  -If the toe does not clinically improve, will amputate during this admission.  -Vancomycin and Zosyn started per emergency department  -Patient be admitted per hospitalist  -Wounds dressed with Betadine to fifth toe and medial ulceration of the right leg, gauze, Kerlix  -Podiatry will continue to follow during this admission    DISPO: Patient is to be admitted, pending tib-fib x-ray right leg, response to IV antibiotics, response to daily dressing changes. Thanks for the opportunity to participate in this patient's care.      Keenan Hodgkins, DPM DPM

## 2021-12-10 ENCOUNTER — APPOINTMENT (OUTPATIENT)
Dept: INTERVENTIONAL RADIOLOGY/VASCULAR | Age: 51
DRG: 240 | End: 2021-12-10
Payer: COMMERCIAL

## 2021-12-10 LAB
AVERAGE GLUCOSE: 222 MG/DL (ref 70–126)
GLUCOSE BLD-MCNC: 295 MG/DL (ref 70–108)
GLUCOSE BLD-MCNC: 300 MG/DL (ref 70–108)
GLUCOSE BLD-MCNC: 304 MG/DL (ref 70–108)
GLUCOSE BLD-MCNC: 320 MG/DL (ref 70–108)
GLUCOSE BLD-MCNC: 376 MG/DL (ref 70–108)
HBA1C MFR BLD: 9.4 % (ref 4.4–6.4)
PRO-BNP: 184.8 PG/ML (ref 0–900)

## 2021-12-10 PROCEDURE — 83880 ASSAY OF NATRIURETIC PEPTIDE: CPT

## 2021-12-10 PROCEDURE — 6370000000 HC RX 637 (ALT 250 FOR IP): Performed by: PEDIATRICS

## 2021-12-10 PROCEDURE — 99233 SBSQ HOSP IP/OBS HIGH 50: CPT | Performed by: PHYSICIAN ASSISTANT

## 2021-12-10 PROCEDURE — 93926 LOWER EXTREMITY STUDY: CPT

## 2021-12-10 PROCEDURE — 36415 COLL VENOUS BLD VENIPUNCTURE: CPT

## 2021-12-10 PROCEDURE — 1200000000 HC SEMI PRIVATE

## 2021-12-10 PROCEDURE — 82948 REAGENT STRIP/BLOOD GLUCOSE: CPT

## 2021-12-10 PROCEDURE — 83036 HEMOGLOBIN GLYCOSYLATED A1C: CPT

## 2021-12-10 PROCEDURE — 2580000003 HC RX 258: Performed by: PEDIATRICS

## 2021-12-10 PROCEDURE — 93970 EXTREMITY STUDY: CPT

## 2021-12-10 PROCEDURE — 6360000002 HC RX W HCPCS: Performed by: PEDIATRICS

## 2021-12-10 PROCEDURE — 6370000000 HC RX 637 (ALT 250 FOR IP): Performed by: PHYSICIAN ASSISTANT

## 2021-12-10 RX ORDER — BUMETANIDE 1 MG/1
1 TABLET ORAL DAILY
Status: DISCONTINUED | OUTPATIENT
Start: 2021-12-10 | End: 2021-12-15 | Stop reason: HOSPADM

## 2021-12-10 RX ORDER — METOPROLOL SUCCINATE 25 MG/1
25 TABLET, EXTENDED RELEASE ORAL ONCE
Status: COMPLETED | OUTPATIENT
Start: 2021-12-10 | End: 2021-12-10

## 2021-12-10 RX ORDER — FENOFIBRATE 160 MG/1
160 TABLET ORAL DAILY
Status: DISCONTINUED | OUTPATIENT
Start: 2021-12-10 | End: 2021-12-15 | Stop reason: HOSPADM

## 2021-12-10 RX ADMIN — PIPERACILLIN AND TAZOBACTAM 3375 MG: 3; .375 INJECTION, POWDER, LYOPHILIZED, FOR SOLUTION INTRAVENOUS at 12:16

## 2021-12-10 RX ADMIN — VANCOMYCIN HYDROCHLORIDE 1250 MG: 5 INJECTION, POWDER, LYOPHILIZED, FOR SOLUTION INTRAVENOUS at 10:20

## 2021-12-10 RX ADMIN — SODIUM CHLORIDE, PRESERVATIVE FREE 10 ML: 5 INJECTION INTRAVENOUS at 00:16

## 2021-12-10 RX ADMIN — Medication 2000 UNITS: at 10:10

## 2021-12-10 RX ADMIN — ATORVASTATIN CALCIUM 80 MG: 80 TABLET, FILM COATED ORAL at 10:12

## 2021-12-10 RX ADMIN — INSULIN LISPRO 4 UNITS: 100 INJECTION, SOLUTION INTRAVENOUS; SUBCUTANEOUS at 06:46

## 2021-12-10 RX ADMIN — INSULIN LISPRO 12 UNITS: 100 INJECTION, SOLUTION INTRAVENOUS; SUBCUTANEOUS at 18:04

## 2021-12-10 RX ADMIN — PIPERACILLIN AND TAZOBACTAM 3375 MG: 3; .375 INJECTION, POWDER, LYOPHILIZED, FOR SOLUTION INTRAVENOUS at 02:22

## 2021-12-10 RX ADMIN — INSULIN LISPRO 5 UNITS: 100 INJECTION, SOLUTION INTRAVENOUS; SUBCUTANEOUS at 12:16

## 2021-12-10 RX ADMIN — INSULIN LISPRO 6 UNITS: 100 INJECTION, SOLUTION INTRAVENOUS; SUBCUTANEOUS at 20:43

## 2021-12-10 RX ADMIN — LOSARTAN POTASSIUM 100 MG: 100 TABLET, FILM COATED ORAL at 10:10

## 2021-12-10 RX ADMIN — INSULIN LISPRO 3 UNITS: 100 INJECTION, SOLUTION INTRAVENOUS; SUBCUTANEOUS at 10:08

## 2021-12-10 RX ADMIN — METOPROLOL SUCCINATE 25 MG: 25 TABLET, EXTENDED RELEASE ORAL at 02:36

## 2021-12-10 RX ADMIN — PANTOPRAZOLE SODIUM 40 MG: 40 TABLET, DELAYED RELEASE ORAL at 06:46

## 2021-12-10 RX ADMIN — PIPERACILLIN AND TAZOBACTAM 3375 MG: 3; .375 INJECTION, POWDER, LYOPHILIZED, FOR SOLUTION INTRAVENOUS at 18:03

## 2021-12-10 RX ADMIN — INSULIN GLARGINE 30 UNITS: 100 INJECTION, SOLUTION SUBCUTANEOUS at 20:43

## 2021-12-10 RX ADMIN — FENOFIBRATE 160 MG: 160 TABLET ORAL at 10:10

## 2021-12-10 RX ADMIN — INSULIN LISPRO 6 UNITS: 100 INJECTION, SOLUTION INTRAVENOUS; SUBCUTANEOUS at 02:19

## 2021-12-10 RX ADMIN — ENOXAPARIN SODIUM 40 MG: 100 INJECTION SUBCUTANEOUS at 10:11

## 2021-12-10 RX ADMIN — METOPROLOL SUCCINATE 25 MG: 25 TABLET, EXTENDED RELEASE ORAL at 10:10

## 2021-12-10 RX ADMIN — VANCOMYCIN HYDROCHLORIDE 1250 MG: 5 INJECTION, POWDER, LYOPHILIZED, FOR SOLUTION INTRAVENOUS at 22:37

## 2021-12-10 RX ADMIN — SODIUM CHLORIDE, PRESERVATIVE FREE 10 ML: 5 INJECTION INTRAVENOUS at 10:21

## 2021-12-10 NOTE — H&P
Hospitalist History and Physical          Patient: Dia Monaco  : 1970  MRN: [id number]     Acct: [de-identified]    PCP: Jaleesa Benjamin DPM  Date of Admission: 2021  Date of Service: Pt seen/examined on 12/10/21  and Admitted to Inpatient with expected LOS greater than two midnights due to medical therapy. Hospital Problems           Last Modified POA    Cellulitis of right leg 2021 Yes          Assessment and Plan:    1. Right lower extremity cellulitis, gangrenous right fifth toe, and diabetic ulcerations:  Continue plan of care per podiatry team.  Right tib-fib x-ray with no evidence of fracture or dislocation. Continue empiric antibiotic coverage with Zosyn and vancomycin. Possible right fifth toe amputation per podiatry if no response to antibiotic. Follow-up on tissue cultures. Wound care consultation for dressing changes and postop care. Follow-up outpatient wound/podiatry clinic with Dr. Mars Brunner. 2.  Type 2 diabetes mellitus:   Uncontrolled secondary to infection. Hemoglobin A1c in a.m. Hold Metformin and glipizide. Medium dose sliding scale insulin coverage. Accu-Cheks before every meal and at bedtime. Diabetic diet. 3.  Hypertension/hyperlipidemia:  Resume home dose losartan, metoprolol, fenofibrate, and statin. 4.  Diastolic CHF, HFpEF:  Last echocardiogram 3/2020 with an estimated ejection fraction of the left ventricle at 50% with no regional wall motion abnormalities. LV grade 1 diastolic dysfunction. Currently does not seem to be in a decompensated state although does have mild worsening of lower extremity edema from infection. Check proBNP and bilateral lower extremity Doppler ultrasound. Resume home dose maintenance medications for CHF including metoprolol, losartan, and Bumex. Record strict ins and outs and daily weights. 5.  Chronic venous insufficiency with stasis dermatitis:  Bilateral lower extremity Doppler ultrasound to rule out DVT.   Treat underlying infection as above in #1. Continue to follow with podiatry and wound care upon discharge. Would benefit from pressure dressings, CHERRY hose once infection cleared. Elevate legs at all times. 6.  GERD:  Home dose PPI. 7.  DVT prophylaxis:  Subcu Lovenox. 8.  Fluid/electrolyte/nutrition:  Saline IV fluids. Repeat electrolytes in a.m. with magnesium. Diabetic diet.      =======================================================================      Chief Complaint: Right lower extremity edema erythema and pain. History Of Present Illness:  Krystal Pereira is a 45 y.o. male with PMHx of type 2 diabetes mellitus, hypertension, hyperlipidemia, CHF, coronary disease, who presented to the emergency room here at [facility] on 12/9/2021 complaining of a couple day history of worsening right lower extremity erythema, swelling, and pain. Reports that this last Saturday or 5 days ago he accidentally hit his right toe against a door which caused avulsion of the right fifth toenail. He then used an instrument to remove the toenail himself after which pain and redness and erythema slowly started building up and progressing proximally towards his upper extremity. No systemic fevers or chills. Patient known to Dr. Brit Serrano and has been following in the wound care clinic with dressing changes of chronic ulcerations or diabetic foot ulcers. Seen by podiatry and diagnosed with a gangrenous right fifth toe with cellulitis and diabetic foot ulcerations. Plan was to admit patient to hospitalist service and monitor patient for response to IV antibiotics and obtain further imaging studies with possible amputation of the right fifth toe if not improved. Upon presenting to the emergency room patient blood pressure was 125/82 with a pulse of 87 respirate of 18 temperature of 97.7 and 97% oxygen saturation on room air.     Lab investigations revealed leukocytosis 11.5 hyperglycemia in the 400 range lactic acid 1.3 sed rate of 100. CRP 17.8 and procalcitonin of 0.12. At the time of evaluation in ER patient had his wound already dressed and was laying in no acute distress and no pain. Past Medical History:        Diagnosis Date    CAD (coronary artery disease) 2019    CHF (congestive heart failure) ([facility] [address] 02/12/2019    Diabetes mellitus ([facility] [address] 1990's    Hypertension 1990's       Past Surgical History:        Procedure Laterality Date    CHOLECYSTECTOMY      FIBULA FRACTURE SURGERY Right     screws    TIBIA FRACTURE SURGERY Right     screws       Medications Prior to Admission:   Prior to Admission medications    Medication Sig Start Date End Date Taking? Authorizing Provider   glipiZIDE (GLUCOTROL) 5 MG tablet take 1/2 tablet by mouth twice a day WITH MORNING AND EVENING MEALS 10/18/21  Yes Arden Santiago MD   losartan (COZAAR) 100 MG tablet take 1 tablet by mouth once daily 10/18/21  Yes Arden Santiago MD   atorvastatin (LIPITOR) 80 MG tablet Take 1 tablet by mouth daily take 1 tablet by mouth at bedtime 7/27/21  Yes ANA Kern CNP   insulin aspart (NOVOLOG FLEXPEN) 100 UNIT/ML injection pen Inject 8 units subcu baseline plus a sliding scale according to group 2 sliding scale insulin.  Max daily dose  70 units daily 7/26/21  Yes ANA Kern CNP   vitamin D (ERGOCALCIFEROL) 1.25 MG (60296 UT) CAPS capsule take 1 capsule by mouth every week 7/22/21  Yes Arden Santiago MD   omega-3 acid ethyl esters (LOVAZA) 1 g capsule take 2 capsules by mouth twice a day 7/22/21  Yes Arden Santiago MD   omeprazole (PRILOSEC) 20 MG delayed release capsule take 1 capsule by mouth every morning BEFORE BREAKFAST 7/22/21  Yes Arden Santiago MD   metoprolol succinate (TOPROL XL) 25 MG extended release tablet take 1 tablet by mouth at bedtime 7/22/21  Yes Arden Santiago MD   metFORMIN (GLUCOPHAGE-XR) 500 MG extended release tablet take 4 tablets by mouth once daily - WITH BREAKFAST 7/22/21  Yes Arden Santiago MD fenofibrate (TRIGLIDE) 160 MG tablet take 1 tablet by mouth once daily with food 7/22/21  Yes Jomar Carrion MD   insulin glargine (LANTUS SOLOSTAR) 100 UNIT/ML injection pen Inject 20 Units into the skin nightly 10/6/20  Yes Jomar Carrion MD   bumetanide (BUMEX) 1 MG tablet take 1 tablet by mouth once daily 10/18/21   Jomar Carrion MD   BASAGLAR KWIKPEN 100 UNIT/ML injection pen INJECT 30 UNITS INTO THE SKIN EVERY NIGHT 9/8/21   ANA Church CNP   Insulin Pen Needle 32G X 4 MM MISC 1 each by Does not apply route 4 times daily 9/8/21   ANA Church CNP   ACCU-CHEK CAT PLUS strip CHECK BLOOD SUGAR EVERY MORNING AND AFTER DINNER EVERY DAY 7/26/21   ANA Church CNP   Accu-Chek Softclix Lancets MISC USE TO CHECK BLOOD SUGAR 7/26/21   ANA Church CNP   vitamin D3 (CHOLECALCIFEROL) 25 MCG (1000 UT) TABS tablet Take 2 tablets by mouth daily 7/22/21   Jomar Carrion MD   Continuous Blood Gluc  ([address] [place] [address] G4 PLAT PED ) ANÍBAL 1 Units by Does not apply route continuous 7/22/21   Jomar Carrion MD   Continuous Blood Gluc Sensor (DEXCOM G6 SENSOR) MISC 1 Units by Does not apply route continuous 7/22/21   Jomar Carrion MD   fluticasone (FLONASE) 50 MCG/ACT nasal spray 1 spray by Each Nostril route daily 2/11/21   ANA Church CNP   Continuous Blood Gluc  (FREESTYLE MACIEL 14 DAY READER) ANÍBAL 1 Units by Does not apply route continuous Patient is diabetic treated with insulin 9/18/20   Jomar Carrion MD   Continuous Blood Gluc Sensor (FREESTYLE MACIEL 14 DAY SENSOR) MISC 1 Units by Does not apply route continuous Patient is Diabetic treated with Insulin 9/18/20   Jomar Carrion MD       Allergies:  Morphine    Social History:    The patient currently lives independently. Tobacco use:   reports that he has been smoking cigarettes. He started smoking about 37 years ago. He has a 9.00 pack-year smoking history.  He has never used smokeless tobacco.  Alcohol use:   has no history on file for alcohol use. Drug use:  reports no history of drug use. Family History:   as follows:  No family history on file. Review of Systems:   Pertinent positives and negatives as noted in the HPI. All other systems reviewed and negative. Physical Exam:    BP (!) 153/72   Pulse 95   Temp 98 °F (36.7 °C) (Oral)   Resp 18   Ht 6' (1.829 m)   Wt 282 lb 3 oz (128 kg)   SpO2 93%   BMI 38.27 kg/m²       General appearance: No apparent distress, well developed, appears stated age. Eyes:  t. Conjunctivae/corneas clear. HENT: Head normal in appearance. External nares normal.  Oral mucosa moist without lesions. Hearing grossly intact. Neck: Supple, with full range of motion. Trachea midline. No gross JVD appreciated. Respiratory:  Normal respiratory effort. Clear to auscultation, bilaterally without rales or wheezes or rhonchi. Cardiovascular: Normal rate, regular rhythm with normal S1/S2 without murmurs. No lower extremity edema. Abdomen: Soft, non-tender, non-distended with normal bowel sounds. Musculoskeletal: Bilateral lower extremity edema and chronic venous stasis with erythema. Skin: Ill-defined blanching erythema with orange peel appearance of the medial thigh on the right plus circumferential erythema of both lower legs with interval ulcerations, 1+ nonpitting edema of lower extremities with right more than left. Neurologic:  No focal sensory/motor deficits in the upper and lower extremities. Cranial nerves:  grossly non-focal 2-12. Psychiatric: Alert and oriented, normal insight and thought content. Capillary Refill: Brisk,< 3 seconds.     Labs:     Recent Labs     12/09/21  1837   WBC 11.5*   HGB 14.1   HCT 42.2        Recent Labs     12/09/21  1837   *   K 5.0   CL 96*   CO2 26   BUN 19   CREATININE 1.1   CALCIUM 9.5     Recent Labs     12/09/21  1837   AST 11   ALT 15   BILIDIR <0.2   BILITOT 0.4   ALKPHOS 106     No results for input(s): INR in the last 72 hours. No results for input(s): Prentis Revels in the last 72 hours. Lab Results   Component Value Date    NITRU NEGATIVE 07/22/2021    WBCUA  07/22/2021    BACTERIA NONE SEEN 07/22/2021    RBCUA 3-5 07/22/2021    BLOODU NEGATIVE 07/22/2021    SPECGRAV >1.030 07/22/2021         Radiology:     XR TIBIA FIBULA RIGHT (2 VIEWS)   Final Result   Soft tissue irregularity. No acute fracture or dislocation. **This report has been created using voice recognition software. It may contain minor errors which are inherent in voice recognition technology. **      Final report electronically signed by Dr Contreras Sanchez on 12/9/2021 7:54 PM      XR FOOT RIGHT (MIN 3 VIEWS)   Final Result    IMPRESSION:      1. There is a linear lucency at the tip of the distal phalanx of the fifth digit which is only appreciated on one of the oblique films. This could represent a nondisplaced fracture. 2. Diffuse soft tissue swelling throughout the foot with irregularity of the soft tissues of the fifth digit. **This report has been created using voice recognition software. It may contain minor errors which are inherent in voice recognition technology. **      Final report electronically signed by Dr Contreras Sanchez on 12/9/2021 5:34 PM      VL DUP LOWER EXTREMITY ARTERIES RIGHT    (Results Pending)   VL DUP LOWER EXTREMITY VENOUS BILATERAL    (Results Pending)        PT/OT Eval Status:  will be assessed  Diet: Diet NPO  DVT prophylaxis: Subcu Lovenox  Code Status: Full Code  Disposition: admit to [place] [address] inpatient. Thank you Cristino Marquez DPM for the opportunity to be involved in this patient's care.     Electronically signed by Breezy Garcia MD on 12/10/2021 at 3:54 AM.

## 2021-12-10 NOTE — ED NOTES
ED to inpatient nurses report    Chief Complaint   Patient presents with    Toe Injury      Present to ED from home  LOC: alert and orientated to name, place, date  Vital signs   Vitals:    12/09/21 1717 12/09/21 2239   BP: (!) 175/82 (!) 161/78   Pulse: 87 82   Resp: 18 17   Temp: 97.7 °F (36.5 °C)    SpO2: 97% 98%   Weight: 260 lb (117.9 kg)       Oxygen Baseline 0L    Current needs required none Bipap/Cpap No  LDAs:   Peripheral IV 12/09/21 Left Forearm (Active)     Mobility: Independent  Pending ED orders: none  Present condition: stable    Electronically signed by Marvin Nash RN on 12/9/2021 at 10:43 PM       Marvin Nash RN  12/09/21 5730

## 2021-12-10 NOTE — ED NOTES
Patient resting in bed. Respirations easy and unlabored. No distress noted. Call light within reach.        Lori Orlando RN  12/09/21 4743

## 2021-12-10 NOTE — PROGRESS NOTES
Pt admitted to  [phone] via in a wheelchair from ED. Complaints: Right fifth toe pain. IV site free of s/s of infection or infiltration. Vital signs obtained. Assessment and data collection initiated. Two nurse skin assessment performed by Bo WOODS and Dhaval Liu RN. Oriented to room. Policies and procedures for [facility] explained. All questions answered with no further questions at this time. Fall prevention and safety brochure discussed with patient. Bed alarm on. Call light in reach. Oriented to room. Iker Greco RN, RN 12/9/2021 11:53 PM     Explained patients right to have family, representative or physician notified of their admission. Patient has Declined for physician to be notified. Patient has Declined for family/representative to be notified.

## 2021-12-10 NOTE — PROGRESS NOTES
Hospitalist Progress Note      Patient:  Jeramie Lamb    Unit/Bed:5K-18/018-A  YOB: 1970  MRN: [id number]   Acct: [de-identified]   PCP: Tanner Reynolds DPM  Date of Admission: 12/9/2021    Assessment/Plan:       1. Right lower extremity cellulitis, gangrenous right fifth toe, and diabetic ulcerations:   Continue plan of care per podiatry team.  Right tib-fib x-ray with no evidence of fracture or dislocation. Continue empiric antibiotic coverage with Zosyn and vancomycin. Possible right fifth toe amputation per podiatry if no response to antibiotic. Follow-up on tissue cultures-> growing gram-negative bacilli. Consider ID consultation pending course.     2. Type 2 diabetes mellitus:   Uncontrolled secondary to infection. Hemoglobin A1c 9.4. Hold Metformin and glipizide. Medium dose sliding scale insulin coverage. Accu-Cheks before every meal and at bedtime. Diabetic diet.     3. Hypertension/hyperlipidemia:  Resume home dose losartan, metoprolol, fenofibrate, and statin.     4.  Diastolic CHF, HFpEF:  Last echocardiogram 3/2020 with an estimated ejection fraction of the left ventricle at 50% with no regional wall motion abnormalities. LV grade 1 diastolic dysfunction. Currently does not seem to be in a decompensated state although does have mild worsening of lower extremity edema from infection. Check proBNP and bilateral lower extremity Doppler ultrasound. Resume home dose maintenance medications for CHF including metoprolol, losartan, and Bumex. Record strict ins and outs and daily weights.     5. Chronic venous insufficiency with stasis dermatitis:  Bilateral lower extremity Doppler ultrasound to rule out DVT -> neg  Treat underlying infection as above in #1. Continue to follow with podiatry and wound care upon discharge. Would benefit from pressure dressings, CHERRY hose once infection cleared. Elevate legs at all times.     6. GERD:  Home dose PPI. Chief Complaint: Right lower extremity edema erythema and pain.       Initial H and P:-    \"Murali Bhat is a 45 y.o. male with PMHx of type 2 diabetes mellitus, hypertension, hyperlipidemia, CHF, coronary disease, who presented to the emergency room here at [facility] on 12/9/2021 complaining of a couple day history of worsening right lower extremity erythema, swelling, and pain. Reports that this last Saturday or 5 days ago he accidentally hit his right toe against a door which caused avulsion of the right fifth toenail. He then used an instrument to remove the toenail himself after which pain and redness and erythema slowly started building up and progressing proximally towards his upper extremity. No systemic fevers or chills.     Patient known to Dr. Libia Royal and has been following in the wound care clinic with dressing changes of chronic ulcerations or diabetic foot ulcers. Seen by podiatry and diagnosed with a gangrenous right fifth toe with cellulitis and diabetic foot ulcerations. Plan was to admit patient to hospitalist service and monitor patient for response to IV antibiotics and obtain further imaging studies with possible amputation of the right fifth toe if not improved.     Upon presenting to the emergency room patient blood pressure was 125/82 with a pulse of 87 respirate of 18 temperature of 97.7 and 97% oxygen saturation on room air. Lab investigations revealed leukocytosis 11.5 hyperglycemia in the 400 range lactic acid 1.3 sed rate of 100. CRP 17.8 and procalcitonin of 0.12.     At the time of evaluation in ER patient had his wound already dressed and was laying in no acute distress and no pain. \"    Subjective (past 24 hours):   12/10: Patient doing okay, sitting up in his chair. Denies any fever/chills. He seen by podiatry is going to continue IV ABX and monitor wound. May consider amputation this admission if necessary. No CP/SOB.       Past medical history, family history, social history and allergies reviewed again and is unchanged since admission. ROS (All review of systems completed. Pertinent positives noted. Otherwise All other systems reviewed and negative.)     Medications:  Reviewed    Infusion Medications    dextrose      sodium chloride       Scheduled Medications    vancomycin (VANCOCIN) intermittent dosing (placeholder)   Other RX Placeholder    vancomycin  1,250 mg IntraVENous Q12H    fenofibrate  160 mg Oral Daily    bumetanide  1 mg Oral Daily    atorvastatin  80 mg Oral Daily    insulin glargine  30 Units SubCUTAneous Nightly    fluticasone  1 spray Each Nostril Daily    losartan  100 mg Oral Daily    metoprolol succinate  25 mg Oral Daily    pantoprazole  40 mg Oral QAM AC    Vitamin D  2,000 Units Oral Daily    piperacillin-tazobactam  3,375 mg IntraVENous Q8H    sodium chloride flush  10 mL IntraVENous 2 times per day    enoxaparin  40 mg SubCUTAneous Daily    insulin lispro  0-6 Units SubCUTAneous TID WC    insulin lispro  0-3 Units SubCUTAneous Nightly     PRN Meds: glucose, dextrose, glucagon (rDNA), dextrose, sodium chloride flush, sodium chloride, ondansetron **OR** ondansetron, polyethylene glycol, acetaminophen **OR** acetaminophen, aluminum & magnesium hydroxide-simethicone      Intake/Output Summary (Last 24 hours) at 12/10/2021 1607  Last data filed at 12/10/2021 0431  Gross per 24 hour   Intake 120 ml   Output --   Net 120 ml       Diet:  ADULT DIET; Regular; 4 carb choices (60 gm/meal)    Exam:  BP (!) 148/76   Pulse 85   Temp 97.9 °F (36.6 °C) (Oral)   Resp 18   Ht 6' (1.829 m)   Wt 282 lb 3 oz (128 kg)   SpO2 96%   BMI 38.27 kg/m²   General appearance: Obese, no apparent distress, appears stated age and cooperative. HEENT: Pupils equal, round, and reactive to light. Conjunctivae/corneas clear. Neck: Supple, with full range of motion. No jugular venous distention.  Trachea midline. Respiratory:  Normal respiratory effort. Clear to auscultation, bilaterally without Rales/Wheezes/Rhonchi. Cardiovascular: Regular rate and rhythm with normal S1/S2 without murmurs, rubs or gallops. Abdomen: Soft, non-tender, non-distended with normal bowel sounds. Musculoskeletal: passive and active ROM x 4 extremities. Right gangrenous fifth toe  Skin: Chronic venous stasis changes bilaterally. RLE wrapped, wound not visualized. Neurologic:  Neurovascularly intact without any focal sensory/motor deficits. Cranial nerves: II-XII intact, grossly non-focal.  Psychiatric: Alert and oriented x4, thought content appropriate, normal insight  Capillary Refill: Brisk,< 3 seconds   Peripheral Pulses: +2 palpable, equal bilaterally     Labs:   Recent Labs     12/09/21 1837   WBC 11.5*   HGB 14.1   HCT 42.2        Recent Labs     12/09/21 1837   *   K 5.0   CL 96*   CO2 26   BUN 19   CREATININE 1.1   CALCIUM 9.5     Recent Labs     12/09/21 1837   AST 11   ALT 15   BILIDIR <0.2   BILITOT 0.4   ALKPHOS 106     No results for input(s): INR in the last 72 hours. No results for input(s): Faustina Horn in the last 72 hours. Microbiology:    Blood culture #1:   Lab Results   Component Value Date    BC No growth-preliminary  12/09/2021    BC No growth-preliminary  12/09/2021       Blood culture #2:No results found for: Susan Stanton    Organism:  Lab Results   Component Value Date    ORG gram negative bacilli 12/09/2021         Lab Results   Component Value Date    LABGRAM  12/09/2021     Rare segmented neutrophils observed. No epithelial cells observed. No organisms observed.         MRSA culture only:No results found for: [facility]    Urine culture: No results found for: LABURIN    Respiratory culture: No results found for: CULTRESP    Aerobic and Anaerobic :  Lab Results   Component Value Date    LABAERO moderate growth  12/09/2021     Lab Results   Component Value Date    Brenden Rubins  03/25/2020 Culture yielded heavy growth of anaerobic gram positive cocci. If a true mixed aerobic and anaerobic infection is suspected, then broad spectrum empiric antibiotic therapy is indicated and should include coverage for anaerobic organisms. Urinalysis:      Lab Results   Component Value Date    NITRU NEGATIVE 07/22/2021    WBCUA  07/22/2021    BACTERIA NONE SEEN 07/22/2021    RBCUA 3-5 07/22/2021    BLOODU NEGATIVE 07/22/2021    SPECGRAV >1.030 07/22/2021       Radiology:  VL DUP LOWER EXTREMITY VENOUS BILATERAL   Final Result   1. Normal venous ultrasound. No evidence for deep venous thrombosis. 2. Inhomogeneous lesion in the right groin with increased vascularity centrally, most likely representing lymph nodes. Nonetheless, further evaluation is warranted. See comments above. **This report has been created using voice recognition software. It may contain minor errors which are inherent in voice recognition technology. **      Final report electronically signed by Dr. Sneha Palacios on 12/10/2021 1:58 PM       DUP LOWER EXTREMITY ARTERIES RIGHT   Final Result   1. 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 Vascularity right lower extremity otherwise unremarkable. In light of the history of gangrene in the right lower extremity, aortofemoral angiography is recommended for    further evaluation with possible intervention. If desired, this can be arranged through the interventional scheduling desk of [facility]'s radiology department ([phone]). 2. Inhomogeneous lesion in the right groin with increased vascularity centrally. This likely represents a cluster of lymph nodes, nonetheless, further evaluation is warranted. **This report has been created using voice recognition software. It may contain minor errors which are inherent in voice recognition technology. **      Final report electronically signed by Dr. Sneha Palacios on 12/10/2021 1:56 PM      XR TIBIA FIBULA RIGHT (2 VIEWS)   Final Result   Soft tissue irregularity. No acute fracture or dislocation. **This report has been created using voice recognition software. It may contain minor errors which are inherent in voice recognition technology. **      Final report electronically signed by Dr Catrachita Hankins on 12/9/2021 7:54 PM      XR FOOT RIGHT (MIN 3 VIEWS)   Final Result    IMPRESSION:      1. There is a linear lucency at the tip of the distal phalanx of the fifth digit which is only appreciated on one of the oblique films. This could represent a nondisplaced fracture. 2. Diffuse soft tissue swelling throughout the foot with irregularity of the soft tissues of the fifth digit. **This report has been created using voice recognition software. It may contain minor errors which are inherent in voice recognition technology. **      Final report electronically signed by Dr Catrachita Hankins on 12/9/2021 5:34 PM        XR TIBIA FIBULA RIGHT (2 VIEWS)    Result Date: 12/9/2021  PROCEDURE: XR TIBIA FIBULA RIGHT (2 VIEWS) CLINICAL INFORMATION: 54-year-old male with a wound at the medial aspect of the right leg. COMPARISON: Radiograph 3/16/2007. TECHNIQUE: 4 views of the right lower leg were obtained. FINDINGS: There is no acute fracture or dislocation. There are fixation screws in the distal fibula with some irregularity presumably from a previous injury. There is soft tissue irregularity at the medial aspect of the distal lower leg. Soft tissue irregularity. No acute fracture or dislocation. **This report has been created using voice recognition software. It may contain minor errors which are inherent in voice recognition technology. ** Final report electronically signed by Dr Catrachita Hankins on 12/9/2021 7:54 PM    XR FOOT RIGHT (MIN 3 VIEWS)    Result Date: 12/9/2021  PROCEDURE: XR FOOT RIGHT (MIN 3 VIEWS) CLINICAL INFORMATION: 54-year-old male who had an injury to the fifth digit of his right foot 7 days ago. Nonhealing wound. COMPARISON: No prior study. TECHNIQUE: 4 views of the right foot were obtained. FINDINGS: There is a linear lucency at the tip of the distal phalanx of the fifth digit of the right foot which is only appreciated on the oblique film. The remaining osseous structures are intact. Postsurgical changes are seen at the distal right fibula. There is no dislocation. The calcaneus is within normal limits. The base of the fifth metatarsal is intact. There is irregularity of the soft tissues of the fifth digit. There is soft tissue swelling throughout the foot. IMPRESSION: 1. There is a linear lucency at the tip of the distal phalanx of the fifth digit which is only appreciated on one of the oblique films. This could represent a nondisplaced fracture. 2. Diffuse soft tissue swelling throughout the foot with irregularity of the soft tissues of the fifth digit. **This report has been created using voice recognition software. It may contain minor errors which are inherent in voice recognition technology. ** Final report electronically signed by Dr Nathaniel Rivero on 12/9/2021 5:34 PM    VL DUP LOWER EXTREMITY ARTERIES RIGHT    Result Date: 12/10/2021  PROCEDURE: VL DUP LOWER EXTREMITY ARTERIES RIGHT CLINICAL INFORMATION: Gangrene ([address] COMPARISON: No prior study. TECHNIQUE: Multiple permanent grayscale and color flow sonographic images of the major arteries of the right lower extremity were obtained from the level of the groin to the ankle. Spectral Doppler waveforms were obtained and velocity measurements were made. ... Technically difficult due to wrappings and wounds on legs. Not able to perform JACK due to wounds.  FINDINGS: CFA ---------------> 227 PSV cm/sec PROF -------------> 58 PSV cm/sec SFA PROX ------->174 PSV cm/sec SFA MID ---------->163 PSV cm/sec SFA DIST -------->175 PSV cm/sec POP A PROX --->147 PSV cm/sec POP A DIST ---->168 PSV cm/sec PTA --------------->32 PSV cm/sec PERONEAL ----->73 PSV cm/sec ABBEY ----------------> 47 PSV cm/sec VELOCITY MEASUREMENTS: ABIs not calculated. There is moderately dampened pulsatility in the right anterior tibial and posterior tibial arteries are relatively good pulsatility in the peroneal artery. Pulsatility throughout the remainder of the right leg is unremarkable. Findings suggestive of trifurcation disease. Note that there is an inhomogeneous masslike lesion in the right groin, 5.1 cm in length and 2.4 cm in diameter with demonstration is increased vascularity. This probably represents a cluster of lymph nodes. Clinical correlation recommended. CT of the pelvis with contrast enhancement will be helpful for further evaluation     1. 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 Vascularity right lower extremity otherwise unremarkable. In light of the history of gangrene in the right lower extremity, aortofemoral angiography is recommended for further evaluation with possible intervention. If desired, this can be arranged through the interventional scheduling desk of [facility] department ([phone]). 2. Inhomogeneous lesion in the right groin with increased vascularity centrally. This likely represents a cluster of lymph nodes, nonetheless, further evaluation is warranted. **This report has been created using voice recognition software. It may contain minor errors which are inherent in voice recognition technology. ** Final report electronically signed by Dr. Venkata Roach on 12/10/2021 1:56 PM    VL DUP LOWER EXTREMITY VENOUS BILATERAL    Result Date: 12/10/2021  PROCEDURE: VL DUP LOWER EXTREMITY VENOUS BILATERAL CLINICAL INFORMATION: Rule out DVT COMPARISON: No prior study. TECHNIQUE: Multiple grayscale and color flow images of the major veins of both lower extremities were obtained from the level of the groin to the level of the ankle.  Multiple compression and

## 2021-12-10 NOTE — PROGRESS NOTES
Pharmacy Note  Vancomycin Consult    Be Davis is a 45 y.o. male started on Vancomycin for cellulitis; consult received from Dr. Jayro Coronado to manage therapy. Also receiving the following antibiotics: Zosyn. Patient Active Problem List   Diagnosis    Cellulitis    Foot ulcer, left, with fat layer exposed ([address]    Forefoot varus, acquired, left    Equinus contracture of left ankle    Type 2 diabetes mellitus with hyperglycemia, with long-term current use of insulin ([address]    Severe sepsis ([address]    Uncontrolled type 2 diabetes mellitus with hyperglycemia (HCC)    Hyponatremia    Hypertension, uncontrolled    PFO (patent foramen ovale)    Hyperlipidemia    Smoker    Morbid obesity ([address]    Chronic diastolic heart failure ([address]    Noncompliance with medications    Aortic valve stenosis    Cellulitis of right leg     Allergies:  Morphine     Temp max: 98    Recent Labs     12/09/21  1837   BUN 19   CREATININE 1.1   WBC 11.5*     No intake or output data in the 24 hours ending 12/10/21 0032  Culture Date Source Results   12/9/21 Toe tissue    12/9/21 Blood x 2          Ht Readings from Last 1 Encounters:   12/09/21 6' (1.829 m)        Wt Readings from Last 1 Encounters:   12/09/21 282 lb 3 oz (128 kg)       Body mass index is 38.27 kg/m². Estimated Creatinine Clearance: 109 mL/min (based on SCr of 1.1 mg/dL). Goal Trough Level: 10-15 mcg/mL; [facility] 477    Assessment/Plan:  Will initiate Vancomycin with a one time loading dose of 1750 mg x1 (ED at 2058), followed by 1250 mg IV every 12 hours. Timing of trough level will be determined based on culture results, renal function, and clinical response. Thank you for the consult. Will continue to follow.     Roslyn Cintron RP, BCPS, BCGP  12/10/2021     12:38 AM

## 2021-12-10 NOTE — PROGRESS NOTES
Department of Podiatric Surgery  Progress Note      SUBJECTIVE    12/10  Patient examined and evaluated at bedside alongside of Dr. Leonard Haywood. Patient states that his right leg and foot are not painful. Patient denies overnight events. He denies any nausea, vomiting, fever, chills, chest pain, shortness of breath. No other pedal complaints      HISTORY OF PRESENT ILLNESS:      The patient is a 45 y.o. male with significant past medical history of CAD, CHF, DM, and HTN who presents with complaints of right 5th toe pain. Patient is well known to Dr. Lenoard Haywood in the wound care center. Patient says that he has been continuing dressing care as he was instructed to at his last wound care visit. Patient says that last week his right first toe was hit with a door. This avulsed the toenail. Patient transilluminates with a Band-Aid. Patient says that 1 to 2 days ago, patient was starting to see the right fifth toe getting darker. Patient was also seen redness creep up his leg. Patient also says that he had the medial aspect of his right leg at work a couple of days ago. Patient denies any pain in his right lower extremity at this point in time. Patient denies any nausea, vomiting, fever, chills, chest pain, shortness of breath. No other pedal complaints      PHYSICAL EXAM:  VITALS:  BP (!) 148/76   Pulse 85   Temp 97.9 °F (36.6 °C) (Oral)   Resp 18   Ht 6' (1.829 m)   Wt 282 lb 3 oz (128 kg)   SpO2 96%   BMI 38.27 kg/m²   CONSTITUTIONAL:  awake, alert, cooperative, no apparent distress, and appears stated age    LOWER EXTREMITY:  VASCULAR: Pedal pulses are palpable bilaterally. Capillary fill time grossly diminished to the fifth digit of the right foot, sluggish but still intact. Cap fill time less than 3 seconds to all other exposed digits of the bilateral lower extremities. Right foot erythematous and edematous. Temperature warm to hot from tibial tuberosity to digits of right foot.   Temperature warm to cool from tibial tuberosity to digits of left foot. MUSCULOSKELETAL: No pain on palpation to the bilateral lower extremities. Rectus foot and ankle bilaterally. NEUROLOGIC: Light touch sensation grossly diminished to the digits bilaterally, light touch sensation is intact to the metatarsal heads bilaterally. SKIN: Patient has discoloration to the right fifth digit. The digit is hard to touch and has epidermal lysis at this point in time. There is no area that frankly probes deep. There is some serosanguineous drainage exuding from the medial aspect of the wound. There is erythema and edema that extends from this digit to the knee. Patient has a full-thickness ulceration on the medial aspect of the right leg. There was purulence from this wound. Wound undermines inferiorly and posteriorly. The wound does not probe to bone. Erythema present around this wound continuous with erythema that is present exuding from the foot. Multiple other stable partial-thickness ulcerations to the anterior, posterior right lower extremity. IMAGING:  XR TIBIA FIBULA RIGHT (2 VIEWS)    Result Date: 12/9/2021  PROCEDURE: XR TIBIA FIBULA RIGHT (2 VIEWS) CLINICAL INFORMATION: 26-year-old male with a wound at the medial aspect of the right leg. COMPARISON: Radiograph 3/16/2007. TECHNIQUE: 4 views of the right lower leg were obtained. FINDINGS: There is no acute fracture or dislocation. There are fixation screws in the distal fibula with some irregularity presumably from a previous injury. There is soft tissue irregularity at the medial aspect of the distal lower leg. Soft tissue irregularity. No acute fracture or dislocation. **This report has been created using voice recognition software. It may contain minor errors which are inherent in voice recognition technology. ** Final report electronically signed by Dr Gentry Menendez on 12/9/2021 7:54 PM    XR FOOT RIGHT (MIN 3 VIEWS)    Result Date: 12/9/2021  PROCEDURE: XR FOOT RIGHT (MIN 3 VIEWS) CLINICAL INFORMATION: 58-year-old male who had an injury to the fifth digit of his right foot 7 days ago. Nonhealing wound. COMPARISON: No prior study. TECHNIQUE: 4 views of the right foot were obtained. FINDINGS: There is a linear lucency at the tip of the distal phalanx of the fifth digit of the right foot which is only appreciated on the oblique film. The remaining osseous structures are intact. Postsurgical changes are seen at the distal right fibula. There is no dislocation. The calcaneus is within normal limits. The base of the fifth metatarsal is intact. There is irregularity of the soft tissues of the fifth digit. There is soft tissue swelling throughout the foot. IMPRESSION: 1. There is a linear lucency at the tip of the distal phalanx of the fifth digit which is only appreciated on one of the oblique films. This could represent a nondisplaced fracture. 2. Diffuse soft tissue swelling throughout the foot with irregularity of the soft tissues of the fifth digit. **This report has been created using voice recognition software. It may contain minor errors which are inherent in voice recognition technology. ** Final report electronically signed by Dr Luanne Rod on 12/9/2021 5:34 PM        LABS:  CBC:   Lab Results   Component Value Date    WBC 11.5 12/09/2021    RBC 4.56 12/09/2021    HGB 14.1 12/09/2021    HCT 42.2 12/09/2021    MCV 92.5 12/09/2021    MCH 30.9 12/09/2021    MCHC 33.4 12/09/2021    RDW 13.2 03/23/2021     12/09/2021    MPV 10.7 12/09/2021     CMP:    Lab Results   Component Value Date     12/09/2021    K 5.0 12/09/2021    K 4.3 03/26/2020    CL 96 12/09/2021    CO2 26 12/09/2021    BUN 19 12/09/2021    CREATININE 1.1 12/09/2021    LABGLOM 70 12/09/2021    GLUCOSE 343 12/09/2021    GLUCOSE 269 03/23/2021    PROT 8.2 12/09/2021    LABALBU 3.6 12/09/2021    CALCIUM 9.5 12/09/2021    BILITOT 0.4 12/09/2021    ALKPHOS 106 12/09/2021    AST 11 12/09/2021    ALT 15 12/09/2021     LDH:  No results found for: LDH  PT/INR:  No results found for: PROTIME, INR  HgBA1c:    Lab Results   Component Value Date    LABA1C 9.4 12/10/2021        Treatment:   ASSESSMENT AND PLAN:  1. Gangrene, right 5th toe  2. Cellulitis, right leg  3. Diabetic Leg Ulceration  -Patient seen and evaluated  -Labs reviewed, white blood cell count 11.5, CRP 17.81, pro calcitonin 0.12  -Culture results pending  -Foot x-ray reviewed, see report above  --XR tib-fib right reviewed, see report above  -The fifth toe is looking dusky, this will likely need amputation in the near future, but not immediately. We will trend the appearance of this toe over the coming couple of days with IV antibiotics and see if it revascularizes or not  -No surgical planning at this time, digit amputation will be performed as outpatient pending progression of appearance  -IV antibiotics per primary  -Wounds dressed with Betadine to fifth toe and medial ulceration of the right leg, gauze, Kerlix  -Podiatry will continue to follow during this admission    DISPO: No surgical planning at this time: will continue to monitor appearance of right 5th digit, leg wound. Thanks for the opportunity to participate in this patient's care.      Mary Rojas DPM DPM

## 2021-12-10 NOTE — PROGRESS NOTES
Wound ostomy consulted for: Chronic diabetic foot ulcers, cellulitis, gangrenous right great toe. Podiatry on case and will address these issues. Please call wound ostomy as needed.

## 2021-12-10 NOTE — CARE COORDINATION
12/10/21, 8:33 AM EST  DISCHARGE PLANNING EVALUATION:    Laura Guaman       Admitted: 12/9/2021/ [facility] day: 1   Location: [facility] Reason for admit: Gangrene ([facility] [place] [address] [I96]  Toe gangrene ([address] [I96]  Cellulitis of right leg [E09.489]  Cellulitis of right lower extremity [I03.200]   PMH:  has a past medical history of CAD (coronary artery disease), CHF (congestive heart failure) ([address] Diabetes mellitus ([address] and Hypertension. Procedure: N/A  Barriers to Discharge:  Patient presents with right lower leg wounds, erythema, and right 5th toe discoloration. Podiatry consult, Lovenox, DM management, Zosyn, IV Vancomycin, prn medications, BMP and CBC in a.m., right arterial doppler, bilateral venous dopplers, NPO, consult to [facility] RN, up with assistance. PCP: Ulysses Shoe, DPM  Readmission Risk Score: 9.3 ( )%    Patient Goals/Plan/Treatment Preferences: Met with Brandy Bah. He resides at home with his wife. Kandace Joe verifies his insurance and PCP. He is able to afford his medications and denies a need for DME. He will have transportation to home at discharge. Brandy Bah states his wife used to be a nurses aide. She does his wound care at home. Kandace Joe declines [facility]. Transportation/Food Security/Housekeeping Addressed:  No issues identified.

## 2021-12-10 NOTE — ED NOTES
Patient resting in bed. Respirations easy and unlabored. No distress noted. Call light within reach.        Madeline Byrd RN  12/09/21 [id number]

## 2021-12-10 NOTE — PROGRESS NOTES
Teaching Note:    I was present with the resident during the visit. I discussed the case with the resident and agree with the findings and the plan as documented in the residents note.     Santo Olsen DPM, [address] [place] [address] Surgery       [facility]

## 2021-12-11 LAB
ANION GAP SERPL CALCULATED.3IONS-SCNC: 10 MEQ/L (ref 8–16)
BASOPHILS # BLD: 0.8 %
BASOPHILS ABSOLUTE: 0.1 THOU/MM3 (ref 0–0.1)
BUN BLDV-MCNC: 19 MG/DL (ref 7–22)
CALCIUM SERPL-MCNC: 9.1 MG/DL (ref 8.5–10.5)
CHLORIDE BLD-SCNC: 96 MEQ/L (ref 98–111)
CO2: 25 MEQ/L (ref 23–33)
CREAT SERPL-MCNC: 0.9 MG/DL (ref 0.4–1.2)
EOSINOPHIL # BLD: 2.2 %
EOSINOPHILS ABSOLUTE: 0.1 THOU/MM3 (ref 0–0.4)
ERYTHROCYTE [DISTWIDTH] IN BLOOD BY AUTOMATED COUNT: 12.5 % (ref 11.5–14.5)
ERYTHROCYTE [DISTWIDTH] IN BLOOD BY AUTOMATED COUNT: 42.2 FL (ref 35–45)
GFR SERPL CREATININE-BSD FRML MDRD: 88 ML/MIN/1.73M2
GLUCOSE BLD-MCNC: 240 MG/DL (ref 70–108)
GLUCOSE BLD-MCNC: 260 MG/DL (ref 70–108)
GLUCOSE BLD-MCNC: 273 MG/DL (ref 70–108)
GLUCOSE BLD-MCNC: 307 MG/DL (ref 70–108)
GLUCOSE BLD-MCNC: 315 MG/DL (ref 70–108)
HCT VFR BLD CALC: 40.2 % (ref 42–52)
HEMOGLOBIN: 13.3 GM/DL (ref 14–18)
IMMATURE GRANS (ABS): 0.04 THOU/MM3 (ref 0–0.07)
IMMATURE GRANULOCYTES: 0.6 %
LYMPHOCYTES # BLD: 23.9 %
LYMPHOCYTES ABSOLUTE: 1.5 THOU/MM3 (ref 1–4.8)
MCH RBC QN AUTO: 30.6 PG (ref 26–33)
MCHC RBC AUTO-ENTMCNC: 33.1 GM/DL (ref 32.2–35.5)
MCV RBC AUTO: 92.4 FL (ref 80–94)
MONOCYTES # BLD: 9.1 %
MONOCYTES ABSOLUTE: 0.6 THOU/MM3 (ref 0.4–1.3)
NUCLEATED RED BLOOD CELLS: 0 /100 WBC
PLATELET # BLD: 294 THOU/MM3 (ref 130–400)
PMV BLD AUTO: 10.8 FL (ref 9.4–12.4)
POTASSIUM SERPL-SCNC: 4.7 MEQ/L (ref 3.5–5.2)
RBC # BLD: 4.35 MILL/MM3 (ref 4.7–6.1)
SEG NEUTROPHILS: 63.4 %
SEGMENTED NEUTROPHILS ABSOLUTE COUNT: 4.1 THOU/MM3 (ref 1.8–7.7)
SODIUM BLD-SCNC: 131 MEQ/L (ref 135–145)
VANCOMYCIN RANDOM: 9.5 UG/ML (ref 0.1–39.9)
WBC # BLD: 6.4 THOU/MM3 (ref 4.8–10.8)

## 2021-12-11 PROCEDURE — 82948 REAGENT STRIP/BLOOD GLUCOSE: CPT

## 2021-12-11 PROCEDURE — 80202 ASSAY OF VANCOMYCIN: CPT

## 2021-12-11 PROCEDURE — 36415 COLL VENOUS BLD VENIPUNCTURE: CPT

## 2021-12-11 PROCEDURE — 2580000003 HC RX 258: Performed by: PEDIATRICS

## 2021-12-11 PROCEDURE — 1200000000 HC SEMI PRIVATE

## 2021-12-11 PROCEDURE — 6370000000 HC RX 637 (ALT 250 FOR IP): Performed by: PEDIATRICS

## 2021-12-11 PROCEDURE — 80048 BASIC METABOLIC PNL TOTAL CA: CPT

## 2021-12-11 PROCEDURE — 6370000000 HC RX 637 (ALT 250 FOR IP): Performed by: PHYSICIAN ASSISTANT

## 2021-12-11 PROCEDURE — 99233 SBSQ HOSP IP/OBS HIGH 50: CPT | Performed by: PHYSICIAN ASSISTANT

## 2021-12-11 PROCEDURE — 6360000002 HC RX W HCPCS: Performed by: PEDIATRICS

## 2021-12-11 PROCEDURE — 85025 COMPLETE CBC W/AUTO DIFF WBC: CPT

## 2021-12-11 RX ORDER — DOCUSATE SODIUM 100 MG/1
100 CAPSULE, LIQUID FILLED ORAL DAILY
Status: DISCONTINUED | OUTPATIENT
Start: 2021-12-11 | End: 2021-12-15 | Stop reason: HOSPADM

## 2021-12-11 RX ADMIN — INSULIN GLARGINE 30 UNITS: 100 INJECTION, SOLUTION SUBCUTANEOUS at 20:58

## 2021-12-11 RX ADMIN — INSULIN LISPRO 3 UNITS: 100 INJECTION, SOLUTION INTRAVENOUS; SUBCUTANEOUS at 20:57

## 2021-12-11 RX ADMIN — LOSARTAN POTASSIUM 100 MG: 100 TABLET, FILM COATED ORAL at 08:36

## 2021-12-11 RX ADMIN — PIPERACILLIN AND TAZOBACTAM 3375 MG: 3; .375 INJECTION, POWDER, LYOPHILIZED, FOR SOLUTION INTRAVENOUS at 11:35

## 2021-12-11 RX ADMIN — ENOXAPARIN SODIUM 40 MG: 100 INJECTION SUBCUTANEOUS at 09:00

## 2021-12-11 RX ADMIN — PIPERACILLIN AND TAZOBACTAM 3375 MG: 3; .375 INJECTION, POWDER, LYOPHILIZED, FOR SOLUTION INTRAVENOUS at 02:55

## 2021-12-11 RX ADMIN — VANCOMYCIN HYDROCHLORIDE 1250 MG: 5 INJECTION, POWDER, LYOPHILIZED, FOR SOLUTION INTRAVENOUS at 09:03

## 2021-12-11 RX ADMIN — INSULIN LISPRO 6 UNITS: 100 INJECTION, SOLUTION INTRAVENOUS; SUBCUTANEOUS at 17:14

## 2021-12-11 RX ADMIN — FENOFIBRATE 160 MG: 160 TABLET ORAL at 08:36

## 2021-12-11 RX ADMIN — INSULIN LISPRO 5 UNITS: 100 INJECTION, SOLUTION INTRAVENOUS; SUBCUTANEOUS at 17:14

## 2021-12-11 RX ADMIN — VANCOMYCIN HYDROCHLORIDE 1250 MG: 5 INJECTION, POWDER, LYOPHILIZED, FOR SOLUTION INTRAVENOUS at 21:00

## 2021-12-11 RX ADMIN — INSULIN LISPRO 5 UNITS: 100 INJECTION, SOLUTION INTRAVENOUS; SUBCUTANEOUS at 12:29

## 2021-12-11 RX ADMIN — ATORVASTATIN CALCIUM 80 MG: 80 TABLET, FILM COATED ORAL at 08:36

## 2021-12-11 RX ADMIN — METOPROLOL SUCCINATE 25 MG: 25 TABLET, EXTENDED RELEASE ORAL at 08:36

## 2021-12-11 RX ADMIN — PANTOPRAZOLE SODIUM 40 MG: 40 TABLET, DELAYED RELEASE ORAL at 05:40

## 2021-12-11 RX ADMIN — PIPERACILLIN AND TAZOBACTAM 3375 MG: 3; .375 INJECTION, POWDER, LYOPHILIZED, FOR SOLUTION INTRAVENOUS at 18:48

## 2021-12-11 RX ADMIN — INSULIN LISPRO 12 UNITS: 100 INJECTION, SOLUTION INTRAVENOUS; SUBCUTANEOUS at 12:29

## 2021-12-11 RX ADMIN — Medication 2000 UNITS: at 08:36

## 2021-12-11 RX ADMIN — INSULIN LISPRO 9 UNITS: 100 INJECTION, SOLUTION INTRAVENOUS; SUBCUTANEOUS at 08:53

## 2021-12-11 NOTE — PROGRESS NOTES
Physician Progress Note      Cesar Cleary  CSN #:                  [id number]  :                       1970  ADMIT DATE:       2021 5:10 PM  DISCH DATE:  RESPONDING  PROVIDER #:        Simin Castillo PA-C          QUERY TEXT:    Pt admitted with Right lower extremity cellulitis  and has CHF documented. If   possible, please document in progress notes and discharge summary further   specificity regarding the type and acuity of CHF:      The medical record reflects the following:  Risk Factors: DM  Clinical Indicators: Documented Diastolic CHF,last echo EF 50% in ,  Treatment: Labs, Daily weights, I & O's, Home meds resumed    Thank You! Sapna Unger RN, CRCR  RN Clinical   (W) [phone] (R) [phone]  Options provided:  -- Acute on Chronic Diastolic CHF/HFpEF  -- Acute Diastolic CHF/HFpEF  -- Chronic Diastolic CHF/HFpEF  -- Other - I will add my own diagnosis  -- Disagree - Not applicable / Not valid  -- Disagree - Clinically unable to determine / Unknown  -- Refer to Clinical Documentation Reviewer    PROVIDER RESPONSE TEXT:    This patient has chronic diastolic CHF/HFpEF.     Query created by: Gabrielle Manzanares on 12/10/2021 6:41 AM      Electronically signed by:  iSmin Castillo PA-C 2021 7:47 AM

## 2021-12-11 NOTE — PROGRESS NOTES
[address] [facility] Pharmacokinetic Monitoring Service - Vancomycin    Consulting Provider: Dr. Rogers Shields   Indication: Cellulitis  Target Concentration: Goal trough of 10-15 mg/L and AUC/JESSE <500 mg*hr/L  Day of Therapy: 3  Additional Antimicrobials: Zosyn    Pertinent Laboratory Values: Wt Readings from Last 1 Encounters:   12/09/21 282 lb 3 oz (128 kg)     Temp Readings from Last 1 Encounters:   12/11/21 98.2 °F (36.8 °C) (Oral)     Estimated Creatinine Clearance: 133 mL/min (based on SCr of 0.9 mg/dL). Recent Labs     12/09/21  1837 12/11/21  0615   CREATININE 1.1 0.9   WBC 11.5* 6.4     Procalcitonin: 0.12 on 12/9/2021    Pertinent Cultures:  Culture Date Source Results   12/9/21 Toe tissue Serratia marcescens, Staph coag positive, GPC pending    12/9/21 Blood x 2 NGTD   MRSA Nasal Swab: N/A. Non-respiratory infection. Recent vancomycin administrations                     vancomycin (VANCOCIN) 1250 mg in dextrose 5 % 250 mL IVPB (mg) 1,250 mg New Bag 12/10/21 2237     1,250 mg New Bag  1020    vancomycin (VANCOCIN) 1,750 mg in dextrose 5 % 500 mL IVPB (mg) 1,750 mg New Bag 12/09/21 2058        Assessment:  Date/Time Current Dose Concentration Timing of Concentration (h) AUC   12/11/21 0615 1250 mg q12h 9.5 ~8 hours 391   Note: Serum concentrations collected for AUC dosing may appear elevated if collected in close proximity to the dose administered, this is not necessarily an indication of toxicity    Plan:  Current dosing regimen is therapeutic  Continue current dose. I anticipate patient will accumulate vancomycin in the next couple days.   Consider repeat vancomycin concentration in a few days  Pharmacy will continue to monitor patient and adjust therapy as indicated    Thank you for the consult,  Ghulam Dobbins, PharmD, BCPS, BCCCP  12/11/2021 8:40 AM

## 2021-12-11 NOTE — PROGRESS NOTES
Hospitalist Progress Note      Patient:  Andrea Mandujano    Unit/Bed:5K-18/018-A  YOB: 1970  MRN: [id number]   Acct: [de-identified]   PCP: Shelly Artis DPM  Date of Admission: 12/9/2021    Assessment/Plan:       1. Right lower extremity cellulitis, gangrenous right fifth toe, and diabetic ulcerations:   Continue plan of care per podiatry team.  Right tib-fib x-ray with no evidence of fracture or dislocation. Tissue cultures-> Serratia marcescens, gram + cocci and light growth staphylococcus (coag +). Consider ID consultation pending course. Continue empiric antibiotic coverage with Zosyn and vancomycin. Possible right fifth toe amputation per podiatry if no response to antibiotic. 2.  IDDM II, uncontrolled:   Uncontrolled secondary to infection. Hemoglobin A1c 9.4. Hold Metformin and glipizide. Medium dose sliding scale insulin coverage and Lantus coverage. Add TID humalog 5 units at meal times. Accu-Cheks before every meal and at bedtime. Diabetic diet.     3.  Essential hypertension / HLD  Resume home dose losartan, metoprolol, fenofibrate, and statin.     4. Chronic HFpEF without acute decompensation:  Last echocardiogram 3/2020 with an estimated ejection fraction of the left ventricle at 50% with no regional wall motion abnormalities. LV grade 1 diastolic dysfunction. Currently does not seem to be in a decompensated state although does have mild worsening of lower extremity edema from infection. Check proBNP and bilateral lower extremity Doppler ultrasound. Resume home dose maintenance medications for CHF including metoprolol, losartan, and Bumex. Record strict I/Os and daily weights.     5. Chronic venous insufficiency with stasis dermatitis:  Bilateral lower extremity Doppler ultrasound to rule out DVT -> neg  Treat underlying infection as above in #1. Continue to follow with podiatry and wound care upon discharge.   Would benefit from pressure dressings, CHERRY hose once infection cleared. Elevate legs at all times.     6. GERD:  Home dose PPI. 7. Hyponatremia: mild, continue to monitor. Chief Complaint: Right lower extremity edema erythema and pain.       Initial H and P:-    \"Murali Bhat is a 45 y.o. male with PMHx of type 2 diabetes mellitus, hypertension, hyperlipidemia, CHF, coronary disease, who presented to the emergency room here at [facility] on 12/9/2021 complaining of a couple day history of worsening right lower extremity erythema, swelling, and pain. Reports that this last Saturday or 5 days ago he accidentally hit his right toe against a door which caused avulsion of the right fifth toenail. He then used an instrument to remove the toenail himself after which pain and redness and erythema slowly started building up and progressing proximally towards his upper extremity. No systemic fevers or chills.     Patient known to Dr. Libia Royal and has been following in the wound care clinic with dressing changes of chronic ulcerations or diabetic foot ulcers. Seen by podiatry and diagnosed with a gangrenous right fifth toe with cellulitis and diabetic foot ulcerations. Plan was to admit patient to hospitalist service and monitor patient for response to IV antibiotics and obtain further imaging studies with possible amputation of the right fifth toe if not improved.     Upon presenting to the emergency room patient blood pressure was 125/82 with a pulse of 87 respirate of 18 temperature of 97.7 and 97% oxygen saturation on room air. Lab investigations revealed leukocytosis 11.5 hyperglycemia in the 400 range lactic acid 1.3 sed rate of 100. CRP 17.8 and procalcitonin of 0.12.     At the time of evaluation in ER patient had his wound already dressed and was laying in no acute distress and no pain. \"    12/10: Patient doing okay, sitting up in his chair. Denies any fever/chills.   He seen by podiatry is going to continue IV ABX and monitor wound. May consider amputation this admission if necessary. No CP/SOB. Subjective (past 24 hours):   12/11: cx returned growing serratia marcescens and gram + cocci, staphylococcus. Pt denies fever/chills. No CP/SOB. abd feels hard, however pt reports that's cause he just had breakfast. No abd pain, n/v/d. Constipation since Thursday per pt. Past medical history, family history, social history and allergies reviewed again and is unchanged since admission. ROS (All review of systems completed. Pertinent positives noted. Otherwise All other systems reviewed and negative.)     Medications:  Reviewed    Infusion Medications    dextrose      sodium chloride       Scheduled Medications    vancomycin (VANCOCIN) intermittent dosing (placeholder)   Other RX Placeholder    vancomycin  1,250 mg IntraVENous Q12H    fenofibrate  160 mg Oral Daily    bumetanide  1 mg Oral Daily    insulin lispro  0-18 Units SubCUTAneous TID WC    insulin lispro  0-9 Units SubCUTAneous Nightly    atorvastatin  80 mg Oral Daily    insulin glargine  30 Units SubCUTAneous Nightly    fluticasone  1 spray Each Nostril Daily    losartan  100 mg Oral Daily    metoprolol succinate  25 mg Oral Daily    pantoprazole  40 mg Oral QAM AC    Vitamin D  2,000 Units Oral Daily    piperacillin-tazobactam  3,375 mg IntraVENous Q8H    sodium chloride flush  10 mL IntraVENous 2 times per day    enoxaparin  40 mg SubCUTAneous Daily     PRN Meds: glucose, dextrose, glucagon (rDNA), dextrose, sodium chloride flush, sodium chloride, ondansetron **OR** ondansetron, polyethylene glycol, acetaminophen **OR** acetaminophen, aluminum & magnesium hydroxide-simethicone    No intake or output data in the 24 hours ending 12/11/21 0748    Diet:  ADULT DIET;  Regular; 4 carb choices (60 gm/meal)    Exam:  BP (!) 145/79   Pulse 80   Temp 98 °F (36.7 °C) (Oral)   Resp 18   Ht 6' (1.829 m)   Wt 282 lb 3 oz (128 unremarkable. In light of the history of gangrene in the right lower extremity, aortofemoral angiography is recommended for    further evaluation with possible intervention. If desired, this can be arranged through the interventional scheduling desk of [facility]'s radiology department ([phone]). 2. Inhomogeneous lesion in the right groin with increased vascularity centrally. This likely represents a cluster of lymph nodes, nonetheless, further evaluation is warranted. **This report has been created using voice recognition software. It may contain minor errors which are inherent in voice recognition technology. **      Final report electronically signed by Dr. Aram Mendoza on 12/10/2021 1:56 PM      XR TIBIA FIBULA RIGHT (2 VIEWS)   Final Result   Soft tissue irregularity. No acute fracture or dislocation. **This report has been created using voice recognition software. It may contain minor errors which are inherent in voice recognition technology. **      Final report electronically signed by Dr Catrachita Hankins on 12/9/2021 7:54 PM      XR FOOT RIGHT (MIN 3 VIEWS)   Final Result    IMPRESSION:      1. There is a linear lucency at the tip of the distal phalanx of the fifth digit which is only appreciated on one of the oblique films. This could represent a nondisplaced fracture. 2. Diffuse soft tissue swelling throughout the foot with irregularity of the soft tissues of the fifth digit. **This report has been created using voice recognition software. It may contain minor errors which are inherent in voice recognition technology. **      Final report electronically signed by Dr Catrachita Hankins on 12/9/2021 5:34 PM        XR TIBIA FIBULA RIGHT (2 VIEWS)    Result Date: 12/9/2021  PROCEDURE: XR TIBIA FIBULA RIGHT (2 VIEWS) CLINICAL INFORMATION: 25-year-old male with a wound at the medial aspect of the right leg. COMPARISON: Radiograph 3/16/2007.  TECHNIQUE: 4 views of the right DUP LOWER EXTREMITY ARTERIES RIGHT CLINICAL INFORMATION: Gangrene ([address] COMPARISON: No prior study. TECHNIQUE: Multiple permanent grayscale and color flow sonographic images of the major arteries of the right lower extremity were obtained from the level of the groin to the ankle. Spectral Doppler waveforms were obtained and velocity measurements were made. ... Technically difficult due to wrappings and wounds on legs. Not able to perform JACK due to wounds. FINDINGS: CFA ---------------> 227 PSV cm/sec PROF -------------> 58 PSV cm/sec SFA PROX ------->174 PSV cm/sec SFA MID ---------->163 PSV cm/sec SFA DIST -------->175 PSV cm/sec POP A PROX --->147 PSV cm/sec POP A DIST ---->168 PSV cm/sec PTA --------------->32 PSV cm/sec PERONEAL ----->73 PSV cm/sec ABBEY ----------------> 47 PSV cm/sec VELOCITY MEASUREMENTS: ABIs not calculated. There is moderately dampened pulsatility in the right anterior tibial and posterior tibial arteries are relatively good pulsatility in the peroneal artery. Pulsatility throughout the remainder of the right leg is unremarkable. Findings suggestive of trifurcation disease. Note that there is an inhomogeneous masslike lesion in the right groin, 5.1 cm in length and 2.4 cm in diameter with demonstration is increased vascularity. This probably represents a cluster of lymph nodes. Clinical correlation recommended. CT of the pelvis with contrast enhancement will be helpful for further evaluation     1. 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 Vascularity right lower extremity otherwise unremarkable. In light of the history of gangrene in the right lower extremity, aortofemoral angiography is recommended for further evaluation with possible intervention. If desired, this can be arranged through the interventional scheduling desk of [facility]ology department ([phone]).  2. Inhomogeneous lesion in the right groin with increased vascularity centrally. This likely represents a cluster of lymph nodes, nonetheless, further evaluation is warranted. **This report has been created using voice recognition software. It may contain minor errors which are inherent in voice recognition technology. ** Final report electronically signed by Dr. Jah Thomas on 12/10/2021 1:56 PM    VL DUP LOWER EXTREMITY VENOUS BILATERAL    Result Date: 12/10/2021  PROCEDURE: VL DUP LOWER EXTREMITY VENOUS BILATERAL CLINICAL INFORMATION: Rule out DVT COMPARISON: No prior study. TECHNIQUE: Multiple grayscale and color flow images of the major veins of both lower extremities were obtained from the level of the groin to the level of the ankle. Multiple compression and augmentation maneuvers were performed and spectral Doppler waveforms were generated. [place] .Technically difficult due to wrappings and wounds on legs. FINDINGS: RIGHT LOWER EXTREMITY:All the deep veins of the right lower extremity are widely patent with normal flow and normal compressibility. Incidental note is made of an inhomogeneous masslike lesion in the right groin, 5.1 x 2.4 cm, with increased vascularity centrally. This likely represents a clustering of lymph nodes. Nonetheless, further evaluation with contrast-enhanced CT is recommended. LEFT LOWER EXTREMITY:All the  deep veins of the left lower extremity are widely patent with normal flow and normal compressibility. 1. Normal venous ultrasound. No evidence for deep venous thrombosis. 2. Inhomogeneous lesion in the right groin with increased vascularity centrally, most likely representing lymph nodes. Nonetheless, further evaluation is warranted. See comments above. **This report has been created using voice recognition software. It may contain minor errors which are inherent in voice recognition technology. ** Final report electronically signed by Dr. Jah Thomas on 12/10/2021 1:58 PM      Electronically signed by Iglesia Smith PA-C on 12/11/2021 at 7:48 AM

## 2021-12-12 LAB
AEROBIC CULTURE: ABNORMAL
ANION GAP SERPL CALCULATED.3IONS-SCNC: 11 MEQ/L (ref 8–16)
BUN BLDV-MCNC: 19 MG/DL (ref 7–22)
CALCIUM SERPL-MCNC: 9.5 MG/DL (ref 8.5–10.5)
CHLORIDE BLD-SCNC: 98 MEQ/L (ref 98–111)
CO2: 27 MEQ/L (ref 23–33)
CREAT SERPL-MCNC: 1 MG/DL (ref 0.4–1.2)
GFR SERPL CREATININE-BSD FRML MDRD: 78 ML/MIN/1.73M2
GLUCOSE BLD-MCNC: 221 MG/DL (ref 70–108)
GLUCOSE BLD-MCNC: 245 MG/DL (ref 70–108)
GLUCOSE BLD-MCNC: 262 MG/DL (ref 70–108)
GLUCOSE BLD-MCNC: 274 MG/DL (ref 70–108)
GLUCOSE BLD-MCNC: 300 MG/DL (ref 70–108)
GRAM STAIN RESULT: ABNORMAL
ORGANISM: ABNORMAL
POTASSIUM SERPL-SCNC: 4.9 MEQ/L (ref 3.5–5.2)
SODIUM BLD-SCNC: 136 MEQ/L (ref 135–145)

## 2021-12-12 PROCEDURE — 1200000000 HC SEMI PRIVATE

## 2021-12-12 PROCEDURE — 99233 SBSQ HOSP IP/OBS HIGH 50: CPT | Performed by: PHYSICIAN ASSISTANT

## 2021-12-12 PROCEDURE — 80048 BASIC METABOLIC PNL TOTAL CA: CPT

## 2021-12-12 PROCEDURE — 82948 REAGENT STRIP/BLOOD GLUCOSE: CPT

## 2021-12-12 PROCEDURE — 2580000003 HC RX 258: Performed by: PEDIATRICS

## 2021-12-12 PROCEDURE — 6360000002 HC RX W HCPCS: Performed by: PEDIATRICS

## 2021-12-12 PROCEDURE — 6370000000 HC RX 637 (ALT 250 FOR IP): Performed by: PEDIATRICS

## 2021-12-12 PROCEDURE — 6370000000 HC RX 637 (ALT 250 FOR IP): Performed by: PHYSICIAN ASSISTANT

## 2021-12-12 PROCEDURE — 36415 COLL VENOUS BLD VENIPUNCTURE: CPT

## 2021-12-12 RX ORDER — INSULIN GLARGINE 100 [IU]/ML
35 INJECTION, SOLUTION SUBCUTANEOUS NIGHTLY
Status: DISCONTINUED | OUTPATIENT
Start: 2021-12-12 | End: 2021-12-13

## 2021-12-12 RX ADMIN — VANCOMYCIN HYDROCHLORIDE 1250 MG: 5 INJECTION, POWDER, LYOPHILIZED, FOR SOLUTION INTRAVENOUS at 23:28

## 2021-12-12 RX ADMIN — PANTOPRAZOLE SODIUM 40 MG: 40 TABLET, DELAYED RELEASE ORAL at 05:26

## 2021-12-12 RX ADMIN — LOSARTAN POTASSIUM 100 MG: 100 TABLET, FILM COATED ORAL at 08:50

## 2021-12-12 RX ADMIN — METOPROLOL SUCCINATE 25 MG: 25 TABLET, EXTENDED RELEASE ORAL at 08:50

## 2021-12-12 RX ADMIN — INSULIN LISPRO 6 UNITS: 100 INJECTION, SOLUTION INTRAVENOUS; SUBCUTANEOUS at 17:26

## 2021-12-12 RX ADMIN — Medication 2000 UNITS: at 08:50

## 2021-12-12 RX ADMIN — PIPERACILLIN AND TAZOBACTAM 3375 MG: 3; .375 INJECTION, POWDER, LYOPHILIZED, FOR SOLUTION INTRAVENOUS at 11:06

## 2021-12-12 RX ADMIN — ENOXAPARIN SODIUM 40 MG: 100 INJECTION SUBCUTANEOUS at 08:50

## 2021-12-12 RX ADMIN — INSULIN LISPRO 5 UNITS: 100 INJECTION, SOLUTION INTRAVENOUS; SUBCUTANEOUS at 12:43

## 2021-12-12 RX ADMIN — INSULIN LISPRO 9 UNITS: 100 INJECTION, SOLUTION INTRAVENOUS; SUBCUTANEOUS at 08:51

## 2021-12-12 RX ADMIN — FENOFIBRATE 160 MG: 160 TABLET ORAL at 08:50

## 2021-12-12 RX ADMIN — INSULIN LISPRO 5 UNITS: 100 INJECTION, SOLUTION INTRAVENOUS; SUBCUTANEOUS at 17:26

## 2021-12-12 RX ADMIN — INSULIN GLARGINE 35 UNITS: 100 INJECTION, SOLUTION SUBCUTANEOUS at 20:46

## 2021-12-12 RX ADMIN — PIPERACILLIN AND TAZOBACTAM 3375 MG: 3; .375 INJECTION, POWDER, LYOPHILIZED, FOR SOLUTION INTRAVENOUS at 03:58

## 2021-12-12 RX ADMIN — PIPERACILLIN AND TAZOBACTAM 3375 MG: 3; .375 INJECTION, POWDER, LYOPHILIZED, FOR SOLUTION INTRAVENOUS at 18:31

## 2021-12-12 RX ADMIN — VANCOMYCIN HYDROCHLORIDE 1250 MG: 5 INJECTION, POWDER, LYOPHILIZED, FOR SOLUTION INTRAVENOUS at 08:58

## 2021-12-12 RX ADMIN — INSULIN LISPRO 3 UNITS: 100 INJECTION, SOLUTION INTRAVENOUS; SUBCUTANEOUS at 20:46

## 2021-12-12 RX ADMIN — ATORVASTATIN CALCIUM 80 MG: 80 TABLET, FILM COATED ORAL at 08:50

## 2021-12-12 RX ADMIN — INSULIN LISPRO 5 UNITS: 100 INJECTION, SOLUTION INTRAVENOUS; SUBCUTANEOUS at 08:51

## 2021-12-12 RX ADMIN — INSULIN LISPRO 9 UNITS: 100 INJECTION, SOLUTION INTRAVENOUS; SUBCUTANEOUS at 12:43

## 2021-12-12 NOTE — PROGRESS NOTES
Department of Podiatric Surgery  Progress Note      SUBJECTIVE  12/12  Patient examined and evaluated at bedside on behalf of Dr. Cindy Peguero. Patient states that his right leg and foot are not painful. Patient denies overnight events. He denies any nausea, vomiting, fever, chills, chest pain, shortness of breath. No other pedal complaints    12/10  Patient examined and evaluated at bedside alongside of Dr. Cindy Peguero. Patient states that his right leg and foot are not painful. Patient denies overnight events. He denies any nausea, vomiting, fever, chills, chest pain, shortness of breath. No other pedal complaints      HISTORY OF PRESENT ILLNESS:      The patient is a 45 y.o. male with significant past medical history of CAD, CHF, DM, and HTN who presents with complaints of right 5th toe pain. Patient is well known to Dr. Cindy Peguero in the wound care center. Patient says that he has been continuing dressing care as he was instructed to at his last wound care visit. Patient says that last week his right first toe was hit with a door. This avulsed the toenail. Patient transilluminates with a Band-Aid. Patient says that 1 to 2 days ago, patient was starting to see the right fifth toe getting darker. Patient was also seen redness creep up his leg. Patient also says that he had the medial aspect of his right leg at work a couple of days ago. Patient denies any pain in his right lower extremity at this point in time. Patient denies any nausea, vomiting, fever, chills, chest pain, shortness of breath. No other pedal complaints      PHYSICAL EXAM:  VITALS:  BP (!) 160/81   Pulse 81   Temp 98 °F (36.7 °C) (Oral)   Resp 18   Ht 6' (1.829 m)   Wt 282 lb 3 oz (128 kg)   SpO2 98%   BMI 38.27 kg/m²   CONSTITUTIONAL:  awake, alert, cooperative, no apparent distress, and appears stated age    LOWER EXTREMITY:  VASCULAR: Pedal pulses are palpable bilaterally.   Capillary fill time grossly diminished to the fifth digit of the right foot, sluggish but still intact. Cap fill time less than 3 seconds to all other exposed digits of the bilateral lower extremities. Right foot erythematous and edematous. Temperature warm to cool from tibial tuberosity to digits of right foot. Temperature warm to cool from tibial tuberosity to digits of left foot. MUSCULOSKELETAL: No pain on palpation to the bilateral lower extremities. Rectus foot and ankle bilaterally. NEUROLOGIC: Light touch sensation grossly diminished to the digits bilaterally, light touch sensation is intact to the metatarsal heads bilaterally. SKIN: Patient has discoloration to the right fifth digit. The digit is hard to touch and has epidermal lysis at this point in time. There is no area that frankly probes deep. There is some serosanguineous drainage exuding from the medial aspect of the wound. There is extensive maceration in the 4th webspace right foot. There is erythema and edema that extends from this digit to the knee. Patient has a full-thickness ulceration on the medial aspect of the right leg. No drainage or undermining noted. The wound does not probe to bone. The erythema present before appears almost completely resolved. IMAGING:  XR TIBIA FIBULA RIGHT (2 VIEWS)    Result Date: 12/9/2021  PROCEDURE: XR TIBIA FIBULA RIGHT (2 VIEWS) CLINICAL INFORMATION: 59-year-old male with a wound at the medial aspect of the right leg. COMPARISON: Radiograph 3/16/2007. TECHNIQUE: 4 views of the right lower leg were obtained. FINDINGS: There is no acute fracture or dislocation. There are fixation screws in the distal fibula with some irregularity presumably from a previous injury. There is soft tissue irregularity at the medial aspect of the distal lower leg. Soft tissue irregularity. No acute fracture or dislocation. **This report has been created using voice recognition software.  It may contain minor errors which are inherent in voice recognition technology. ** Final report electronically signed by Dr Gentry Menendez on 12/9/2021 7:54 PM    XR FOOT RIGHT (MIN 3 VIEWS)    Result Date: 12/9/2021  PROCEDURE: XR FOOT RIGHT (MIN 3 VIEWS) CLINICAL INFORMATION: 25-year-old male who had an injury to the fifth digit of his right foot 7 days ago. Nonhealing wound. COMPARISON: No prior study. TECHNIQUE: 4 views of the right foot were obtained. FINDINGS: There is a linear lucency at the tip of the distal phalanx of the fifth digit of the right foot which is only appreciated on the oblique film. The remaining osseous structures are intact. Postsurgical changes are seen at the distal right fibula. There is no dislocation. The calcaneus is within normal limits. The base of the fifth metatarsal is intact. There is irregularity of the soft tissues of the fifth digit. There is soft tissue swelling throughout the foot. IMPRESSION: 1. There is a linear lucency at the tip of the distal phalanx of the fifth digit which is only appreciated on one of the oblique films. This could represent a nondisplaced fracture. 2. Diffuse soft tissue swelling throughout the foot with irregularity of the soft tissues of the fifth digit. **This report has been created using voice recognition software. It may contain minor errors which are inherent in voice recognition technology. ** Final report electronically signed by Dr Gentry Menendez on 12/9/2021 5:34 PM    VL DUP LOWER EXTREMITY ARTERIES RIGHT    Result Date: 12/10/2021  PROCEDURE: VL DUP LOWER EXTREMITY ARTERIES RIGHT CLINICAL INFORMATION: Gangrene ([address] COMPARISON: No prior study. TECHNIQUE: Multiple permanent grayscale and color flow sonographic images of the major arteries of the right lower extremity were obtained from the level of the groin to the ankle. Spectral Doppler waveforms were obtained and velocity measurements were made. ... Technically difficult due to wrappings and wounds on legs.  Not able to perform JACK due to wounds. FINDINGS: CFA ---------------> 227 PSV cm/sec PROF -------------> 58 PSV cm/sec SFA PROX ------->174 PSV cm/sec SFA MID ---------->163 PSV cm/sec SFA DIST -------->175 PSV cm/sec POP A PROX --->147 PSV cm/sec POP A DIST ---->168 PSV cm/sec PTA --------------->32 PSV cm/sec PERONEAL ----->73 PSV cm/sec ABBEY ----------------> 47 PSV cm/sec VELOCITY MEASUREMENTS: ABIs not calculated. There is moderately dampened pulsatility in the right anterior tibial and posterior tibial arteries are relatively good pulsatility in the peroneal artery. Pulsatility throughout the remainder of the right leg is unremarkable. Findings suggestive of trifurcation disease. Note that there is an inhomogeneous masslike lesion in the right groin, 5.1 cm in length and 2.4 cm in diameter with demonstration is increased vascularity. This probably represents a cluster of lymph nodes. Clinical correlation recommended. CT of the pelvis with contrast enhancement will be helpful for further evaluation     1. 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 Vascularity right lower extremity otherwise unremarkable. In light of the history of gangrene in the right lower extremity, aortofemoral angiography is recommended for further evaluation with possible intervention. If desired, this can be arranged through the interventional scheduling desk of [facility]'s radiology department ([phone]). 2. Inhomogeneous lesion in the right groin with increased vascularity centrally. This likely represents a cluster of lymph nodes, nonetheless, further evaluation is warranted. **This report has been created using voice recognition software. It may contain minor errors which are inherent in voice recognition technology. ** Final report electronically signed by Dr. Renita Hylton on 12/10/2021 1:56 PM    VL DUP LOWER EXTREMITY VENOUS BILATERAL    Result Date: 12/10/2021  PROCEDURE: VL DUP LOWER EXTREMITY VENOUS BILATERAL CLINICAL INFORMATION: Rule out DVT COMPARISON: No prior study. TECHNIQUE: Multiple grayscale and color flow images of the major veins of both lower extremities were obtained from the level of the groin to the level of the ankle. Multiple compression and augmentation maneuvers were performed and spectral Doppler waveforms were generated. Shaun Gutter .Technically difficult due to wrappings and wounds on legs. FINDINGS: RIGHT LOWER EXTREMITY:All the deep veins of the right lower extremity are widely patent with normal flow and normal compressibility. Incidental note is made of an inhomogeneous masslike lesion in the right groin, 5.1 x 2.4 cm, with increased vascularity centrally. This likely represents a clustering of lymph nodes. Nonetheless, further evaluation with contrast-enhanced CT is recommended. LEFT LOWER EXTREMITY:All the  deep veins of the left lower extremity are widely patent with normal flow and normal compressibility. 1. Normal venous ultrasound. No evidence for deep venous thrombosis. 2. Inhomogeneous lesion in the right groin with increased vascularity centrally, most likely representing lymph nodes. Nonetheless, further evaluation is warranted. See comments above. **This report has been created using voice recognition software. It may contain minor errors which are inherent in voice recognition technology. ** Final report electronically signed by Dr. Johan Chavez on 12/10/2021 1:58 PM      LABS:  CBC:   Lab Results   Component Value Date    WBC 6.4 12/11/2021    RBC 4.35 12/11/2021    HGB 13.3 12/11/2021    HCT 40.2 12/11/2021    MCV 92.4 12/11/2021    MCH 30.6 12/11/2021    MCHC 33.1 12/11/2021    RDW 13.2 03/23/2021     12/11/2021    MPV 10.8 12/11/2021     CMP:    Lab Results   Component Value Date     12/12/2021    K 4.9 12/12/2021    K 4.3 03/26/2020    CL 98 12/12/2021    CO2 27 12/12/2021    BUN 19 12/12/2021    CREATININE 1.0 12/12/2021    LABGLOM 78 12/12/2021    GLUCOSE 300 12/12/2021 GLUCOSE 269 03/23/2021    PROT 8.2 12/09/2021    LABALBU 3.6 12/09/2021    CALCIUM 9.5 12/12/2021    BILITOT 0.4 12/09/2021    ALKPHOS 106 12/09/2021    AST 11 12/09/2021    ALT 15 12/09/2021     LDH:  No results found for: LDH  PT/INR:  No results found for: PROTIME, INR  HgBA1c:    Lab Results   Component Value Date    LABA1C 9.4 12/10/2021        Treatment:   ASSESSMENT AND PLAN:  1. Gangrene, right 5th toe  2. Cellulitis, right leg  3. Diabetic Leg Ulceration  -Patient seen and evaluated  -Labs reviewed, white blood cell count 6.4, CRP 17.81, pro calcitonin 0.12  -Culture results: positive for serratia and coagulase positive staphylococcus   -Foot x-ray reviewed, see report above  --XR tib-fib right reviewed, see report above  -The fifth toe is looking dusky, this will likely need amputation in the near future, but not immediately. We will trend the appearance of this toe over the coming couple of days with IV antibiotics and see if it revascularizes or not  -No surgical planning at this time, digit amputation will be performed as outpatient pending progression of appearance  -IV antibiotics per primary  -Wounds dressed with Betadine to fifth toe and medial ulceration of the right leg, gauze, Kerlix  -Podiatry will continue to follow during this admission    DISPO: No surgical planning at this time: will continue to monitor appearance of right 5th digit, leg wound.         Caio Cornelius DPM

## 2021-12-12 NOTE — PROGRESS NOTES
discharge. Would benefit from pressure dressings, CHERRY hose once infection cleared. Elevate legs at all times.     6. GERD:  Home dose PPI. 7. Hyponatremia: Resolved    Chief Complaint: Right lower extremity edema erythema and pain.       Initial H and P:-    \"Murali Zhong is a 45 y.o. male with PMHx of type 2 diabetes mellitus, hypertension, hyperlipidemia, CHF, coronary disease, who presented to the emergency room here at [address] [place] [address] on 12/9/2021 complaining of a couple day history of worsening right lower extremity erythema, swelling, and pain. Reports that this last Saturday or 5 days ago he accidentally hit his right toe against a door which caused avulsion of the right fifth toenail. He then used an instrument to remove the toenail himself after which pain and redness and erythema slowly started building up and progressing proximally towards his upper extremity. No systemic fevers or chills.     Patient known to Dr. Elsa Becker and has been following in the wound care clinic with dressing changes of chronic ulcerations or diabetic foot ulcers. Seen by podiatry and diagnosed with a gangrenous right fifth toe with cellulitis and diabetic foot ulcerations. Plan was to admit patient to hospitalist service and monitor patient for response to IV antibiotics and obtain further imaging studies with possible amputation of the right fifth toe if not improved.     Upon presenting to the emergency room patient blood pressure was 125/82 with a pulse of 87 respirate of 18 temperature of 97.7 and 97% oxygen saturation on room air. Lab investigations revealed leukocytosis 11.5 hyperglycemia in the 400 range lactic acid 1.3 sed rate of 100. CRP 17.8 and procalcitonin of 0.12.     At the time of evaluation in ER patient had his wound already dressed and was laying in no acute distress and no pain. \"    12/10: Patient doing okay, sitting up in his chair. Denies any fever/chills.   He seen by podiatry is going to continue IV ABX and monitor wound. May consider amputation this admission if necessary. No CP/SOB. 12/11: cx returned growing serratia marcescens and gram + cocci, staphylococcus. Pt denies fever/chills. No CP/SOB. abd feels hard, however pt reports that's cause he just had breakfast. No abd pain, n/v/d. Constipation since Thursday per pt. Subjective (past 24 hours):   12/12: Patient sitting up in his chair. Patient states that podiatry is considering patient being discharged and following up as an outpatient for his toe. Patient does not feel like he is ready for discharge yet. Continue to monitor. Denies chest pain or shortness of breath. No fever/chills. No nausea vomiting diarrhea. Continues to report constipation, however does not wish to take stool softeners. Past medical history, family history, social history and allergies reviewed again and is unchanged since admission. ROS (All review of systems completed. Pertinent positives noted.  Otherwise All other systems reviewed and negative.)     Medications:  Reviewed    Infusion Medications    dextrose      sodium chloride       Scheduled Medications    docusate sodium  100 mg Oral Daily    insulin lispro  5 Units SubCUTAneous TID WC    vancomycin (VANCOCIN) intermittent dosing (placeholder)   Other RX Placeholder    vancomycin  1,250 mg IntraVENous Q12H    fenofibrate  160 mg Oral Daily    bumetanide  1 mg Oral Daily    insulin lispro  0-18 Units SubCUTAneous TID WC    insulin lispro  0-9 Units SubCUTAneous Nightly    atorvastatin  80 mg Oral Daily    insulin glargine  30 Units SubCUTAneous Nightly    fluticasone  1 spray Each Nostril Daily    losartan  100 mg Oral Daily    metoprolol succinate  25 mg Oral Daily    pantoprazole  40 mg Oral QAM AC    Vitamin D  2,000 Units Oral Daily    piperacillin-tazobactam  3,375 mg IntraVENous Q8H    sodium chloride flush  10 mL IntraVENous 2 times per day    enoxaparin  40 mg SubCUTAneous Daily     PRN Meds: glucose, dextrose, glucagon (rDNA), dextrose, sodium chloride flush, sodium chloride, ondansetron **OR** ondansetron, polyethylene glycol, acetaminophen **OR** acetaminophen, aluminum & magnesium hydroxide-simethicone      Intake/Output Summary (Last 24 hours) at 12/12/2021 0846  Last data filed at 12/11/2021 1413  Gross per 24 hour   Intake 400 ml   Output --   Net 400 ml       Diet:  ADULT DIET; Regular; 4 carb choices (60 gm/meal)    Exam:  BP (!) 160/81   Pulse 81   Temp 98 °F (36.7 °C) (Oral)   Resp 18   Ht 6' (1.829 m)   Wt 282 lb 3 oz (128 kg)   SpO2 98%   BMI 38.27 kg/m²   General appearance: Obese, no apparent distress, appears stated age and cooperative. HEENT: Pupils equal, round, and reactive to light. Conjunctivae/corneas clear. Neck: Supple, with full range of motion. No jugular venous distention. Trachea midline. Respiratory:  Normal respiratory effort. Clear to auscultation, bilaterally without Rales/Wheezes/Rhonchi. Cardiovascular: Regular rate and rhythm with normal S1/S2 without murmurs, rubs or gallops. Abdomen: Soft, non-tender, non-distended with normal bowel sounds. Musculoskeletal: passive and active ROM x 4 extremities. Right gangrenous fifth toe  Skin: Chronic venous stasis changes bilaterally. RLE wrapped, wound not visualized. Neurologic:  Neurovascularly intact without any focal sensory/motor deficits.  Cranial nerves: II-XII intact, grossly non-focal.  Psychiatric: Alert and oriented x4, thought content appropriate, normal insight  Capillary Refill: Brisk,< 3 seconds   Peripheral Pulses: +2 palpable, equal bilaterally     Labs:   Recent Labs     12/09/21  1837 12/11/21  0615   WBC 11.5* 6.4   HGB 14.1 13.3*   HCT 42.2 40.2*    294     Recent Labs     12/09/21  1837 12/11/21  0615 12/12/21  0535   * 131* 136   K 5.0 4.7 4.9   CL 96* 96* 98   CO2 26 25 27   BUN 19 19 19   CREATININE 1.1 0.9 1.0   CALCIUM 9.5 9.1 9.5 Recent Labs     12/09/21  1837   AST 11   ALT 15   BILIDIR <0.2   BILITOT 0.4   ALKPHOS 106     No results for input(s): INR in the last 72 hours. No results for input(s): Cherre Gash in the last 72 hours. Microbiology:    Blood culture #1:   Lab Results   Component Value Date    BC No growth-preliminary  12/09/2021    BC No growth-preliminary  12/09/2021       Blood culture #2:No results found for: Ph[facility] Gorman    Organism:  Lab Results   Component Value Date    ORG Serratia marcescens 12/09/2021    ORG gram positive cocci 12/09/2021    ORG Staphylococcus (coagulase positive) 12/09/2021         Lab Results   Component Value Date    LABGRAM  12/09/2021     Rare segmented neutrophils observed. No epithelial cells observed. No organisms observed. MRSA culture only:No results found for: [facility]    Urine culture: No results found for: LABURIN    Respiratory culture: No results found for: CULTRESP    Aerobic and Anaerobic :  Lab Results   Component Value Date    LABAERO  12/09/2021     moderate growth Serratia species which may be initially susceptible to third generation cephalosporins may develop resistance within three to four days of initiation of this antimicrobial therapy. LABAERO moderate growth  12/09/2021    LABAERO light growth  12/09/2021     Lab Results   Component Value Date    LABANAE  03/25/2020     Culture yielded heavy growth of anaerobic gram positive cocci. If a true mixed aerobic and anaerobic infection is suspected, then broad spectrum empiric antibiotic therapy is indicated and should include coverage for anaerobic organisms. Urinalysis:      Lab Results   Component Value Date    NITRU NEGATIVE 07/22/2021    WBCUA  07/22/2021    BACTERIA NONE SEEN 07/22/2021    RBCUA 3-5 07/22/2021    BLOODU NEGATIVE 07/22/2021    SPECGRAV >1.030 07/22/2021       Radiology:  VL DUP LOWER EXTREMITY VENOUS BILATERAL   Final Result   1. Normal venous ultrasound.  No evidence for deep venous thrombosis. 2. Inhomogeneous lesion in the right groin with increased vascularity centrally, most likely representing lymph nodes. Nonetheless, further evaluation is warranted. See comments above. **This report has been created using voice recognition software. It may contain minor errors which are inherent in voice recognition technology. **      Final report electronically signed by Dr. Geeta Syed on 12/10/2021 1:58 PM      VL DUP LOWER EXTREMITY ARTERIES RIGHT   Final Result   1. 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 Vascularity right lower extremity otherwise unremarkable. In light of the history of gangrene in the right lower extremity, aortofemoral angiography is recommended for    further evaluation with possible intervention. If desired, this can be arranged through the interventional scheduling desk of [facility] radiology department ([phone]). 2. Inhomogeneous lesion in the right groin with increased vascularity centrally. This likely represents a cluster of lymph nodes, nonetheless, further evaluation is warranted. **This report has been created using voice recognition software. It may contain minor errors which are inherent in voice recognition technology. **      Final report electronically signed by Dr. Geeta Syed on 12/10/2021 1:56 PM      XR TIBIA FIBULA RIGHT (2 VIEWS)   Final Result   Soft tissue irregularity. No acute fracture or dislocation. **This report has been created using voice recognition software. It may contain minor errors which are inherent in voice recognition technology. **      Final report electronically signed by Dr Brittany Land on 12/9/2021 7:54 PM      XR FOOT RIGHT (MIN 3 VIEWS)   Final Result    IMPRESSION:      1. There is a linear lucency at the tip of the distal phalanx of the fifth digit which is only appreciated on one of the oblique films.  This could represent a swelling throughout the foot. IMPRESSION: 1. There is a linear lucency at the tip of the distal phalanx of the fifth digit which is only appreciated on one of the oblique films. This could represent a nondisplaced fracture. 2. Diffuse soft tissue swelling throughout the foot with irregularity of the soft tissues of the fifth digit. **This report has been created using voice recognition software. It may contain minor errors which are inherent in voice recognition technology. ** Final report electronically signed by Dr Alan Taylor on 12/9/2021 5:34 PM     DUP LOWER EXTREMITY ARTERIES RIGHT    Result Date: 12/10/2021  PROCEDURE: VL DUP LOWER EXTREMITY ARTERIES RIGHT CLINICAL INFORMATION: Gangrene ([address] COMPARISON: No prior study. TECHNIQUE: Multiple permanent grayscale and color flow sonographic images of the major arteries of the right lower extremity were obtained from the level of the groin to the ankle. Spectral Doppler waveforms were obtained and velocity measurements were made. ... Technically difficult due to wrappings and wounds on legs. Not able to perform JACK due to wounds. FINDINGS: CFA ---------------> 227 PSV cm/sec PROF -------------> 58 PSV cm/sec SFA PROX ------->174 PSV cm/sec SFA MID ---------->163 PSV cm/sec SFA DIST -------->175 PSV cm/sec POP A PROX --->147 PSV cm/sec POP A DIST ---->168 PSV cm/sec PTA --------------->32 PSV cm/sec PERONEAL ----->73 PSV cm/sec ABBEY ----------------> 47 PSV cm/sec VELOCITY MEASUREMENTS: ABIs not calculated. There is moderately dampened pulsatility in the right anterior tibial and posterior tibial arteries are relatively good pulsatility in the peroneal artery. Pulsatility throughout the remainder of the right leg is unremarkable. Findings suggestive of trifurcation disease. Note that there is an inhomogeneous masslike lesion in the right groin, 5.1 cm in length and 2.4 cm in diameter with demonstration is increased vascularity.  This probably represents a cluster of lymph nodes. Clinical correlation recommended. CT of the pelvis with contrast enhancement will be helpful for further evaluation     1. 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 Vascularity right lower extremity otherwise unremarkable. In light of the history of gangrene in the right lower extremity, aortofemoral angiography is recommended for further evaluation with possible intervention. If desired, this can be arranged through the interventional scheduling desk of [facility] radiology department ([phone]). 2. Inhomogeneous lesion in the right groin with increased vascularity centrally. This likely represents a cluster of lymph nodes, nonetheless, further evaluation is warranted. **This report has been created using voice recognition software. It may contain minor errors which are inherent in voice recognition technology. ** Final report electronically signed by Dr. Nikita Robins on 12/10/2021 1:56 PM    VL DUP LOWER EXTREMITY VENOUS BILATERAL    Result Date: 12/10/2021  PROCEDURE: VL DUP LOWER EXTREMITY VENOUS BILATERAL CLINICAL INFORMATION: Rule out DVT COMPARISON: No prior study. TECHNIQUE: Multiple grayscale and color flow images of the major veins of both lower extremities were obtained from the level of the groin to the level of the ankle. Multiple compression and augmentation maneuvers were performed and spectral Doppler waveforms were generated. Suki Quiroz .Technically difficult due to wrappings and wounds on legs. FINDINGS: RIGHT LOWER EXTREMITY:All the deep veins of the right lower extremity are widely patent with normal flow and normal compressibility. Incidental note is made of an inhomogeneous masslike lesion in the right groin, 5.1 x 2.4 cm, with increased vascularity centrally. This likely represents a clustering of lymph nodes. Nonetheless, further evaluation with contrast-enhanced CT is recommended.  LEFT LOWER EXTREMITY:All the  deep veins of the left lower extremity are widely patent with normal flow and normal compressibility. 1. Normal venous ultrasound. No evidence for deep venous thrombosis. 2. Inhomogeneous lesion in the right groin with increased vascularity centrally, most likely representing lymph nodes. Nonetheless, further evaluation is warranted. See comments above. **This report has been created using voice recognition software. It may contain minor errors which are inherent in voice recognition technology. ** Final report electronically signed by Dr. Beto Lofton on 12/10/2021 1:58 PM      Electronically signed by Carly Bonilla PA-C on 12/12/2021 at 8:46 AM

## 2021-12-13 LAB
BASOPHILS # BLD: 1.1 %
BASOPHILS ABSOLUTE: 0.1 THOU/MM3 (ref 0–0.1)
EOSINOPHIL # BLD: 2 %
EOSINOPHILS ABSOLUTE: 0.1 THOU/MM3 (ref 0–0.4)
ERYTHROCYTE [DISTWIDTH] IN BLOOD BY AUTOMATED COUNT: 12.3 % (ref 11.5–14.5)
ERYTHROCYTE [DISTWIDTH] IN BLOOD BY AUTOMATED COUNT: 42.1 FL (ref 35–45)
GLUCOSE BLD-MCNC: 217 MG/DL (ref 70–108)
GLUCOSE BLD-MCNC: 223 MG/DL (ref 70–108)
GLUCOSE BLD-MCNC: 298 MG/DL (ref 70–108)
GLUCOSE BLD-MCNC: 322 MG/DL (ref 70–108)
HCT VFR BLD CALC: 43.5 % (ref 42–52)
HEMOGLOBIN: 14.4 GM/DL (ref 14–18)
IMMATURE GRANS (ABS): 0.02 THOU/MM3 (ref 0–0.07)
IMMATURE GRANULOCYTES: 0.4 %
LYMPHOCYTES # BLD: 25 %
LYMPHOCYTES ABSOLUTE: 1.4 THOU/MM3 (ref 1–4.8)
MCH RBC QN AUTO: 30.8 PG (ref 26–33)
MCHC RBC AUTO-ENTMCNC: 33.1 GM/DL (ref 32.2–35.5)
MCV RBC AUTO: 93.1 FL (ref 80–94)
MONOCYTES # BLD: 10.3 %
MONOCYTES ABSOLUTE: 0.6 THOU/MM3 (ref 0.4–1.3)
NUCLEATED RED BLOOD CELLS: 2 /100 WBC
PLATELET # BLD: 297 THOU/MM3 (ref 130–400)
PMV BLD AUTO: 10.5 FL (ref 9.4–12.4)
RBC # BLD: 4.67 MILL/MM3 (ref 4.7–6.1)
SEG NEUTROPHILS: 61.2 %
SEGMENTED NEUTROPHILS ABSOLUTE COUNT: 3.4 THOU/MM3 (ref 1.8–7.7)
VANCOMYCIN RANDOM: 11.5 UG/ML (ref 0.1–39.9)
WBC # BLD: 5.5 THOU/MM3 (ref 4.8–10.8)

## 2021-12-13 PROCEDURE — 80202 ASSAY OF VANCOMYCIN: CPT

## 2021-12-13 PROCEDURE — 2580000003 HC RX 258: Performed by: PEDIATRICS

## 2021-12-13 PROCEDURE — 99233 SBSQ HOSP IP/OBS HIGH 50: CPT | Performed by: PHYSICIAN ASSISTANT

## 2021-12-13 PROCEDURE — 6360000002 HC RX W HCPCS: Performed by: PEDIATRICS

## 2021-12-13 PROCEDURE — 82948 REAGENT STRIP/BLOOD GLUCOSE: CPT

## 2021-12-13 PROCEDURE — 6370000000 HC RX 637 (ALT 250 FOR IP): Performed by: PEDIATRICS

## 2021-12-13 PROCEDURE — 1200000000 HC SEMI PRIVATE

## 2021-12-13 PROCEDURE — 36415 COLL VENOUS BLD VENIPUNCTURE: CPT

## 2021-12-13 PROCEDURE — 85025 COMPLETE CBC W/AUTO DIFF WBC: CPT

## 2021-12-13 PROCEDURE — 6370000000 HC RX 637 (ALT 250 FOR IP): Performed by: PHYSICIAN ASSISTANT

## 2021-12-13 RX ORDER — INSULIN GLARGINE 100 [IU]/ML
45 INJECTION, SOLUTION SUBCUTANEOUS NIGHTLY
Status: DISCONTINUED | OUTPATIENT
Start: 2021-12-13 | End: 2021-12-15 | Stop reason: HOSPADM

## 2021-12-13 RX ADMIN — ATORVASTATIN CALCIUM 80 MG: 80 TABLET, FILM COATED ORAL at 09:56

## 2021-12-13 RX ADMIN — PIPERACILLIN AND TAZOBACTAM 3375 MG: 3; .375 INJECTION, POWDER, LYOPHILIZED, FOR SOLUTION INTRAVENOUS at 14:06

## 2021-12-13 RX ADMIN — METOPROLOL SUCCINATE 25 MG: 25 TABLET, EXTENDED RELEASE ORAL at 09:56

## 2021-12-13 RX ADMIN — SODIUM CHLORIDE, PRESERVATIVE FREE 10 ML: 5 INJECTION INTRAVENOUS at 10:06

## 2021-12-13 RX ADMIN — INSULIN LISPRO 6 UNITS: 100 INJECTION, SOLUTION INTRAVENOUS; SUBCUTANEOUS at 17:54

## 2021-12-13 RX ADMIN — PANTOPRAZOLE SODIUM 40 MG: 40 TABLET, DELAYED RELEASE ORAL at 10:05

## 2021-12-13 RX ADMIN — INSULIN LISPRO 6 UNITS: 100 INJECTION, SOLUTION INTRAVENOUS; SUBCUTANEOUS at 13:12

## 2021-12-13 RX ADMIN — INSULIN GLARGINE 45 UNITS: 100 INJECTION, SOLUTION SUBCUTANEOUS at 21:45

## 2021-12-13 RX ADMIN — INSULIN LISPRO 12 UNITS: 100 INJECTION, SOLUTION INTRAVENOUS; SUBCUTANEOUS at 10:03

## 2021-12-13 RX ADMIN — PIPERACILLIN AND TAZOBACTAM 3375 MG: 3; .375 INJECTION, POWDER, LYOPHILIZED, FOR SOLUTION INTRAVENOUS at 23:52

## 2021-12-13 RX ADMIN — INSULIN LISPRO 5 UNITS: 100 INJECTION, SOLUTION INTRAVENOUS; SUBCUTANEOUS at 10:03

## 2021-12-13 RX ADMIN — VANCOMYCIN HYDROCHLORIDE 1500 MG: 5 INJECTION, POWDER, LYOPHILIZED, FOR SOLUTION INTRAVENOUS at 21:48

## 2021-12-13 RX ADMIN — INSULIN LISPRO 5 UNITS: 100 INJECTION, SOLUTION INTRAVENOUS; SUBCUTANEOUS at 21:45

## 2021-12-13 RX ADMIN — VANCOMYCIN HYDROCHLORIDE 1500 MG: 5 INJECTION, POWDER, LYOPHILIZED, FOR SOLUTION INTRAVENOUS at 10:14

## 2021-12-13 RX ADMIN — LOSARTAN POTASSIUM 100 MG: 100 TABLET, FILM COATED ORAL at 09:56

## 2021-12-13 RX ADMIN — INSULIN LISPRO 5 UNITS: 100 INJECTION, SOLUTION INTRAVENOUS; SUBCUTANEOUS at 17:54

## 2021-12-13 RX ADMIN — ENOXAPARIN SODIUM 40 MG: 100 INJECTION SUBCUTANEOUS at 10:06

## 2021-12-13 RX ADMIN — FENOFIBRATE 160 MG: 160 TABLET ORAL at 09:56

## 2021-12-13 RX ADMIN — PIPERACILLIN AND TAZOBACTAM 3375 MG: 3; .375 INJECTION, POWDER, LYOPHILIZED, FOR SOLUTION INTRAVENOUS at 01:32

## 2021-12-13 RX ADMIN — Medication 2000 UNITS: at 09:56

## 2021-12-13 RX ADMIN — INSULIN LISPRO 5 UNITS: 100 INJECTION, SOLUTION INTRAVENOUS; SUBCUTANEOUS at 13:12

## 2021-12-13 NOTE — PROGRESS NOTES
Department of Podiatric Surgery  Progress Note      SUBJECTIVE  12/30/2021  Patient seen this morning on behalf of Dr. Cindy Peguero. Patient is pleasant, and is alert and oriented to person, place, time. Patient was seated in a chair side and that time of the encounter. Patient denies any pain to his right leg. He also denies any N/V/F/C/SOB/CP or bilateral calf tenderness. Patient was curious if he may well require amputation of the toe. He has no other pedal complaints at this time. 12/12  Patient examined and evaluated at bedside on behalf of Dr. Cindy Peguero. Patient states that his right leg and foot are not painful. Patient denies overnight events. He denies any nausea, vomiting, fever, chills, chest pain, shortness of breath. No other pedal complaints    12/10  Patient examined and evaluated at bedside alongside of Dr. Cindy Peguero. Patient states that his right leg and foot are not painful. Patient denies overnight events. He denies any nausea, vomiting, fever, chills, chest pain, shortness of breath. No other pedal complaints      HISTORY OF PRESENT ILLNESS:      The patient is a 45 y.o. male with significant past medical history of CAD, CHF, DM, and HTN who presents with complaints of right 5th toe pain. Patient is well known to Dr. Cindy Peguero in the wound care center. Patient says that he has been continuing dressing care as he was instructed to at his last wound care visit. Patient says that last week his right first toe was hit with a door. This avulsed the toenail. Patient transilluminates with a Band-Aid. Patient says that 1 to 2 days ago, patient was starting to see the right fifth toe getting darker. Patient was also seen redness creep up his leg. Patient also says that he had the medial aspect of his right leg at work a couple of days ago. Patient denies any pain in his right lower extremity at this point in time. Patient denies any nausea, vomiting, fever, chills, chest pain, shortness of breath.   No other pedal COMPARISON: Radiograph 3/16/2007. TECHNIQUE: 4 views of the right lower leg were obtained. FINDINGS: There is no acute fracture or dislocation. There are fixation screws in the distal fibula with some irregularity presumably from a previous injury. There is soft tissue irregularity at the medial aspect of the distal lower leg. Soft tissue irregularity. No acute fracture or dislocation. **This report has been created using voice recognition software. It may contain minor errors which are inherent in voice recognition technology. ** Final report electronically signed by Dr Catrachita Hankins on 12/9/2021 7:54 PM    XR FOOT RIGHT (MIN 3 VIEWS)    Result Date: 12/9/2021  PROCEDURE: XR FOOT RIGHT (MIN 3 VIEWS) CLINICAL INFORMATION: 40-year-old male who had an injury to the fifth digit of his right foot 7 days ago. Nonhealing wound. COMPARISON: No prior study. TECHNIQUE: 4 views of the right foot were obtained. FINDINGS: There is a linear lucency at the tip of the distal phalanx of the fifth digit of the right foot which is only appreciated on the oblique film. The remaining osseous structures are intact. Postsurgical changes are seen at the distal right fibula. There is no dislocation. The calcaneus is within normal limits. The base of the fifth metatarsal is intact. There is irregularity of the soft tissues of the fifth digit. There is soft tissue swelling throughout the foot. IMPRESSION: 1. There is a linear lucency at the tip of the distal phalanx of the fifth digit which is only appreciated on one of the oblique films. This could represent a nondisplaced fracture. 2. Diffuse soft tissue swelling throughout the foot with irregularity of the soft tissues of the fifth digit. **This report has been created using voice recognition software. It may contain minor errors which are inherent in voice recognition technology. ** Final report electronically signed by Dr Catrachita Hankins on 12/9/2021 5:34 PM    VL DUP LOWER EXTREMITY ARTERIES RIGHT    Result Date: 12/10/2021  PROCEDURE: VL DUP LOWER EXTREMITY ARTERIES RIGHT CLINICAL INFORMATION: Gangrene ([address] COMPARISON: No prior study. TECHNIQUE: Multiple permanent grayscale and color flow sonographic images of the major arteries of the right lower extremity were obtained from the level of the groin to the ankle. Spectral Doppler waveforms were obtained and velocity measurements were made. ... Technically difficult due to wrappings and wounds on legs. Not able to perform JACK due to wounds. FINDINGS: CFA ---------------> 227 PSV cm/sec PROF -------------> 58 PSV cm/sec SFA PROX ------->174 PSV cm/sec SFA MID ---------->163 PSV cm/sec SFA DIST -------->175 PSV cm/sec POP A PROX --->147 PSV cm/sec POP A DIST ---->168 PSV cm/sec PTA --------------->32 PSV cm/sec PERONEAL ----->73 PSV cm/sec ABBEY ----------------> 47 PSV cm/sec VELOCITY MEASUREMENTS: ABIs not calculated. There is moderately dampened pulsatility in the right anterior tibial and posterior tibial arteries are relatively good pulsatility in the peroneal artery. Pulsatility throughout the remainder of the right leg is unremarkable. Findings suggestive of trifurcation disease. Note that there is an inhomogeneous masslike lesion in the right groin, 5.1 cm in length and 2.4 cm in diameter with demonstration is increased vascularity. This probably represents a cluster of lymph nodes. Clinical correlation recommended. CT of the pelvis with contrast enhancement will be helpful for further evaluation     1. 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 Vascularity right lower extremity otherwise unremarkable. In light of the history of gangrene in the right lower extremity, aortofemoral angiography is recommended for further evaluation with possible intervention. If desired, this can be arranged through the interventional scheduling desk of [facility] radiology department ([phone]).  2. Inhomogeneous lesion in the right groin with increased vascularity centrally. This likely represents a cluster of lymph nodes, nonetheless, further evaluation is warranted. **This report has been created using voice recognition software. It may contain minor errors which are inherent in voice recognition technology. ** Final report electronically signed by Dr. Denver Busby on 12/10/2021 1:56 PM    VL DUP LOWER EXTREMITY VENOUS BILATERAL    Result Date: 12/10/2021  PROCEDURE: VL DUP LOWER EXTREMITY VENOUS BILATERAL CLINICAL INFORMATION: Rule out DVT COMPARISON: No prior study. TECHNIQUE: Multiple grayscale and color flow images of the major veins of both lower extremities were obtained from the level of the groin to the level of the ankle. Multiple compression and augmentation maneuvers were performed and spectral Doppler waveforms were generated. Priti Stands .Technically difficult due to wrappings and wounds on legs. FINDINGS: RIGHT LOWER EXTREMITY:All the deep veins of the right lower extremity are widely patent with normal flow and normal compressibility. Incidental note is made of an inhomogeneous masslike lesion in the right groin, 5.1 x 2.4 cm, with increased vascularity centrally. This likely represents a clustering of lymph nodes. Nonetheless, further evaluation with contrast-enhanced CT is recommended. LEFT LOWER EXTREMITY:All the  deep veins of the left lower extremity are widely patent with normal flow and normal compressibility. 1. Normal venous ultrasound. No evidence for deep venous thrombosis. 2. Inhomogeneous lesion in the right groin with increased vascularity centrally, most likely representing lymph nodes. Nonetheless, further evaluation is warranted. See comments above. **This report has been created using voice recognition software. It may contain minor errors which are inherent in voice recognition technology. ** Final report electronically signed by Dr. Denver Busby on 12/10/2021 1:58 PM      LABS:  CBC:   Lab Results   Component Value Date    WBC 6.4 12/11/2021    RBC 4.35 12/11/2021    HGB 13.3 12/11/2021    HCT 40.2 12/11/2021    MCV 92.4 12/11/2021    MCH 30.6 12/11/2021    MCHC 33.1 12/11/2021    RDW 13.2 03/23/2021     12/11/2021    MPV 10.8 12/11/2021     CMP:    Lab Results   Component Value Date     12/12/2021    K 4.9 12/12/2021    K 4.3 03/26/2020    CL 98 12/12/2021    CO2 27 12/12/2021    BUN 19 12/12/2021    CREATININE 1.0 12/12/2021    LABGLOM 78 12/12/2021    GLUCOSE 300 12/12/2021    GLUCOSE 269 03/23/2021    PROT 8.2 12/09/2021    LABALBU 3.6 12/09/2021    CALCIUM 9.5 12/12/2021    BILITOT 0.4 12/09/2021    ALKPHOS 106 12/09/2021    AST 11 12/09/2021    ALT 15 12/09/2021     LDH:  No results found for: LDH  PT/INR:  No results found for: PROTIME, INR  HgBA1c:    Lab Results   Component Value Date    LABA1C 9.4 12/10/2021        Treatment:   ASSESSMENT AND PLAN:  1. Gangrene, right 5th toe  2. Cellulitis, right leg  3. Diabetic Leg Ulceration  -Patient seen and evaluated  -Labs reviewed, WBC 6.4 on 12/11/2021; CRP 17.81, pro calcitonin 0.12  -Ordered CBC with differential  -Culture results: positive for serratia and coagulase positive staphylococcus   -Foot x-ray reviewed, see report above  --XR tib-fib right reviewed, see report above  -The fifth toe continues to look dusky and with much reduced CFT; discussed with patient and his wife (via phone) that this will likely need amputation in the near future. Discussed with patient and his wife that we will trend the appearance of this toe over the coming couple of days with IV antibiotics and see if it revascularizes or not.   -IV antibiotics per primary  -Applied Betadine to wound on medial RLE and to stable eschars on anterior of RLE; applied Betadine wet to dry to right fifth digit and covered with 4 x 4 gauze, Kerlix rolls, and loosely wrapped Ace bandages  -Podiatry will continue to follow during this admission    DISPO: Will continue to monitor appearance of right 5th digit, leg wound. Amputation of right fifth digit may be a possibility during this admission.        Amberly Paulson DPM

## 2021-12-13 NOTE — PROGRESS NOTES
Hospitalist Progress Note      Patient:  Krystin Govea    Unit/Bed:5K-18/018-A  YOB: 1970  MRN: [id number]   Acct: [de-identified]   PCP: Damon Garcia DPM  Date of Admission: 12/9/2021    Assessment/Plan:       1. Right lower extremity cellulitis, gangrenous right fifth toe, and diabetic ulcerations:   Continue plan of care per podiatry team.  Right tib-fib x-ray with no evidence of fracture or dislocation. Tissue cultures-> Serratia marcescens, gram + cocci and light growth staphylococcus (coag +). Consider ID consultation pending course. Continue empiric antibiotic coverage with Zosyn and vancomycin. Possible right fifth toe amputation per podiatry if no response to antibiotic. 2.  IDDM II, uncontrolled:   Uncontrolled secondary to infection. Hemoglobin A1c 9.4. Hold Metformin and glipizide. Medium dose sliding scale insulin coverage and Lantus coverage, increased to 45 units. Add TID humalog 5 units at meal times. Accu-Cheks before every meal and at bedtime. Diabetic diet.     3.  Essential hypertension / HLD  Resume home dose losartan, metoprolol, fenofibrate, and statin.     4. Chronic HFpEF without acute decompensation:  Last echocardiogram 3/2020 with an estimated ejection fraction of the left ventricle at 50% with no regional wall motion abnormalities. LV grade 1 diastolic dysfunction. Currently does not seem to be in a decompensated state although does have mild worsening of lower extremity edema from infection. Check proBNP and bilateral lower extremity Doppler ultrasound. Resume home dose maintenance medications for CHF including metoprolol, losartan, and Bumex. Record strict I/Os and daily weights.     5. Chronic venous insufficiency with stasis dermatitis:  Bilateral lower extremity Doppler ultrasound to rule out DVT -> neg  Treat underlying infection as above in #1.   Continue to follow with podiatry and wound care upon discharge. Would benefit from pressure dressings, CHERRY hose once infection cleared. Elevate legs at all times.     6. GERD:  Home dose PPI. 7. Hyponatremia: Resolved    Chief Complaint: Right lower extremity edema erythema and pain.       Initial H and P:-    \"Murali Mendez is a 45 y.o. male with PMHx of type 2 diabetes mellitus, hypertension, hyperlipidemia, CHF, coronary disease, who presented to the emergency room here at [facility] on 12/9/2021 complaining of a couple day history of worsening right lower extremity erythema, swelling, and pain. Reports that this last Saturday or 5 days ago he accidentally hit his right toe against a door which caused avulsion of the right fifth toenail. He then used an instrument to remove the toenail himself after which pain and redness and erythema slowly started building up and progressing proximally towards his upper extremity. No systemic fevers or chills.     Patient known to Dr. Leora Garcia and has been following in the wound care clinic with dressing changes of chronic ulcerations or diabetic foot ulcers. Seen by podiatry and diagnosed with a gangrenous right fifth toe with cellulitis and diabetic foot ulcerations. Plan was to admit patient to hospitalist service and monitor patient for response to IV antibiotics and obtain further imaging studies with possible amputation of the right fifth toe if not improved.     Upon presenting to the emergency room patient blood pressure was 125/82 with a pulse of 87 respirate of 18 temperature of 97.7 and 97% oxygen saturation on room air. Lab investigations revealed leukocytosis 11.5 hyperglycemia in the 400 range lactic acid 1.3 sed rate of 100. CRP 17.8 and procalcitonin of 0.12.     At the time of evaluation in ER patient had his wound already dressed and was laying in no acute distress and no pain. \"    12/10: Patient doing okay, sitting up in his chair. Denies any fever/chills.   He seen by podiatry is Oral QAM AC    Vitamin D  2,000 Units Oral Daily    piperacillin-tazobactam  3,375 mg IntraVENous Q8H    sodium chloride flush  10 mL IntraVENous 2 times per day    enoxaparin  40 mg SubCUTAneous Daily     PRN Meds: glucose, dextrose, glucagon (rDNA), dextrose, sodium chloride flush, sodium chloride, ondansetron **OR** ondansetron, polyethylene glycol, acetaminophen **OR** acetaminophen, aluminum & magnesium hydroxide-simethicone      Intake/Output Summary (Last 24 hours) at 12/13/2021 1457  Last data filed at 12/13/2021 1407  Gross per 24 hour   Intake 4130.84 ml   Output --   Net 4130.84 ml       Diet:  ADULT DIET; Regular; 4 carb choices (60 gm/meal)    Exam:  BP (!) 158/79   Pulse 76   Temp 98.1 °F (36.7 °C) (Oral)   Resp 18   Ht 6' (1.829 m)   Wt 282 lb 3 oz (128 kg)   SpO2 95%   BMI 38.27 kg/m²   General appearance: Obese, no apparent distress, appears stated age and cooperative. HEENT: Pupils equal, round, and reactive to light. Conjunctivae/corneas clear. Neck: Supple, with full range of motion. No jugular venous distention. Trachea midline. Respiratory:  Normal respiratory effort. Clear to auscultation, bilaterally without Rales/Wheezes/Rhonchi. Cardiovascular: Regular rate and rhythm with normal S1/S2 without murmurs, rubs or gallops. Abdomen: Soft, non-tender, non-distended with normal bowel sounds. Musculoskeletal: passive and active ROM x 4 extremities. Right gangrenous fifth toe  Skin: Chronic venous stasis changes bilaterally. RLE wrapped, wound not visualized. Neurologic:  Neurovascularly intact without any focal sensory/motor deficits.  Cranial nerves: II-XII intact, grossly non-focal.  Psychiatric: Alert and oriented x4, thought content appropriate, normal insight  Capillary Refill: Brisk,< 3 seconds   Peripheral Pulses: +2 palpable, equal bilaterally     Labs:   Recent Labs     12/11/21  0615 12/13/21  1106   WBC 6.4 5.5   HGB 13.3* 14.4   HCT 40.2* 43.5    297     Recent Labs     12/11/21  0615 12/12/21  0535   * 136   K 4.7 4.9   CL 96* 98   CO2 25 27   BUN 19 19   CREATININE 0.9 1.0   CALCIUM 9.1 9.5     No results for input(s): AST, ALT, BILIDIR, BILITOT, ALKPHOS in the last 72 hours. No results for input(s): INR in the last 72 hours. No results for input(s): Feliciano Shackle in the last 72 hours. Microbiology:    Blood culture #1:   Lab Results   Component Value Date    BC No growth-preliminary  12/09/2021    BC No growth-preliminary  12/09/2021       Blood culture #2:No results found for: Venkat Vaz    Organism:  Lab Results   Component Value Date    ORG Serratia marcescens 12/09/2021    ORG Streptococcus agalactiae - (Group B) 12/09/2021    ORG Staphylococcus aureus 12/09/2021         Lab Results   Component Value Date    LABGRAM  12/09/2021     Rare segmented neutrophils observed. No epithelial cells observed. No organisms observed. MRSA culture only:No results found for: [facility]    Urine culture: No results found for: LABURIN    Respiratory culture: No results found for: CULTRESP    Aerobic and Anaerobic :  Lab Results   Component Value Date    LABAERO  12/09/2021     moderate growth Serratia species which may be initially susceptible to third generation cephalosporins may develop resistance within three to four days of initiation of this antimicrobial therapy. LABAERO  12/09/2021     moderate growth Group B streptococci are suspectible to ampicillin, penicillin and cefazolin, but may be erythromycin and/or clindamycin resistant. Contact microbiology if erythromycin and/or clindamycin testing is necessary. LABAERO  12/09/2021     light growth This is a MRSA (Methicillin Resistant Staphylococcus aureus)isolate. Isolates of MRSA (ORSA) Methicillin (Oxacillin) Resistant Staphylococcus aureus (coagulase positive) require patient be placed in CONTACT isolation.  Methicillin(Oxacillin)resistant strains of staphylococci (MRSA)or(MRSE)should be considered resistant to all classes of cephalosporins, penems and beta-lactams. In the treatment of gram positive infections, GENTAMICIN should be CONSIDERED a SYNERGYSTIC agent ONLY. Lab Results   Component Value Date    LABANAE  03/25/2020     Culture yielded heavy growth of anaerobic gram positive cocci. If a true mixed aerobic and anaerobic infection is suspected, then broad spectrum empiric antibiotic therapy is indicated and should include coverage for anaerobic organisms. Urinalysis:      Lab Results   Component Value Date    NITRU NEGATIVE 07/22/2021    WBCUA  07/22/2021    BACTERIA NONE SEEN 07/22/2021    RBCUA 3-5 07/22/2021    BLOODU NEGATIVE 07/22/2021    SPECGRAV >1.030 07/22/2021       Radiology:  VL DUP LOWER EXTREMITY VENOUS BILATERAL   Final Result   1. Normal venous ultrasound. No evidence for deep venous thrombosis. 2. Inhomogeneous lesion in the right groin with increased vascularity centrally, most likely representing lymph nodes. Nonetheless, further evaluation is warranted. See comments above. **This report has been created using voice recognition software. It may contain minor errors which are inherent in voice recognition technology. **      Final report electronically signed by Dr. Laurann Severe on 12/10/2021 1:58 PM       DUP LOWER EXTREMITY ARTERIES RIGHT   Final Result   1. 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 Vascularity right lower extremity otherwise unremarkable. In light of the history of gangrene in the right lower extremity, aortofemoral angiography is recommended for    further evaluation with possible intervention. If desired, this can be arranged through the interventional scheduling desk of [address] [place], [place] [address]s radiology department ([phone]). 2. Inhomogeneous lesion in the right groin with increased vascularity centrally.  This likely represents a cluster of lymph nodes, nonetheless, further evaluation is warranted. **This report has been created using voice recognition software. It may contain minor errors which are inherent in voice recognition technology. **      Final report electronically signed by Dr. Yesi Osman on 12/10/2021 1:56 PM      XR TIBIA FIBULA RIGHT (2 VIEWS)   Final Result   Soft tissue irregularity. No acute fracture or dislocation. **This report has been created using voice recognition software. It may contain minor errors which are inherent in voice recognition technology. **      Final report electronically signed by Dr Nathaniel Rivero on 12/9/2021 7:54 PM      XR FOOT RIGHT (MIN 3 VIEWS)   Final Result    IMPRESSION:      1. There is a linear lucency at the tip of the distal phalanx of the fifth digit which is only appreciated on one of the oblique films. This could represent a nondisplaced fracture. 2. Diffuse soft tissue swelling throughout the foot with irregularity of the soft tissues of the fifth digit. **This report has been created using voice recognition software. It may contain minor errors which are inherent in voice recognition technology. **      Final report electronically signed by Dr Nathaniel Rivero on 12/9/2021 5:34 PM        XR TIBIA FIBULA RIGHT (2 VIEWS)    Result Date: 12/9/2021  PROCEDURE: XR TIBIA FIBULA RIGHT (2 VIEWS) CLINICAL INFORMATION: 48-year-old male with a wound at the medial aspect of the right leg. COMPARISON: Radiograph 3/16/2007. TECHNIQUE: 4 views of the right lower leg were obtained. FINDINGS: There is no acute fracture or dislocation. There are fixation screws in the distal fibula with some irregularity presumably from a previous injury. There is soft tissue irregularity at the medial aspect of the distal lower leg. Soft tissue irregularity. No acute fracture or dislocation. **This report has been created using voice recognition software.  It may contain minor errors which are inherent in voice recognition technology. ** Final report electronically signed by Dr Simran Cantor on 12/9/2021 7:54 PM    XR FOOT RIGHT (MIN 3 VIEWS)    Result Date: 12/9/2021  PROCEDURE: XR FOOT RIGHT (MIN 3 VIEWS) CLINICAL INFORMATION: 79-year-old male who had an injury to the fifth digit of his right foot 7 days ago. Nonhealing wound. COMPARISON: No prior study. TECHNIQUE: 4 views of the right foot were obtained. FINDINGS: There is a linear lucency at the tip of the distal phalanx of the fifth digit of the right foot which is only appreciated on the oblique film. The remaining osseous structures are intact. Postsurgical changes are seen at the distal right fibula. There is no dislocation. The calcaneus is within normal limits. The base of the fifth metatarsal is intact. There is irregularity of the soft tissues of the fifth digit. There is soft tissue swelling throughout the foot. IMPRESSION: 1. There is a linear lucency at the tip of the distal phalanx of the fifth digit which is only appreciated on one of the oblique films. This could represent a nondisplaced fracture. 2. Diffuse soft tissue swelling throughout the foot with irregularity of the soft tissues of the fifth digit. **This report has been created using voice recognition software. It may contain minor errors which are inherent in voice recognition technology. ** Final report electronically signed by Dr Simran Cantor on 12/9/2021 5:34 PM    VL DUP LOWER EXTREMITY ARTERIES RIGHT    Result Date: 12/10/2021  PROCEDURE: VL DUP LOWER EXTREMITY ARTERIES RIGHT CLINICAL INFORMATION: Gangrene ([address] COMPARISON: No prior study. TECHNIQUE: Multiple permanent grayscale and color flow sonographic images of the major arteries of the right lower extremity were obtained from the level of the groin to the ankle. Spectral Doppler waveforms were obtained and velocity measurements were made. ... Technically difficult due to wrappings and wounds on legs. Not able to perform JACK due to wounds. FINDINGS: CFA ---------------> 227 PSV cm/sec PROF -------------> 58 PSV cm/sec SFA PROX ------->174 PSV cm/sec SFA MID ---------->163 PSV cm/sec SFA DIST -------->175 PSV cm/sec POP A PROX --->147 PSV cm/sec POP A DIST ---->168 PSV cm/sec PTA --------------->32 PSV cm/sec PERONEAL ----->73 PSV cm/sec ABBEY ----------------> 47 PSV cm/sec VELOCITY MEASUREMENTS: ABIs not calculated. There is moderately dampened pulsatility in the right anterior tibial and posterior tibial arteries are relatively good pulsatility in the peroneal artery. Pulsatility throughout the remainder of the right leg is unremarkable. Findings suggestive of trifurcation disease. Note that there is an inhomogeneous masslike lesion in the right groin, 5.1 cm in length and 2.4 cm in diameter with demonstration is increased vascularity. This probably represents a cluster of lymph nodes. Clinical correlation recommended. CT of the pelvis with contrast enhancement will be helpful for further evaluation     1. 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 Vascularity right lower extremity otherwise unremarkable. In light of the history of gangrene in the right lower extremity, aortofemoral angiography is recommended for further evaluation with possible intervention. If desired, this can be arranged through the interventional scheduling desk of [facility]'s radiology department ([phone]). 2. Inhomogeneous lesion in the right groin with increased vascularity centrally. This likely represents a cluster of lymph nodes, nonetheless, further evaluation is warranted. **This report has been created using voice recognition software. It may contain minor errors which are inherent in voice recognition technology. ** Final report electronically signed by Dr. Worley Dakin on 12/10/2021 1:56 PM    VL DUP LOWER EXTREMITY VENOUS BILATERAL    Result Date: 12/10/2021  PROCEDURE: VL DUP LOWER EXTREMITY VENOUS BILATERAL CLINICAL INFORMATION: Rule out DVT COMPARISON: No prior study. TECHNIQUE: Multiple grayscale and color flow images of the major veins of both lower extremities were obtained from the level of the groin to the level of the ankle. Multiple compression and augmentation maneuvers were performed and spectral Doppler waveforms were generated. Vearl Pippins .Technically difficult due to wrappings and wounds on legs. FINDINGS: RIGHT LOWER EXTREMITY:All the deep veins of the right lower extremity are widely patent with normal flow and normal compressibility. Incidental note is made of an inhomogeneous masslike lesion in the right groin, 5.1 x 2.4 cm, with increased vascularity centrally. This likely represents a clustering of lymph nodes. Nonetheless, further evaluation with contrast-enhanced CT is recommended. LEFT LOWER EXTREMITY:All the  deep veins of the left lower extremity are widely patent with normal flow and normal compressibility. 1. Normal venous ultrasound. No evidence for deep venous thrombosis. 2. Inhomogeneous lesion in the right groin with increased vascularity centrally, most likely representing lymph nodes. Nonetheless, further evaluation is warranted. See comments above. **This report has been created using voice recognition software. It may contain minor errors which are inherent in voice recognition technology. ** Final report electronically signed by Dr. Venkata Roach on 12/10/2021 1:58 PM      Electronically signed by Kika Beckham PA-C on 12/13/2021 at 2:57 PM

## 2021-12-14 ENCOUNTER — ANESTHESIA (OUTPATIENT)
Dept: OPERATING ROOM | Age: 51
DRG: 240 | End: 2021-12-14
Payer: COMMERCIAL

## 2021-12-14 ENCOUNTER — ANESTHESIA EVENT (OUTPATIENT)
Dept: OPERATING ROOM | Age: 51
DRG: 240 | End: 2021-12-14
Payer: COMMERCIAL

## 2021-12-14 VITALS — DIASTOLIC BLOOD PRESSURE: 50 MMHG | OXYGEN SATURATION: 96 % | SYSTOLIC BLOOD PRESSURE: 95 MMHG

## 2021-12-14 LAB
ANION GAP SERPL CALCULATED.3IONS-SCNC: 11 MEQ/L (ref 8–16)
BUN BLDV-MCNC: 18 MG/DL (ref 7–22)
CALCIUM SERPL-MCNC: 9.1 MG/DL (ref 8.5–10.5)
CHLORIDE BLD-SCNC: 97 MEQ/L (ref 98–111)
CO2: 27 MEQ/L (ref 23–33)
CREAT SERPL-MCNC: 0.8 MG/DL (ref 0.4–1.2)
ERYTHROCYTE [DISTWIDTH] IN BLOOD BY AUTOMATED COUNT: 12.3 % (ref 11.5–14.5)
ERYTHROCYTE [DISTWIDTH] IN BLOOD BY AUTOMATED COUNT: 40.7 FL (ref 35–45)
GFR SERPL CREATININE-BSD FRML MDRD: > 90 ML/MIN/1.73M2
GLUCOSE BLD-MCNC: 202 MG/DL (ref 70–108)
GLUCOSE BLD-MCNC: 247 MG/DL (ref 70–108)
GLUCOSE BLD-MCNC: 277 MG/DL (ref 70–108)
GLUCOSE BLD-MCNC: 300 MG/DL (ref 70–108)
HCT VFR BLD CALC: 41.7 % (ref 42–52)
HEMOGLOBIN: 14.1 GM/DL (ref 14–18)
MCH RBC QN AUTO: 30.7 PG (ref 26–33)
MCHC RBC AUTO-ENTMCNC: 33.8 GM/DL (ref 32.2–35.5)
MCV RBC AUTO: 90.7 FL (ref 80–94)
PLATELET # BLD: 331 THOU/MM3 (ref 130–400)
PMV BLD AUTO: 10.7 FL (ref 9.4–12.4)
POTASSIUM SERPL-SCNC: 4.4 MEQ/L (ref 3.5–5.2)
RBC # BLD: 4.6 MILL/MM3 (ref 4.7–6.1)
SODIUM BLD-SCNC: 135 MEQ/L (ref 135–145)
VANCOMYCIN RANDOM: 14.7 UG/ML (ref 0.1–39.9)
WBC # BLD: 6.4 THOU/MM3 (ref 4.8–10.8)

## 2021-12-14 PROCEDURE — 2500000003 HC RX 250 WO HCPCS: Performed by: PODIATRIST

## 2021-12-14 PROCEDURE — 2709999900 HC NON-CHARGEABLE SUPPLY: Performed by: PODIATRIST

## 2021-12-14 PROCEDURE — 3700000000 HC ANESTHESIA ATTENDED CARE: Performed by: PODIATRIST

## 2021-12-14 PROCEDURE — 36415 COLL VENOUS BLD VENIPUNCTURE: CPT

## 2021-12-14 PROCEDURE — 85027 COMPLETE CBC AUTOMATED: CPT

## 2021-12-14 PROCEDURE — 80048 BASIC METABOLIC PNL TOTAL CA: CPT

## 2021-12-14 PROCEDURE — 80202 ASSAY OF VANCOMYCIN: CPT

## 2021-12-14 PROCEDURE — 82948 REAGENT STRIP/BLOOD GLUCOSE: CPT

## 2021-12-14 PROCEDURE — 88305 TISSUE EXAM BY PATHOLOGIST: CPT

## 2021-12-14 PROCEDURE — 0Y6M0ZF DETACHMENT AT RIGHT FOOT, PARTIAL 5TH RAY, OPEN APPROACH: ICD-10-PCS

## 2021-12-14 PROCEDURE — 3600000003 HC SURGERY LEVEL 3 BASE: Performed by: PODIATRIST

## 2021-12-14 PROCEDURE — 6370000000 HC RX 637 (ALT 250 FOR IP): Performed by: PEDIATRICS

## 2021-12-14 PROCEDURE — 3700000001 HC ADD 15 MINUTES (ANESTHESIA): Performed by: PODIATRIST

## 2021-12-14 PROCEDURE — 6360000002 HC RX W HCPCS: Performed by: PEDIATRICS

## 2021-12-14 PROCEDURE — 6360000002 HC RX W HCPCS: Performed by: STUDENT IN AN ORGANIZED HEALTH CARE EDUCATION/TRAINING PROGRAM

## 2021-12-14 PROCEDURE — 1200000000 HC SEMI PRIVATE

## 2021-12-14 PROCEDURE — 6370000000 HC RX 637 (ALT 250 FOR IP): Performed by: PHYSICIAN ASSISTANT

## 2021-12-14 PROCEDURE — 2580000003 HC RX 258

## 2021-12-14 PROCEDURE — 2580000003 HC RX 258: Performed by: PEDIATRICS

## 2021-12-14 PROCEDURE — 3600000013 HC SURGERY LEVEL 3 ADDTL 15MIN: Performed by: PODIATRIST

## 2021-12-14 PROCEDURE — 88311 DECALCIFY TISSUE: CPT

## 2021-12-14 PROCEDURE — 2580000003 HC RX 258: Performed by: NURSE ANESTHETIST, CERTIFIED REGISTERED

## 2021-12-14 PROCEDURE — 2500000003 HC RX 250 WO HCPCS: Performed by: NURSE ANESTHETIST, CERTIFIED REGISTERED

## 2021-12-14 PROCEDURE — 99233 SBSQ HOSP IP/OBS HIGH 50: CPT | Performed by: PHYSICIAN ASSISTANT

## 2021-12-14 RX ORDER — BUPIVACAINE HYDROCHLORIDE 5 MG/ML
INJECTION, SOLUTION EPIDURAL; INTRACAUDAL PRN
Status: DISCONTINUED | OUTPATIENT
Start: 2021-12-14 | End: 2021-12-14 | Stop reason: ALTCHOICE

## 2021-12-14 RX ORDER — ONDANSETRON 4 MG/1
4 TABLET, ORALLY DISINTEGRATING ORAL EVERY 8 HOURS PRN
Status: DISCONTINUED | OUTPATIENT
Start: 2021-12-14 | End: 2021-12-15 | Stop reason: HOSPADM

## 2021-12-14 RX ORDER — SODIUM CHLORIDE 9 MG/ML
INJECTION, SOLUTION INTRAVENOUS CONTINUOUS
Status: DISCONTINUED | OUTPATIENT
Start: 2021-12-14 | End: 2021-12-15 | Stop reason: HOSPADM

## 2021-12-14 RX ORDER — PROPOFOL 10 MG/ML
INJECTION, EMULSION INTRAVENOUS PRN
Status: DISCONTINUED | OUTPATIENT
Start: 2021-12-14 | End: 2021-12-14 | Stop reason: SDUPTHER

## 2021-12-14 RX ORDER — SODIUM CHLORIDE 9 MG/ML
25 INJECTION, SOLUTION INTRAVENOUS PRN
Status: DISCONTINUED | OUTPATIENT
Start: 2021-12-14 | End: 2021-12-15 | Stop reason: HOSPADM

## 2021-12-14 RX ORDER — SODIUM CHLORIDE 9 MG/ML
INJECTION, SOLUTION INTRAVENOUS CONTINUOUS PRN
Status: DISCONTINUED | OUTPATIENT
Start: 2021-12-14 | End: 2021-12-14 | Stop reason: SDUPTHER

## 2021-12-14 RX ORDER — ONDANSETRON 2 MG/ML
4 INJECTION INTRAMUSCULAR; INTRAVENOUS EVERY 6 HOURS PRN
Status: DISCONTINUED | OUTPATIENT
Start: 2021-12-14 | End: 2021-12-15 | Stop reason: HOSPADM

## 2021-12-14 RX ORDER — LIDOCAINE HYDROCHLORIDE AND EPINEPHRINE 10; 10 MG/ML; UG/ML
INJECTION, SOLUTION INFILTRATION; PERINEURAL PRN
Status: DISCONTINUED | OUTPATIENT
Start: 2021-12-14 | End: 2021-12-14 | Stop reason: ALTCHOICE

## 2021-12-14 RX ORDER — OXYCODONE HYDROCHLORIDE AND ACETAMINOPHEN 5; 325 MG/1; MG/1
1 TABLET ORAL EVERY 6 HOURS PRN
Status: DISCONTINUED | OUTPATIENT
Start: 2021-12-14 | End: 2021-12-15 | Stop reason: HOSPADM

## 2021-12-14 RX ORDER — SODIUM CHLORIDE 0.9 % (FLUSH) 0.9 %
5-40 SYRINGE (ML) INJECTION EVERY 12 HOURS SCHEDULED
Status: DISCONTINUED | OUTPATIENT
Start: 2021-12-14 | End: 2021-12-15 | Stop reason: HOSPADM

## 2021-12-14 RX ORDER — LIDOCAINE HYDROCHLORIDE 20 MG/ML
INJECTION, SOLUTION INFILTRATION; PERINEURAL PRN
Status: DISCONTINUED | OUTPATIENT
Start: 2021-12-14 | End: 2021-12-14 | Stop reason: SDUPTHER

## 2021-12-14 RX ORDER — SODIUM CHLORIDE 0.9 % (FLUSH) 0.9 %
5-40 SYRINGE (ML) INJECTION PRN
Status: DISCONTINUED | OUTPATIENT
Start: 2021-12-14 | End: 2021-12-15 | Stop reason: HOSPADM

## 2021-12-14 RX ADMIN — PROPOFOL 30 MG: 10 INJECTION, EMULSION INTRAVENOUS at 19:42

## 2021-12-14 RX ADMIN — INSULIN LISPRO 9 UNITS: 100 INJECTION, SOLUTION INTRAVENOUS; SUBCUTANEOUS at 08:26

## 2021-12-14 RX ADMIN — PROPOFOL 50 MG: 10 INJECTION, EMULSION INTRAVENOUS at 19:44

## 2021-12-14 RX ADMIN — SODIUM CHLORIDE, PRESERVATIVE FREE 10 ML: 5 INJECTION INTRAVENOUS at 08:21

## 2021-12-14 RX ADMIN — PROPOFOL 100 MG: 10 INJECTION, EMULSION INTRAVENOUS at 19:52

## 2021-12-14 RX ADMIN — PIPERACILLIN AND TAZOBACTAM 3375 MG: 3; .375 INJECTION, POWDER, LYOPHILIZED, FOR SOLUTION INTRAVENOUS at 08:11

## 2021-12-14 RX ADMIN — SODIUM CHLORIDE, PRESERVATIVE FREE 10 ML: 5 INJECTION INTRAVENOUS at 22:14

## 2021-12-14 RX ADMIN — FENOFIBRATE 160 MG: 160 TABLET ORAL at 08:19

## 2021-12-14 RX ADMIN — LOSARTAN POTASSIUM 100 MG: 100 TABLET, FILM COATED ORAL at 08:19

## 2021-12-14 RX ADMIN — PROPOFOL 100 MG: 10 INJECTION, EMULSION INTRAVENOUS at 19:58

## 2021-12-14 RX ADMIN — VANCOMYCIN HYDROCHLORIDE 1500 MG: 5 INJECTION, POWDER, LYOPHILIZED, FOR SOLUTION INTRAVENOUS at 22:16

## 2021-12-14 RX ADMIN — ONDANSETRON HYDROCHLORIDE 4 MG: 4 INJECTION, SOLUTION INTRAMUSCULAR; INTRAVENOUS at 19:43

## 2021-12-14 RX ADMIN — PROPOFOL 30 MG: 10 INJECTION, EMULSION INTRAVENOUS at 20:22

## 2021-12-14 RX ADMIN — INSULIN LISPRO 3 UNITS: 100 INJECTION, SOLUTION INTRAVENOUS; SUBCUTANEOUS at 21:47

## 2021-12-14 RX ADMIN — PANTOPRAZOLE SODIUM 40 MG: 40 TABLET, DELAYED RELEASE ORAL at 05:42

## 2021-12-14 RX ADMIN — ATORVASTATIN CALCIUM 80 MG: 80 TABLET, FILM COATED ORAL at 08:19

## 2021-12-14 RX ADMIN — LIDOCAINE HYDROCHLORIDE 50 MG: 20 INJECTION, SOLUTION INFILTRATION; PERINEURAL at 19:37

## 2021-12-14 RX ADMIN — ENOXAPARIN SODIUM 40 MG: 100 INJECTION SUBCUTANEOUS at 08:19

## 2021-12-14 RX ADMIN — METOPROLOL SUCCINATE 25 MG: 25 TABLET, EXTENDED RELEASE ORAL at 08:19

## 2021-12-14 RX ADMIN — PROPOFOL 100 MG: 10 INJECTION, EMULSION INTRAVENOUS at 20:07

## 2021-12-14 RX ADMIN — SODIUM CHLORIDE: 9 INJECTION, SOLUTION INTRAVENOUS at 19:32

## 2021-12-14 RX ADMIN — PROPOFOL 20 MG: 10 INJECTION, EMULSION INTRAVENOUS at 20:15

## 2021-12-14 RX ADMIN — INSULIN LISPRO 5 UNITS: 100 INJECTION, SOLUTION INTRAVENOUS; SUBCUTANEOUS at 08:26

## 2021-12-14 RX ADMIN — Medication 2000 UNITS: at 08:19

## 2021-12-14 RX ADMIN — INSULIN GLARGINE 45 UNITS: 100 INJECTION, SOLUTION SUBCUTANEOUS at 21:46

## 2021-12-14 RX ADMIN — PIPERACILLIN AND TAZOBACTAM 3375 MG: 3; .375 INJECTION, POWDER, LYOPHILIZED, FOR SOLUTION INTRAVENOUS at 16:05

## 2021-12-14 RX ADMIN — VANCOMYCIN HYDROCHLORIDE 1500 MG: 5 INJECTION, POWDER, LYOPHILIZED, FOR SOLUTION INTRAVENOUS at 13:19

## 2021-12-14 RX ADMIN — SODIUM CHLORIDE: 9 INJECTION, SOLUTION INTRAVENOUS at 23:54

## 2021-12-14 ASSESSMENT — PULMONARY FUNCTION TESTS
PIF_VALUE: 0

## 2021-12-14 ASSESSMENT — COPD QUESTIONNAIRES: CAT_SEVERITY: NO INTERVAL CHANGE

## 2021-12-14 NOTE — ANESTHESIA PRE PROCEDURE
Department of Anesthesiology  Preprocedure Note       Name:  Elena Gil   Age:  45 y.o.  :  1970                                          MRN:  [id number]         Date:  2021      Surgeon: Andrea Rinaldi):  Corby French DPM    Procedure: Procedure(s):  RIGHT 5TH TOE AMPUTATION    Medications prior to admission:   Prior to Admission medications    Medication Sig Start Date End Date Taking? Authorizing Provider   glipiZIDE (GLUCOTROL) 5 MG tablet take 1/2 tablet by mouth twice a day WITH MORNING AND EVENING MEALS 10/18/21  Yes Nicolas Wyman MD   losartan (COZAAR) 100 MG tablet take 1 tablet by mouth once daily 10/18/21  Yes Nicolas Wyman MD   BASAGLAR KWIKPEN 100 UNIT/ML injection pen INJECT 30 UNITS INTO THE SKIN EVERY NIGHT 21  Yes ANA Simpson CNP   atorvastatin (LIPITOR) 80 MG tablet Take 1 tablet by mouth daily take 1 tablet by mouth at bedtime 21  Yes ANA Simpson CNP   insulin aspart (NOVOLOG FLEXPEN) 100 UNIT/ML injection pen Inject 8 units subcu baseline plus a sliding scale according to group 2 sliding scale insulin.  Max daily dose  70 units daily 21  Yes ANA Simpson CNP   vitamin D (ERGOCALCIFEROL) 1.25 MG (30392 UT) CAPS capsule take 1 capsule by mouth every week 21  Yes Nicolas Wyman MD   omega-3 acid ethyl esters (LOVAZA) 1 g capsule take 2 capsules by mouth twice a day 21  Yes Nicolas Wyman MD   omeprazole (PRILOSEC) 20 MG delayed release capsule take 1 capsule by mouth every morning BEFORE BREAKFAST 21  Yes Nicolas Wyman MD   metoprolol succinate (TOPROL XL) 25 MG extended release tablet take 1 tablet by mouth at bedtime 21  Yes Nicolas Wyman MD   metFORMIN (GLUCOPHAGE-XR) 500 MG extended release tablet take 4 tablets by mouth once daily - WITH BREAKFAST 21  Yes Nicolas Wyman MD   fenofibrate (TRIGLIDE) 160 MG tablet take 1 tablet by mouth once daily with food 21  Yes Nicolas Wyman MD   insulin glargine (LANTUS SOLOSTAR) 100 UNIT/ML injection pen Inject 20 Units into the skin nightly 10/6/20  Yes Danuta Dumont MD   bumetanide (BUMEX) 1 MG tablet take 1 tablet by mouth once daily 10/18/21   Danuta Dumont MD   Insulin Pen Needle 32G X 4 MM MISC 1 each by Does not apply route 4 times daily 9/8/21   ANA Stubbs CNP   ACCU-CHEK CAT PLUS strip CHECK BLOOD SUGAR EVERY MORNING AND AFTER DINNER EVERY DAY 7/26/21   ANA Stubbs CNP   Accu-Chek Softclix Lancets MISC USE TO CHECK BLOOD SUGAR 7/26/21   ANA Stubbs CNP   vitamin D3 (CHOLECALCIFEROL) 25 MCG (1000 UT) TABS tablet Take 2 tablets by mouth daily 7/22/21   Danuta Dumont MD   Continuous Blood Gluc  ([address] [place] [address] G4 PLAT PED ) ANÍBAL 1 Units by Does not apply route continuous 7/22/21   Danuta Dumont MD   Continuous Blood Gluc Sensor (DEXCOM G6 SENSOR) MISC 1 Units by Does not apply route continuous 7/22/21   Danuta Dumont MD   fluticasone (FLONASE) 50 MCG/ACT nasal spray 1 spray by Each Nostril route daily 2/11/21   ANA Stubbs CNP   Continuous Blood Gluc  (FREESTYLE MACIEL 14 DAY READER) ANÍBAL 1 Units by Does not apply route continuous Patient is diabetic treated with insulin 9/18/20   Danuta Dumont MD   Continuous Blood Gluc Sensor (FREESTYLE MACIEL 14 DAY SENSOR) MISC 1 Units by Does not apply route continuous Patient is Diabetic treated with Insulin 9/18/20   Danuta Dumont MD       Current medications:    Current Facility-Administered Medications   Medication Dose Route Frequency Provider Last Rate Last Admin    insulin lispro (HUMALOG) injection vial 10 Units  10 Units SubCUTAneous TID  Vicenta Mchugh PA-C        vancomycin (VANCOCIN) 1,500 mg in dextrose 5 % 500 mL IVPB  1,500 mg IntraVENous Q12H Jenifer Jj MD   Stopped at 12/14/21 1555    insulin glargine (LANTUS) injection vial 45 Units  45 Units SubCUTAneous Nightly Vicenta Mchugh PA-C   45 Units at 12/13/21 2145    docusate sodium (COLACE) capsule 100 mg  100 mg Oral Daily [employer], PA-C        vancomycin (VANCOCIN) intermittent dosing (placeholder)   Other RX Placeholder Luis Corcoran MD        fenofibrate (TRIGLIDE) tablet 160 mg  160 mg Oral Daily Luis Corcoran MD   160 mg at 12/14/21 0819    bumetanide (BUMEX) tablet 1 mg  1 mg Oral Daily Luis Corcoran MD        insulin lispro (HUMALOG) injection vial 0-18 Units  0-18 Units SubCUTAneous TID WC [employer] PA-C   9 Units at 12/14/21 0826    insulin lispro (HUMALOG) injection vial 0-9 Units  0-9 Units SubCUTAneous Nightly [employer], PA-C   5 Units at 12/13/21 2145    atorvastatin (LIPITOR) tablet 80 mg  80 mg Oral Daily Luis Corcoran MD   80 mg at 12/14/21 0819    fluticasone (FLONASE) 50 MCG/ACT nasal spray 1 spray  1 spray Each Nostril Daily Luis Corcoran MD        losartan (COZAAR) tablet 100 mg  100 mg Oral Daily Luis Corcoran MD   100 mg at 12/14/21 8394    metoprolol succinate (TOPROL XL) extended release tablet 25 mg  25 mg Oral Daily Luis Corcoran MD   25 mg at 12/14/21 0819    pantoprazole (PROTONIX) tablet 40 mg  40 mg Oral QAM AC Luis Corcoran MD   40 mg at 12/14/21 0542    Vitamin D (CHOLECALCIFEROL) tablet 2,000 Units  2,000 Units Oral Daily Luis Corcoran MD   2,000 Units at 12/14/21 0819    piperacillin-tazobactam (ZOSYN) 3,375 mg in dextrose 5 % 50 mL IVPB extended infusion (mini-bag)  3,375 mg IntraVENous Q8H Luis Corcoran MD 12.5 mL/hr at 12/14/21 1605 3,375 mg at 12/14/21 1605    glucose (GLUTOSE) 40 % oral gel 15 g  15 g Oral PRN Luis Corcoran MD        dextrose 50 % IV solution  12.5 g IntraVENous PRN Luis Corcoran MD        glucagon (rDNA) injection 1 mg  1 mg IntraMUSCular PRN Luis Corcoran MD        dextrose 5 % solution  100 mL/hr IntraVENous PRN Luis Corcoran MD        sodium chloride flush 0.9 % injection 10 mL  10 mL IntraVENous 2 times per day Luis Corcoran MD   10 mL at 12/14/21 0821    sodium chloride flush 0.9 % injection 10 mL  10 mL IntraVENous PRN Jerir Vaca MD        0.9 % sodium chloride infusion  25 mL IntraVENous PRN Jerri Vaca MD        enoxaparin (LOVENOX) injection 40 mg  40 mg SubCUTAneous Daily Jerri Vaca MD   40 mg at 12/14/21 0819    ondansetron (ZOFRAN-ODT) disintegrating tablet 4 mg  4 mg Oral Q8H PRN Jerri Vaca MD        Or    [facility]ansetron [facility]) injection 4 mg  4 mg IntraVENous Q6H PRN Jerri Vaca MD        polyethylene glycol [facility]) packet 17 g  17 g Oral Daily PRN Jerri Vaca MD        acetaminophen (TYLENOL) tablet 650 mg  650 mg Oral Q6H PRN Jerri Vaca MD        Or   Saul Kerr acetaminophen (TYLENOL) suppository 650 mg  650 mg Rectal Q6H PRN Jerri Vaca MD        aluminum & magnesium hydroxide-simethicone (MAALOX) 200-200-20 MG/5ML suspension 30 mL  30 mL Oral Q6H PRN Jerri Vaca MD           Allergies:     Allergies   Allergen Reactions    Morphine      \"goes nuts\"       Problem List:    Patient Active Problem List   Diagnosis Code    Cellulitis L03.90    [facility] left[facility] with [facility] layer [facility] L97.522    Forefoot varus, acquired, left M21.6X2    Equinus contracture of left ankle M24.572    Type 2 diabetes mellitus with hyperglycemia, with long-term current use of insulin ([facility]) E11.65, Z79.4    Severe sepsis ([facility]) A41.9, R65.20    Uncontrolled type 2 diabetes mellitus with hyperglycemia ([facility] E11.65    Hyponatremia E87.1    Hypertension, uncontrolled I10    PFO (patent foramen ovale) Q21.1    Hyperlipidemia E78.5    Smoker F17.200    Morbid obesity ([facility]) E66.01    Chronic diastolic heart failure ([facility]) I50.32    Noncompliance with medications Z91.14    Aortic valve stenosis I35.0    Cellulitis of right leg L03.115       Past Medical History:        Diagnosis Date    CAD (coronary artery disease) 2019    CH[facility]congestive heart failure) [facility] 02/12/2019    Diabetes mellitus ([facility] 1990's    Hypertension 1990's       Past Surgical History:        Procedure Laterality Date    CHOLECYSTECTOMY      FIBULA FRACTURE SURGERY Right     screws    TIBIA FRACTURE SURGERY Right     screws       Social History:    Social History     Tobacco Use    Smoking status: Current Every Day Smoker     Packs/day: 0.25     Years: 36.00     Pack years: 9.00     Types: Cigarettes     Start date: 1984    Smokeless tobacco: Never Used    Tobacco comment: \"nicotine pouches\"   Substance Use Topics    Alcohol use: Not on file                                Ready to quit: Not Answered  Counseling given: Not Answered  Comment: \"nicotine pouches\"      Vital Signs (Current):   Vitals:    12/13/21 2032 12/14/21 0313 12/14/21 0821 12/14/21 1130   BP: (!) 166/80 (!) 158/84 (!) 154/82 137/74   Pulse: 79 76 76 73   Resp: 20 18 18 18   Temp: 97.9 °F (36.6 °C) 98.4 °F (36.9 °C) 98.6 °F (37 °C) 98.6 °F (37 °C)   TempSrc: Oral Oral Oral Oral   SpO2: 97% 94% 96% 96%   Weight:       Height:                                                  BP Readings from Last 3 Encounters:   12/14/21 137/74   08/03/21 (!) 163/79   07/22/21 (!) 154/85       NPO Status:                                                                                 BMI:   Wt Readings from Last 3 Encounters:   12/09/21 282 lb 3 oz (128 kg)   08/03/21 274 lb (124.3 kg)   07/22/21 274 lb (124.3 kg)     Body mass index is 38.27 kg/m².     CBC:   Lab Results   Component Value Date    WBC 6.4 12/14/2021    RBC 4.60 12/14/2021    HGB 14.1 12/14/2021    HCT 41.7 12/14/2021    MCV 90.7 12/14/2021    RDW 13.2 03/23/2021     12/14/2021       CMP:   Lab Results   Component Value Date     12/14/2021    K 4.4 12/14/2021    K 4.3 03/26/2020    CL 97 12/14/2021    CO2 27 12/14/2021    BUN 18 12/14/2021    CREATININE 0.8 12/14/2021    LABGLOM >90 12/14/2021    GLUCOSE 300 12/14/2021    GLUCOSE 269 03/23/2021    PROT 8.2 12/09/2021    CALCIUM 9.1 12/14/2021    BILITOT 0.4 12/09/2021    ALKPHOS 106 12/09/2021 (+) obese,           Vascular: Other Findings:             Anesthesia Plan      MAC     ASA 3       Induction: intravenous. Anesthetic plan and risks discussed with patient and spouse. Plan discussed with CRNA.                   J Luis Jordan DO   12/14/2021

## 2021-12-14 NOTE — PROGRESS NOTES
Hospitalist Progress Note      Patient:  Sandor Chowdary    Unit/Bed:5K-18/018-A  YOB: 1970  MRN: [id number]   Acct: [de-identified]   PCP: JEROME Bass  Date of Admission: 12/9/2021    Assessment/Plan:       1. Right lower extremity cellulitis, gangrenous right fifth toe, and diabetic ulcerations:   Continue plan of care per podiatry team.  Right tib-fib x-ray with no evidence of fracture or dislocation. Tissue cultures-> Serratia marcescens, gram + cocci and light growth staphylococcus (coag +). Consider ID consultation pending course. Continue empiric antibiotic coverage with Zosyn and vancomycin. Possible right fifth toe amputation per podiatry if no response to antibiotic. 2.  IDDM II, uncontrolled:   Uncontrolled secondary to infection. Hemoglobin A1c 9.4. Hold Metformin and glipizide. Medium dose sliding scale insulin coverage and Lantus coverage, increased to 45 units. Add TID humalog 5 units at meal times. Accu-Cheks before every meal and at bedtime. Diabetic diet.     3.  Essential hypertension / HLD  Resume home dose losartan, metoprolol, fenofibrate, and statin.     4. Chronic HFpEF without acute decompensation:  Last echocardiogram 3/2020 with an estimated ejection fraction of the left ventricle at 50% with no regional wall motion abnormalities. LV grade 1 diastolic dysfunction. Currently does not seem to be in a decompensated state although does have mild worsening of lower extremity edema from infection. Check proBNP and bilateral lower extremity Doppler ultrasound. Resume home dose maintenance medications for CHF including metoprolol, losartan, and Bumex. Record strict I/Os and daily weights.     5. Chronic venous insufficiency with stasis dermatitis:  Bilateral lower extremity Doppler ultrasound to rule out DVT -> neg  Treat underlying infection as above in #1.   Continue to follow with podiatry and wound care upon discharge. Would benefit from pressure dressings, CHERRY hose once infection cleared. Elevate legs at all times.     6. GERD:  Home dose PPI. 7. Hyponatremia: Resolved    8. Preoperative risk assessment: Per RCRI patient is a class III risk and thereby poses a 10.1% increase risk of myocardial infarction, cardiac arrest, or death within 30 days. Patient is aware of these risks, understands, and accepts and wishes to proceed with surgery. Of note, patient did receive prophylactic Lovenox today. Podiatry is aware and wishes to proceed. Chief Complaint: Right lower extremity edema erythema and pain.       Initial H and P:-    \"Murali Haley is a 45 y.o. male with PMHx of type 2 diabetes mellitus, hypertension, hyperlipidemia, CHF, coronary disease, who presented to the emergency room here at [address] [place] [address] on 12/9/2021 complaining of a couple day history of worsening right lower extremity erythema, swelling, and pain. Reports that this last Saturday or 5 days ago he accidentally hit his right toe against a door which caused avulsion of the right fifth toenail. He then used an instrument to remove the toenail himself after which pain and redness and erythema slowly started building up and progressing proximally towards his upper extremity. No systemic fevers or chills.     Patient known to Dr. Isabell Nuno and has been following in the wound care clinic with dressing changes of chronic ulcerations or diabetic foot ulcers. Seen by podiatry and diagnosed with a gangrenous right fifth toe with cellulitis and diabetic foot ulcerations. Plan was to admit patient to hospitalist service and monitor patient for response to IV antibiotics and obtain further imaging studies with possible amputation of the right fifth toe if not improved.     Upon presenting to the emergency room patient blood pressure was 125/82 with a pulse of 87 respirate of 18 temperature of 97.7 and 97% oxygen saturation on room air.     Lab investigations revealed leukocytosis 11.5 hyperglycemia in the 400 range lactic acid 1.3 sed rate of 100. CRP 17.8 and procalcitonin of 0.12.     At the time of evaluation in ER patient had his wound already dressed and was laying in no acute distress and no pain. \"    12/10: Patient doing okay, sitting up in his chair. Denies any fever/chills. He seen by podiatry is going to continue IV ABX and monitor wound. May consider amputation this admission if necessary. No CP/SOB. 12/11: cx returned growing serratia marcescens and gram + cocci, staphylococcus. Pt denies fever/chills. No CP/SOB. abd feels hard, however pt reports that's cause he just had breakfast. No abd pain, n/v/d. Constipation since Thursday per pt.     12/12: Patient sitting up in his chair. Patient states that podiatry is considering patient being discharged and following up as an outpatient for his toe. Patient does not feel like he is ready for discharge yet. Continue to monitor. Denies chest pain or shortness of breath. No fever/chills. No nausea vomiting diarrhea. Continues to report constipation, however does not wish to take stool softeners. 12/13: Patient doing okay sitting up in chair. Reports that podiatry has discussed possible amputation this admission. Denies fever/chills. No chest pain or shortness of breath. Subjective (past 24 hours):   12/14: Patient doing well, sitting up in his chair. Offers no new complaints. Denies fever/chills. No chest pain or shortness of breath. Planning surgery later today for amputation. Past medical history, family history, social history and allergies reviewed again and is unchanged since admission. ROS (All review of systems completed. Pertinent positives noted.  Otherwise All other systems reviewed and negative.)     Medications:  Reviewed    Infusion Medications    dextrose      sodium chloride       Scheduled Medications    vancomycin  1,500 mg IntraVENous Q12H    insulin glargine  45 Units SubCUTAneous Nightly    docusate sodium  100 mg Oral Daily    insulin lispro  5 Units SubCUTAneous TID     vancomycin (VANCOCIN) intermittent dosing (placeholder)   Other RX Placeholder    fenofibrate  160 mg Oral Daily    bumetanide  1 mg Oral Daily    insulin lispro  0-18 Units SubCUTAneous TID     insulin lispro  0-9 Units SubCUTAneous Nightly    atorvastatin  80 mg Oral Daily    fluticasone  1 spray Each Nostril Daily    losartan  100 mg Oral Daily    metoprolol succinate  25 mg Oral Daily    pantoprazole  40 mg Oral QAM AC    Vitamin D  2,000 Units Oral Daily    piperacillin-tazobactam  3,375 mg IntraVENous Q8H    sodium chloride flush  10 mL IntraVENous 2 times per day    enoxaparin  40 mg SubCUTAneous Daily     PRN Meds: glucose, dextrose, glucagon (rDNA), dextrose, sodium chloride flush, sodium chloride, ondansetron **OR** ondansetron, polyethylene glycol, acetaminophen **OR** acetaminophen, aluminum & magnesium hydroxide-simethicone      Intake/Output Summary (Last 24 hours) at 12/14/2021 0739  Last data filed at 12/14/2021 0313  Gross per 24 hour   Intake 2347.88 ml   Output 0 ml   Net 2347.88 ml       Diet:  ADULT DIET; Regular; 4 carb choices (60 gm/meal)    Exam:  BP (!) 158/84   Pulse 76   Temp 98.4 °F (36.9 °C) (Oral)   Resp 18   Ht 6' (1.829 m)   Wt 282 lb 3 oz (128 kg)   SpO2 94%   BMI 38.27 kg/m²   General appearance: Obese, no apparent distress, appears stated age and cooperative. HEENT: Pupils equal, round, and reactive to light. Conjunctivae/corneas clear. Neck: Supple, with full range of motion. No jugular venous distention. Trachea midline. Respiratory:  Normal respiratory effort. Clear to auscultation, bilaterally without Rales/Wheezes/Rhonchi. Cardiovascular: Regular rate and rhythm with normal S1/S2 without murmurs, rubs or gallops. Abdomen: Soft, non-tender, non-distended with normal bowel sounds.   Musculoskeletal: passive and active ROM x 4 extremities. Right gangrenous fifth toe  Skin: Chronic venous stasis changes bilaterally. RLE wrapped, wound not visualized. Neurologic:  Neurovascularly intact without any focal sensory/motor deficits. Cranial nerves: II-XII intact, grossly non-focal.  Psychiatric: Alert and oriented x4, thought content appropriate, normal insight  Capillary Refill: Brisk,< 3 seconds   Peripheral Pulses: +2 palpable, equal bilaterally     Labs:   Recent Labs     12/13/21  1106 12/14/21  0622   WBC 5.5 6.4   HGB 14.4 14.1   HCT 43.5 41.7*    331     Recent Labs     12/12/21  0535 12/14/21  0622    135   K 4.9 4.4   CL 98 97*   CO2 27 27   BUN 19 18   CREATININE 1.0 0.8   CALCIUM 9.5 9.1     No results for input(s): AST, ALT, BILIDIR, BILITOT, ALKPHOS in the last 72 hours. No results for input(s): INR in the last 72 hours. No results for input(s): Adonica Hoose in the last 72 hours. Microbiology:    Blood culture #1:   Lab Results   Component Value Date    BC No growth-preliminary  12/09/2021    BC No growth-preliminary  12/09/2021       Blood culture #2:No results found for: Mireille Quinonez    Organism:  Lab Results   Component Value Date    ORG Serratia marcescens 12/09/2021    ORG Streptococcus agalactiae - (Group B) 12/09/2021    ORG Staphylococcus aureus 12/09/2021         Lab Results   Component Value Date    LABGRAM  12/09/2021     Rare segmented neutrophils observed. No epithelial cells observed. No organisms observed. MRSA culture only:No results found for: [facility]    Urine culture: No results found for: LABURIN    Respiratory culture: No results found for: CULTRESP    Aerobic and Anaerobic :  Lab Results   Component Value Date    LABAERO  12/09/2021     moderate growth Serratia species which may be initially susceptible to third generation cephalosporins may develop resistance within three to four days of initiation of this antimicrobial therapy.      LABAERO  12/09/2021     moderate growth Group B streptococci are suspectible to ampicillin, penicillin and cefazolin, but may be erythromycin and/or clindamycin resistant. Contact microbiology if erythromycin and/or clindamycin testing is necessary. LABAERO  12/09/2021     light growth This is a MRSA (Methicillin Resistant Staphylococcus aureus)isolate. Isolates of MRSA (ORSA) Methicillin (Oxacillin) Resistant Staphylococcus aureus (coagulase positive) require patient be placed in CONTACT isolation. Methicillin(Oxacillin)resistant strains of staphylococci (MRSA)or(MRSE)should be considered resistant to all classes of cephalosporins, penems and beta-lactams. In the treatment of gram positive infections, GENTAMICIN should be CONSIDERED a SYNERGYSTIC agent ONLY. Lab Results   Component Value Date    LABANAE  03/25/2020     Culture yielded heavy growth of anaerobic gram positive cocci. If a true mixed aerobic and anaerobic infection is suspected, then broad spectrum empiric antibiotic therapy is indicated and should include coverage for anaerobic organisms. Urinalysis:      Lab Results   Component Value Date    NITRU NEGATIVE 07/22/2021    WBCUA  07/22/2021    BACTERIA NONE SEEN 07/22/2021    RBCUA 3-5 07/22/2021    BLOODU NEGATIVE 07/22/2021    SPECGRAV >1.030 07/22/2021       Radiology:  VL DUP LOWER EXTREMITY VENOUS BILATERAL   Final Result   1. Normal venous ultrasound. No evidence for deep venous thrombosis. 2. Inhomogeneous lesion in the right groin with increased vascularity centrally, most likely representing lymph nodes. Nonetheless, further evaluation is warranted. See comments above. **This report has been created using voice recognition software. It may contain minor errors which are inherent in voice recognition technology. **      Final report electronically signed by Dr. Isamar Das on 12/10/2021 1:58 PM      VL DUP LOWER EXTREMITY ARTERIES RIGHT   Final Result   1.  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 Vascularity right lower extremity otherwise unremarkable. In light of the history of gangrene in the right lower extremity, aortofemoral angiography is recommended for    further evaluation with possible intervention. If desired, this can be arranged through the interventional scheduling desk of [facility]'s radiology department ([phone]). 2. Inhomogeneous lesion in the right groin with increased vascularity centrally. This likely represents a cluster of lymph nodes, nonetheless, further evaluation is warranted. **This report has been created using voice recognition software. It may contain minor errors which are inherent in voice recognition technology. **      Final report electronically signed by Dr. Nora Gomez on 12/10/2021 1:56 PM      XR TIBIA FIBULA RIGHT (2 VIEWS)   Final Result   Soft tissue irregularity. No acute fracture or dislocation. **This report has been created using voice recognition software. It may contain minor errors which are inherent in voice recognition technology. **      Final report electronically signed by Dr Renato Miller on 12/9/2021 7:54 PM      XR FOOT RIGHT (MIN 3 VIEWS)   Final Result    IMPRESSION:      1. There is a linear lucency at the tip of the distal phalanx of the fifth digit which is only appreciated on one of the oblique films. This could represent a nondisplaced fracture. 2. Diffuse soft tissue swelling throughout the foot with irregularity of the soft tissues of the fifth digit. **This report has been created using voice recognition software. It may contain minor errors which are inherent in voice recognition technology. **      Final report electronically signed by Dr Renato Miller on 12/9/2021 5:34 PM        XR TIBIA FIBULA RIGHT (2 VIEWS)    Result Date: 12/9/2021  PROCEDURE: XR TIBIA FIBULA RIGHT (2 VIEWS) CLINICAL INFORMATION: 58-year-old male with a wound at the medial ([phone]). 2. Inhomogeneous lesion in the right groin with increased vascularity centrally. This likely represents a cluster of lymph nodes, nonetheless, further evaluation is warranted. **This report has been created using voice recognition software. It may contain minor errors which are inherent in voice recognition technology. ** Final report electronically signed by Dr. Suresh Cohen on 12/10/2021 1:56 PM    VL DUP LOWER EXTREMITY VENOUS BILATERAL    Result Date: 12/10/2021  PROCEDURE: VL DUP LOWER EXTREMITY VENOUS BILATERAL CLINICAL INFORMATION: Rule out DVT COMPARISON: No prior study. TECHNIQUE: Multiple grayscale and color flow images of the major veins of both lower extremities were obtained from the level of the groin to the level of the ankle. Multiple compression and augmentation maneuvers were performed and spectral Doppler waveforms were generated. Rosalie Cornelius .Technically difficult due to wrappings and wounds on legs. FINDINGS: RIGHT LOWER EXTREMITY:All the deep veins of the right lower extremity are widely patent with normal flow and normal compressibility. Incidental note is made of an inhomogeneous masslike lesion in the right groin, 5.1 x 2.4 cm, with increased vascularity centrally. This likely represents a clustering of lymph nodes. Nonetheless, further evaluation with contrast-enhanced CT is recommended. LEFT LOWER EXTREMITY:All the  deep veins of the left lower extremity are widely patent with normal flow and normal compressibility. 1. Normal venous ultrasound. No evidence for deep venous thrombosis. 2. Inhomogeneous lesion in the right groin with increased vascularity centrally, most likely representing lymph nodes. Nonetheless, further evaluation is warranted. See comments above. **This report has been created using voice recognition software. It may contain minor errors which are inherent in voice recognition technology. ** Final report electronically signed by Dr. Suresh Cohen on 12/10/2021 1:58 PM      Electronically signed by Kayode Rehman PA-C on 12/14/2021 at 7:39 AM

## 2021-12-14 NOTE — PROGRESS NOTES
Consent for surgery later today obtained, surgeon will need to sign. Updated patient about need for CHG bath/shower, will plan on a shower with a bag to cover his RLE dressing after IV zosyn is completed, to wear a hospital gown after, patient voiced understanding.

## 2021-12-14 NOTE — CARE COORDINATION
12/14/21, 3:42 PM EST    DISCHARGE ON GOING EVALUATION    [facility] PROVIDERS Critical access hospital - [facility] day: 5  Location: -18/018-A Reason for admit: Gangrene [facility]) Christain Medici  Toe gangrene ([address] Christain Medici  Cellulitis of right leg [P18.260]  Cellulitis of right lower extremity [D78.322]   Procedure: 12/14/2021 Planned right fifth toe amputation  Barriers to Discharge: Podiatry following, OR this evening, Lovenox, Zosyn, IV Vancomycin per pharmacy dosing, DM management, prn medications, BMP and CBC in a.m., NPO, up with assistance. PCP: Devin Hannon DPM  Readmission Risk Score: 8.5 ( )%  Patient Goals/Plan/Treatment Preferences: Silvia Manzanares is from home with his wife. He denies needs at discharge.

## 2021-12-14 NOTE — PROGRESS NOTES
[facility] Pharmacokinetic Monitoring Service - Vancomycin    Consulting Provider: Dr. Reji Sotomayor   Indication: cellulitis   Target Concentration: Goal trough of 10-15 mg/L and AUC/JESSE <500 mg*hr/L  Day of Therapy: 6  Additional Antimicrobials: zosyn    Pertinent Laboratory Values: Wt Readings from Last 1 Encounters:   12/09/21 282 lb 3 oz (128 kg)     Temp Readings from Last 1 Encounters:   12/14/21 98.6 °F (37 °C) (Oral)     Estimated Creatinine Clearance: 149 mL/min (based on SCr of 0.8 mg/dL). Recent Labs     12/12/21  0535 12/13/21  1106 12/14/21  0622   CREATININE 1.0  --  0.8   WBC  --  5.5 6.4     Procalcitonin: 0.12    Pertinent Cultures:  Culture Date Source Results   12/9/21 Toe Serratia marcescens, Group B strep, MRSA   MRSA Nasal Swab:  N/A. Non-respiratory infection. Recent vancomycin administrations                     vancomycin (VANCOCIN) 1,500 mg in dextrose 5 % 500 mL IVPB (mg) 1,500 mg New Bag 12/13/21 2148     1,500 mg New Bag  1014    vancomycin (VANCOCIN) 1250 mg in dextrose 5 % 250 mL IVPB (mg) 1,250 mg New Bag 12/12/21 2328     1,250 mg New Bag  0858     1,250 mg New Bag 12/11/21 2100     1,250 mg New Bag  0903                    Assessment:    Date/Time Current Dose Concentration Timing of Concentration (h) AUC   12/13/21 1250 mg IV every 12 hours 11.5 12/13/21 at 7:51 367   12/14/21 1500 mg IV every 12 hours 14.7 12/14/21 at 0622 435       Note: Serum concentrations collected for AUC dosing may appear elevated if collected in close proximity to the dose administered, this is not necessarily an indication of toxicity    Plan:  Current dosing regimen is therapeutic  Continue current dose  Repeat vancomycin concentration in 5 to 7 days.   Pharmacy will continue to monitor patient and adjust therapy as indicated    Thank you for the consult,  Karin Wilder, [facility]  12/14/2021 8:42 AM

## 2021-12-15 VITALS
OXYGEN SATURATION: 95 % | RESPIRATION RATE: 20 BRPM | TEMPERATURE: 99.7 F | WEIGHT: 282.19 LBS | SYSTOLIC BLOOD PRESSURE: 138 MMHG | HEIGHT: 72 IN | DIASTOLIC BLOOD PRESSURE: 69 MMHG | BODY MASS INDEX: 38.22 KG/M2 | HEART RATE: 79 BPM

## 2021-12-15 LAB
ANION GAP SERPL CALCULATED.3IONS-SCNC: 14 MEQ/L (ref 8–16)
BLOOD CULTURE, ROUTINE: NORMAL
BLOOD CULTURE, ROUTINE: NORMAL
BUN BLDV-MCNC: 16 MG/DL (ref 7–22)
CALCIUM SERPL-MCNC: 9.2 MG/DL (ref 8.5–10.5)
CHLORIDE BLD-SCNC: 97 MEQ/L (ref 98–111)
CO2: 25 MEQ/L (ref 23–33)
CREAT SERPL-MCNC: 0.9 MG/DL (ref 0.4–1.2)
ERYTHROCYTE [DISTWIDTH] IN BLOOD BY AUTOMATED COUNT: 12.3 % (ref 11.5–14.5)
ERYTHROCYTE [DISTWIDTH] IN BLOOD BY AUTOMATED COUNT: 41.4 FL (ref 35–45)
GFR SERPL CREATININE-BSD FRML MDRD: 88 ML/MIN/1.73M2
GLUCOSE BLD-MCNC: 230 MG/DL (ref 70–108)
GLUCOSE BLD-MCNC: 238 MG/DL (ref 70–108)
GLUCOSE BLD-MCNC: 272 MG/DL (ref 70–108)
HCT VFR BLD CALC: 43.3 % (ref 42–52)
HEMOGLOBIN: 14.3 GM/DL (ref 14–18)
MCH RBC QN AUTO: 30.4 PG (ref 26–33)
MCHC RBC AUTO-ENTMCNC: 33 GM/DL (ref 32.2–35.5)
MCV RBC AUTO: 91.9 FL (ref 80–94)
PLATELET # BLD: 334 THOU/MM3 (ref 130–400)
PMV BLD AUTO: 11.1 FL (ref 9.4–12.4)
POTASSIUM SERPL-SCNC: 4.9 MEQ/L (ref 3.5–5.2)
RBC # BLD: 4.71 MILL/MM3 (ref 4.7–6.1)
SODIUM BLD-SCNC: 136 MEQ/L (ref 135–145)
WBC # BLD: 8 THOU/MM3 (ref 4.8–10.8)

## 2021-12-15 PROCEDURE — 6370000000 HC RX 637 (ALT 250 FOR IP)

## 2021-12-15 PROCEDURE — 6370000000 HC RX 637 (ALT 250 FOR IP): Performed by: PEDIATRICS

## 2021-12-15 PROCEDURE — 85027 COMPLETE CBC AUTOMATED: CPT

## 2021-12-15 PROCEDURE — 6360000002 HC RX W HCPCS: Performed by: PEDIATRICS

## 2021-12-15 PROCEDURE — 36415 COLL VENOUS BLD VENIPUNCTURE: CPT

## 2021-12-15 PROCEDURE — 82948 REAGENT STRIP/BLOOD GLUCOSE: CPT

## 2021-12-15 PROCEDURE — 99239 HOSP IP/OBS DSCHRG MGMT >30: CPT | Performed by: PHYSICIAN ASSISTANT

## 2021-12-15 PROCEDURE — 97116 GAIT TRAINING THERAPY: CPT

## 2021-12-15 PROCEDURE — 2580000003 HC RX 258

## 2021-12-15 PROCEDURE — 80048 BASIC METABOLIC PNL TOTAL CA: CPT

## 2021-12-15 PROCEDURE — 6370000000 HC RX 637 (ALT 250 FOR IP): Performed by: PHYSICIAN ASSISTANT

## 2021-12-15 PROCEDURE — 97162 PT EVAL MOD COMPLEX 30 MIN: CPT

## 2021-12-15 PROCEDURE — 2580000003 HC RX 258: Performed by: PEDIATRICS

## 2021-12-15 RX ORDER — SULFAMETHOXAZOLE AND TRIMETHOPRIM 800; 160 MG/1; MG/1
1 TABLET ORAL 2 TIMES DAILY
Qty: 28 TABLET | Refills: 0 | Status: SHIPPED | OUTPATIENT
Start: 2021-12-15 | End: 2021-12-29

## 2021-12-15 RX ADMIN — INSULIN LISPRO 9 UNITS: 100 INJECTION, SOLUTION INTRAVENOUS; SUBCUTANEOUS at 12:14

## 2021-12-15 RX ADMIN — ATORVASTATIN CALCIUM 80 MG: 80 TABLET, FILM COATED ORAL at 08:14

## 2021-12-15 RX ADMIN — SODIUM CHLORIDE, PRESERVATIVE FREE 10 ML: 5 INJECTION INTRAVENOUS at 09:24

## 2021-12-15 RX ADMIN — Medication 2000 UNITS: at 08:13

## 2021-12-15 RX ADMIN — METOPROLOL SUCCINATE 25 MG: 25 TABLET, EXTENDED RELEASE ORAL at 08:14

## 2021-12-15 RX ADMIN — PANTOPRAZOLE SODIUM 40 MG: 40 TABLET, DELAYED RELEASE ORAL at 05:45

## 2021-12-15 RX ADMIN — PIPERACILLIN AND TAZOBACTAM 3375 MG: 3; .375 INJECTION, POWDER, LYOPHILIZED, FOR SOLUTION INTRAVENOUS at 01:23

## 2021-12-15 RX ADMIN — OXYCODONE AND ACETAMINOPHEN 1 TABLET: 5; 325 TABLET ORAL at 08:14

## 2021-12-15 RX ADMIN — FENOFIBRATE 160 MG: 160 TABLET ORAL at 08:14

## 2021-12-15 RX ADMIN — ENOXAPARIN SODIUM 40 MG: 100 INJECTION SUBCUTANEOUS at 09:24

## 2021-12-15 RX ADMIN — INSULIN LISPRO 6 UNITS: 100 INJECTION, SOLUTION INTRAVENOUS; SUBCUTANEOUS at 08:22

## 2021-12-15 RX ADMIN — LOSARTAN POTASSIUM 100 MG: 100 TABLET, FILM COATED ORAL at 08:14

## 2021-12-15 RX ADMIN — VANCOMYCIN HYDROCHLORIDE 1500 MG: 5 INJECTION, POWDER, LYOPHILIZED, FOR SOLUTION INTRAVENOUS at 12:23

## 2021-12-15 RX ADMIN — DOCUSATE SODIUM 100 MG: 100 CAPSULE ORAL at 08:13

## 2021-12-15 RX ADMIN — INSULIN LISPRO 10 UNITS: 100 INJECTION, SOLUTION INTRAVENOUS; SUBCUTANEOUS at 08:20

## 2021-12-15 RX ADMIN — INSULIN LISPRO 10 UNITS: 100 INJECTION, SOLUTION INTRAVENOUS; SUBCUTANEOUS at 12:15

## 2021-12-15 ASSESSMENT — PAIN SCALES - GENERAL
PAINLEVEL_OUTOF10: 3
PAINLEVEL_OUTOF10: 8

## 2021-12-15 NOTE — BRIEF OP NOTE
Brief Postoperative Note      Patient: Carmen Cheng  YOB: 1970  MRN: [id number]    Date of Procedure: 12/14/2021    Pre-Op Diagnosis: GANGRENE RIGHT 5TH TOE    Post-Op Diagnosis: Same       Procedure(s):  RIGHT 5TH PARTIAL RAY AMPUTATION   Excisional debridement of wound to subcutaneous layer 2.5cm x 2cm    Surgeon(s):  Misha French DPM    Assistant:  First Assistant: Lay Grande  Resident: Sang Hallman DPM    Anesthesia: Monitor Anesthesia Care    Estimated Blood Loss (mL): Minimal    Complications: None    Hemostasis: None     Injectables: 10cc of 0.5% Marcaine plain; 10cc of 1% Lidocaine with epinepherine    Materials: 3-0 Vicryl, 3-0 Monocryl, 4-0 Monocryl     Specimens:   ID Type Source Tests Collected by Time Destination   A : RIGHT FOOT, FIFTH TOE Tissue Toe SURGICAL PATHOLOGY Misha French DPM 12/14/2021 2003        Implants:  * No implants in log *      Drains: * No LDAs found *    Findings: Same as above    Electronically signed by Sang Hallman DPM on 12/14/2021 at 8:28 PM

## 2021-12-15 NOTE — PROGRESS NOTES
Department of Podiatric Surgery  Progress Note      SUBJECTIVE  12/15/21  Patient seen this morning on behalf of Dr. Angela Ramires. Patient is pleasant, and is alert and oriented to person, place, time. Patient was seated in a chair side and that time of the encounter. Pt is s/p debridment of rle wound and right 5th digit amputation (DOS 12/14) Patient denies minimal pain to his right leg. He also denies any N/V/F/C/SOB/CP or bilateral calf tenderness. No other complaints at this time      12/30/2021  Patient seen this morning on behalf of Dr. Angela Ramires. Patient is pleasant, and is alert and oriented to person, place, time. Patient was seated in a chair side and that time of the encounter. Patient denies any pain to his right leg. He also denies any N/V/F/C/SOB/CP or bilateral calf tenderness. Patient was curious if he may well require amputation of the toe. He has no other pedal complaints at this time. 12/12  Patient examined and evaluated at bedside on behalf of Dr. Angela Ramires. Patient states that his right leg and foot are not painful. Patient denies overnight events. He denies any nausea, vomiting, fever, chills, chest pain, shortness of breath. No other pedal complaints    12/10  Patient examined and evaluated at bedside alongside of Dr. Angela Ramires. Patient states that his right leg and foot are not painful. Patient denies overnight events. He denies any nausea, vomiting, fever, chills, chest pain, shortness of breath. No other pedal complaints      HISTORY OF PRESENT ILLNESS:      The patient is a 45 y.o. male with significant past medical history of CAD, CHF, DM, and HTN who presents with complaints of right 5th toe pain. Patient is well known to Dr. Angela Ramires in the wound care center. Patient says that he has been continuing dressing care as he was instructed to at his last wound care visit. Patient says that last week his right first toe was hit with a door. This avulsed the toenail. Patient transilluminates with a Band-Aid. Patient says that 1 to 2 days ago, patient was starting to see the right fifth toe getting darker. Patient was also seen redness creep up his leg. Patient also says that he had the medial aspect of his right leg at work a couple of days ago. Patient denies any pain in his right lower extremity at this point in time. Patient denies any nausea, vomiting, fever, chills, chest pain, shortness of breath. No other pedal complaints      PHYSICAL EXAM:  VITALS:  BP (!) 165/90   Pulse 94   Temp 98 °F (36.7 °C) (Oral)   Resp 18   Ht 6' (1.829 m)   Wt 282 lb 3 oz (128 kg)   SpO2 98%   BMI 38.27 kg/m²   CONSTITUTIONAL:  awake, alert, cooperative, no apparent distress, and appears stated age    LOWER EXTREMITY:  VASCULAR: Pedal pulses are palpable bilaterally. CFT less than 3 seconds to all other exposed digits of the bilateral lower extremities. Right foot erythematous and edematous, improved. Temperature warm to cool from tibial tuberosity to digits of right foot. Temperature warm to cool from tibial tuberosity to digits of left foot. Numerous varicose veins noted to BLE. MUSCULOSKELETAL: Muscle strength 5/5 and symmetric for all muscle groups crossing the ankle joint bilaterally. No pain on palpation to the bilateral lower extremities. Rectus foot and ankle bilaterally. NEUROLOGIC: Light touch sensation grossly diminished to the digits bilaterally, light touch sensation is intact to the metatarsal heads bilaterally. SKIN: Surgical incision noted to right forefoot with noted 5th digit amp. Incision is macerated and erythematous. No dehiscence. No drainage or malodor noted. There are 2 wounds noted to the RLE mid calf. Anterior medial has fibrogranular base probed to ST. Stable. Posterior wound is superficial and granular no SOI.        Images from 12/15                  IMAGING:  XR TIBIA FIBULA RIGHT (2 VIEWS)    Result Date: 12/9/2021  PROCEDURE: XR TIBIA FIBULA RIGHT (2 VIEWS) CLINICAL INFORMATION: 48-year-old male with a wound at the medial aspect of the right leg. COMPARISON: Radiograph 3/16/2007. TECHNIQUE: 4 views of the right lower leg were obtained. FINDINGS: There is no acute fracture or dislocation. There are fixation screws in the distal fibula with some irregularity presumably from a previous injury. There is soft tissue irregularity at the medial aspect of the distal lower leg. Soft tissue irregularity. No acute fracture or dislocation. **This report has been created using voice recognition software. It may contain minor errors which are inherent in voice recognition technology. ** Final report electronically signed by Dr Daniel Will on 12/9/2021 7:54 PM    XR FOOT RIGHT (MIN 3 VIEWS)    Result Date: 12/9/2021  PROCEDURE: XR FOOT RIGHT (MIN 3 VIEWS) CLINICAL INFORMATION: 48-year-old male who had an injury to the fifth digit of his right foot 7 days ago. Nonhealing wound. COMPARISON: No prior study. TECHNIQUE: 4 views of the right foot were obtained. FINDINGS: There is a linear lucency at the tip of the distal phalanx of the fifth digit of the right foot which is only appreciated on the oblique film. The remaining osseous structures are intact. Postsurgical changes are seen at the distal right fibula. There is no dislocation. The calcaneus is within normal limits. The base of the fifth metatarsal is intact. There is irregularity of the soft tissues of the fifth digit. There is soft tissue swelling throughout the foot. IMPRESSION: 1. There is a linear lucency at the tip of the distal phalanx of the fifth digit which is only appreciated on one of the oblique films. This could represent a nondisplaced fracture. 2. Diffuse soft tissue swelling throughout the foot with irregularity of the soft tissues of the fifth digit. **This report has been created using voice recognition software. It may contain minor errors which are inherent in voice recognition technology. ** Final report electronically signed by Dr Bernarda Castaneda on 12/9/2021 5:34 PM    VL DUP LOWER EXTREMITY ARTERIES RIGHT    Result Date: 12/10/2021  PROCEDURE: VL DUP LOWER EXTREMITY ARTERIES RIGHT CLINICAL INFORMATION: Gangrene ([address] COMPARISON: No prior study. TECHNIQUE: Multiple permanent grayscale and color flow sonographic images of the major arteries of the right lower extremity were obtained from the level of the groin to the ankle. Spectral Doppler waveforms were obtained and velocity measurements were made. ... Technically difficult due to wrappings and wounds on legs. Not able to perform JACK due to wounds. FINDINGS: CFA ---------------> 227 PSV cm/sec PROF -------------> 58 PSV cm/sec SFA PROX ------->174 PSV cm/sec SFA MID ---------->163 PSV cm/sec SFA DIST -------->175 PSV cm/sec POP A PROX --->147 PSV cm/sec POP A DIST ---->168 PSV cm/sec PTA --------------->32 PSV cm/sec PERONEAL ----->73 PSV cm/sec ABBEY ----------------> 47 PSV cm/sec VELOCITY MEASUREMENTS: ABIs not calculated. There is moderately dampened pulsatility in the right anterior tibial and posterior tibial arteries are relatively good pulsatility in the peroneal artery. Pulsatility throughout the remainder of the right leg is unremarkable. Findings suggestive of trifurcation disease. Note that there is an inhomogeneous masslike lesion in the right groin, 5.1 cm in length and 2.4 cm in diameter with demonstration is increased vascularity. This probably represents a cluster of lymph nodes. Clinical correlation recommended. CT of the pelvis with contrast enhancement will be helpful for further evaluation     1. 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 Vascularity right lower extremity otherwise unremarkable. In light of the history of gangrene in the right lower extremity, aortofemoral angiography is recommended for further evaluation with possible intervention.  If desired, this can be arranged through the interventional scheduling windy of [facility] radiology department ([phone]). 2. Inhomogeneous lesion in the right groin with increased vascularity centrally. This likely represents a cluster of lymph nodes, nonetheless, further evaluation is warranted. **This report has been created using voice recognition software. It may contain minor errors which are inherent in voice recognition technology. ** Final report electronically signed by Dr. Renita Hylton on 12/10/2021 1:56 PM    VL DUP LOWER EXTREMITY VENOUS BILATERAL    Result Date: 12/10/2021  PROCEDURE: VL DUP LOWER EXTREMITY VENOUS BILATERAL CLINICAL INFORMATION: Rule out DVT COMPARISON: No prior study. TECHNIQUE: Multiple grayscale and color flow images of the major veins of both lower extremities were obtained from the level of the groin to the level of the ankle. Multiple compression and augmentation maneuvers were performed and spectral Doppler waveforms were generated. Heriberto Chow .Technically difficult due to wrappings and wounds on legs. FINDINGS: RIGHT LOWER EXTREMITY:All the deep veins of the right lower extremity are widely patent with normal flow and normal compressibility. Incidental note is made of an inhomogeneous masslike lesion in the right groin, 5.1 x 2.4 cm, with increased vascularity centrally. This likely represents a clustering of lymph nodes. Nonetheless, further evaluation with contrast-enhanced CT is recommended. LEFT LOWER EXTREMITY:All the  deep veins of the left lower extremity are widely patent with normal flow and normal compressibility. 1. Normal venous ultrasound. No evidence for deep venous thrombosis. 2. Inhomogeneous lesion in the right groin with increased vascularity centrally, most likely representing lymph nodes. Nonetheless, further evaluation is warranted. See comments above. **This report has been created using voice recognition software. It may contain minor errors which are inherent in voice recognition technology. ** Final report electronically signed by Dr. Ciara Mendes on 12/10/2021 1:58 PM      LABS:  CBC:   Lab Results   Component Value Date    WBC 8.0 12/15/2021    RBC 4.71 12/15/2021    HGB 14.3 12/15/2021    HCT 43.3 12/15/2021    MCV 91.9 12/15/2021    MCH 30.4 12/15/2021    MCHC 33.0 12/15/2021    RDW 13.2 03/23/2021     12/15/2021    MPV 11.1 12/15/2021     CMP:    Lab Results   Component Value Date     12/15/2021    K 4.9 12/15/2021    K 4.3 03/26/2020    CL 97 12/15/2021    CO2 25 12/15/2021    BUN 16 12/15/2021    CREATININE 0.9 12/15/2021    LABGLOM 88 12/15/2021    GLUCOSE 238 12/15/2021    GLUCOSE 269 03/23/2021    PROT 8.2 12/09/2021    LABALBU 3.6 12/09/2021    CALCIUM 9.2 12/15/2021    BILITOT 0.4 12/09/2021    ALKPHOS 106 12/09/2021    AST 11 12/09/2021    ALT 15 12/09/2021     LDH:  No results found for: LDH  PT/INR:  No results found for: PROTIME, INR  HgBA1c:    Lab Results   Component Value Date    LABA1C 9.4 12/10/2021        Treatment:   ASSESSMENT AND PLAN:  1. Gangrene, right 5th toe, resolved  2. Cellulitis, right leg, resolving  3. Diabetic Leg Ulceration      -Patient seen and evaluated  -Labs reviewed, WBC 8  -Culture results: positive for serratia and coagulase positive staphylococcus   -RX for oral bactrim for 14 days  -Foot x-ray reviewed, see report above  --XR tib-fib right reviewed, see report above  -Pt to receive shoe and crutches. Heel WB for transfers only.   -Applied Betadine to wound on medial RLE and to stable eschars on anterior of RLE; applied Betadine to incisioncovered with 4 x 4 gauze, Kerlix rolls, and loosely wrapped Ace bandages  -[facility] [address] 12/21    DISPO: OK to D/c on oral antibiotics      Kaylahmaame Graves DPM

## 2021-12-15 NOTE — CARE COORDINATION
DISCHARGE PLAN UPDATE      12/15/21, 11:22 AM EST  Plans home with wife today; denied needs. 12/14  Had amputation of R fifth toe and debridement of wound on R ankle. Planning po antibiotics/RN Jayme Lassiter        Patient goals/plan/ treatment preferences discussed by  and . Patient goals/plan/ treatment preferences reviewed with patient/ family. Patient/ family verbalize understanding of discharge plan and are in agreement with goal/plan/treatment preferences. Understanding was demonstrated using the teach back method. AVS provided by RN at time of discharge, which includes all necessary medical information pertaining to the patients current course of illness, treatment, post-discharge goals of care, and treatment preferences.

## 2021-12-15 NOTE — PROGRESS NOTES
[facility]  INPATIENT PHYSICAL THERAPY  EVALUATION  [facility] ONC MED 5K - 5K-18/018-A    Time In: 2911  Time Out: 1400  Timed Code Treatment Minutes: 10 Minutes  Minutes: 20          Date: 12/15/2021  Patient Name: Ausitn Penaloza,  Gender:  male        MRN: [id number]  : 1970  (45 y.o.)      Referring Practitioner: Nick Diaz DPM  Diagnosis: Gangrene  Additional Pertinent Hx: Pt admitted   for gangrene right foot. Pt has sx  for I and D wound on lower leg right and 5th digit amputation right foot. Restrictions/Precautions:  Restrictions/Precautions: Weight Bearing  Partial Weight Bearing Percentage Or Pounds: heel WB right foot in post op shoe  Required Braces or Orthoses  Right Lower Extremity Brace: Ankle Foot Orthotics (post op shoe)  Position Activity Restriction  Other position/activity restrictions: heelWB right in  postop shoe    Subjective:  Chart Reviewed: Yes  Patient assessed for rehabilitation services?: Yes  Family / Caregiver Present: No  Subjective: RN approved session. Pt agreeable once his postop shoe and crutches arrive.     General:  Overall Orientation Status: Within Functional Limits  Follows Commands: Within Functional Limits    Vision: Within Functional Limits    Hearing: Within functional limits         Pain: 0/10:     Vitals: Vitals not assessed per clinical judgement, see nursing flowsheet    Social/Functional History:    Lives With: Spouse  Type of Home: House  Home Layout: One level  Home Access: Stairs to enter with rails  Entrance Stairs - Number of Steps: 2-4             ADL Assistance: Independent  Homemaking Assistance: Independent  Ambulation Assistance: Independent  Transfer Assistance: Independent          Additional Comments: no ADs prior, crutches coming from distribution    OBJECTIVE:  Range of Motion:  Bilateral Lower Extremity: WFL    Strength:  Bilateral Lower Extremity: WFL    Balance:  Static Standing Balance: Modified Independent, Supervision  Dynamic Standing Balance: Supervision    Bed Mobility:  Supine to Sit: Modified Independent  Sit to Supine: Modified Independent     Transfers:  Sit to Stand: Supervision  Stand to Sit:Supervision    Ambulation:  Supervision  Distance: 100 feet  Surface: Level Tile  Device:Crutches  Gait Deviations:  Slow Kelly, Decreased Step Length Bilaterally, Decreased Gait Speed and Pt able to maintain heelWB right LE  Reviewed stair training with pt as well          Functional Outcome Measures: Completed  AM-PAC Inpatient Mobility Raw Score : 23  AM-PAC Inpatient T-Scale Score : 56.93    ASSESSMENT:  Activity Tolerance:  Patient tolerance of  treatment: good. Treatment Initiated: Treatment and education initiated within context of evaluation. Evaluation time included review of current medical information, gathering information related to past medical, social and functional history, completion of standardized testing, formal and informal observation of tasks, assessment of data and development of plan of care and goals. Treatment time included skilled education and facilitation of tasks to increase safety and independence with functional mobility for improved independence and quality of life. Assessment: Body structures, Functions, Activity limitations: Decreased endurance  Assessment: Pt moving well with crutches heelWB right LE. Pt safe to go home  Prognosis: Excellent    REQUIRES PT FOLLOW UP: No    Discharge Recommendations:  Discharge Recommendations: Home with assist PRN    Patient Education:  PT Education: Goals, PT Role, Plan of Care, General Safety, Gait Training, Functional Mobility Training, Weight-bearing Education, Transfer Training  Patient Education: Reviewed stair training with crutches,pt reports has done crutches alot in the past.  Pt understaning of heel WB    Equipment Recommendations:  Equipment Needed:  (crutches coming from dist)    Plan:  Times per week: 1X  Specific instructions for Next Treatment: heelWB  Current Treatment Recommendations: Gait Training, Stair training    Goals:  Patient goals : Go home today          Following session, patient left in safe position with all fall risk precautions in place.

## 2021-12-15 NOTE — DISCHARGE SUMMARY
Hospitalist Discharge Summary        Patient: Marcella Sanchez  YOB: 1970  MRN: [id number]   Acct: [de-identified]    Primary Care Physician: Nicole Mishra DPM    Admit date  12/9/2021    Discharge date: No discharge date for patient encounter. Chief Complaint on presentation :-  Right lower extremity edema erythema and pain    Discharge Assessment and Plan:-   Right lower extremity cellulitis, diabetic ulcerations, and gangrenous right fifth toe s/p excisional debridement 12/14:    Continue plan of care per podiatry team.  Right tib-fib x-ray with no evidence of fracture or dislocation. Tissue cultures-> Serratia marcescens, gram + cocci and light growth staphylococcus (coag +). S/p right fifth toe amputation per podiatry. Defer to their service for abx treatment -> PO Bactrim x 14 days. F/u with podiatry as outpatient.      2.  IDDM II, uncontrolled:   Uncontrolled secondary to infection. Hemoglobin A1c 9.4. Hold Metformin and glipizide. Medium dose sliding scale insulin coverage and Lantus coverage, increased to 45 units. Add TID humalog 5 units at meal times. Accu-Cheks before every meal and at bedtime. Diabetic diet.     3.  Essential hypertension / HLD  Resume home dose losartan, metoprolol, fenofibrate, and statin.     4.  Chronic HFpEF without acute decompensation:  Last echocardiogram 3/2020 with an estimated ejection fraction of the left ventricle at 50% with no regional wall motion abnormalities.  LV grade 1 diastolic dysfunction.  Currently does not seem to be in a decompensated state although does have mild worsening of lower extremity edema from infection. Check proBNP and bilateral lower extremity Doppler ultrasound. Resume home dose maintenance medications for CHF including metoprolol, losartan, and Bumex.   Record strict I/Os and daily weights.     5.  Chronic venous insufficiency with stasis dermatitis:  Bilateral lower extremity Doppler ultrasound to rule out DVT -> of 97.7 and 97% oxygen saturation on room air.    Lab investigations revealed leukocytosis 11.5 hyperglycemia in the 400 range lactic acid 1.3 sed rate of 100.  CRP 17.8 and procalcitonin of 0.12.     At the time of evaluation in ER patient had his wound already dressed and was laying in no acute distress and no pain. \"     12/10: Patient doing okay, sitting up in his chair. Denies any fever/chills. He seen by podiatry is going to continue IV ABX and monitor wound. May consider amputation this admission if necessary. No CP/SOB.     12/11: cx returned growing serratia marcescens and gram + cocci, staphylococcus. Pt denies fever/chills. No CP/SOB. abd feels hard, however pt reports that's cause he just had breakfast. No abd pain, n/v/d. Constipation since Thursday per pt.      12/12: Patient sitting up in his chair. Patient states that podiatry is considering patient being discharged and following up as an outpatient for his toe. Patient does not feel like he is ready for discharge yet. Continue to monitor. Denies chest pain or shortness of breath. No fever/chills. No nausea vomiting diarrhea. Continues to report constipation, however does not wish to take stool softeners.     12/13: Patient doing okay sitting up in chair. Reports that podiatry has discussed possible amputation this admission. Denies fever/chills. No chest pain or shortness of breath. 12/14: Patient doing well, sitting up in his chair. Offers no new complaints. Denies fever/chills. No chest pain or shortness of breath. Planning surgery later today for amputation. Pt was admitted secondary to R 5th toe changes, and RLE cellultiis. Pt is an uncontrolled diabetic. Podiatry was consulted for management. Pt's admission was prolonged secondary to attempt to salvage the toe and avoid amputation with abx. However, over the course of the admission it was deemed appropriate to take pt to OR for amputation.  Pt was treated with IV abx during his admission and discharged home with Bactrim x 14 days per Podiatry. Pt will need f/u with podiatry as an outpatient. Pt's glucose was difficult to control during admission, prompting insulin adjustment. This was likely also exacerbated by infection. Pt is to continue his home regimen (including PO agents) at discharge. He will need close f/u with his PCP for any further adjustments. Pt did not wish for any HH services at discharge. All VSS and suitable for discharge home.       Physical Exam:-  Vitals:   Patient Vitals for the past 24 hrs:   BP Temp Temp src Pulse Resp SpO2   12/15/21 0343 (!) 165/90 98 °F (36.7 °C) Oral 94 18 98 %   12/14/21 2345 (!) 149/81 -- -- 80 -- --   12/14/21 2330 117/73 -- -- 71 -- --   12/14/21 2300 137/74 -- -- 80 -- --   12/14/21 2230 110/73 -- -- 81 -- --   12/14/21 2200 110/73 -- -- 81 -- --   12/14/21 2130 139/77 -- -- 72 -- --   12/14/21 2115 (!) 144/83 -- -- 70 -- --   12/14/21 2100 (!) 140/77 -- -- 76 -- --   12/14/21 2044 128/73 97.4 °F (36.3 °C) Oral 77 20 95 %   12/14/21 1600 (!) 161/79 98.2 °F (36.8 °C) Oral 74 18 98 %   12/14/21 1130 137/74 98.6 °F (37 °C) Oral 73 18 96 %     Weight:   Weight: 282 lb 3 oz (128 kg)   24 hour intake/output:     Intake/Output Summary (Last 24 hours) at 12/15/2021 1109  Last data filed at 12/15/2021 9874  Gross per 24 hour   Intake 3427.17 ml   Output --   Net 3427.17 ml       General appearance: Obese, no apparent distress, appears stated age and cooperative. HEENT: Pupils equal, round, and reactive to light. Conjunctivae/corneas clear. Neck: Supple, with full range of motion. No jugular venous distention. Trachea midline. Respiratory:  Normal respiratory effort. Clear to auscultation, bilaterally without Rales/Wheezes/Rhonchi. Cardiovascular: Regular rate and rhythm with normal S1/S2 without murmurs, rubs or gallops. Abdomen: Soft, non-tender, non-distended with normal bowel sounds.   Musculoskeletal: passive and active ROM x 4 extremities. Right gangrenous fifth toe  Skin: Chronic venous stasis changes bilaterally. RLE wrapped, wound not visualized. Neurologic:  Neurovascularly intact without any focal sensory/motor deficits.  Cranial nerves: II-XII intact, grossly non-focal.  Psychiatric: Alert and oriented x4, thought content appropriate, normal insight  Capillary Refill: Brisk,< 3 seconds   Peripheral Pulses: +2 palpable, equal bilaterally       Discharge Medications:-      Medication List      CONTINUE taking these medications    Accu-Chek Carol Plus strip  Generic drug: blood glucose test strips  CHECK BLOOD SUGAR EVERY MORNING AND AFTER DINNER EVERY DAY     Accu-Chek Softclix Lancets Misc  USE TO CHECK BLOOD SUGAR     atorvastatin 80 MG tablet  Commonly known as: LIPITOR  Take 1 tablet by mouth daily take 1 tablet by mouth at bedtime     bumetanide 1 MG tablet  Commonly known as: BUMEX  take 1 tablet by mouth once daily     fenofibrate 160 MG tablet  Commonly known as: TRIGLIDE  take 1 tablet by mouth once daily with food     fluticasone 50 MCG/ACT nasal spray  Commonly known as: FLONASE  1 spray by Each Nostril route daily     * FreeStyle Christy 14 Day [place]ina [place]  1 Units by Does not apply route continuous Patient is diabetic treated with insulin     * Dexcom G4 Plat Ped  Hortencia  1 Units by Does not apply route continuous     * FreeStyle Christy 14 Day Sensor Misc  1 Units by Does not apply route continuous Patient is Diabetic treated with Insulin     * Dexcom G6 Sensor Misc  1 Units by Does not apply route continuous     glipiZIDE 5 MG tablet  Commonly known as: GLUCOTROL  take 1/2 tablet by mouth twice a day WITH MORNING AND EVENING MEALS     Insulin Pen Needle 32G X 4 MM Misc  1 each by Does not apply route 4 times daily     * Lantus SoloStar 100 UNIT/ML injection pen  Generic drug: insulin glargine  Inject 20 Units into the skin nightly     * Basaglar KwikPen 100 UNIT/ML injection pen  Generic drug: insulin glargine  INJECT 30 UNITS INTO THE SKIN EVERY NIGHT     losartan 100 MG tablet  Commonly known as: COZAAR  take 1 tablet by mouth once daily     metFORMIN 500 MG extended release tablet  Commonly known as: GLUCOPHAGE-XR  take 4 tablets by mouth once daily - WITH BREAKFAST     metoprolol succinate 25 MG extended release tablet  Commonly known as: TOPROL XL  take 1 tablet by mouth at bedtime     NovoLOG FlexPen 100 UNIT/ML injection pen  Generic drug: insulin aspart  Inject 8 units subcu baseline plus a sliding scale according to group 2 sliding scale insulin. Max daily dose  70 units daily     omega-3 acid ethyl esters 1 g capsule  Commonly known as: LOVAZA  take 2 capsules by mouth twice a day     omeprazole 20 MG delayed release capsule  Commonly known as: PRILOSEC  take 1 capsule by mouth every morning BEFORE BREAKFAST     vitamin D 1.25 MG (92018 UT) Caps capsule  Commonly known as: ERGOCALCIFEROL  take 1 capsule by mouth every week     vitamin D3 25 MCG (1000 UT) Tabs tablet  Commonly known as: CHOLECALCIFEROL  Take 2 tablets by mouth daily         * This list has 6 medication(s) that are the same as other medications prescribed for you. Read the directions carefully, and ask your doctor or other care provider to review them with you.                  Labs :-  Recent Results (from the past 72 hour(s))   POCT glucose - If patient is NPO, on continuous tube feedings, or on parenteral nutrition and on HumaLOG    Collection Time: 12/12/21 12:00 PM   Result Value Ref Range    POC Glucose 274 (H) 70 - 108 mg/dl   POCT glucose    Collection Time: 12/12/21  4:35 PM   Result Value Ref Range    POC Glucose 221 (H) 70 - 108 mg/dl   POCT glucose - If patient is NPO, on continuous tube feedings, or on parenteral nutrition and on HumaLOG    Collection Time: 12/12/21  8:23 PM   Result Value Ref Range    POC Glucose 245 (H) 70 - 108 mg/dl   POCT glucose    Collection Time: 12/13/21  7:44 AM   Result Value Ref Range    POC Glucose 322 (H) 70 - 108 mg/dl   Vancomycin, Random    Collection Time: 12/13/21  7:51 AM   Result Value Ref Range    Vancomycin Rm 11.5 0.1 - 39.9 ug/mL   CBC auto differential    Collection Time: 12/13/21 11:06 AM   Result Value Ref Range    WBC 5.5 4.8 - 10.8 thou/mm3    RBC 4.67 (L) 4.70 - 6.10 mill/mm3    Hemoglobin 14.4 14.0 - 18.0 gm/dl    Hematocrit 43.5 42.0 - 52.0 %    MCV 93.1 80.0 - 94.0 fL    MCH 30.8 26.0 - 33.0 pg    MCHC 33.1 32.2 - 35.5 gm/dl    RDW-CV 12.3 11.5 - 14.5 %    RDW-SD 42.1 35.0 - 45.0 fL    Platelets 657 178 - 933 thou/mm3    MPV 10.5 9.4 - 12.4 fL    Seg Neutrophils 61.2 %    Lymphocytes 25.0 %    Monocytes 10.3 %    Eosinophils 2.0 %    Basophils 1.1 %    Immature Granulocytes 0.4 %    Segs Absolute 3.4 1.8 - 7.7 thou/mm3    Lymphocytes Absolute 1.4 1.0 - 4.8 thou/mm3    Monocytes Absolute 0.6 0.4 - 1.3 thou/mm3    Eosinophils Absolute 0.1 0.0 - 0.4 thou/mm3    Basophils Absolute 0.1 0.0 - 0.1 thou/mm3    Immature Grans (Abs) 0.02 0.00 - 0.07 thou/mm3    nRBC 2 /100 wbc   POCT glucose - If patient is NPO, on continuous tube feedings, or on parenteral nutrition and on HumaLOG    Collection Time: 12/13/21 12:18 PM   Result Value Ref Range    POC Glucose 223 (H) 70 - 108 mg/dl   POCT glucose - If patient is NPO, on continuous tube feedings, or on parenteral nutrition and on HumaLOG    Collection Time: 12/13/21  5:24 PM   Result Value Ref Range    POC Glucose 217 (H) 70 - 108 mg/dl   POCT glucose    Collection Time: 12/13/21  7:55 PM   Result Value Ref Range    POC Glucose 298 (H) 70 - 108 mg/dl   Vancomycin, Random    Collection Time: 12/14/21  6:22 AM   Result Value Ref Range    Vancomycin Rm 14.7 0.1 - 39.9 ug/mL   Basic Metabolic Panel    Collection Time: 12/14/21  6:22 AM   Result Value Ref Range    Sodium 135 135 - 145 meq/L    Potassium 4.4 3.5 - 5.2 meq/L    Chloride 97 (L) 98 - 111 meq/L    CO2 27 23 - 33 meq/L    Glucose 300 (H) 70 - 108 mg/dL    BUN 18 7 - 22 mg/dL CREATININE 0.8 0.4 - 1.2 mg/dL    Calcium 9.1 8.5 - 10.5 mg/dL   CBC    Collection Time: 12/14/21  6:22 AM   Result Value Ref Range    WBC 6.4 4.8 - 10.8 thou/mm3    RBC 4.60 (L) 4.70 - 6.10 mill/mm3    Hemoglobin 14.1 14.0 - 18.0 gm/dl    Hematocrit 41.7 (L) 42.0 - 52.0 %    MCV 90.7 80.0 - 94.0 fL    MCH 30.7 26.0 - 33.0 pg    MCHC 33.8 32.2 - 35.5 gm/dl    RDW-CV 12.3 11.5 - 14.5 %    RDW-SD 40.7 35.0 - 45.0 fL    Platelets 956 773 - 187 thou/mm3    MPV 10.7 9.4 - 12.4 fL   Anion Gap    Collection Time: 12/14/21  6:22 AM   Result Value Ref Range    Anion Gap 11.0 8.0 - 16.0 meq/L   Glomerular Filtration Rate, Estimated    Collection Time: 12/14/21  6:22 AM   Result Value Ref Range    Est, Glom Filt Rate >90 ml/min/1.73m2   POCT glucose - If patient is NPO, on continuous tube feedings, or on parenteral nutrition and on HumaLOG    Collection Time: 12/14/21  7:43 AM   Result Value Ref Range    POC Glucose 277 (H) 70 - 108 mg/dl   POCT glucose    Collection Time: 12/14/21  4:11 PM   Result Value Ref Range    POC Glucose 247 (H) 70 - 108 mg/dl   POCT glucose - If patient is NPO, on continuous tube feedings, or on parenteral nutrition and on HumaLOG    Collection Time: 12/14/21  9:04 PM   Result Value Ref Range    POC Glucose 202 (H) 70 - 108 mg/dl   Basic Metabolic Panel    Collection Time: 12/15/21  7:19 AM   Result Value Ref Range    Sodium 136 135 - 145 meq/L    Potassium 4.9 3.5 - 5.2 meq/L    Chloride 97 (L) 98 - 111 meq/L    CO2 25 23 - 33 meq/L    Glucose 238 (H) 70 - 108 mg/dL    BUN 16 7 - 22 mg/dL    CREATININE 0.9 0.4 - 1.2 mg/dL    Calcium 9.2 8.5 - 10.5 mg/dL   CBC    Collection Time: 12/15/21  7:19 AM   Result Value Ref Range    WBC 8.0 4.8 - 10.8 thou/mm3    RBC 4.71 4.70 - 6.10 mill/mm3    Hemoglobin 14.3 14.0 - 18.0 gm/dl    Hematocrit 43.3 42.0 - 52.0 %    MCV 91.9 80.0 - 94.0 fL    MCH 30.4 26.0 - 33.0 pg    MCHC 33.0 32.2 - 35.5 gm/dl    RDW-CV 12.3 11.5 - 14.5 %    RDW-SD 41.4 35.0 - 45.0 fL Platelets 427 891 - 634 thou/mm3    MPV 11.1 9.4 - 12.4 fL   Anion Gap    Collection Time: 12/15/21  7:19 AM   Result Value Ref Range    Anion Gap 14.0 8.0 - 16.0 meq/L   Glomerular Filtration Rate, Estimated    Collection Time: 12/15/21  7:19 AM   Result Value Ref Range    Est, Glom Filt Rate 88 (A) ml/min/1.73m2   POCT glucose - If patient is NPO, on continuous tube feedings, or on parenteral nutrition and on HumaLOG    Collection Time: 12/15/21  7:47 AM   Result Value Ref Range    POC Glucose 230 (H) 70 - 108 mg/dl        Microbiology:    Blood culture #1:   Lab Results   Component Value Date    BC No growth-preliminary No growth  12/09/2021    BC No growth-preliminary No growth  12/09/2021       Blood culture #2:No results found for: BLOODCULT2    Organism:    Lab Results   Component Value Date    LABGRAM  12/09/2021     Rare segmented neutrophils observed. No epithelial cells observed. No organisms observed. MRSA culture only:No results found for: [facility]    Urine culture: No results found for: Aileen Dust  Lab Results   Component Value Date    ORG Serratia marcescens 12/09/2021    ORG Streptococcus agalactiae - (Group B) 12/09/2021    ORG Staphylococcus aureus 12/09/2021        Respiratory culture: No results found for: CULTRESP    Aerobic and Anaerobic :  Lab Results   Component Value Date    LABAERO  12/09/2021     moderate growth Serratia species which may be initially susceptible to third generation cephalosporins may develop resistance within three to four days of initiation of this antimicrobial therapy. LABAERO  12/09/2021     moderate growth Group B streptococci are suspectible to ampicillin, penicillin and cefazolin, but may be erythromycin and/or clindamycin resistant. Contact microbiology if erythromycin and/or clindamycin testing is necessary. LABAERO  12/09/2021     light growth This is a MRSA (Methicillin Resistant Staphylococcus aureus)isolate.  Isolates of MRSA (ORSA) Methicillin (Oxacillin) Resistant Staphylococcus aureus (coagulase positive) require patient be placed in CONTACT isolation. Methicillin(Oxacillin)resistant strains of staphylococci (MRSA)or(MRSE)should be considered resistant to all classes of cephalosporins, penems and beta-lactams. In the treatment of gram positive infections, GENTAMICIN should be CONSIDERED a SYNERGYSTIC agent ONLY. Lab Results   Component Value Date    LABANAE  03/25/2020     Culture yielded heavy growth of anaerobic gram positive cocci. If a true mixed aerobic and anaerobic infection is suspected, then broad spectrum empiric antibiotic therapy is indicated and should include coverage for anaerobic organisms. Urinalysis:      Lab Results   Component Value Date    NITRU NEGATIVE 07/22/2021    WBCUA  07/22/2021    BACTERIA NONE SEEN 07/22/2021    RBCUA 3-5 07/22/2021    BLOODU NEGATIVE 07/22/2021    SPECGRAV >1.030 07/22/2021       Radiology:-  XR TIBIA FIBULA RIGHT (2 VIEWS)    Result Date: 12/9/2021  PROCEDURE: XR TIBIA FIBULA RIGHT (2 VIEWS) CLINICAL INFORMATION: 50-year-old male with a wound at the medial aspect of the right leg. COMPARISON: Radiograph 3/16/2007. TECHNIQUE: 4 views of the right lower leg were obtained. FINDINGS: There is no acute fracture or dislocation. There are fixation screws in the distal fibula with some irregularity presumably from a previous injury. There is soft tissue irregularity at the medial aspect of the distal lower leg. Soft tissue irregularity. No acute fracture or dislocation. **This report has been created using voice recognition software. It may contain minor errors which are inherent in voice recognition technology. ** Final report electronically signed by Dr Dianna García on 12/9/2021 7:54 PM    XR FOOT RIGHT (MIN 3 VIEWS)    Result Date: 12/9/2021  PROCEDURE: XR FOOT RIGHT (MIN 3 VIEWS) CLINICAL INFORMATION: 50-year-old male who had an injury to the fifth digit of his right foot 7 days ago. Nonhealing wound. COMPARISON: No prior study. TECHNIQUE: 4 views of the right foot were obtained. FINDINGS: There is a linear lucency at the tip of the distal phalanx of the fifth digit of the right foot which is only appreciated on the oblique film. The remaining osseous structures are intact. Postsurgical changes are seen at the distal right fibula. There is no dislocation. The calcaneus is within normal limits. The base of the fifth metatarsal is intact. There is irregularity of the soft tissues of the fifth digit. There is soft tissue swelling throughout the foot. IMPRESSION: 1. There is a linear lucency at the tip of the distal phalanx of the fifth digit which is only appreciated on one of the oblique films. This could represent a nondisplaced fracture. 2. Diffuse soft tissue swelling throughout the foot with irregularity of the soft tissues of the fifth digit. **This report has been created using voice recognition software. It may contain minor errors which are inherent in voice recognition technology. ** Final report electronically signed by Dr Nathaniel Rivero on 12/9/2021 5:34 PM     DUP LOWER EXTREMITY ARTERIES RIGHT    Result Date: 12/10/2021  PROCEDURE: VL DUP LOWER EXTREMITY ARTERIES RIGHT CLINICAL INFORMATION: Gangrene ([address] COMPARISON: No prior study. TECHNIQUE: Multiple permanent grayscale and color flow sonographic images of the major arteries of the right lower extremity were obtained from the level of the groin to the ankle. Spectral Doppler waveforms were obtained and velocity measurements were made. ... Technically difficult due to wrappings and wounds on legs. Not able to perform JACK due to wounds.  FINDINGS: CFA ---------------> 227 PSV cm/sec PROF -------------> 58 PSV cm/sec SFA PROX ------->174 PSV cm/sec SFA MID ---------->163 PSV cm/sec SFA DIST -------->175 PSV cm/sec POP A PROX --->147 PSV cm/sec POP A DIST ---->168 PSV cm/sec PTA --------------->32 PSV cm/sec PERONEAL ----->73 PSV cm/sec ABBEY ----------------> 47 PSV cm/sec VELOCITY MEASUREMENTS: ABIs not calculated. There is moderately dampened pulsatility in the right anterior tibial and posterior tibial arteries are relatively good pulsatility in the peroneal artery. Pulsatility throughout the remainder of the right leg is unremarkable. Findings suggestive of trifurcation disease. Note that there is an inhomogeneous masslike lesion in the right groin, 5.1 cm in length and 2.4 cm in diameter with demonstration is increased vascularity. This probably represents a cluster of lymph nodes. Clinical correlation recommended. CT of the pelvis with contrast enhancement will be helpful for further evaluation     1. 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 Vascularity right lower extremity otherwise unremarkable. In light of the history of gangrene in the right lower extremity, aortofemoral angiography is recommended for further evaluation with possible intervention. If desired, this can be arranged through the interventional scheduling desk of [facility] department ([phone]). 2. Inhomogeneous lesion in the right groin with increased vascularity centrally. This likely represents a cluster of lymph nodes, nonetheless, further evaluation is warranted. **This report has been created using voice recognition software. It may contain minor errors which are inherent in voice recognition technology. ** Final report electronically signed by Dr. Jah Thomas on 12/10/2021 1:56 PM    VL DUP LOWER EXTREMITY VENOUS BILATERAL    Result Date: 12/10/2021  PROCEDURE: VL DUP LOWER EXTREMITY VENOUS BILATERAL CLINICAL INFORMATION: Rule out DVT COMPARISON: No prior study. TECHNIQUE: Multiple grayscale and color flow images of the major veins of both lower extremities were obtained from the level of the groin to the level of the ankle.  Multiple compression and augmentation maneuvers were performed and spectral Doppler waveforms were generated. Yojana Galvan .Technically difficult due to wrappings and wounds on legs. FINDINGS: RIGHT LOWER EXTREMITY:All the deep veins of the right lower extremity are widely patent with normal flow and normal compressibility. Incidental note is made of an inhomogeneous masslike lesion in the right groin, 5.1 x 2.4 cm, with increased vascularity centrally. This likely represents a clustering of lymph nodes. Nonetheless, further evaluation with contrast-enhanced CT is recommended. LEFT LOWER EXTREMITY:All the  deep veins of the left lower extremity are widely patent with normal flow and normal compressibility. 1. Normal venous ultrasound. No evidence for deep venous thrombosis. 2. Inhomogeneous lesion in the right groin with increased vascularity centrally, most likely representing lymph nodes. Nonetheless, further evaluation is warranted. See comments above. **This report has been created using voice recognition software. It may contain minor errors which are inherent in voice recognition technology. ** Final report electronically signed by Dr. Ramez Buitrago on 12/10/2021 1:58 PM       Follow-up scheduled after discharge :-    in the next few days with Gomez Luna DPM  in 1-2 weeks with Podiatry     Consultations during this hospital stay:-  [] NONE [] Cardiology  [] Nephrology  [] Hemo onco   [] GI   [] ID  [] Endocrine  [] Pulm    [] Neuro    [] Psych   [] Urology  [] ENT   [] G SURGERY   []Ortho    []CV surg    [] Palliative  [] Hospice [] Pain management   []    []TCU   [] PT/OT  OTHERS:- Podiatry     Disposition: home  Condition at Discharge: Stable    Time Spent:- 50 minutes    Electronically signed by Americo Gonzalez PA-C on 12/15/21 at 11:09 AM EST   Discharging Hospitalist

## 2021-12-15 NOTE — ANESTHESIA POSTPROCEDURE EVALUATION
Department of Anesthesiology  Postprocedure Note    Patient: Floridalma Larios  MRN: [id number]  YOB: 1970  Date of evaluation: 12/15/2021  Time:  1:14 PM     Procedure Summary     Date: 12/14/21 Room / Location: [address] [place] [id number] / [facility] [place] [facility] [place]    Anesthesia Start: 1932 Anesthesia Stop: 2033    Procedure: RIGHT 5TH TOE AMPUTATION (Right Toes) Diagnosis: (GANGRENE RIGHT 5TH TOE)    Surgeons: Roni French DPM Responsible Provider: Leena Schmitt DO    Anesthesia Type: MAC ASA Status: 3          Anesthesia Type: MAC    Jolie Phase I:      Jolie Phase II:      Last vitals: Reviewed and per EMR flowsheets.        Anesthesia Post Evaluation    Patient location during evaluation: bedside  Patient participation: complete - patient participated  Level of consciousness: awake and alert  Airway patency: patent  Nausea & Vomiting: no vomiting and no nausea  Complications: no  Cardiovascular status: hemodynamically stable  Respiratory status: acceptable  Hydration status: stable

## 2021-12-15 NOTE — H&P
[address] [address]  History and Physical Update    Pt Name: Marshia Angelucci  MRN: [id number]  YOB: 1970  Date of evaluation: 12/14/2021    I have examined the patient and reviewed the H&P/Consult and there are no changes to the patient or plans.       Matias French DPM,FACFAS  Electronically signed 12/14/2021 at 7:14 PM

## 2021-12-15 NOTE — OP NOTE
Operative Note      Patient: Glenn Oates  YOB: 1970  MRN: [id number]     Date of Procedure: 12/14/2021     Pre-Op Diagnosis: GANGRENE RIGHT 5TH TOE     Post-Op Diagnosis: Same       Procedure(s):  RIGHT 5TH PARTIAL RAY AMPUTATION   Excisional debridement of wound to subcutaneous layer 2.5cm x 2cm     [facility] JER[facility]     Assistant:  First Assistant: Jailene Conteh  Resident: Marie Melgoza DPM     Anesthesia: Monitor Anesthesia Care     Estimated Blood Loss (mL): Minimal     Complications: None     Hemostasis: None      Injectables: 10cc of 0.5% Marcaine plain; 10cc of 1% Lidocaine with epinepherine     Materials: 3-0 Vicryl, 3-0 Monocryl, 4-0 Monocryl      Specimens:   ID Type Source Tests Collected by Time Destination   A : RIGHT FOOT, FIFTH TOE Tissue Toe SURGICAL PATHOLOGY [facility], [facility] 12/14/2021 2003           Implants:  * No implants in log *      Drains: * No LDAs found *     Findings: Same as above    Detailed Description of Procedure: Indications: Patient is a 45 y.o. male with a chief complaint of gangrene  who is being seen by Dr. Vijay Urbina and being treated for gangrene of right 5th toe. At this time all conservative options have been exhausted and surgical intervention is necessary. The procedure has been explained to the patient and they understand the risks, benefits and possible complications including recurrence, loss of foot, loss of limb, loss of life. No promises have been made as to surgical outcome. Procedure: The patient was transported from the pre-op holding to the operating room and placed in a supine position. A pre-operative injection of 10 cc of 0.5% marcaine plain was injected into the right foot in a digit block. A pre-operative injection of 10 cc of 1% Lidocaine with epinepherine was injected into the right leg in a tibial nerve block. The right foot was then prepped and draped in the normal aspetic manner.       Attention was then directed to the right fifth digit. A skin marker was used to create a racquet type incision line. Forceps were then applied to the digit to check for proper anesthesia. A towel clamp was then used to grasp the distal aspect of the toe. A full thickness incision was made down to the level of bone. The metatarso-phalangeal joint was visualized, and all ligamentous and capsular structures were released. All soft tissue was released from the fifth metatarsal. The distal portion of the fifth metatarsal was removed using a rongeur. The dis-articulated digit and piece of fifth metatarsal was then sent to Pathology and Microbiology for Aerobic and Anaerobic culture and sensitivity. The incision site was then inspected for viable healthy tissue. All non-viable tissue was removed using a rongeur. Attention was then directed to the medial leg wound. A rongeur and curette were used to remove all non-viable tissue, fibrotic tissue, slough and biofilm to the subcutaneous layer. A 15 blade and forceps were used to freshen the wound edges. A saline wet to dry gauze was packed into the wound. The incision was flushed with copious amounts of Irresept. The subcutaneous tissues were re-appoximated with 3-0 Vicryl. The skin was closed using 3-0 Monocryl and 4-0 Monocryl. The incision was dressed with Mepitel Ag, 4x4's, kerlix, ACE wrap. The patient was transported to the PACU with VSS and NVS intact to digits 1-4 of the right foot. No complications were noted throughout the procedure. The patient is to be discharged per PACU protocol. The patient is to follow-up with Dr. Karis Camacho in the office.     JEROME Ahuja DPM  Foot and Ankle Surgical Resident  12/14/2021  8:51 PM          Electronically signed by Cecilio Mishra DPM on 12/14/2021 at 8:50 PM

## 2021-12-17 ASSESSMENT — ENCOUNTER SYMPTOMS
COUGH: 0
ABDOMINAL PAIN: 0
NAUSEA: 0
BACK PAIN: 0
RHINORRHEA: 0
CHEST TIGHTNESS: 0
EYE REDNESS: 0
VOMITING: 0
COLOR CHANGE: 1

## 2021-12-17 NOTE — ED PROVIDER NOTES
[facility] Emergency Department    CHIEF COMPLAINT       Chief Complaint   Patient presents with    Toe Injury       Nurses Notes reviewed and I agree except as noted in the HPI. HISTORY OF PRESENT ILLNESS    Briseyda Kenny álvaro 45 y.o. male who presents to the ED for evaluation of right fifth toe injury. He injured it and now it is completely black. Legs are also discolored. This is not entirely new but the patient does have multiple ulcers on the legs. He states that he has uncontrolled diabetes. Has neuropathy in his legs. Denies fever. Pain is minimal due to lack of sensation. HPI was provided by the patient    REVIEW OF SYSTEMS     Review of Systems   Constitutional: Negative for chills, fatigue and fever. HENT: Negative for congestion, ear discharge, ear pain, postnasal drip and rhinorrhea. Eyes: Negative for redness. Respiratory: Negative for cough and chest tightness. Cardiovascular: Negative for chest pain and leg swelling. Gastrointestinal: Negative for abdominal pain, nausea and vomiting. Genitourinary: Negative for difficulty urinating, dysuria, enuresis, flank pain and hematuria. Musculoskeletal: Positive for gait problem. Negative for back pain and joint swelling. Skin: Positive for color change and wound. Negative for rash. Neurological: Negative for dizziness, light-headedness, numbness and headaches. Psychiatric/Behavioral: Negative for agitation, behavioral problems and confusion. All other systems negative except as noted. 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     CURRENT MEDICATIONS Discharge Medication List as of 12/15/2021  3:56 PM      CONTINUE these medications which have NOT CHANGED    Details   bumetanide (BUMEX) 1 MG tablet take 1 tablet by mouth once daily, Disp-90 tablet, R-0Normal      glipiZIDE (GLUCOTROL) 5 MG tablet take 1/2 tablet by mouth twice a day WITH MORNING AND EVENING MEALS, Disp-90 tablet, R-1Normal      losartan (COZAAR) 100 MG tablet take 1 tablet by mouth once daily, Disp-90 tablet, R-0Normal      !! BASAGLAR KWIKPEN 100 UNIT/ML injection pen INJECT 30 UNITS INTO THE SKIN EVERY NIGHT, Disp-15 mL, R-2Normal      Insulin Pen Needle 32G X 4 MM MISC 4 TIMES DAILY Starting Wed 9/8/2021, Disp-100 each, R-3, Normal      atorvastatin (LIPITOR) 80 MG tablet Take 1 tablet by mouth daily take 1 tablet by mouth at bedtime, Disp-90 tablet, R-1Normal      ACCU-CHEK CAT PLUS strip CHECK BLOOD SUGAR EVERY MORNING AND AFTER DINNER EVERY DAY, Disp-60 strip, R-0Normal      Accu-Chek Softclix Lancets MISC Disp-100 each, R-0, STEVE, Normal      insulin aspart (NOVOLOG FLEXPEN) 100 UNIT/ML injection pen Inject 8 units subcu baseline plus a sliding scale according to group 2 sliding scale insulin.  Max daily dose  70 units daily, Disp-5 pen, R-3Normal      vitamin D (ERGOCALCIFEROL) 1.25 MG (47921 UT) CAPS capsule take 1 capsule by mouth every week, Disp-4 capsule, R-2Normal      omega-3 acid ethyl esters (LOVAZA) 1 g capsule take 2 capsules by mouth twice a day, Disp-360 capsule, R-1Normal      omeprazole (PRILOSEC) 20 MG delayed release capsule take 1 capsule by mouth every morning BEFORE BREAKFAST, Disp-90 capsule, R-1Normal      metoprolol succinate (TOPROL XL) 25 MG extended release tablet take 1 tablet by mouth at bedtime, Disp-90 tablet, R-0Normal      metFORMIN (GLUCOPHAGE-XR) 500 MG extended release tablet take 4 tablets by mouth once daily - WITH BREAKFAST, Disp-360 tablet, R-0Normal      vitamin D3 (CHOLECALCIFEROL) 25 MCG (1000 UT) TABS tablet Take 2 tablets by mouth daily, Disp-180 tablet, R-5Normal      fenofibrate (TRIGLIDE) 160 MG tablet take 1 tablet by mouth once daily with food, Disp-90 tablet, R-0Normal      !! Continuous Blood Gluc  (DEXCOM G4 PLAT PED ) ANÍBAL 1 Units by Does not apply route continuous, Disp-1 Device, R-0Normal      !! Continuous Blood Gluc Sensor (DEXCOM G6 SENSOR) MISC 1 Units by Does not apply route continuous, Disp-6 each, R-1Normal      fluticasone (FLONASE) 50 MCG/ACT nasal spray 1 spray by Each Nostril route daily, Disp-1 Bottle, R-0Normal      !! insulin glargine (LANTUS SOLOSTAR) 100 UNIT/ML injection pen Inject 20 Units into the skin nightly, Disp-5 pen,R-3Normal      !! Continuous Blood Gluc  (FREESTYLE MACIEL 14 DAY READER) ANÍBAL 1 Units by Does not apply route continuous Patient is diabetic treated with insulin, Disp-1 Device,R-0Normal      !! Continuous Blood Gluc Sensor (FREESTYLE MACIEL 14 DAY SENSOR) MISC 1 Units by Does not apply route continuous Patient is Diabetic treated with Insulin, Disp-6 each,R-1Normal       !! - Potential duplicate medications found. Please discuss with provider. ALLERGIES     is allergic to morphine. FAMILY HISTORY     has no family status information on file. family history is not on file.     SOCIAL HISTORY       Social History     Socioeconomic History    Marital status:      Spouse name: Not on file    Number of children: Not on file    Years of education: Not on file    Highest education level: Not on file   Occupational History    Not on file   Tobacco Use    Smoking status: Current Every Day Smoker     Packs/day: 0.25     Years: 36.00     Pack years: 9.00     Types: Cigarettes     Start date: 1984    Smokeless tobacco: Never Used    Tobacco comment: \"nicotine pouches\"   Substance and Sexual Activity    Alcohol use: Not on file    Drug use: No    Sexual activity: Not on file   Other Topics Concern    Not on file   Social History Narrative    Not on file Social Determinants of Health     Financial Resource Strain: Unknown    Difficulty of Paying Living Expenses: Patient refused   Food Insecurity: Unknown    Worried About Running Out of Food in the Last Year: Patient refused    [address] in the Last Year: Patient refused   Transportation Needs:     Lack of Transportation (Medical): Not on file    Lack of Transportation (Non-Medical): Not on file   Physical Activity:     Days of Exercise per Week: Not on file    Minutes of Exercise per Session: Not on file   Stress:     Feeling of Stress : Not on file   Social Connections:     Frequency of Communication with Friends and Family: Not on file    Frequency of Social Gatherings with Friends and Family: Not on file    Attends Zoroastrian Services: Not on file    Active Member of [address] [place] [address] [employer] or Organizations: Not on file    Attends Club or Organization Meetings: Not on file    Marital Status: Not on file   Intimate Partner Violence:     Fear of Current or Ex-Partner: Not on file    Emotionally Abused: Not on file    Physically Abused: Not on file    Sexually Abused: Not on file   Housing Stability:     Unable to Pay for Housing in the Last Year: Not on file    Number of [place] in the Last Year: Not on file    Unstable Housing in the Last Year: Not on file       PHYSICAL EXAM     INITIAL VITALS:  height is 6' (1.829 m) and weight is 282 lb 3 oz (128 kg). His oral temperature is 99.7 °F (37.6 °C). His blood pressure is 138/69 and his pulse is 79. His respiration is 20 and oxygen saturation is 95%. Physical Exam  Vitals and nursing note reviewed. Constitutional:       General: He is not in acute distress. Appearance: Normal appearance. He is well-developed. He is not ill-appearing or diaphoretic. HENT:      Head: Normocephalic and atraumatic. Nose: Nose normal.      Mouth/Throat:      Mouth: Mucous membranes are moist.      Pharynx: Oropharynx is clear.    Eyes:      General: Right eye: No discharge. Left eye: No discharge. Conjunctiva/sclera: Conjunctivae normal.   Neck:      Trachea: No tracheal deviation. Cardiovascular:      Rate and Rhythm: Normal rate and regular rhythm. Pulses:           Dorsalis pedis pulses are 1+ on the right side and 1+ on the left side. Posterior tibial pulses are 1+ on the right side and 1+ on the left side. Heart sounds: Normal heart sounds. No murmur heard. No gallop. Comments: Normal capillary refill, discoloration of the BLE. Pulmonary:      Effort: Pulmonary effort is normal. No respiratory distress. Breath sounds: Normal breath sounds. No stridor. Abdominal:      General: Bowel sounds are normal.      Palpations: Abdomen is soft. Musculoskeletal:         General: No tenderness or deformity. Normal range of motion. Cervical back: Normal range of motion. Right lower le+ Pitting Edema present. Left lower le+ Pitting Edema present. Skin:     General: Skin is warm and dry. Capillary Refill: Capillary refill takes 2 to 3 seconds. Coloration: Skin is not pale. Findings: No erythema or rash. Comments: See photos of wounds. Neurological:      General: No focal deficit present. Mental Status: He is alert and oriented to person, place, and time. Cranial Nerves: No cranial nerve deficit. Psychiatric:         Behavior: Behavior normal.                         DIFFERENTIAL DIAGNOSIS:   Venous stasis ulcers, diabetic ulcers, neuropathy, gangrene, osteo    DIAGNOSTIC RESULTS     EKG: All EKG's are interpreted by the Emergency Department Physician who eithersigns or Co-signs this chart in the absence of a cardiologist.        RADIOLOGY: non-plainfilm images(s) such as CT, Ultrasound and MRI are read by the radiologist.  Plain radiographic images are visualized and preliminarily interpreted by the emergency physician unless otherwise stated below.   ALEXEI BOB LOWER EXTREMITY VENOUS BILATERAL   Final Result   1. Normal venous ultrasound. No evidence for deep venous thrombosis. 2. Inhomogeneous lesion in the right groin with increased vascularity centrally, most likely representing lymph nodes. Nonetheless, further evaluation is warranted. See comments above. **This report has been created using voice recognition software. It may contain minor errors which are inherent in voice recognition technology. **      Final report electronically signed by Dr. Isamar Das on 12/10/2021 1:58 PM      VL DUP LOWER EXTREMITY ARTERIES RIGHT   Final Result   1. 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 Vascularity right lower extremity otherwise unremarkable. In light of the history of gangrene in the right lower extremity, aortofemoral angiography is recommended for    further evaluation with possible intervention. If desired, this can be arranged through the interventional scheduling desk of [facility]'s radiology department ([phone]). 2. Inhomogeneous lesion in the right groin with increased vascularity centrally. This likely represents a cluster of lymph nodes, nonetheless, further evaluation is warranted. **This report has been created using voice recognition software. It may contain minor errors which are inherent in voice recognition technology. **      Final report electronically signed by Dr. Isamar Das on 12/10/2021 1:56 PM      XR TIBIA FIBULA RIGHT (2 VIEWS)   Final Result   Soft tissue irregularity. No acute fracture or dislocation. **This report has been created using voice recognition software. It may contain minor errors which are inherent in voice recognition technology. **      Final report electronically signed by Dr Bernarda Castaneda on 12/9/2021 7:54 PM      XR FOOT RIGHT (MIN 3 VIEWS)   Final Result    IMPRESSION:      1.  There is a linear lucency at the tip of the distal phalanx of the fifth digit which is only appreciated on one of the oblique films. This could represent a nondisplaced fracture. 2. Diffuse soft tissue swelling throughout the foot with irregularity of the soft tissues of the fifth digit. **This report has been created using voice recognition software. It may contain minor errors which are inherent in voice recognition technology. **      Final report electronically signed by Dr Luanne Rod on 12/9/2021 5:34 PM            LABS:   Labs Reviewed   CULTURE, AEROBIC - Abnormal; Notable for the following components:       Result Value    Organism Serratia marcescens (*)     Organism Streptococcus agalactiae - (Group B) (*)     Organism Staphylococcus aureus (*)     All other components within normal limits    Narrative:     Source: toe       Site: swab injury          Current Antibiotics: not stated   BASIC METABOLIC PANEL - Abnormal; Notable for the following components:    Sodium 132 (*)     Chloride 96 (*)     Glucose 343 (*)     All other components within normal limits   CBC WITH AUTO DIFFERENTIAL - Abnormal; Notable for the following components:    WBC 11.5 (*)     RBC 4.56 (*)     Segs Absolute 8.4 (*)     All other components within normal limits   HEPATIC FUNCTION PANEL - Abnormal; Notable for the following components:     Total Protein 8.2 (*)     All other components within normal limits   PROCALCITONIN - Abnormal; Notable for the following components:    Procalcitonin 0.12 (*)     All other components within normal limits   C-REACTIVE PROTEIN - Abnormal; Notable for the following components:    CRP 17.81 (*)     All other components within normal limits   SEDIMENTATION RATE - Abnormal; Notable for the following components:    Sed Rate 100 (*)     All other components within normal limits   GLOMERULAR FILTRATION RATE, ESTIMATED - Abnormal; Notable for the following components:    Est, Glom Filt Rate 70 (*)     All other components within normal limits following components:    POC Glucose 304 (*)     All other components within normal limits   POCT GLUCOSE - Abnormal; Notable for the following components:    POC Glucose 295 (*)     All other components within normal limits   POCT GLUCOSE - Abnormal; Notable for the following components:    POC Glucose 376 (*)     All other components within normal limits   POCT GLUCOSE - Abnormal; Notable for the following components:    POC Glucose 320 (*)     All other components within normal limits   POCT GLUCOSE - Abnormal; Notable for the following components:    POC Glucose 300 (*)     All other components within normal limits   POCT GLUCOSE - Abnormal; Notable for the following components:    POC Glucose 273 (*)     All other components within normal limits   POCT GLUCOSE - Abnormal; Notable for the following components:    POC Glucose 307 (*)     All other components within normal limits   POCT GLUCOSE - Abnormal; Notable for the following components:    POC Glucose 240 (*)     All other components within normal limits   POCT GLUCOSE - Abnormal; Notable for the following components:    POC Glucose 260 (*)     All other components within normal limits   POCT GLUCOSE - Abnormal; Notable for the following components:    POC Glucose 262 (*)     All other components within normal limits   POCT GLUCOSE - Abnormal; Notable for the following components:    POC Glucose 274 (*)     All other components within normal limits   POCT GLUCOSE - Abnormal; Notable for the following components:    POC Glucose 221 (*)     All other components within normal limits   POCT GLUCOSE - Abnormal; Notable for the following components:    POC Glucose 245 (*)     All other components within normal limits   POCT GLUCOSE - Abnormal; Notable for the following components:    POC Glucose 322 (*)     All other components within normal limits   POCT GLUCOSE - Abnormal; Notable for the following components:    POC Glucose 223 (*)     All other components within normal limits   POCT GLUCOSE - Abnormal; Notable for the following components:    POC Glucose 217 (*)     All other components within normal limits   POCT GLUCOSE - Abnormal; Notable for the following components:    POC Glucose 298 (*)     All other components within normal limits   POCT GLUCOSE - Abnormal; Notable for the following components:    POC Glucose 277 (*)     All other components within normal limits   POCT GLUCOSE - Abnormal; Notable for the following components:    POC Glucose 247 (*)     All other components within normal limits   POCT GLUCOSE - Abnormal; Notable for the following components:    POC Glucose 202 (*)     All other components within normal limits   POCT GLUCOSE - Abnormal; Notable for the following components:    POC Glucose 230 (*)     All other components within normal limits   POCT GLUCOSE - Abnormal; Notable for the following components:    POC Glucose 272 (*)     All other components within normal limits   CULTURE, BLOOD 1    Narrative:     Source: blood-Adult-suboptimal <5.5oz./set volume       Site: Peripheral Vein            Current Antibiotics: not stated   CULTURE, BLOOD 2    Narrative:     Source: blood-Adult-suboptimal <5.5oz./set volume       Site: Peripheral Vein            Current Antibiotics: not stated   LACTIC ACID, PLASMA   LIPASE   ANION GAP   OSMOLALITY   BRAIN NATRIURETIC PEPTIDE   VANCOMYCIN, RANDOM   ANION GAP   ANION GAP   VANCOMYCIN, RANDOM   VANCOMYCIN, RANDOM   ANION GAP   GLOMERULAR FILTRATION RATE, ESTIMATED   CBC   ANION GAP   SURGICAL PATHOLOGY   POCT GLUCOSE   POCT GLUCOSE   POCT GLUCOSE   POCT GLUCOSE   POCT GLUCOSE   POCT GLUCOSE   POCT GLUCOSE   POCT GLUCOSE   POCT GLUCOSE   POCT GLUCOSE   POCT GLUCOSE   POCT GLUCOSE   POCT GLUCOSE   POCT GLUCOSE   POCT GLUCOSE   POCT GLUCOSE   POCT GLUCOSE   POCT GLUCOSE   POCT GLUCOSE   POCT GLUCOSE   POCT GLUCOSE   POCT GLUCOSE   POCT GLUCOSE   POCT GLUCOSE   POCT GLUCOSE   POCT GLUCOSE   POCT GLUCOSE   POCT GLUCOSE   POCT GLUCOSE       EMERGENCY DEPARTMENT COURSE:   Vitals:    Vitals:    12/14/21 2330 12/14/21 2345 12/15/21 0343 12/15/21 1548   BP: 117/73 (!) 149/81 (!) 165/90 138/69   Pulse: 71 80 94 79   Resp:   18 20   Temp:   98 °F (36.7 °C) 99.7 °F (37.6 °C)   TempSrc:   Oral Oral   SpO2:   98% 95%   Weight:       Height:                                        MDM    Patient was seen in the ER for what appears to be gangrene of the toe. Appropriate labs and imaging were obtained. I discussed with Kellie Yo MD from podiatry. He came down and saw the patient. He indicated the patient should be admitted for further evaluation and work up. The patient is agreeable. Patient is treated with two strong abx. Medications   vancomycin (VANCOCIN) 1,750 mg in dextrose 5 % 500 mL IVPB (0 mg IntraVENous Stopped 12/9/21 2349)   piperacillin-tazobactam (ZOSYN) 3,375 mg in dextrose 5 % 50 mL IVPB (mini-bag) (0 mg IntraVENous Stopped 12/9/21 2058)   metoprolol succinate (TOPROL XL) extended release tablet 25 mg (25 mg Oral Given 12/10/21 0236)       Please note that the patient was evaluated during a pandemic. All efforts were made for HIPPA compliance as well as provision of appropriate care. Patient was seen independently by myself. The patient's final impression and disposition and plan was determined by myself. Strict return precautions and follow up instructions were discussed with the patient prior to discharge, with which the patient agrees. Physical assessment findings, diagnostic testing(s) if applicable, and vital signs reviewed with patient/patient representative. Questions answered. Medications asdirected, including OTC medications for supportive care. Education provided on medications. Differential diagnosis(s) discussed with patient/patient representative. Home care/self care instructions reviewed withpatient/patient representative.   Patient is to follow-up with family care provider in 2-3 days if no improvement. Patient is to go to the emergency department if symptoms worsen. Patient/patient representative isaware of care plan, questions answered, verbalizes understanding and is in agreement. CRITICAL CARE:   None    CONSULTS:  OTHER--podiatry    PROCEDURES:  None    FINAL IMPRESSION     1. Cellulitis of right lower extremity    2.  Toe gangrene ([facility] [address]    3. Gangrene ([facility] [address]          DISPOSITION/PLAN   DISPOSITION Admitted 12/09/2021 10:06:23 PM      PATIENT REFERREDTO:  [address] [facility] [address]  [address]  [facility] [address]  [phone]  On 12/21/2021  @2:30      DISCHARGE MEDICATIONS:  Discharge Medication List as of 12/15/2021  3:56 PM      START taking these medications    Details   sulfamethoxazole-trimethoprim (BACTRIM DS;SEPTRA DS) 800-160 MG per tablet Take 1 tablet by mouth 2 times daily for 14 days, Disp-28 tablet, R-0Print             (Please note that portions of this note were completed with a voice recognition program.  Efforts were made to edit the dictations but occasionally words are mis-transcribed.)         ANA Lafleur - ANA Bradshaw CNP  12/17/21 9928

## 2021-12-20 RX ORDER — BLOOD SUGAR DIAGNOSTIC
STRIP MISCELLANEOUS
Qty: 60 STRIP | Refills: 0 | Status: SHIPPED | OUTPATIENT
Start: 2021-12-20

## 2021-12-21 ENCOUNTER — HOSPITAL ENCOUNTER (OUTPATIENT)
Dept: WOUND CARE | Age: 51
Discharge: HOME | End: 2021-12-21
Payer: COMMERCIAL

## 2021-12-21 VITALS
BODY MASS INDEX: 38.19 KG/M2 | SYSTOLIC BLOOD PRESSURE: 147 MMHG | TEMPERATURE: 99.1 F | DIASTOLIC BLOOD PRESSURE: 76 MMHG | HEIGHT: 72 IN | HEART RATE: 97 BPM | OXYGEN SATURATION: 92 % | RESPIRATION RATE: 18 BRPM | WEIGHT: 282 LBS

## 2021-12-21 DIAGNOSIS — S81.801D WOUND OF RIGHT LOWER EXTREMITY, SUBSEQUENT ENCOUNTER: Primary | ICD-10-CM

## 2021-12-21 PROCEDURE — 97597 DBRDMT OPN WND 1ST 20 CM/<: CPT

## 2021-12-21 PROCEDURE — 6370000000 HC RX 637 (ALT 250 FOR IP): Performed by: PODIATRIST

## 2021-12-21 PROCEDURE — 99213 OFFICE O/P EST LOW 20 MIN: CPT

## 2021-12-21 RX ADMIN — SILVER NITRATE APPLICATORS 1 EACH: 25; 75 STICK TOPICAL at 14:56

## 2021-12-21 ASSESSMENT — PAIN SCALES - GENERAL: PAINLEVEL_OUTOF10: 0

## 2021-12-21 NOTE — PLAN OF CARE
Problem: Wound:  Goal: Will show signs of wound healing; wound closure and no evidence of infection  Description: Will show signs of wound healing; wound closure and no evidence of infection  Outcome: Ongoing   Patient presents to wound clinic for hospital follow up of right foot and leg wounds. Wound debrided today and silver nitrate applied. Wound vac ordered today. Home Health to apply wound vac once received. Referral to [address] [place] [address]. Right foot- Apply collagen to wound. Cover with saline moistened gauze. Cover with dry gauze and ABD pad. Wrap with roll gauze and ace bandage from base of toes to 1-2 inches below bend of knee. Change 3 times weekly. Right leg- Apply collagen to wound bed. Pack with saline moistened gauze. Cover with dry gauze and ABD pad. Wrap with roll gauze and ace bandage from base of toes to 1-2 inches below bend of knee. Change 3 times weekly. Follow up visit:   2 Weeks on Tuesday January 4th at 10:30 am.    Care plan reviewed with patient. Patient verbalize understanding of the plan of care and contribute to goal setting.

## 2021-12-21 NOTE — PROGRESS NOTES
[facility]          Progress Note and Procedure Note      Kathleen Baez  MEDICAL RECORD NUMBER:  [id number]  AGE: 45 y.o. GENDER: male  : 1970  EPISODE DATE:  2021    Subjective:     Chief Complaint   Patient presents with    Wound Check     Right fifth toe amputation site         HISTORY of PRESENT ILLNESS HPI     Kathleen Baez is a 45 y.o. male Established patient, who presents today for wound/ulcer evaluation. History of Wound Context: Patient had recent hospitalization secondary to right foot infection. He is status post right digit amputation (date of surgery 2021). He also has a right calf wound. Wound/Ulcer Pain Timing/Severity: intermittent  Quality of pain: sharp  Severity:  2 / 10   Modifying Factors: Pain worsens with Touch  Associated Signs/Symptoms: drainage and pain    Interval History:   Patient presents today for follow up on wound/ulcer's progression. The patient is currently not on antibiotics. Current dressing care includes Betadine every other day to both wounds. He is no longer on antibiotics. Patient relates redness has completely resolved. His wife has been doing dressing changes. He denies any fever, chills, nausea, vomiting, chest pain or shortness of breath. Patient has no other complaints at this time. PAST MEDICAL HISTORY        Diagnosis Date    CAD (coronary artery disease)     CHF (congestive heart failure) ([facility] 2019    Diabetes mellitus ([facility] 's    Hypertension 's       PAST SURGICAL HISTORY    Past Surgical History:   Procedure Laterality Date    CHOLECYSTECTOMY      FIBULA FRACTURE SURGERY Right     screws    TIBIA FRACTURE SURGERY Right     screws    TOE AMPUTATION Right 2021    RIGHT 5TH TOE AMPUTATION performed by Lashanda Bello DPM at [address] [place] [address]    History reviewed. No pertinent family history.     SOCIAL HISTORY    Social History     Tobacco Use    Smoking status: Current Every Day Smoker     Packs/day: 0.25     Years: 36.00     Pack years: 9.00     Types: Cigarettes     Start date: 1984    Smokeless tobacco: Never Used    Tobacco comment: \"nicotine pouches\"   Substance Use Topics    Alcohol use: Not on file    Drug use: No       ALLERGIES    Allergies   Allergen Reactions    Morphine      \"goes nuts\"       MEDICATIONS    Current Outpatient Medications on File Prior to Encounter   Medication Sig Dispense Refill    sulfamethoxazole-trimethoprim (BACTRIM DS;SEPTRA DS) 800-160 MG per tablet Take 1 tablet by mouth 2 times daily for 14 days 28 tablet 0    bumetanide (BUMEX) 1 MG tablet take 1 tablet by mouth once daily 90 tablet 0    glipiZIDE (GLUCOTROL) 5 MG tablet take 1/2 tablet by mouth twice a day WITH MORNING AND EVENING MEALS 90 tablet 1    losartan (COZAAR) 100 MG tablet take 1 tablet by mouth once daily 90 tablet 0    BASAGLAR KWIKPEN 100 UNIT/ML injection pen INJECT 30 UNITS INTO THE SKIN EVERY NIGHT 15 mL 2    atorvastatin (LIPITOR) 80 MG tablet Take 1 tablet by mouth daily take 1 tablet by mouth at bedtime 90 tablet 1    insulin aspart (NOVOLOG FLEXPEN) 100 UNIT/ML injection pen Inject 8 units subcu baseline plus a sliding scale according to group 2 sliding scale insulin.  Max daily dose  70 units daily 5 pen 3    vitamin D (ERGOCALCIFEROL) 1.25 MG (90312 UT) CAPS capsule take 1 capsule by mouth every week 4 capsule 2    omega-3 acid ethyl esters (LOVAZA) 1 g capsule take 2 capsules by mouth twice a day 360 capsule 1    omeprazole (PRILOSEC) 20 MG delayed release capsule take 1 capsule by mouth every morning BEFORE BREAKFAST 90 capsule 1    metoprolol succinate (TOPROL XL) 25 MG extended release tablet take 1 tablet by mouth at bedtime 90 tablet 0    metFORMIN (GLUCOPHAGE-XR) 500 MG extended release tablet take 4 tablets by mouth once daily - WITH BREAKFAST 360 tablet 0    vitamin D3 (CHOLECALCIFEROL) 25 MCG (1000 UT) TABS tablet Take 2 tablets by mouth daily 180 tablet 5    fenofibrate (TRIGLIDE) 160 MG tablet take 1 tablet by mouth once daily with food 90 tablet 0    fluticasone (FLONASE) 50 MCG/ACT nasal spray 1 spray by Each Nostril route daily 1 Bottle 0    insulin glargine (LANTUS SOLOSTAR) 100 UNIT/ML injection pen Inject 20 Units into the skin nightly 5 pen 3    blood glucose test strips (ACCU-CHEK CAT PLUS) strip CHECK BLOOD SUGAR EVERY MORNING AND AFTER DINNER EVERY DAY 60 strip 0    Insulin Pen Needle 32G X 4 MM MISC 1 each by Does not apply route 4 times daily 100 each 3    Accu-Chek Softclix Lancets MISC USE TO CHECK BLOOD SUGAR 100 each 0    Continuous Blood Gluc  (DEXCOM G4 PLAT PED ) ANÍBAL 1 Units by Does not apply route continuous 1 Device 0    Continuous Blood Gluc Sensor (DEXCOM G6 SENSOR) MISC 1 Units by Does not apply route continuous 6 each 1    Continuous Blood Gluc  (FREESTYLE MACIEL 14 DAY READER) ANÍBAL 1 Units by Does not apply route continuous Patient is diabetic treated with insulin 1 Device 0    Continuous Blood Gluc Sensor (FREESTYLE MACIEL 14 DAY SENSOR) MISC 1 Units by Does not apply route continuous Patient is Diabetic treated with Insulin 6 each 1     No current facility-administered medications on file prior to encounter. REVIEW OF SYSTEMS    Pertinent items are noted in HPI. Objective:      BP (!) 147/76   Pulse 97   Temp 99.1 °F (37.3 °C) (Infrared)   Resp 18   Ht 6' (1.829 m)   Wt 282 lb (127.9 kg)   SpO2 92%   BMI 38.25 kg/m²     Wt Readings from Last 3 Encounters:   12/21/21 282 lb (127.9 kg)   12/09/21 282 lb 3 oz (128 kg)   08/03/21 274 lb (124.3 kg)       PHYSICAL EXAM    General Appearance: alert and oriented to person, place and time    Physical Exam:   VASCULAR: Pedal pulses are palpable bilaterally. CFT less than 3 seconds to all other exposed digits of the bilateral lower extremities.   Temperature warm to cool from tibial tuberosity to digits of right foot.  Temperature warm to cool from tibial tuberosity to digits of left foot. Numerous varicose veins noted to BLE.     MUSCULOSKELETAL: Muscle strength 5/5 and symmetric for all muscle groups crossing the ankle joint bilaterally. No pain on palpation to the bilateral lower extremities. Rectus foot and ankle bilaterally.     NEUROLOGIC: Light touch sensation grossly diminished to the digits bilaterally, light touch sensation is intact to the metatarsal heads bilaterally.     SKIN: Surgical incision noted to right forefoot with noted 5th digit amp. Incision is slightly macerated. No dehiscence. Stitches are intact. No drainage or malodor noted. There are 2 wounds noted to the RLE mid calf. Anterior medial has fibrogranular base probed to [facility]. Posterior wound is superficial and granular no SOI. Wound 08/03/21 Pretibial Right (Active)   Wound Image   12/21/21 1422   Wound Etiology Venous 12/21/21 1422   Dressing Status Intact; New dressing applied 12/21/21 1422   Wound Cleansed Cleansed with saline 12/21/21 1422   Offloading for Diabetic Foot Ulcers No offloading required 12/21/21 1422   Wound Length (cm) 2 cm 12/21/21 1422   Wound Width (cm) 1.8 cm 12/21/21 1422   Wound Depth (cm) 0.7 cm 12/21/21 1422   Wound Surface Area (cm^2) 3.6 cm^2 12/21/21 1422   Change in Wound Size % (l*w) -1340 12/21/21 1422   Wound Volume (cm^3) 2.52 cm^3 12/21/21 1422   Wound Healing % -20060 12/21/21 1422   Wound Assessment Granulation tissue;Slough 12/21/21 1422   Drainage Amount Moderate 12/21/21 1422   Drainage Description Serosanguinous 12/21/21 1422   Odor None 12/21/21 1422   Angela-wound Assessment Dry/flaky;Edematous 12/21/21 1422   Margins Attached edges 12/21/21 1422   Wound Thickness Description not for Pressure Injury Full thickness 12/21/21 1422   Number of days: 140       LABS      CBC:   Lab Results   Component Value Date    WBC 8.0 12/15/2021    HGB 14.3 12/15/2021    HCT 43.3 12/15/2021    MCV 91.9 12/15/2021     12/15/2021     BMP:   Lab Results   Component Value Date     12/15/2021    K 4.9 12/15/2021    K 4.3 03/26/2020    CL 97 12/15/2021    CO2 25 12/15/2021    BUN 16 12/15/2021    CREATININE 0.9 12/15/2021     PT/INR: No results found for: PROTIME, INR  Prealbumin: No results found for: PREALBUMIN  Albumin:  Lab Results   Component Value Date    LABALBU 3.6 12/09/2021     Sed Rate:  Lab Results   Component Value Date    SEDRATE 100 12/09/2021     CRP:   Lab Results   Component Value Date    CRP 17.81 12/09/2021     Micro:   Lab Results   Component Value Date    BC No growth-preliminary No growth  12/09/2021    BC No growth-preliminary No growth  12/09/2021      Hemoglobin A1C:   Lab Results   Component Value Date    LABA1C 9.4 12/10/2021       Assessment:     Ulcer Identification:  Ulcer Type: diabetic and non-healing surgical  Contributing Factors: edema and diabetes    Wound: Nonhealing surgical due to infection    Depth of Diabetic/Pressure/Non Pressure Ulcers or Wound:  Wound, full thickness    Patient Active Problem List   Diagnosis Code    Cellulitis L03.90    Foot ulcer, left, with fat layer exposed ([facility] [facility] L97.522    Forefoot varus, acquired, left M21.6X2    Equinus contracture of left ankle M24.572    Type 2 diabetes mellitus with hyperglycemia, with long-term current use of insulin ([facility]) E11.65, Z79.4    Severe sepsis ([facility]) A41.9, R65.20    Uncontrolled type 2 diabetes mellitus with hyperglycemia ([facility]) E11.65    Hyponatremia E87.1    Hypertension, uncontrolled I10    PFO (patent foramen ovale) Q21.1    Hyperlipidemia E78.5    Smoker F17.200    Morbid obesity ([facility]) E66.01    Chronic diastolic heart failure ([facility]) I50.32    Noncompliance with medications Z91.14    Aortic valve stenosis I35.0    Cellulitis of right leg L03.115       Procedure Note  Indications:  Based on my examination of this patient's wound(s)/ulcer(s) today, debridement is required to promote healing and evaluate the extent healing. Performed by: Mario Alberto Dukes DPM    Consent obtained: Yes    Time out taken:  Yes    Pain control: neuropathic anesthesia      Debridement:Non-excisional Debridement    Using curette the wound(s)/ulcer(s) was/were sharply debrided down through and including the removal of epidermis, dermis and subcutaneous tissue. Devitalized Tissue Debrided:  fibrin and biofilm    Pre Debridement Measurements:  Are located in the Wound/Ulcer Documentation Flow Sheet    Wound/Ulcer #: 1    Post Debridement Measurements:    Wound/Ulcer Descriptions are listed under Physical Exam above. Wound/Ulcer Descriptions are Pre Debridement except measurements    Percent of Wound/Ulcer Debrided: 100%    Total Surface Area Debrided:  2 sq cm     Bleeding:  Minimal    Hemostasis Achieved:  by silver nitrate stick    Procedural Pain:  0  / 10     Post Procedural Pain:  0 / 10     Response to treatment:  Well tolerated by patient. Plan:     Patient examined and evaluated  Nonexcisional debridement performed. Note above  Betadine to incision every other day. Home health order placed for wound VAC to be changed 3 times a week. Nonweightbearing to right lower extremity  Patient to follow-up in 2 weeks. Treatment:   Orders Placed This Encounter   Procedures    Apply dressing     Standing Status:   Standing     Number of Occurrences:   1         Antibiotics: No    Follow up: 2 weeks    Please see attached Discharge Instructions    Written patient dismissal instructions given to patient and signed by patient or POA. Discharge Instructions       Visit Discharge/Physician Orders:  -Try to cut back/quit smoking/nicotine pouches to help with wound healing.  -Wound debrided today and silver nitrate applied.  -Keep blood sugars controlled to help with wound healing.   -Wound vac ordered today. Home Health to apply wound vac once received.      Home Care: Referral to [address] [place] [address]. [facility]    Wound Location: Right foot, Right leg    Dressing orders:     1) Gather wound care supplies and arrange on clean table. 2) Wash your hands with soap and water or use alcohol based hand  for 20 seconds (sing \"Happy Birthday\" twice). 3) Cleanse wounds with normal saline or wound cleanser and gauze. Pat dry with clean gauze. 4) Right foot- Apply collagen to wound. Cover with saline moistened gauze. Cover with dry gauze and ABD pad. Wrap with roll gauze and ace bandage from base of toes to 1-2 inches below bend of knee. Right leg- Apply collagen to wound bed. Pack with saline moistened gauze. Cover with dry gauze and ABD pad. Wrap with roll gauze and ace bandage from base of toes to 1-2 inches below bend of knee. Once wound vac is received-   Right leg- Apply skin prep to marimar wound. Cut foam to fit into wound bed. Apply clear dressing or stoma wafer to marimar wound to protect good skin. Apply foam to wound bed followed by clear drape. Cut quarter size hole in center of drape over sponge area and apply vac suction. Run wound vac at 125 mmHg continuous suction. Apply extra clear drape as needed if leaks noted. Change canister as needed when full. Change wound vac three times weekly. Keep all dressings clean & dry. Do not shower, take baths or get wound wet, unless otherwise instructed by your Wound Care doctor. Follow up visit:   2 Weeks on Tuesday January 4th at 10:30 am    Keep next scheduled appointment. Please give 24 hour notice if unable to keep appointment. [phone]    If you experience any of the following, please call the Wound Care Service during business hours: Monday through Friday 8:00 am - 4:30 pm  ([phone]).    *Increase in pain   *Temperature over 101   *Increase in drainage from your wound or a foul odor   *Uncontrolled swelling   *Need for compression bandage changes due to slippage, breakthrough drainage    If you need medical attention outside of business hours, please contact your Primary Care Doctor or go to the nearest emergency room.                    Electronically signed by Marivel Ojeda DPM on 12/21/2021 at 3:04 PM

## 2021-12-21 NOTE — PROGRESS NOTES
Teaching Note:    I was present with the resident during the visit. I discussed the case with the resident and agree with the findings and the plan as documented in the residents note.     JEROME Bass, [address] [place] [address] Surgery       [facility]

## 2022-01-03 DIAGNOSIS — E78.2 HYPERCHOLESTEROLEMIA WITH HYPERTRIGLYCERIDEMIA: ICD-10-CM

## 2022-01-03 RX ORDER — FENOFIBRATE 160 MG/1
TABLET ORAL
Qty: 30 TABLET | Refills: 0 | Status: ON HOLD | OUTPATIENT
Start: 2022-01-03 | End: 2022-02-18

## 2022-01-03 RX ORDER — INSULIN GLARGINE 100 [IU]/ML
INJECTION, SOLUTION SUBCUTANEOUS
Qty: 15 ML | Refills: 2 | Status: SHIPPED | OUTPATIENT
Start: 2022-01-03

## 2022-01-03 NOTE — TELEPHONE ENCOUNTER
Please approve or deny     Last Visit Date:  7/22/2021       Next Visit Date:    Visit date not found   Message sent requesting appt.

## 2022-01-04 ENCOUNTER — HOSPITAL ENCOUNTER (OUTPATIENT)
Dept: WOUND CARE | Age: 52
Discharge: HOME | End: 2022-01-04
Payer: COMMERCIAL

## 2022-01-04 VITALS
TEMPERATURE: 98.7 F | HEART RATE: 92 BPM | OXYGEN SATURATION: 97 % | RESPIRATION RATE: 20 BRPM | SYSTOLIC BLOOD PRESSURE: 150 MMHG | DIASTOLIC BLOOD PRESSURE: 80 MMHG

## 2022-01-04 DIAGNOSIS — M24.572 EQUINUS CONTRACTURE OF LEFT ANKLE: ICD-10-CM

## 2022-01-04 DIAGNOSIS — L97.522 FOOT ULCER, LEFT, WITH FAT LAYER EXPOSED (HCC): ICD-10-CM

## 2022-01-04 DIAGNOSIS — E11.65 UNCONTROLLED TYPE 2 DIABETES MELLITUS WITH HYPERGLYCEMIA (HCC): ICD-10-CM

## 2022-01-04 DIAGNOSIS — L03.115 CELLULITIS OF RIGHT LEG: Primary | ICD-10-CM

## 2022-01-04 DIAGNOSIS — M21.6X2 FOREFOOT VARUS, ACQUIRED, LEFT: ICD-10-CM

## 2022-01-04 PROCEDURE — 97605 NEG PRS WND THER DME<=50SQCM: CPT

## 2022-01-04 PROCEDURE — 99213 OFFICE O/P EST LOW 20 MIN: CPT

## 2022-01-04 PROCEDURE — 97597 DBRDMT OPN WND 1ST 20 CM/<: CPT

## 2022-01-04 RX ORDER — LIDOCAINE HYDROCHLORIDE 20 MG/ML
JELLY TOPICAL ONCE
Status: CANCELLED | OUTPATIENT
Start: 2022-01-04 | End: 2022-01-04

## 2022-01-04 RX ORDER — LIDOCAINE HYDROCHLORIDE 40 MG/ML
SOLUTION TOPICAL ONCE
Status: CANCELLED | OUTPATIENT
Start: 2022-01-04 | End: 2022-01-04

## 2022-01-04 RX ORDER — GENTAMICIN SULFATE 1 MG/G
OINTMENT TOPICAL ONCE
Status: CANCELLED | OUTPATIENT
Start: 2022-01-04 | End: 2022-01-04

## 2022-01-04 RX ORDER — BACITRACIN, NEOMYCIN, POLYMYXIN B 400; 3.5; 5 [USP'U]/G; MG/G; [USP'U]/G
OINTMENT TOPICAL ONCE
Status: CANCELLED | OUTPATIENT
Start: 2022-01-04 | End: 2022-01-04

## 2022-01-04 RX ORDER — BACITRACIN ZINC AND POLYMYXIN B SULFATE 500; 1000 [USP'U]/G; [USP'U]/G
OINTMENT TOPICAL ONCE
Status: CANCELLED | OUTPATIENT
Start: 2022-01-04 | End: 2022-01-04

## 2022-01-04 RX ORDER — GINSENG 100 MG
CAPSULE ORAL ONCE
Status: CANCELLED | OUTPATIENT
Start: 2022-01-04 | End: 2022-01-04

## 2022-01-04 RX ORDER — CLOBETASOL PROPIONATE 0.5 MG/G
OINTMENT TOPICAL ONCE
Status: CANCELLED | OUTPATIENT
Start: 2022-01-04 | End: 2022-01-04

## 2022-01-04 RX ORDER — LIDOCAINE 40 MG/G
CREAM TOPICAL ONCE
Status: CANCELLED | OUTPATIENT
Start: 2022-01-04 | End: 2022-01-04

## 2022-01-04 RX ORDER — LIDOCAINE 50 MG/G
OINTMENT TOPICAL ONCE
Status: CANCELLED | OUTPATIENT
Start: 2022-01-04 | End: 2022-01-04

## 2022-01-04 RX ORDER — BETAMETHASONE DIPROPIONATE 0.05 %
OINTMENT (GRAM) TOPICAL ONCE
Status: CANCELLED | OUTPATIENT
Start: 2022-01-04 | End: 2022-01-04

## 2022-01-04 NOTE — PROGRESS NOTES
Negative Pressure    NAME:  Luz Martines  YOB: 1970  MEDICAL RECORD NUMBER:  [id number]  DATE:  1/4/2022     Applied Negative Pressure to right leg wound(s)/ulcer(s).  [x] Applied skin barrier prep to marimar-wound.  [x] Cut strips of premium wafer to picture frame wound so that marimar-wound is     covered with the drape.  [] If bridging dressing to less prominent site, cover any intact skin that will come in contact with the Negative Pressure Therapy sponge, gauze or channel drain with plastic drape. The sponge should never touch intact skin.  [x] Cut sponge, gauze or channel drain to size which will fit into the wound/ulcer bed without being forced.  [x] Be sure the sponge is large enough to hold the entire round plastic flange which is attached to the tubing. Never allow flange to be larger than the sponge or it will produce suction damaging intact skin.  Total number of individual pieces of foam used within the wound bed: 2     [] If bridging the dressing away from the primary site, be sure the bridge leads to a piece of sponge large enough to hold the entire flange without allowing any of the flange to overlap onto intact skin.  [x] Covered sponge, gauze or channel drain with plastic drape.  [x] Cut a hole in this plastic drape directly over the sponge the same size as the plastic drain tubing.  [x] Removed plastic liner from flange and apply it directly over the hole you cut.  [x] Removed the plastic cover from the flange.  [x] Attached the tubing to the wound/ulcer Negative Pressure Therapy and turn it on to be sure a vacuum is created and that there are no leaks.  [x] If air leaks occur, use plastic drape to patch them.  [x] Secured Negative Pressure Therapy dressing with ace wrap loosely if located on an extremity. Maintain tubing outside of ace wrap. Tubing must not exert pressure on intact skin.     Applied per  Guidelines      Electronically signed by Olga Lawrence RN on 1/4/2022 at 11am

## 2022-01-04 NOTE — PROGRESS NOTES
Teaching Note:    I was present with the resident during the visit. I discussed the case with the resident and agree with the findings and the plan as documented in the residents note.     Levy Freeman DPM, [address] [place] [address] Surgery       [facility]

## 2022-01-04 NOTE — LETTER
[address]  Phone: [phone]    Becka Kingston DPM, [facility]        January 4, 2022     Patient: Graham Kearns   YOB: 1970   Date of Visit: 1/4/2022       To Whom It May Concern:    May return to work Thursday , 1.6.2022      It is my medical opinion that Ricky Christianson may return to light duty immediately with the following restrictions: no work involving right leg. Must elevate and sit to perform job. If you have any questions or concerns, please don't hesitate to call.     Sincerely,        Becka Kingston DPM, [facility]

## 2022-01-04 NOTE — PLAN OF CARE
Problem: Wound:  Goal: Will show signs of wound healing; wound closure and no evidence of infection  Description: Will show signs of wound healing; wound closure and no evidence of infection  Outcome: Ongoing  Note: Patient seen for right leg right foot wound. Wound shows signs of proper closure and healing. No s/s of infection noted. Wound vac Follow up in 2 weeks. See AVS for order changes. Care plan reviewed with patient. Patient verbalize understanding of the plan of care and contribute to goal setting.

## 2022-01-04 NOTE — PROGRESS NOTES
[facility]          Progress Note and Procedure Note      Dom House  MEDICAL RECORD NUMBER:  [id number]  AGE: 45 y.o. GENDER: male  : 1970  EPISODE DATE:  2022    Subjective:     Chief Complaint   Patient presents with    Wound Check         HISTORY of PRESENT ILLNESS HPI     Dom House is a 45 y.o. male Established patient, who presents today for wound/ulcer evaluation. History of Wound Context: Patient had recent hospitalization secondary to right foot infection. He is status post right digit amputation (date of surgery 2021). He also has a right calf wound. Wound/Ulcer Pain Timing/Severity: intermittent  Quality of pain: sharp  Severity:  2 / 10   Modifying Factors: Pain worsens with Touch  Associated Signs/Symptoms: drainage and pain    Interval History:   Patient presents today for follow up on wound/ulcer's progression. The patient is currently not on antibiotics. Current dressing care includes wound VAC to left leg wound and Betadine daily to foot wound by home health. 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 He denies any fever, chills, nausea, vomiting, chest pain or shortness of breath. Patient has no other complaints at this time. PAST MEDICAL HISTORY        Diagnosis Date    CAD (coronary artery disease)     CHF (congestive heart failure) ([facility] [address] 2019    Diabetes mellitus ([facility] [address] 's    Hypertension 's       PAST SURGICAL HISTORY    Past Surgical History:   Procedure Laterality Date    CHOLECYSTECTOMY      FIBULA FRACTURE SURGERY Right     screws    TIBIA FRACTURE SURGERY Right     screws    TOE AMPUTATION Right 2021    RIGHT 5TH TOE AMPUTATION performed by Ana María Fuller DPM at [address] [place] [address]    History reviewed. No pertinent family history.     SOCIAL HISTORY    Social History     Tobacco Use    Smoking status: Current Every Day Smoker Packs/day: 0.25     Years: 36.00     Pack years: 9.00     Types: Cigarettes     Start date: 1984    Smokeless tobacco: Never Used    Tobacco comment: \"nicotine pouches\"   Vaping Use    Vaping Use: Not on file   Substance Use Topics    Alcohol use: Not Currently    Drug use: No       ALLERGIES    Allergies   Allergen Reactions    Morphine      \"goes nuts\"       MEDICATIONS    Current Outpatient Medications on File Prior to Encounter   Medication Sig Dispense Refill    fenofibrate (TRIGLIDE) 160 MG tablet take 1 tablet by mouth once daily with food 30 tablet 0    bumetanide (BUMEX) 1 MG tablet take 1 tablet by mouth once daily 90 tablet 0    glipiZIDE (GLUCOTROL) 5 MG tablet take 1/2 tablet by mouth twice a day WITH MORNING AND EVENING MEALS 90 tablet 1    losartan (COZAAR) 100 MG tablet take 1 tablet by mouth once daily 90 tablet 0    atorvastatin (LIPITOR) 80 MG tablet Take 1 tablet by mouth daily take 1 tablet by mouth at bedtime 90 tablet 1    insulin aspart (NOVOLOG FLEXPEN) 100 UNIT/ML injection pen Inject 8 units subcu baseline plus a sliding scale according to group 2 sliding scale insulin.  Max daily dose  70 units daily 5 pen 3    vitamin D (ERGOCALCIFEROL) 1.25 MG (61402 UT) CAPS capsule take 1 capsule by mouth every week 4 capsule 2    omega-3 acid ethyl esters (LOVAZA) 1 g capsule take 2 capsules by mouth twice a day 360 capsule 1    omeprazole (PRILOSEC) 20 MG delayed release capsule take 1 capsule by mouth every morning BEFORE BREAKFAST 90 capsule 1    metoprolol succinate (TOPROL XL) 25 MG extended release tablet take 1 tablet by mouth at bedtime 90 tablet 0    metFORMIN (GLUCOPHAGE-XR) 500 MG extended release tablet take 4 tablets by mouth once daily - WITH BREAKFAST 360 tablet 0    vitamin D3 (CHOLECALCIFEROL) 25 MCG (1000 UT) TABS tablet Take 2 tablets by mouth daily 180 tablet 5    fluticasone (FLONASE) 50 MCG/ACT nasal spray 1 spray by Each Nostril route daily 1 Bottle 0    BASAGLAR KWIKPEN 100 UNIT/ML injection pen INJECT 30 UNITS INTO THE SKIN EVERY NIGHT 15 mL 2    blood glucose test strips (ACCU-CHEK CAT PLUS) strip CHECK BLOOD SUGAR EVERY MORNING AND AFTER DINNER EVERY DAY 60 strip 0    Insulin Pen Needle 32G X 4 MM MISC 1 each by Does not apply route 4 times daily 100 each 3    Accu-Chek Softclix Lancets MISC USE TO CHECK BLOOD SUGAR 100 each 0    Continuous Blood Gluc  (DEXCOM G4 PLAT PED ) ANÍBAL 1 Units by Does not apply route continuous 1 Device 0    Continuous Blood Gluc Sensor (DEXCOM G6 SENSOR) MISC 1 Units by Does not apply route continuous 6 each 1    Continuous Blood Gluc  (FREESTYLE MACIEL 14 DAY READER) ANÍBAL 1 Units by Does not apply route continuous Patient is diabetic treated with insulin 1 Device 0    Continuous Blood Gluc Sensor (FREESTYLE MACIEL 14 DAY SENSOR) MISC 1 Units by Does not apply route continuous Patient is Diabetic treated with Insulin 6 each 1     No current facility-administered medications on file prior to encounter. REVIEW OF SYSTEMS    Pertinent items are noted in HPI. Objective:      BP (!) 150/80   Pulse 92   Temp 98.7 °F (37.1 °C) (Infrared)   Resp 20   SpO2 97%     Wt Readings from Last 3 Encounters:   12/21/21 282 lb (127.9 kg)   12/09/21 282 lb 3 oz (128 kg)   08/03/21 274 lb (124.3 kg)       PHYSICAL EXAM    General Appearance: alert and oriented to person, place and time    Physical Exam:   VASCULAR: Pedal pulses are palpable bilaterally. CFT less than 3 seconds to all other exposed digits of the bilateral lower extremities. Temperature warm to cool from tibial tuberosity to digits of right foot. Temperature warm to cool from tibial tuberosity to digits of left foot. Numerous varicose veins noted to BLE.     MUSCULOSKELETAL: Muscle strength 5/5 and symmetric for all muscle groups crossing the ankle joint bilaterally. No pain on palpation to the bilateral lower extremities.  Rectus foot and ankle bilaterally.     NEUROLOGIC: Light touch sensation grossly diminished to the digits bilaterally, light touch sensation is intact to the metatarsal heads bilaterally.     SKIN: Surgical incision noted to right forefoot with noted 5th digit amp. Incision is dehisced distally. No drainage or malodor noted. There are 2 wounds noted to the RLE mid calf. Anterior medial has fibrogranular base probed to [facility]. Posterior wound is clinically healed. Wound 08/03/21 Pretibial Right (Active)   Wound Image   01/04/22 1039   Wound Etiology Venous 01/04/22 1039   Dressing Status Intact; New dressing applied 01/04/22 1039   Wound Cleansed Cleansed with saline 01/04/22 1039   Dressing/Treatment Collagen;Moisten with saline;Gauze dressing/dressing sponge;Packing;ABD;Roll gauze; Ace wrap 12/21/21 1422   Offloading for Diabetic Foot Ulcers No offloading required 01/04/22 1039   Wound Length (cm) 1.8 cm 01/04/22 1039   Wound Width (cm) 1.6 cm 01/04/22 1039   Wound Depth (cm) 0.5 cm 01/04/22 1039   Wound Surface Area (cm^2) 2.88 cm^2 01/04/22 1039   Change in Wound Size % (l*w) -1052 01/04/22 1039   Wound Volume (cm^3) 1.44 cm^3 01/04/22 1039   Wound Healing % -54023 01/04/22 1039   Wound Assessment Granulation tissue;Slough 01/04/22 1039   Drainage Amount Moderate 01/04/22 1039   Drainage Description Serosanguinous 01/04/22 1039   Odor None 01/04/22 1039   Angela-wound Assessment Dry/flaky;Edematous; Hyperpigmented; Maceration 01/04/22 1039   Margins Attached edges 01/04/22 1039   Wound Thickness Description not for Pressure Injury Full thickness 01/04/22 1039   Number of days: 154       LABS      CBC:   Lab Results   Component Value Date    WBC 8.0 12/15/2021    HGB 14.3 12/15/2021    HCT 43.3 12/15/2021    MCV 91.9 12/15/2021     12/15/2021     BMP:   Lab Results   Component Value Date     12/15/2021    K 4.9 12/15/2021    K 4.3 03/26/2020    CL 97 12/15/2021    CO2 25 12/15/2021    BUN 16 12/15/2021    CREATININE 0.9 12/15/2021     PT/INR: No results found for: PROTIME, INR  Prealbumin: No results found for: PREALBUMIN  Albumin:  Lab Results   Component Value Date    LABALBU 3.6 12/09/2021     Sed Rate:  Lab Results   Component Value Date    SEDRATE 100 12/09/2021     CRP:   Lab Results   Component Value Date    CRP 17.81 12/09/2021     Micro:   Lab Results   Component Value Date    BC No growth-preliminary No growth  12/09/2021    BC No growth-preliminary No growth  12/09/2021      Hemoglobin A1C:   Lab Results   Component Value Date    LABA1C 9.4 12/10/2021       Assessment:     Ulcer Identification:  Ulcer Type: diabetic and non-healing surgical  Contributing Factors: edema and diabetes    Wound: External surgical dehiscence    Depth of Diabetic/Pressure/Non Pressure Ulcers or Wound:  Wound, full thickness    Patient Active Problem List   Diagnosis Code    Cellulitis L03.90    Foot ulcer, left, with fat layer exposed ([facility] [address] L97.522    Forefoot varus, acquired, left M21.6X2    Equinus contracture of left ankle M24.572    Type 2 diabetes mellitus with hyperglycemia, with long-term current use of insulin (HCC) E11.65, Z79.4    Severe sepsis ([facility]) A41.9, R65.20    Uncontrolled type 2 diabetes mellitus with hyperglycemia (HCC) E11.65    Hyponatremia E87.1    Hypertension, uncontrolled I10    PFO (patent foramen ovale) Q21.1    Hyperlipidemia E78.5    Smoker F17.200    Morbid obesity ([facility]) E66.01    Chronic diastolic heart failure ([facility]) I50.32    Noncompliance with medications Z91.14    Aortic valve stenosis I35.0    Cellulitis of right leg L03.115       Procedure Note  Indications:  Based on my examination of this patient's wound(s)/ulcer(s) today, debridement is required to promote healing and evaluate the extent healing.     Performed by: Sindi Dhillon DPM    Consent obtained: Yes    Time out taken:  Yes    Pain control: neuropathic anesthesia      Debridement:Non-excisional Debridement to the leg  Excisional debridement to the foot    Using curette the wound(s)/ulcer(s) was/were sharply debrided down through and including the removal of epidermis, dermis and subcutaneous tissue. Devitalized Tissue Debrided:  fibrin and biofilm necrotic tissue    Pre Debridement Measurements:  Are located in the Wound/Ulcer Documentation Flow Sheet    Wound/Ulcer #: 2    Post Debridement Measurements:    Wound/Ulcer Descriptions are listed under Physical Exam above. Wound/Ulcer Descriptions are Pre Debridement except measurements    Percent of Wound/Ulcer Debrided: 100%    Total Surface Area Debrided:  2 sq cm     Bleeding:  Minimal    Hemostasis Achieved: Not needed    Procedural Pain:  0  / 10     Post Procedural Pain:  0 / 10     Response to treatment:  Well tolerated by patient. Plan:     Patient examined and evaluated  Nonexcisional debridement of leg and excisional debridement of foot performed. Note above  Continue wound VAC to leg as shows improvement in depth  Iodoflex to foot to be changed every other day with a dry sterile dressing  Nonweightbearing to right lower extremity  Short-term disability paperwork filled out  Patient to follow-up in 2 weeks. Treatment:   No orders of the defined types were placed in this encounter. Antibiotics: No    Follow up: 2 weeks    Please see attached Discharge Instructions    Written patient dismissal instructions given to patient and signed by patient or POA.                Electronically signed by Geoavni Cottrell DPM on 1/4/2022 at 11:08 AM

## 2022-01-05 ENCOUNTER — TELEPHONE (OUTPATIENT)
Dept: WOUND CARE | Age: 52
End: 2022-01-05

## 2022-01-05 NOTE — TELEPHONE ENCOUNTER
----- Message from Alonzo Andino sent at 1/5/2022 11:15 AM EST -----  [facility] [phone] CALLED TO FOLLOW UP ON HIS [facility] PAPERWORK THAT DR FALCON NEEDED TO FILL OUT? IS IT DONE?

## 2022-01-05 NOTE — TELEPHONE ENCOUNTER
Spoke with Jensen Bazzi at Dr. Vance Apley office, she states paperwork is there and finished and she will be faxing it today. Notified patient of this, he states he will just pick the paperwork up. Gave him office number in case he wanted to call them, he stated he knew where the office was.

## 2022-01-18 ENCOUNTER — HOSPITAL ENCOUNTER (OUTPATIENT)
Dept: WOUND CARE | Age: 52
Discharge: HOME | End: 2022-01-18
Payer: COMMERCIAL

## 2022-01-18 VITALS
RESPIRATION RATE: 18 BRPM | HEART RATE: 90 BPM | DIASTOLIC BLOOD PRESSURE: 71 MMHG | OXYGEN SATURATION: 94 % | TEMPERATURE: 97.9 F | SYSTOLIC BLOOD PRESSURE: 147 MMHG

## 2022-01-18 DIAGNOSIS — E11.65 UNCONTROLLED TYPE 2 DIABETES MELLITUS WITH HYPERGLYCEMIA (HCC): ICD-10-CM

## 2022-01-18 DIAGNOSIS — L85.3 XEROSIS OF SKIN: ICD-10-CM

## 2022-01-18 DIAGNOSIS — L97.522 FOOT ULCER, LEFT, WITH FAT LAYER EXPOSED (HCC): ICD-10-CM

## 2022-01-18 DIAGNOSIS — M24.572 EQUINUS CONTRACTURE OF LEFT ANKLE: ICD-10-CM

## 2022-01-18 DIAGNOSIS — L03.115 CELLULITIS OF RIGHT LEG: Primary | ICD-10-CM

## 2022-01-18 DIAGNOSIS — M21.6X2 FOREFOOT VARUS, ACQUIRED, LEFT: ICD-10-CM

## 2022-01-18 PROCEDURE — 97597 DBRDMT OPN WND 1ST 20 CM/<: CPT

## 2022-01-18 RX ORDER — GENTAMICIN SULFATE 1 MG/G
OINTMENT TOPICAL ONCE
Status: CANCELLED | OUTPATIENT
Start: 2022-01-18 | End: 2022-01-18

## 2022-01-18 RX ORDER — LIDOCAINE HYDROCHLORIDE 20 MG/ML
JELLY TOPICAL ONCE
Status: CANCELLED | OUTPATIENT
Start: 2022-01-18 | End: 2022-01-18

## 2022-01-18 RX ORDER — BACITRACIN, NEOMYCIN, POLYMYXIN B 400; 3.5; 5 [USP'U]/G; MG/G; [USP'U]/G
OINTMENT TOPICAL ONCE
Status: CANCELLED | OUTPATIENT
Start: 2022-01-18 | End: 2022-01-18

## 2022-01-18 RX ORDER — CLOBETASOL PROPIONATE 0.5 MG/G
OINTMENT TOPICAL ONCE
Status: CANCELLED | OUTPATIENT
Start: 2022-01-18 | End: 2022-01-18

## 2022-01-18 RX ORDER — BETAMETHASONE DIPROPIONATE 0.05 %
OINTMENT (GRAM) TOPICAL ONCE
Status: CANCELLED | OUTPATIENT
Start: 2022-01-18 | End: 2022-01-18

## 2022-01-18 RX ORDER — LIDOCAINE 40 MG/G
CREAM TOPICAL ONCE
Status: CANCELLED | OUTPATIENT
Start: 2022-01-18 | End: 2022-01-18

## 2022-01-18 RX ORDER — GINSENG 100 MG
CAPSULE ORAL ONCE
Status: CANCELLED | OUTPATIENT
Start: 2022-01-18 | End: 2022-01-18

## 2022-01-18 RX ORDER — LIDOCAINE 50 MG/G
OINTMENT TOPICAL ONCE
Status: CANCELLED | OUTPATIENT
Start: 2022-01-18 | End: 2022-01-18

## 2022-01-18 RX ORDER — LIDOCAINE HYDROCHLORIDE 40 MG/ML
SOLUTION TOPICAL ONCE
Status: CANCELLED | OUTPATIENT
Start: 2022-01-18 | End: 2022-01-18

## 2022-01-18 RX ORDER — BACITRACIN ZINC AND POLYMYXIN B SULFATE 500; 1000 [USP'U]/G; [USP'U]/G
OINTMENT TOPICAL ONCE
Status: CANCELLED | OUTPATIENT
Start: 2022-01-18 | End: 2022-01-18

## 2022-01-18 ASSESSMENT — PAIN SCALES - GENERAL: PAINLEVEL_OUTOF10: 0

## 2022-01-18 NOTE — PROGRESS NOTES
[facility]          Progress Note and Procedure Note      Tammie Craven  MEDICAL RECORD NUMBER:  [id number]  AGE: 45 y.o. GENDER: male  : 1970  EPISODE DATE:  2022    Subjective:     Chief Complaint   Patient presents with    Wound Check     right leg and foot         HISTORY of PRESENT ILLNESS HPI     Tammie Craven is a 45 y.o. male Established patient, who presents today for wound/ulcer evaluation. History of Wound Context: Patient had recent hospitalization secondary to right foot infection. He is status post right digit amputation (date of surgery 2021). He also has a right calf wound. Wound/Ulcer Pain Timing/Severity: intermittent  Quality of pain: sharp  Severity:  2 / 10   Modifying Factors: Pain worsens with Touch  Associated Signs/Symptoms: drainage and pain    Interval History:   Patient presents today for follow up on wound/ulcer's progression. The patient is currently not on antibiotics. Current dressing care includes wound VAC to left leg wound and Betadine daily to foot wound by home health. 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 He denies any fever, chills, nausea, vomiting, chest pain or shortness of breath. Patient has no other complaints at this time. PAST MEDICAL HISTORY        Diagnosis Date    CAD (coronary artery disease)     [facility]F [facility]congestive heart failure) ([facility] 2019    Diabetes mellitus ([facility] 's    Hypertension 's       PAST SURGICAL HISTORY    Past Surgical History:   Procedure Laterality Date    CHOLECYSTECTOMY      FIBULA FRACTURE SURGERY Right     screws    TIBIA FRACTURE SURGERY Right     screws    TOE AMPUTATION Right 2021    RIGHT 5TH TOE AMPUTATION performed by Brady Sahu DPM at [address] [facility]    History reviewed. No pertinent family history.     SOCIAL HISTORY    Social History     Tobacco Use    Smoking status: Current Every Day Smoker     Packs/day: 0.25     Years: 36.00     Pack years: 9.00     Types: Cigarettes     Start date: 1984    Smokeless tobacco: Never Used    Tobacco comment: \"nicotine pouches\"   Vaping Use    Vaping Use: Never used    Passive vaping exposure: Yes   Substance Use Topics    Alcohol use: Not Currently    Drug use: No       ALLERGIES    Allergies   Allergen Reactions    Morphine      \"goes nuts\"       MEDICATIONS    Current Outpatient Medications on File Prior to Encounter   Medication Sig Dispense Refill    fenofibrate (TRIGLIDE) 160 MG tablet take 1 tablet by mouth once daily with food 30 tablet 0    BASAGLAR KWIKPEN 100 UNIT/ML injection pen INJECT 30 UNITS INTO THE SKIN EVERY NIGHT 15 mL 2    blood glucose test strips (ACCU-CHEK CAT PLUS) strip CHECK BLOOD SUGAR EVERY MORNING AND AFTER DINNER EVERY DAY 60 strip 0    bumetanide (BUMEX) 1 MG tablet take 1 tablet by mouth once daily 90 tablet 0    glipiZIDE (GLUCOTROL) 5 MG tablet take 1/2 tablet by mouth twice a day WITH MORNING AND EVENING MEALS 90 tablet 1    losartan (COZAAR) 100 MG tablet take 1 tablet by mouth once daily 90 tablet 0    Insulin Pen Needle 32G X 4 MM MISC 1 each by Does not apply route 4 times daily 100 each 3    atorvastatin (LIPITOR) 80 MG tablet Take 1 tablet by mouth daily take 1 tablet by mouth at bedtime 90 tablet 1    Accu-Chek Softclix Lancets MISC USE TO CHECK BLOOD SUGAR 100 each 0    insulin aspart (NOVOLOG FLEXPEN) 100 UNIT/ML injection pen Inject 8 units subcu baseline plus a sliding scale according to group 2 sliding scale insulin.  Max daily dose  70 units daily 5 pen 3    vitamin D (ERGOCALCIFEROL) 1.25 MG (73664 UT) CAPS capsule take 1 capsule by mouth every week 4 capsule 2    omega-3 acid ethyl esters (LOVAZA) 1 g capsule take 2 capsules by mouth twice a day 360 capsule 1    omeprazole (PRILOSEC) 20 MG delayed release capsule take 1 capsule by mouth every morning BEFORE BREAKFAST 90 capsule 1    metoprolol succinate (TOPROL XL) 25 MG extended release tablet take 1 tablet by mouth at bedtime 90 tablet 0    metFORMIN (GLUCOPHAGE-XR) 500 MG extended release tablet take 4 tablets by mouth once daily - WITH BREAKFAST 360 tablet 0    vitamin D3 (CHOLECALCIFEROL) 25 MCG (1000 UT) TABS tablet Take 2 tablets by mouth daily 180 tablet 5    Continuous Blood Gluc  (DEXCOM G4 PLAT PED ) ANÍBAL 1 Units by Does not apply route continuous 1 Device 0    Continuous Blood Gluc Sensor (DEXCOM G6 SENSOR) MISC 1 Units by Does not apply route continuous 6 each 1    fluticasone (FLONASE) 50 MCG/ACT nasal spray 1 spray by Each Nostril route daily 1 Bottle 0    Continuous Blood Gluc  (FREESTYLE MACIEL 14 DAY READER) ANÍBAL 1 Units by Does not apply route continuous Patient is diabetic treated with insulin 1 Device 0    Continuous Blood Gluc Sensor (FREESTYLE MACIEL 14 DAY SENSOR) MISC 1 Units by Does not apply route continuous Patient is Diabetic treated with Insulin 6 each 1     No current facility-administered medications on file prior to encounter. REVIEW OF SYSTEMS    Pertinent items are noted in HPI. Objective:      BP (!) 147/71   Pulse 90   Temp 97.9 °F (36.6 °C) (Infrared)   Resp 18   SpO2 94%     Wt Readings from Last 3 Encounters:   12/21/21 282 lb (127.9 kg)   12/09/21 282 lb 3 oz (128 kg)   08/03/21 274 lb (124.3 kg)       PHYSICAL EXAM    General Appearance: alert and oriented to person, place and time    Physical Exam:   VASCULAR: Pedal pulses are palpable bilaterally. CFT less than 3 seconds to all other exposed digits of the bilateral lower extremities. Temperature warm to cool from tibial tuberosity to digits of right foot. Temperature warm to cool from tibial tuberosity to digits of left foot. Numerous varicose veins noted to BLE.     MUSCULOSKELETAL: Muscle strength 5/5 and symmetric for all muscle groups crossing the ankle joint bilaterally.  No pain on palpation to the bilateral lower extremities. Rectus foot and ankle bilaterally.     NEUROLOGIC: Light touch sensation grossly diminished to the digits bilaterally, light touch sensation is intact to the metatarsal heads bilaterally.     SKIN:  Ulceration to the leg is much improved and there is a lot of slough and nonviable tissue but upon debridement there is bleeding and granulation tissue noted. Valuation of the ulceration on the lateral subfifth digit removal there is a small ulceration again there is a deep area of nonviable necrotic slough tissue this is removed and there is good bleeding healthy tissue to the base. No signs of any infection or cellulitis the patient has extremely dry scaling skin to the foot and the leg                            Wound 08/03/21 Pretibial Right (Active)   Wound Image   01/18/22 0918   Wound Etiology Venous 01/18/22 0918   Dressing Status Intact; New dressing applied 01/18/22 0918   Wound Cleansed Cleansed with saline 01/18/22 0918   Dressing/Treatment ABD; Ace wrap 01/18/22 0918   Offloading for Diabetic Foot Ulcers No;No offloading required 01/18/22 0918   Wound Length (cm) 1.9 cm 01/18/22 0918   Wound Width (cm) 1.7 cm 01/18/22 0918   Wound Depth (cm) 0.5 cm 01/18/22 0918   Wound Surface Area (cm^2) 3.23 cm^2 01/18/22 0918   Change in Wound Size % (l*w) -1192 01/18/22 0918   Wound Volume (cm^3) 1.615 cm^3 01/18/22 0918   Wound Healing % -22109 01/18/22 0918   Wound Assessment Granulation tissue 01/18/22 0918   Drainage Amount Moderate 01/18/22 0918   Drainage Description Serosanguinous 01/18/22 0918   Odor None 01/18/22 0918   Angela-wound Assessment Dry/flaky 01/18/22 0918   Margins Attached edges 01/18/22 0918   Wound Thickness Description not for Pressure Injury Full thickness 01/18/22 0918   Number of days: 168       LABS      CBC:   Lab Results   Component Value Date    WBC 8.0 12/15/2021    HGB 14.3 12/15/2021    HCT 43.3 12/15/2021    MCV 91.9 12/15/2021     12/15/2021     BMP: Lab Results   Component Value Date     12/15/2021    K 4.9 12/15/2021    K 4.3 03/26/2020    CL 97 12/15/2021    CO2 25 12/15/2021    BUN 16 12/15/2021    CREATININE 0.9 12/15/2021     PT/INR: No results found for: PROTIME, INR  Prealbumin: No results found for: PREALBUMIN  Albumin:  Lab Results   Component Value Date    LABALBU 3.6 12/09/2021     Sed Rate:  Lab Results   Component Value Date    SEDRATE 100 12/09/2021     CRP:   Lab Results   Component Value Date    CRP 17.81 12/09/2021     Micro:   Lab Results   Component Value Date    BC No growth-preliminary No growth  12/09/2021    BC No growth-preliminary No growth  12/09/2021      Hemoglobin A1C:   Lab Results   Component Value Date    LABA1C 9.4 12/10/2021       Assessment:     Ulcer Identification:  Ulcer Type: diabetic and non-healing surgical  Contributing Factors: edema and diabetes    Wound: External surgical dehiscence    Depth of Diabetic/Pressure/Non Pressure Ulcers or Wound:  Wound, full thickness    Patient Active Problem List   Diagnosis Code    Cellulitis L03.90    Foot ulcer, left, with fat layer exposed ([facility] L97.522    Forefoot varus, acquired, left M21.6X2    Equinus contracture of left ankle M24.572    Type 2 diabetes mellitus with hyperglycemia, with long-term current use of insulin ([facility]) E11.65, Z79.4    Severe sepsis ([facility]) A41.9, R65.20    Uncontrolled type 2 diabetes mellitus with hyperglycemia ([facility]) E11.65    Hyponatremia E87.1    Hypertension, uncontrolled I10    PFO (patent foramen ovale) Q21.1    Hyperlipidemia E78.5    Smoker F17.200    Morbid obesity ([facility]) E66.01    Chronic diastolic heart failure ([facility]) I50.32    Noncompliance with medications Z91.14    Aortic valve stenosis I35.0    Cellulitis of right leg L03.115    Xerosis of skin L85.3       Procedure Note  Indications:  Based on my examination of this patient's wound(s)/ulcer(s) today, debridement is required to promote healing and evaluate the extent healing. Performed by: Vanesa Foreman DPM    Consent obtained: Yes    Time out taken:  Yes    Pain control: neuropathic anesthesia      Debridement:Non-excisional Debridement to the leg  Non-Excisional debridement to the foot    Using curette the wound(s)/ulcer(s) was/were sharply debrided down through and including the removal of epidermis, dermis and subcutaneous tissue. Devitalized Tissue Debrided:  fibrin and biofilm necrotic tissue    Pre Debridement Measurements:  Are located in the Wound/Ulcer Documentation Flow Sheet    Wound/Ulcer #: 2    Post Debridement Measurements:    Wound/Ulcer Descriptions are listed under Physical Exam above. Wound/Ulcer Descriptions are Pre Debridement except measurements    Percent of Wound/Ulcer Debrided: 100%    Total Surface Area Debrided:  2 sq cm     Bleeding:  Minimal    Hemostasis Achieved: Not needed    Procedural Pain:  0  / 10     Post Procedural Pain:  0 / 10     Response to treatment:  Well tolerated by patient. Plan:     Patient examined and evaluated  Nonexcisional debridement of leg and foot performed. Note above  Iodoflex to foot to be changed every other day with a dry sterile dressing  Patient to follow-up in 2 weeks. Treatment:   Orders Placed This Encounter   Procedures    Nursing communication     Apply ace wraps to lower legs bilaterally. Standing Status:   Standing     Number of Occurrences:   1    Wound care     Surepress wraps to right legs wrapped from base of toes to knee (rewrap daily)     Standing Status:   Standing     Number of Occurrences:   1    Wound dressing     Standing Status:   Standing     Number of Occurrences:   1     Hydration of skin and lotion recommended  Antibiotics: No    Follow up: 2 weeks    Please see attached Discharge Instructions    Written patient dismissal instructions given to patient and signed by patient or POA.          Discharge Instructions       Visit Discharge/Physician Orders:  -Try to cut back/quit smoking/nicotine pouches to help with wound healing.  - Right foot debrided today. - Keep blood sugars controlled to help with wound healing.   - We will order supplies through [facility] for you today [phone]        Wound Location: Right foot, Right leg     Dressing orders:      1) Gather wound care supplies and arrange on clean table.      2) Wash your hands with soap and water or use alcohol based hand  for 20 seconds (sing \"Happy Birthday\" twice).    3) Cleanse wounds with normal saline or wound cleanser and gauze. Pat dry with clean gauze. Wash dry skin around leg with a wet wash cloth. Apply an oil based lotion to leg avoiding wounds.    4) Right foot- Apply iodoflex to wound. Cover with dry gauze and ABD pad. Wrap with roll gauze and ace bandage from base of toes to 1-2 inches below bend of knee. Change 3 times weekly     Right leg- Apply iodoflex to wound cover with dry dressing and ABD. Wrap with kerlix and ace bandage from base of toe to 1-2 inches below bend of knee. Change 3 times week.      Keep all dressings clean & dry.     Do not shower, take baths or get wound wet, unless otherwise instructed by your Wound Care doctor.      Follow up visit: Sandor Dorsey Tuesday 02/01/2022 at 8:15    Keep next scheduled appointment. Please give 24 hour notice if unable to keep appointment. [phone]     If you experience any of the following, please call the Wound Care Service during business hours: Monday through Friday 8:00 am - 4:30 pm  ([phone]).             *Increase in pain              *Temperature over 101              *Increase in drainage from your wound or a foul odor              *Uncontrolled swelling              *Need for compression bandage changes due to slippage, breakthrough drainage     If you need medical attention outside of business hours, please contact your Primary Care Doctor or go to the nearest emergency room.    Electronically signed by Katie Valdovinos DPM on 1/18/2022 at 12:08 PM          Electronically signed by Mirian BassettchJEROME hernández [id number] on 1/18/2022 at 12:12 PM

## 2022-01-18 NOTE — PROGRESS NOTES
[address] f: [phone] f: [phone] p: [phone] [email or web address]      Ordering Center:   [facility] [address] [facility] [address]  [phone]  WOUND CARE Dept: [phone]   FAX NUMBER [phone]    Patient Information:      Graham Kearns  [address] [facility] [address]   [phone]   : 1970  AGE: 45 y.o. GENDER: male   EPISODE DATE: 2022    Insurance:      PRIMARY INSURANCE:  Plan: Layne Campbell  Coverage: AETNA  Effective Date: 2015  Group Number: [unfilled]  Subscriber Number: [id number] - (Commercial)    Payor/Plan Subscr  Sex Relation Sub. Ins. ID Effective Group Num   1. [address] [place] 1970 Male Self [id number]* 1/1/15 [id number]                                   [address]       Patient Wound Information:      Problem List Items Addressed This Visit     Foot ulcer, left, with fat layer exposed ([address]    Forefoot varus, acquired, left    Equinus contracture of left ankle    Uncontrolled type 2 diabetes mellitus with hyperglycemia ([address]    Cellulitis of right leg    Xerosis of skin          WOUNDS REQUIRING DRESSING SUPPLIES:     Wound 21 Pretibial Right (Active)   Wound Image   22   Wound Etiology Venous 22   Dressing Status Intact; New dressing applied 22   Wound Cleansed Cleansed with saline 22   Dressing/Treatment ABD; Ace wrap 22   Offloading for Diabetic Foot Ulcers No;No offloading required 22   Wound Length (cm) 1.9 cm 22   Wound Width (cm) 1.7 cm 22   Wound Depth (cm) 0.5 cm 22   Wound Surface Area (cm^2) 3.23 cm^2 22   Change in Wound Size % (l*w) -1192 22   Wound Volume (cm^3) 1.615 cm^3 22   Wound Healing % -00122 22   Wound Assessment Granulation tissue 22 4997   Drainage Amount Moderate 01/18/22 0918   Drainage Description Serosanguinous 01/18/22 0918   Odor None 01/18/22 0918   Angela-wound Assessment Dry/flaky 01/18/22 0918   Margins Attached edges 01/18/22 0918   Wound Thickness Description not for Pressure Injury Full thickness 01/18/22 0918   Number of days: 168     Incision 12/14/21 Foot Right;Lateral (Active)   Wound Image   01/18/22 0918   Dressing Status Intact; Old drainage noted;New dressing applied 01/18/22 0918   Incision Cleansed Cleansed with saline 01/18/22 0918   Dressing/Treatment ABD pad; Ace wrap 01/18/22 0918   Incision Length (cm) 2 01/18/22 0918   Incision Width (cm) 0.4 cm 01/18/22 0918   Incision Depth (cm) 0.4 cm 01/18/22 0918   Closure Sutures 01/04/22 1039   Margins Approximated 01/18/22 0918   Incision Assessment Epithelialization 01/18/22 0918   Drainage Amount Moderate 01/18/22 0918   Drainage Description Serosanguinous 01/18/22 0918   Odor None 01/18/22 0918   Angela-incision Assessment Blanchable erythema;Dry/flaky; Intact 01/18/22 0918   Number of days: 34       Supplies Requested :      WOUND #: 1   PRIMARY DRESSING:  Other: iodoflex   Cover and Secure with: ABD pad  Conforming roll gauze  Ace wrap     FREQUENCY OF DRESSING CHANGES:  Three times per week       WOUND #: 2   PRIMARY DRESSING:  Other: iodoflex   Cover and Secure with: ABD pad  Conforming roll gauze  Ace wrap     FREQUENCY OF DRESSING CHANGES:  Three times per week       ADDITIONAL ITEMS:  [] Gloves Small  [x] Gloves Medium [x] Gloves Large [] Gloves XLarge  [] Tape 1\" [x] Tape 2\" [] Tape 3\"  [x] Medipore Tape  [x] Saline  [] Skin Prep   [] Adhesive Remover   [] Cotton Tip Applicators   [] Other:    Patient Wound(s) Debrided: [x] Yes if yes please add date 01/18/2022   [] No    Debribement Type: Excisional/Sharp    Patient currently being seen by Home Health: [] Yes   [x] No    Duration for needed supplies:  []15  []30  []60  [x]90 Days    Electronically signed by Danielle Valdes Amie Le LPN on 2/39/5999 at 33:89 PM     Provider Information:      PROVIDER'S NAME: Justino Santillan DPM    NPI: [id number]

## 2022-01-18 NOTE — PLAN OF CARE
Problem: Wound:  Goal: Will show signs of wound healing; wound closure and no evidence of infection  Description: Will show signs of wound healing; wound closure and no evidence of infection  Outcome: Ongoing   Patient seen for right leg wound. Wound shows signs of proper closure and healing. No s/s of infection noted. Follow up in 2 weeks. See AVS for order changes. Care plan reviewed with patient. Patient verbalize understanding of the plan of care and contribute to goal setting.

## 2022-01-18 NOTE — PROGRESS NOTES
Esequiel STANTON has reviewed and agrees with Blair HARVEY's shift  Assessment.     Mitra Vega RN  1/18/2022

## 2022-01-21 ENCOUNTER — TELEPHONE (OUTPATIENT)
Dept: WOUND CARE | Age: 52
End: 2022-01-21

## 2022-01-21 NOTE — TELEPHONE ENCOUNTER
----- Message from Alberto Andre sent at 1/21/2022  9:51 AM EST -----  [phone] SHANA (WIFE) WOULD LIKE A CALL BACK ABOUT CONCERNS FOR COST OF TREATMENT PLAN

## 2022-02-01 ENCOUNTER — HOSPITAL ENCOUNTER (OUTPATIENT)
Dept: WOUND CARE | Age: 52
Discharge: HOME | End: 2022-02-01
Payer: COMMERCIAL

## 2022-02-01 ENCOUNTER — TELEPHONE (OUTPATIENT)
Dept: CARDIOLOGY CLINIC | Age: 52
End: 2022-02-01

## 2022-02-01 VITALS
HEART RATE: 92 BPM | SYSTOLIC BLOOD PRESSURE: 176 MMHG | OXYGEN SATURATION: 96 % | RESPIRATION RATE: 18 BRPM | TEMPERATURE: 98.2 F | DIASTOLIC BLOOD PRESSURE: 85 MMHG

## 2022-02-01 DIAGNOSIS — L03.115 CELLULITIS OF RIGHT LEG: ICD-10-CM

## 2022-02-01 DIAGNOSIS — I73.9 PERIPHERAL ARTERIAL DISEASE (HCC): Primary | ICD-10-CM

## 2022-02-01 DIAGNOSIS — M24.572 EQUINUS CONTRACTURE OF LEFT ANKLE: ICD-10-CM

## 2022-02-01 DIAGNOSIS — E11.65 UNCONTROLLED TYPE 2 DIABETES MELLITUS WITH HYPERGLYCEMIA (HCC): ICD-10-CM

## 2022-02-01 DIAGNOSIS — L97.522 FOOT ULCER, LEFT, WITH FAT LAYER EXPOSED (HCC): ICD-10-CM

## 2022-02-01 DIAGNOSIS — M21.6X2 FOREFOOT VARUS, ACQUIRED, LEFT: ICD-10-CM

## 2022-02-01 PROCEDURE — 99213 OFFICE O/P EST LOW 20 MIN: CPT

## 2022-02-01 PROCEDURE — 97597 DBRDMT OPN WND 1ST 20 CM/<: CPT

## 2022-02-01 RX ORDER — BACITRACIN, NEOMYCIN, POLYMYXIN B 400; 3.5; 5 [USP'U]/G; MG/G; [USP'U]/G
OINTMENT TOPICAL ONCE
Status: CANCELLED | OUTPATIENT
Start: 2022-02-01 | End: 2022-02-01

## 2022-02-01 RX ORDER — LIDOCAINE HYDROCHLORIDE 20 MG/ML
JELLY TOPICAL ONCE
Status: CANCELLED | OUTPATIENT
Start: 2022-02-01 | End: 2022-02-01

## 2022-02-01 RX ORDER — LIDOCAINE 40 MG/G
CREAM TOPICAL ONCE
Status: CANCELLED | OUTPATIENT
Start: 2022-02-01 | End: 2022-02-01

## 2022-02-01 RX ORDER — LIDOCAINE HYDROCHLORIDE 40 MG/ML
SOLUTION TOPICAL ONCE
Status: CANCELLED | OUTPATIENT
Start: 2022-02-01 | End: 2022-02-01

## 2022-02-01 RX ORDER — GENTAMICIN SULFATE 1 MG/G
OINTMENT TOPICAL ONCE
Status: CANCELLED | OUTPATIENT
Start: 2022-02-01 | End: 2022-02-01

## 2022-02-01 RX ORDER — BACITRACIN ZINC AND POLYMYXIN B SULFATE 500; 1000 [USP'U]/G; [USP'U]/G
OINTMENT TOPICAL ONCE
Status: CANCELLED | OUTPATIENT
Start: 2022-02-01 | End: 2022-02-01

## 2022-02-01 RX ORDER — BETAMETHASONE DIPROPIONATE 0.05 %
OINTMENT (GRAM) TOPICAL ONCE
Status: CANCELLED | OUTPATIENT
Start: 2022-02-01 | End: 2022-02-01

## 2022-02-01 RX ORDER — CLOBETASOL PROPIONATE 0.5 MG/G
OINTMENT TOPICAL ONCE
Status: CANCELLED | OUTPATIENT
Start: 2022-02-01 | End: 2022-02-01

## 2022-02-01 RX ORDER — LIDOCAINE 50 MG/G
OINTMENT TOPICAL ONCE
Status: CANCELLED | OUTPATIENT
Start: 2022-02-01 | End: 2022-02-01

## 2022-02-01 RX ORDER — GINSENG 100 MG
CAPSULE ORAL ONCE
Status: CANCELLED | OUTPATIENT
Start: 2022-02-01 | End: 2022-02-01

## 2022-02-01 ASSESSMENT — PAIN SCALES - GENERAL: PAINLEVEL_OUTOF10: 0

## 2022-02-01 NOTE — PROGRESS NOTES
[facility]          Progress Note and Procedure Note      Jessie Bhat  MEDICAL RECORD NUMBER:  [id number]  AGE: 45 y.o. GENDER: male  : 1970  EPISODE DATE:  2022    Subjective:     Chief Complaint   Patient presents with    Wound Check     Right leg, Right fifth toe amputation site         HISTORY of PRESENT ILLNESS HPI     Jessie Baht is a 45 y.o. male Established patient referred by self, who presents today for wound/ulcer evaluation. History of Wound Context: non-healing post-surgical  Wound/Ulcer Pain Timing/Severity: none  Quality of pain: N/A  Severity:  0 / 10   Modifying Factors: None  Associated Signs/Symptoms: edema and numbness    Interval History:   Patient presents today for follow up on wound/ulcer's progression. The patient is currently not on antibiotics. Current dressing care includes Iodoflex to right leg wound, Betadine to right foot wound, dry sterile dressing. Patient states that he ran out of Iodoflex and that he has been using Betadine to the leg wound for the past few days. Patient relates no other new complaints. Patient states that he has never seen anyone for work-up of his arteries. Patient denies any nausea, vomiting, fever, chills, chest pain, shortness of breath. No other pedal complaints. PAST MEDICAL HISTORY        Diagnosis Date    CAD (coronary artery disease)     CHF (congestive heart failure) ([facility] [address] 2019    Diabetes mellitus ([facility] [address] 's    Hypertension 's       PAST SURGICAL HISTORY    Past Surgical History:   Procedure Laterality Date    CHOLECYSTECTOMY      FIBULA FRACTURE SURGERY Right     screws    TIBIA FRACTURE SURGERY Right     screws    TOE AMPUTATION Right 2021    RIGHT 5TH TOE AMPUTATION performed by Jeison Wei DPM at [address] [place] [address] [place]    History reviewed. No pertinent family history.     SOCIAL HISTORY    Social History     Tobacco Use    Smoking status: Current Every Day Smoker     Packs/day: 0.25     Years: 36.00     Pack years: 9.00     Types: Cigarettes     Start date: 1984    Smokeless tobacco: Never Used    Tobacco comment: \"nicotine pouches\"   Vaping Use    Vaping Use: Never used    Passive vaping exposure: Yes   Substance Use Topics    Alcohol use: Not Currently    Drug use: No       ALLERGIES    Allergies   Allergen Reactions    Morphine      \"goes nuts\"       MEDICATIONS    Current Outpatient Medications on File Prior to Encounter   Medication Sig Dispense Refill    fenofibrate (TRIGLIDE) 160 MG tablet take 1 tablet by mouth once daily with food 30 tablet 0    BASAGLAR KWIKPEN 100 UNIT/ML injection pen INJECT 30 UNITS INTO THE SKIN EVERY NIGHT 15 mL 2    bumetanide (BUMEX) 1 MG tablet take 1 tablet by mouth once daily 90 tablet 0    glipiZIDE (GLUCOTROL) 5 MG tablet take 1/2 tablet by mouth twice a day WITH MORNING AND EVENING MEALS 90 tablet 1    losartan (COZAAR) 100 MG tablet take 1 tablet by mouth once daily 90 tablet 0    atorvastatin (LIPITOR) 80 MG tablet Take 1 tablet by mouth daily take 1 tablet by mouth at bedtime 90 tablet 1    insulin aspart (NOVOLOG FLEXPEN) 100 UNIT/ML injection pen Inject 8 units subcu baseline plus a sliding scale according to group 2 sliding scale insulin.  Max daily dose  70 units daily 5 pen 3    vitamin D (ERGOCALCIFEROL) 1.25 MG (86429 UT) CAPS capsule take 1 capsule by mouth every week 4 capsule 2    omega-3 acid ethyl esters (LOVAZA) 1 g capsule take 2 capsules by mouth twice a day 360 capsule 1    metoprolol succinate (TOPROL XL) 25 MG extended release tablet take 1 tablet by mouth at bedtime 90 tablet 0    metFORMIN (GLUCOPHAGE-XR) 500 MG extended release tablet take 4 tablets by mouth once daily - WITH BREAKFAST 360 tablet 0    vitamin D3 (CHOLECALCIFEROL) 25 MCG (1000 UT) TABS tablet Take 2 tablets by mouth daily 180 tablet 5    fluticasone (FLONASE) 50 MCG/ACT nasal spray 1 spray by Each Nostril route daily 1 Bottle 0    blood glucose test strips (ACCU-CHEK CAT PLUS) strip CHECK BLOOD SUGAR EVERY MORNING AND AFTER DINNER EVERY DAY 60 strip 0    Insulin Pen Needle 32G X 4 MM MISC 1 each by Does not apply route 4 times daily 100 each 3    Accu-Chek Softclix Lancets MISC USE TO CHECK BLOOD SUGAR 100 each 0    omeprazole (PRILOSEC) 20 MG delayed release capsule take 1 capsule by mouth every morning BEFORE BREAKFAST 90 capsule 1    Continuous Blood Gluc  (DEXCOM G4 PLAT PED ) ANÍBAL 1 Units by Does not apply route continuous 1 Device 0    Continuous Blood Gluc Sensor (DEXCOM G6 SENSOR) MISC 1 Units by Does not apply route continuous 6 each 1    Continuous Blood Gluc  (FREESTYLE MACIEL 14 DAY READER) ANÍBAL 1 Units by Does not apply route continuous Patient is diabetic treated with insulin 1 Device 0    Continuous Blood Gluc Sensor (FREESTYLE MACIEL 14 DAY SENSOR) MISC 1 Units by Does not apply route continuous Patient is Diabetic treated with Insulin 6 each 1     No current facility-administered medications on file prior to encounter. REVIEW OF SYSTEMS    Pertinent items are noted in HPI. Objective:      BP (!) 176/85   Pulse 92   Temp 98.2 °F (36.8 °C) (Infrared)   Resp 18   SpO2 96%     Wt Readings from Last 3 Encounters:   12/21/21 282 lb (127.9 kg)   12/09/21 282 lb 3 oz (128 kg)   08/03/21 274 lb (124.3 kg)       PHYSICAL EXAM    General Appearance: alert and oriented to person, place and time, well-developed and well-nourished, in no acute distress    Physical Exam:   VASCULAR: Pedal pulses are lightly palpable bilaterally.  CFT less than 3 seconds to all other exposed digits of the bilateral lower extremities.  Temperature warm to cool from tibial tuberosity to digits of right foot.  Temperature warm to cool from tibial tuberosity to digits of left foot.  Numerous varicose veins noted to BLE.     MUSCULOSKELETAL: Muscle strength 5/5 and symmetric for all muscle groups crossing the ankle joint bilaterally. No pain on palpation to the bilateral lower extremities. Rectus foot and ankle bilaterally.     NEUROLOGIC: Light touch sensation grossly diminished to the digits bilaterally, light touch sensation is intact to the metatarsal heads bilaterally.     SKIN: Surgical incision noted to right forefoot with noted 5th digit amp. Incision is dehisced distally. No drainage or malodor noted. There is one wound noted to the RLE mid calf. Wound has fibrogranular base. No active drainage. No periwound erythema, edema. Wound 08/03/21 Pretibial Right (Active)   Wound Image   02/01/22 0904   Wound Etiology Venous 02/01/22 0904   Dressing Status Intact; New dressing applied 02/01/22 0904   Wound Cleansed Cleansed with saline 02/01/22 0904   Dressing/Treatment ABD; Ace wrap 01/18/22 0918   Offloading for Diabetic Foot Ulcers No offloading required 02/01/22 0904   Wound Length (cm) 1.3 cm 02/01/22 0904   Wound Width (cm) 0.8 cm 02/01/22 0904   Wound Depth (cm) 0.3 cm 02/01/22 0904   Wound Surface Area (cm^2) 1.04 cm^2 02/01/22 0904   Change in Wound Size % (l*w) -316 02/01/22 0904   Wound Volume (cm^3) 0.312 cm^3 02/01/22 0904   Wound Healing % -2396 02/01/22 0904   Wound Assessment Fibrinous;Pale granulation tissue 02/01/22 0904   Drainage Amount Moderate 02/01/22 0904   Drainage Description Serosanguinous 02/01/22 0904   Odor None 02/01/22 0904   Angela-wound Assessment Dry/flaky 02/01/22 0904   Margins Attached edges 02/01/22 0904   Wound Thickness Description not for Pressure Injury Full thickness 02/01/22 0904   Number of days: 182       LABS      CBC:   Lab Results   Component Value Date    WBC 8.0 12/15/2021    HGB 14.3 12/15/2021    HCT 43.3 12/15/2021    MCV 91.9 12/15/2021     12/15/2021     BMP:   Lab Results   Component Value Date     12/15/2021    K 4.9 12/15/2021    K 4.3 03/26/2020    CL 97 12/15/2021    CO2 25 12/15/2021    BUN 16 12/15/2021    CREATININE 0.9 12/15/2021     PT/INR: No results found for: PROTIME, INR  Prealbumin: No results found for: PREALBUMIN  Albumin:  Lab Results   Component Value Date    LABALBU 3.6 12/09/2021     Sed Rate:  Lab Results   Component Value Date    SEDRATE 100 12/09/2021     CRP:   Lab Results   Component Value Date    CRP 17.81 12/09/2021     Micro:   Lab Results   Component Value Date    BC No growth-preliminary No growth  12/09/2021    BC No growth-preliminary No growth  12/09/2021      Hemoglobin A1C:   Lab Results   Component Value Date    LABA1C 9.4 12/10/2021       Assessment:     Ulcer Identification:  Ulcer Type: arterial, diabetic, pressure and non-healing surgical  Contributing Factors: edema, venous stasis, diabetes and obesity    Wound: N/A    Depth of Diabetic/Pressure/Non Pressure Ulcers or Wound:  \"N/A    Patient Active Problem List   Diagnosis Code    Cellulitis L03.90    Foot ulcer, left, with fat layer exposed ([facility] [address] L97.522    Forefoot varus, acquired, left M21.6X2    Equinus contracture of left ankle M24.572    Type 2 diabetes mellitus with hyperglycemia, with long-term current use of insulin (HCC) E11.65, Z79.4    Severe sepsis ([facility]) A41.9, R65.20    Uncontrolled type 2 diabetes mellitus with hyperglycemia (HCC) E11.65    Hyponatremia E87.1    Hypertension, uncontrolled I10    PFO (patent foramen ovale) Q21.1    Hyperlipidemia E78.5    Smoker F17.200    Morbid obesity ([facility]) E66.01    Chronic diastolic heart failure (HCC) I50.32    Noncompliance with medications Z91.14    Aortic valve stenosis I35.0    Cellulitis of right leg L03.115    Xerosis of skin L85.3       Procedure Note  Indications:  Based on my examination of this patient's wound(s)/ulcer(s) today, debridement is required to promote healing and evaluate the extent healing.     Performed by: Darinel Santos DPM    Consent obtained: Yes    Time out taken:  Yes    Pain control: neuropathic      Debridement:Non-excisional Debridement    Using curette the wound(s)/ulcer(s) was/were sharply debrided down through and including the removal of dermis and subcutaneous tissue. Devitalized Tissue Debrided:  fibrin, biofilm, slough and callus    Pre Debridement Measurements:  Are located in the Wound/Ulcer Documentation Flow Sheet    Wound/Ulcer #: 1    Post Debridement Measurements:    Wound/Ulcer Descriptions are listed under Physical Exam above. Wound/Ulcer Descriptions are Pre Debridement except measurements    Percent of Wound/Ulcer Debrided: 100%    Total Surface Area Debrided:  1.04 sq cm     Bleeding:  Minimal    Hemostasis Achieved:  Not needed    Procedural Pain:  0  / 10     Post Procedural Pain:  0 / 10     Response to treatment:  Well tolerated by patient. Plan:     -Patient examined and evaluated  -Nonexcisional debridement performed, see note above  -Dressing applied: Betadine to right foot wound, Iodoflex to the right leg wound, gauze, Kerlix, Ace bandage from toes to tibial tuberosity  -Change dressing every other day  -Referral placed to Dr. Renee Barry in 3 weeks      Treatment: No orders of the defined types were placed in this encounter. Antibiotics: No    Follow up: 3 weeks    Please see attached Discharge Instructions    Written patient dismissal instructions given to patient and signed by patient or POA. Discharge Instructions       Visit Discharge/Physician Orders:  -Try to cut back/quit smoking/nicotine pouches to help with wound healing.  - Wounds debrided today. - Keep blood sugars controlled to help with wound healing.   - Wound care supplies through [employer]. Call if you need to re-order [phone].  - Continue light duty at work.     Wound Location: Right foot, Right leg     Dressing orders:      1) Gather wound care supplies and arrange on clean table.      2) Wash your hands with soap and water or use alcohol based hand  for 20 seconds (sing \"Happy Birthday\" twice).      3) Cleanse wounds with normal saline or wound cleanser and gauze. Pat dry with clean gauze. Wash dry skin around leg with a wet wash cloth. Apply an oil based lotion to leg avoiding wounds.    4) Right foot- Apply iodoflex to wound. Cover with dry gauze. Wrap with roll gauze and ace bandage from base of toes to 1-2 inches below bend of knee. Change 3 times weekly         Right leg- Apply iodoflex to wound. Cover with gauze and ABD. Wrap with roll gauze and ace bandage from base of toe to 1-2 inches below bend of knee. Change 3 times week.      Keep all dressings clean & dry.     Do not shower, take baths or get wound wet, unless otherwise instructed by your Wound Care doctor.      Follow up visit: Loyd Christy [facility] Tuesday 02/22/2022 at 8:00 am     Keep next scheduled appointment. Please give 24 hour notice if unable to keep appointment. [phone]     If you experience any of the following, please call the Wound Care Service during business hours: Monday through Friday 8:00 am - 4:30 pm  ([phone]).             *Increase in pain              *Temperature over 101              *Increase in drainage from your wound or a foul odor              *Uncontrolled swelling              *Need for compression bandage changes due to slippage, breakthrough drainage     If you need medical attention outside of business hours, please contact your Primary Care Doctor or go to the nearest emergency room.         Electronically signed by Stefan Moctezuma DPM on 2/1/2022 at 9:33 AM

## 2022-02-01 NOTE — PLAN OF CARE
Problem: Wound:  Goal: Will show signs of wound healing; wound closure and no evidence of infection  Description: Will show signs of wound healing; wound closure and no evidence of infection  Outcome: Ongoing   Patient presents to wound clinic for follow up of right leg and foot wounds. Wounds debrided today. Right foot- Apply iodoflex to wound. Cover with dry gauze. Wrap with roll gauze and ace bandage from base of toes to 1-2 inches below bend of knee. Change 3 times weekly. Right leg- Apply iodoflex to wound. Cover with gauze and ABD. Wrap with roll gauze and ace bandage from base of toe to 1-2 inches below bend of knee. Change 3 times week. Follow up visit:   3 Weeks on Tuesday 02/22/2022 at 8:00 am.    Care plan reviewed with patient. Patient verbalize understanding of the plan of care and contribute to goal setting.

## 2022-02-06 DIAGNOSIS — I10 ESSENTIAL HYPERTENSION: ICD-10-CM

## 2022-02-07 RX ORDER — LOSARTAN POTASSIUM 100 MG/1
TABLET ORAL
Qty: 30 TABLET | Refills: 0 | Status: SHIPPED | OUTPATIENT
Start: 2022-02-07 | End: 2022-02-22 | Stop reason: SDUPTHER

## 2022-02-07 NOTE — TELEPHONE ENCOUNTER
Please approve or deny     Last Visit Date:  7/22/2021       Next Visit Date:    Visit date not found    Refuse due to needing appointment- Please advise

## 2022-02-09 ENCOUNTER — OFFICE VISIT (OUTPATIENT)
Dept: CARDIOLOGY CLINIC | Age: 52
End: 2022-02-09
Payer: COMMERCIAL

## 2022-02-09 VITALS
SYSTOLIC BLOOD PRESSURE: 174 MMHG | HEART RATE: 102 BPM | DIASTOLIC BLOOD PRESSURE: 80 MMHG | HEIGHT: 72 IN | WEIGHT: 289 LBS | BODY MASS INDEX: 39.14 KG/M2 | OXYGEN SATURATION: 95 %

## 2022-02-09 DIAGNOSIS — I73.9 PAD (PERIPHERAL ARTERY DISEASE) (HCC): Primary | ICD-10-CM

## 2022-02-09 PROCEDURE — 99204 OFFICE O/P NEW MOD 45 MIN: CPT | Performed by: INTERNAL MEDICINE

## 2022-02-09 RX ORDER — CARVEDILOL 6.25 MG/1
6.25 TABLET ORAL 2 TIMES DAILY
Qty: 30 TABLET | Refills: 5 | Status: SHIPPED | OUTPATIENT
Start: 2022-02-09 | End: 2022-05-23 | Stop reason: SDUPTHER

## 2022-02-09 NOTE — PROGRESS NOTES
[address] [facility] [address] [place] [address]  Dept: [phone]  Dept Fax: [phone]  Loc: [phone]    Visit Date: 2/9/2022    Mr. Ilana Galvin is a 45 y.o. male  who presented for:  Chief Complaint   Patient presents with    New Patient     pad       HPI:   46 yo M c hx of DM on Insulin, HTN, systolic CHF s/p improvement in EF, Mild AS, Smoker is referred from podiatry clinic for evaluation of PAD. Patients right foot has nonhealing ulcers. HbA1c is 9.4. He used to follow up with cardiology at [facility] in 2019. His Echo from 3/27/2020 showed EF of 50%, mild AS. Current Outpatient Medications:     carvedilol (COREG) 6.25 MG tablet, Take 1 tablet by mouth 2 times daily, Disp: 30 tablet, Rfl: 5    losartan (COZAAR) 100 MG tablet, take 1 tablet by mouth once daily, Disp: 30 tablet, Rfl: 0    fenofibrate (TRIGLIDE) 160 MG tablet, take 1 tablet by mouth once daily with food, Disp: 30 tablet, Rfl: 0    BASAGLAR KWIKPEN 100 UNIT/ML injection pen, INJECT 30 UNITS INTO THE SKIN EVERY NIGHT, Disp: 15 mL, Rfl: 2    bumetanide (BUMEX) 1 MG tablet, take 1 tablet by mouth once daily, Disp: 90 tablet, Rfl: 0    glipiZIDE (GLUCOTROL) 5 MG tablet, take 1/2 tablet by mouth twice a day WITH MORNING AND EVENING MEALS, Disp: 90 tablet, Rfl: 1    atorvastatin (LIPITOR) 80 MG tablet, Take 1 tablet by mouth daily take 1 tablet by mouth at bedtime, Disp: 90 tablet, Rfl: 1    insulin aspart (NOVOLOG FLEXPEN) 100 UNIT/ML injection pen, Inject 8 units subcu baseline plus a sliding scale according to group 2 sliding scale insulin.  Max daily dose  70 units daily, Disp: 5 pen, Rfl: 3    vitamin D (ERGOCALCIFEROL) 1.25 MG (32156 UT) CAPS capsule, take 1 capsule by mouth every week, Disp: 4 capsule, Rfl: 2    omega-3 acid ethyl esters (LOVAZA) 1 g capsule, take 2 capsules by mouth twice a day, Disp: 360 capsule, Rfl: 1    omeprazole (PRILOSEC) 20 MG delayed release capsule, take 1 capsule by mouth every morning BEFORE BREAKFAST, Disp: 90 capsule, Rfl: 1    metFORMIN (GLUCOPHAGE-XR) 500 MG extended release tablet, take 4 tablets by mouth once daily - WITH BREAKFAST, Disp: 360 tablet, Rfl: 0    vitamin D3 (CHOLECALCIFEROL) 25 MCG (1000 UT) TABS tablet, Take 2 tablets by mouth daily, Disp: 180 tablet, Rfl: 5    fluticasone (FLONASE) 50 MCG/ACT nasal spray, 1 spray by Each Nostril route daily, Disp: 1 Bottle, Rfl: 0    blood glucose test strips (ACCU-CHEK CAT PLUS) strip, CHECK BLOOD SUGAR EVERY MORNING AND AFTER DINNER EVERY DAY, Disp: 60 strip, Rfl: 0    Insulin Pen Needle 32G X 4 MM MISC, 1 each by Does not apply route 4 times daily, Disp: 100 each, Rfl: 3    Accu-Chek Softclix Lancets MISC, USE TO CHECK BLOOD SUGAR, Disp: 100 each, Rfl: 0    Continuous Blood Gluc  (DEXCOM G4 PLAT PED ) ANÍBAL, 1 Units by Does not apply route continuous, Disp: 1 Device, Rfl: 0    Continuous Blood Gluc Sensor (DEXCOM G6 SENSOR) MISC, 1 Units by Does not apply route continuous, Disp: 6 each, Rfl: 1    Continuous Blood Gluc  (FREESTYLE MACIEL 14 DAY READER) ANÍBAL, 1 Units by Does not apply route continuous Patient is diabetic treated with insulin, Disp: 1 Device, Rfl: 0    Continuous Blood Gluc Sensor (FREESTYLE MACIEL 14 DAY SENSOR) MISC, 1 Units by Does not apply route continuous Patient is Diabetic treated with Insulin, Disp: 6 each, Rfl: 1    Past Medical History  Tita Oconnell  has a past medical history of CAD (coronary artery disease), CHF (congestive heart failure) ([facility] [address] Diabetes mellitus ([facility] [address] and Hypertension. Social History  Tita Oconnell  reports that he has been smoking cigarettes. He started smoking about 38 years ago. He has a 9.00 pack-year smoking history. He has never used smokeless tobacco. He reports previous alcohol use. He reports that he does not use drugs. Family History  Tita Oconnell family history is not on file.     Past Surgical History Past Surgical History:   Procedure Laterality Date    CHOLECYSTECTOMY      FIBULA FRACTURE SURGERY Right     screws    TIBIA FRACTURE SURGERY Right     screws    TOE AMPUTATION Right 12/14/2021    RIGHT 5TH TOE AMPUTATION performed by Carol Ann King DPM at [address] [place] [address] Place:     REVIEW OF SYSTEMS  Constitutional: denies sweats, chills and fever  HENT: denies  congestion, sinus pressure, sneezing and sore throat. Eyes: denies  pain, discharge, redness and itching. Respiratory: denies apnea, cough  Gastrointestinal: denies blood in stool, constipation, diarrhea   Endocrine: denies cold intolerance, heat intolerance, polydipsia. Genitourinary: denies dysuria, enuresis, flank pain and hematuria. Musculoskeletal: denies arthralgias, joint swelling and neck pain. Neurological: denies numbness and headaches. Psychiatric/Behavioral: denies agitation, confusion, decreased concentration and dysphoric mood    All others reviewed and are negative. Objective:     BP (!) 162/82   Pulse 113   Ht 6' (1.829 m)   Wt 289 lb (131.1 kg)   SpO2 95%   BMI 39.20 kg/m²     Wt Readings from Last 3 Encounters:   02/09/22 289 lb (131.1 kg)   12/21/21 282 lb (127.9 kg)   12/09/21 282 lb 3 oz (128 kg)     BP Readings from Last 3 Encounters:   02/09/22 (!) 162/82   02/01/22 (!) 176/85   01/18/22 (!) 147/71       PHYSICAL EXAM  Constitutional: Oriented to person, place, and time. Appears well-developed and well-nourished. HENT:   Head: Normocephalic and atraumatic. Eyes: EOM are normal. Pupils are equal, round, and reactive to light. Neck: Normal range of motion. Neck supple. No JVD present. Cardiovascular: Normal rate , normal heart sounds and intact distal pulses. Pulmonary/Chest: Effort normal and breath sounds normal. No respiratory distress. No wheezes. No rales. Abdominal: Soft. Bowel sounds are normal. No distension. There is no tenderness. Musculoskeletal: Normal range of motion. No edema. Neurological: Alert and oriented to person, place, and time. No cranial nerve deficit. Coordination normal.   Skin: Skin is warm and dry. Psychiatric: Normal mood and affect.        No results found for: CKTOTAL, CKMB, CKMBINDEX    Lab Results   Component Value Date    WBC 8.0 12/15/2021    RBC 4.71 12/15/2021    HGB 14.3 12/15/2021    HCT 43.3 12/15/2021    MCV 91.9 12/15/2021    MCH 30.4 12/15/2021    MCHC 33.0 12/15/2021    RDW 13.2 03/23/2021     12/15/2021    MPV 11.1 12/15/2021       Lab Results   Component Value Date     12/15/2021    K 4.9 12/15/2021    K 4.3 03/26/2020    CL 97 12/15/2021    CO2 25 12/15/2021    BUN 16 12/15/2021    LABALBU 3.6 12/09/2021    CREATININE 0.9 12/15/2021    CALCIUM 9.2 12/15/2021    LABGLOM 88 12/15/2021    GLUCOSE 238 12/15/2021    GLUCOSE 269 03/23/2021       Lab Results   Component Value Date    ALKPHOS 106 12/09/2021    ALT 15 12/09/2021    AST 11 12/09/2021    PROT 8.2 12/09/2021    BILITOT 0.4 12/09/2021    BILIDIR <0.2 12/09/2021    LABALBU 3.6 12/09/2021       Lab Results   Component Value Date    MG 1.6 03/25/2020       No results found for: INR, PROTIME      Lab Results   Component Value Date    LABA1C 9.4 12/10/2021       Lab Results   Component Value Date    TRIG 657 07/22/2021    HDL 25 07/22/2021    LDLCALC SEE BELOW 07/22/2021       Lab Results   Component Value Date    TSH 2.060 07/22/2021         Testing Reviewed:      I haveindividually reviewed the below cardiac tests    EKG:    ECHO: Results for orders placed during the hospital encounter of 03/25/20    ECHO Complete 2D W Doppler W Color    Narrative  Transthoracic Echocardiography Report (TTE)    Demographics    Patient Name    Zahida Grissom Gender               Male    MR #            [id number]     Race                     Ethnicity    Account #       [de-identified]     Room Number          0016    Accession       [id number]     Date of Study        03/27/2020  Number    Date of Birth 1970    Referring Physician  Julio Silva. Tamara Gomez MD    Age             47 year(s)    Sonographer          [facility]    Interpreting         Echo reader of the week  Physician            Calista Flood MD    Procedure    Type of Study    TTE procedure:ECHOCARDIOGRAM COMPLETE 2D W DOPPLER W COLOR. Procedure Date  Date: 03/27/2020 Start: 10:58 AM    Study Location: Bedside  Technical Quality: Adequate visualization    Indications:Congestive heart failure. Additional Medical History:Diabetic, Sepsis, Hypertension, PFO,  Hyperlipidemia, Smoker, Congestive heart failure. Patient Status: Routine    Height: 72 inches Weight: 273.01 pounds BSA: 2.43 m^2 BMI: 37.03 kg/m^2    BP: 126/60 mmHg    Conclusions    Summary  Normal left ventricle size and Low Normal LV systolic function. Ejection  fraction was estimated at 50 %. There were no regional left ventricular  wall motion abnormalities and wall thickness was within normal limits. Doppler parameters were consistent with abnormal left ventricular  relaxation (grade 1 diastolic dysfunction). The left atrium is Mildly dilated. There is mild aortic stenosis with valve area of 1.5 sq cm. The maximum aortic valve gradient is 24 mmHg, the mean gradient is 14  mmHg, and the peak velocity is 2.4 m/s. Signature    ----------------------------------------------------------------  Electronically signed by Calista Flood MD (Interpreting  physician) on 03/27/2020 at 01:01 PM  ----------------------------------------------------------------    Findings    Mitral Valve  The mitral valve structure was normal with normal leaflet separation. DOPPLER: The transmitral velocity was within the normal range with no  evidence for mitral stenosis. There was no evidence of mitral  regurgitation. Aortic Valve  Aortic valve appears tricuspid. Leaflets exhibited mildly increased  thickness and mildly reduced cuspal separation of the aortic valve.  No  evidence of aortic valve regurgitation . There is mild aortic stenosis with valve area of 1.5 sq cm. The maximum aortic valve gradient is 24 mmHg, the mean gradient is 14  mmHg, and the peak velocity is 2.4 m/s. Tricuspid Valve  The tricuspid valve structure was normal with normal leaflet separation. DOPPLER: There was no evidence of tricuspid stenosis. There was no  evidence of tricuspid regurgitation. Pulmonic Valve  The pulmonic valve leaflets exhibited normal thickness, no calcification,  and normal cuspal separation. DOPPLER: The transpulmonic velocity was  within the normal range with no evidence for regurgitation. Left Atrium  The left atrium is Mildly dilated. Left Ventricle  Normal left ventricle size and Low Normal LV systolic function. Ejection  fraction was estimated at 50 %. There were no regional left ventricular  wall motion abnormalities and wall thickness was within normal limits. Doppler parameters were consistent with abnormal left ventricular  relaxation (grade 1 diastolic dysfunction). Right Atrium  Right atrial size was normal.    Right Ventricle  The right ventricular size was normal with normal systolic function and  wall thickness. Pericardial Effusion  The pericardium was normal in appearance with no evidence of a pericardial  effusion. Pleural Effusion  No evidence of pleural effusion. Aorta / Great Vessels  -Aortic root dimension within normal limits.  -The Pulmonary artery is within normal limits. -IVC size is within normal limits with normal respiratory phasic changes.     M-Mode/2D Measurements & Calculations    LV Diastolic   LV Systolic Dimension:    AV Cusp Separation: 1.5 cmLA  Dimension: 5.7 4.1 cm                    Dimension: 4.7 cmAO Root  cm             LV Volume Diastolic: 247  Dimension: 3.5 cmLA Area: 24.9  LV FS:28.1 %   ml                        cm^2  LV PW          LV Volume Systolic: 64.9  Diastolic: 1.4 ml  cm             LV EDV/LV EDV Index: 160  Septum         ml/66 m^2LV ESV/LV ESV    RV Diastolic Dimension: 3.8 cm  Diastolic: 1.4 Index: 14.9 ml/31 m^2  cm             EF Calculated: 53.6 %     LA/Aorta: 1.34  Ascending Aorta: 3.3 cm  LA volume/Index: 75.6 ml /31m^2    LVOT: 2 cm    Doppler Measurements & Calculations    MV Peak E-Wave: 99   AV Peak Velocity: 208    LVOT Peak Velocity: 102 cm/s  cm/s                 cm/s                     LVOT Mean Velocity: 77.5  MV Peak A-Wave: 125  AV Peak Gradient: 17.31  cm/s  cm/s                 mmHg                     LVOT Peak Gradient: 4  MV E/A Ratio: 0.79   AV Mean Velocity: 156    mmHgLVOT Mean Gradient: 3  MV Peak Gradient:    cm/s                     mmHg  3.92 mmHg            AV Mean Gradient: 11  mmHg                     TV Peak E-Wave: 72.4 cm/s  MV Deceleration      AV VTI: 52.2 cm          TV Peak A-Wave: 67.3 cm/s  Time: 236 msec       AV Area  (Continuity):1.65 cm^2   TV Peak Gradient: 2.1 mmHg    MV E' Septal         LVOT VTI: 27.4 cm        PV Peak Velocity: 80.6 cm/s  Velocity: 6.5 cm/s   IVRT: 70 msec            PV Peak Gradient: 2.6 mmHg  MV A' Septal  Velocity: 12.2 cm/s  MV E' Lateral        AV DVI (VTI): 0.52AV DVI  Velocity: 9.8 cm/s   (Vmax):0.49  MV A' Lateral  Velocity: 12.7 cm/s  E/E' septal: 15.23  E/E' lateral: 10.1    [id number]      STRESS:    CATH:    Assessment/Plan       Diagnosis Orders   1. PAD (peripheral artery disease) (HCC)         Non-healing ulcers of the right foot  Hx of systolic CHF, improved  Mild AS  Poorly controlled DM  HTN  Smoker  Obesity  ARIANE on CPAP    Reviewed arterial duplex study  Will need IR angiogram to evaluate trifurcation disease  R/B/A were discussed  Patient would like to proceed   Advised to stop smoking because smoking increases risk of heart disease, morbidity, mortality and end organ damage.   Needs better control of DM  Will also change BP meds, as it is uncontrolled  Needs weight loss  The patient is asked to make an attempt to improve diet and exercise patterns to aid in medical management of this problem. Advised more plant based nutrition/meditarrean diet   Advised patient to call office or seek immediate medical attention if there is any new onset of  any chest pain, sob, palpitations, lightheadedness, dizziness, orthopnea, PND or pedal edema. All medication side effects were discussed in details. Thank youfor allowing me to participate in the care of this patient. Please do not hesitate to contact me for any further questions. Return in about 3 months (around 5/9/2022), or if symptoms worsen or fail to improve, for Review testing, Regular follow up.        Electronically signed by Calli Hale MD [facility]  2/9/2022 at 8:10 AM EST

## 2022-02-18 ENCOUNTER — HOSPITAL ENCOUNTER (OUTPATIENT)
Dept: INTERVENTIONAL RADIOLOGY/VASCULAR | Age: 52
Discharge: HOME | End: 2022-02-18
Attending: INTERNAL MEDICINE | Admitting: INTERNAL MEDICINE
Payer: COMMERCIAL

## 2022-02-18 VITALS
RESPIRATION RATE: 22 BRPM | TEMPERATURE: 98.5 F | BODY MASS INDEX: 39.14 KG/M2 | SYSTOLIC BLOOD PRESSURE: 149 MMHG | OXYGEN SATURATION: 93 % | HEIGHT: 72 IN | HEART RATE: 101 BPM | WEIGHT: 289 LBS | DIASTOLIC BLOOD PRESSURE: 86 MMHG

## 2022-02-18 DIAGNOSIS — I73.9 PAD (PERIPHERAL ARTERY DISEASE) (HCC): ICD-10-CM

## 2022-02-18 LAB
ABO: NORMAL
ALBUMIN SERPL-MCNC: 3.9 G/DL (ref 3.5–5.1)
ALP BLD-CCNC: 71 U/L (ref 38–126)
ALT SERPL-CCNC: 39 U/L (ref 11–66)
ANION GAP SERPL CALCULATED.3IONS-SCNC: 11 MEQ/L (ref 8–16)
ANTIBODY SCREEN: NORMAL
AST SERPL-CCNC: 25 U/L (ref 5–40)
BILIRUB SERPL-MCNC: 0.4 MG/DL (ref 0.3–1.2)
BUN BLDV-MCNC: 15 MG/DL (ref 7–22)
CALCIUM SERPL-MCNC: 8.9 MG/DL (ref 8.5–10.5)
CHLORIDE BLD-SCNC: 100 MEQ/L (ref 98–111)
CHOLESTEROL, TOTAL: 165 MG/DL (ref 100–199)
CO2: 25 MEQ/L (ref 23–33)
CREAT SERPL-MCNC: 0.7 MG/DL (ref 0.4–1.2)
ERYTHROCYTE [DISTWIDTH] IN BLOOD BY AUTOMATED COUNT: 13.5 % (ref 11.5–14.5)
ERYTHROCYTE [DISTWIDTH] IN BLOOD BY AUTOMATED COUNT: 44.6 FL (ref 35–45)
GFR SERPL CREATININE-BSD FRML MDRD: > 90 ML/MIN/1.73M2
GLUCOSE BLD-MCNC: 247 MG/DL (ref 70–108)
HCT VFR BLD CALC: 44.1 % (ref 42–52)
HDLC SERPL-MCNC: 23 MG/DL
HEMOGLOBIN: 14.8 GM/DL (ref 14–18)
INR BLD: 0.91 (ref 0.85–1.13)
LDL CHOLESTEROL CALCULATED: ABNORMAL MG/DL
MCH RBC QN AUTO: 30.2 PG (ref 26–33)
MCHC RBC AUTO-ENTMCNC: 33.6 GM/DL (ref 32.2–35.5)
MCV RBC AUTO: 90 FL (ref 80–94)
PLATELET # BLD: 188 THOU/MM3 (ref 130–400)
PMV BLD AUTO: 10.9 FL (ref 9.4–12.4)
POTASSIUM REFLEX MAGNESIUM: 4.1 MEQ/L (ref 3.5–5.2)
RBC # BLD: 4.9 MILL/MM3 (ref 4.7–6.1)
RH FACTOR: NORMAL
SODIUM BLD-SCNC: 136 MEQ/L (ref 135–145)
TOTAL PROTEIN: 7 G/DL (ref 6.1–8)
TRIGL SERPL-MCNC: 542 MG/DL (ref 0–199)
WBC # BLD: 4.8 THOU/MM3 (ref 4.8–10.8)

## 2022-02-18 PROCEDURE — 6360000004 HC RX CONTRAST MEDICATION: Performed by: INTERNAL MEDICINE

## 2022-02-18 PROCEDURE — 80061 LIPID PANEL: CPT

## 2022-02-18 PROCEDURE — 80053 COMPREHEN METABOLIC PANEL: CPT

## 2022-02-18 PROCEDURE — 36247 INS CATH ABD/L-EXT ART 3RD: CPT

## 2022-02-18 PROCEDURE — 86901 BLOOD TYPING SEROLOGIC RH(D): CPT

## 2022-02-18 PROCEDURE — 36415 COLL VENOUS BLD VENIPUNCTURE: CPT

## 2022-02-18 PROCEDURE — 6360000002 HC RX W HCPCS: Performed by: INTERNAL MEDICINE

## 2022-02-18 PROCEDURE — 86850 RBC ANTIBODY SCREEN: CPT

## 2022-02-18 PROCEDURE — 85610 PROTHROMBIN TIME: CPT

## 2022-02-18 PROCEDURE — 36200 PLACE CATHETER IN AORTA: CPT | Performed by: INTERNAL MEDICINE

## 2022-02-18 PROCEDURE — 75625 CONTRAST EXAM ABDOMINL AORTA: CPT

## 2022-02-18 PROCEDURE — 2580000003 HC RX 258: Performed by: INTERNAL MEDICINE

## 2022-02-18 PROCEDURE — 2709999900 IR ANGIOGRAM EXTREMITY BILATERAL

## 2022-02-18 PROCEDURE — 75774 ARTERY X-RAY EACH VESSEL: CPT

## 2022-02-18 PROCEDURE — 6370000000 HC RX 637 (ALT 250 FOR IP): Performed by: INTERNAL MEDICINE

## 2022-02-18 PROCEDURE — 86900 BLOOD TYPING SEROLOGIC ABO: CPT

## 2022-02-18 PROCEDURE — 75625 CONTRAST EXAM ABDOMINL AORTA: CPT | Performed by: INTERNAL MEDICINE

## 2022-02-18 PROCEDURE — 75716 ARTERY X-RAYS ARMS/LEGS: CPT

## 2022-02-18 PROCEDURE — 75716 ARTERY X-RAYS ARMS/LEGS: CPT | Performed by: INTERNAL MEDICINE

## 2022-02-18 PROCEDURE — 85027 COMPLETE CBC AUTOMATED: CPT

## 2022-02-18 RX ORDER — SODIUM CHLORIDE 9 MG/ML
INJECTION, SOLUTION INTRAVENOUS CONTINUOUS
Status: DISCONTINUED | OUTPATIENT
Start: 2022-02-18 | End: 2022-02-18 | Stop reason: HOSPADM

## 2022-02-18 RX ORDER — MIDAZOLAM HYDROCHLORIDE 1 MG/ML
1 INJECTION INTRAMUSCULAR; INTRAVENOUS ONCE
Status: COMPLETED | OUTPATIENT
Start: 2022-02-18 | End: 2022-02-18

## 2022-02-18 RX ORDER — ACETAMINOPHEN 325 MG/1
650 TABLET ORAL EVERY 4 HOURS PRN
Status: DISCONTINUED | OUTPATIENT
Start: 2022-02-18 | End: 2022-02-18 | Stop reason: HOSPADM

## 2022-02-18 RX ORDER — SODIUM CHLORIDE 0.9 % (FLUSH) 0.9 %
5-40 SYRINGE (ML) INJECTION EVERY 12 HOURS SCHEDULED
Status: DISCONTINUED | OUTPATIENT
Start: 2022-02-18 | End: 2022-02-18 | Stop reason: HOSPADM

## 2022-02-18 RX ORDER — FENTANYL CITRATE 50 UG/ML
50 INJECTION, SOLUTION INTRAMUSCULAR; INTRAVENOUS ONCE
Status: COMPLETED | OUTPATIENT
Start: 2022-02-18 | End: 2022-02-18

## 2022-02-18 RX ORDER — SODIUM CHLORIDE 9 MG/ML
25 INJECTION, SOLUTION INTRAVENOUS PRN
Status: DISCONTINUED | OUTPATIENT
Start: 2022-02-18 | End: 2022-02-18 | Stop reason: HOSPADM

## 2022-02-18 RX ORDER — ASPIRIN 325 MG
325 TABLET ORAL ONCE
Status: COMPLETED | OUTPATIENT
Start: 2022-02-18 | End: 2022-02-18

## 2022-02-18 RX ORDER — SODIUM CHLORIDE 0.9 % (FLUSH) 0.9 %
5-40 SYRINGE (ML) INJECTION PRN
Status: DISCONTINUED | OUTPATIENT
Start: 2022-02-18 | End: 2022-02-18 | Stop reason: HOSPADM

## 2022-02-18 RX ORDER — SODIUM CHLORIDE 9 MG/ML
INJECTION, SOLUTION INTRAVENOUS CONTINUOUS
Status: ACTIVE | OUTPATIENT
Start: 2022-02-18 | End: 2022-02-18

## 2022-02-18 RX ADMIN — SODIUM CHLORIDE: 9 INJECTION, SOLUTION INTRAVENOUS at 07:59

## 2022-02-18 RX ADMIN — MIDAZOLAM 1 MG: 1 INJECTION INTRAMUSCULAR; INTRAVENOUS at 09:40

## 2022-02-18 RX ADMIN — FENTANYL CITRATE 50 MCG: 50 INJECTION, SOLUTION INTRAMUSCULAR; INTRAVENOUS at 09:40

## 2022-02-18 RX ADMIN — IOPAMIDOL 75 ML: 612 INJECTION, SOLUTION INTRAVENOUS at 10:00

## 2022-02-18 RX ADMIN — ASPIRIN 325 MG: 325 TABLET ORAL at 08:04

## 2022-02-18 ASSESSMENT — PAIN SCALES - GENERAL
PAINLEVEL_OUTOF10: 0
PAINLEVEL_OUTOF10: 0

## 2022-02-18 NOTE — PROGRESS NOTES
Returned to 2E09. Monitor attached showing SR. Dressing to right groin dry and intact. No bleeding,swelling, or edema noted. 0.9nss infusing with approx 600 ml remaining.

## 2022-02-18 NOTE — H&P
[address] [address]  Sedation/Analgesia History & Physical    Pt Name: Henry Valentine  Account number: [de-identified]  MRN: [id number]  YOB: 1970  Provider Performing Procedure: Kasie Gould MD MD [facility]  Primary Care Physician: Alonso Davis MD  Date: 2/18/2022    PRE-PROCEDURE    Code Status: FULL CODE  Brief History/Pre-Procedure Diagnosis: Non-healing Rt foot ulcer, poorly controlled DM, abnormal duplex    Consent: : I have discussed with the patient risks, benefits, and alternatives (and relevant risks, benefits, and side effects related to alternatives or not receiving care), and likelihood of the success. The patient and/or representative appear to understand and agree to proceed. The discussion encompasses risks, benefits, and side effects related to the alternatives and the risks related to not receiving the proposed care, treatment, and services. PLANNED PROCEDURE   []Cath  []PCI                []Pacemaker/AICD  []IVAN             []Cardioversion [x]Peripheral angiography/PTA  []Other:      VITAL SIGNS   Vitals:    02/18/22 0742   BP:    Pulse: 89   Resp:    Temp:    SpO2:        PHYSICAL:   General: No acute distress  HEENT:  Unremarkable for age  Neck: without increased JVD, carotid pulses 2+ bilaterally without bruits  Heart: RRR, S1 & S2 WNL   Lungs: Clear to auscultation    Abdomen: BS present, without HSM, masses, or tenderness    Extremities: without C,C,E.  Pulses 2+ bilaterally  Mental Status: Alert & Oriented    SEDATION  Planned agent:[x]Midazolam []Meperidine []Sublimaze []Morphine  []Diazepam  [x]Other: fentanyl      ASA Classification:  []1 []2 [x]3 []4 []5  Class 1: A normal healthy patient  Class 2: Pt with mild to moderate systemic disease  Class 3: Severe systemic disease or disturbance  Class 4: Severe systemic disorders that are already life threatening. Class 5: Moribund pt with little chances of survival, for more than 24 hours.   Mallampati I Airway Classification:   []1 []2 [x]3 []4          MEDICAL HISTORY   has a past medical history of CAD (coronary artery disease), CHF (congestive heart failure) ([facility] [address] Diabetes mellitus ([facility] [address] and Hypertension. []Additional information:          SURGICAL HISTORY   has a past surgical history that includes Fibula Fracture Surgery (Right); Tibia fracture surgery (Right); Cholecystectomy; and Toe amputation (Right, 12/14/2021). Additional information:       ALLERGIES   Allergies as of 02/18/2022 - Fully Reviewed 02/18/2022   Allergen Reaction Noted    Morphine  06/04/2018     Additional information:       MEDICATIONS     Current Facility-Administered Medications:     0.9 % sodium chloride infusion, , IntraVENous, Continuous, Vitaliy Roy MD, Last Rate: 75 mL/hr at 02/18/22 0759, New Bag at 02/18/22 0759  Prior to Admission medications    Medication Sig Start Date End Date Taking? Authorizing Provider   carvedilol (COREG) 6.25 MG tablet Take 1 tablet by mouth 2 times daily 2/9/22  Yes Abbie Jeans, MD   losartan (COZAAR) 100 MG tablet take 1 tablet by mouth once daily 2/7/22  Yes ANA Hannah CNP   blood glucose test strips (ACCU-CHEK CAT PLUS) strip CHECK BLOOD SUGAR EVERY MORNING AND AFTER DINNER EVERY DAY 12/20/21  Yes Makeda Garcia MD   glipiZIDE (GLUCOTROL) 5 MG tablet take 1/2 tablet by mouth twice a day WITH MORNING AND EVENING MEALS 10/18/21  Yes Makeda Garcia MD   Insulin Pen Needle 32G X 4 MM MISC 1 each by Does not apply route 4 times daily 9/8/21  Yes ANA Hannah CNP   atorvastatin (LIPITOR) 80 MG tablet Take 1 tablet by mouth daily take 1 tablet by mouth at bedtime 7/27/21  Yes ANA Hannah CNP   Accu-Chek Softclix Lancets MISC USE TO CHECK BLOOD SUGAR 7/26/21  Yes ANA Hannah CNP   insulin aspart (NOVOLOG FLEXPEN) 100 UNIT/ML injection pen Inject 8 units subcu baseline plus a sliding scale according to group 2 sliding scale insulin.  Max daily dose  70 units daily 7/26/21  Yes ANA Tinoco CNP   omega-3 acid ethyl esters (LOVAZA) 1 g capsule take 2 capsules by mouth twice a day 7/22/21  Yes Adrián Mcadams MD   omeprazole (PRILOSEC) 20 MG delayed release capsule take 1 capsule by mouth every morning BEFORE BREAKFAST 7/22/21  Yes Adrián Mcadams MD   metFORMIN (GLUCOPHAGE-XR) 500 MG extended release tablet take 4 tablets by mouth once daily - WITH BREAKFAST 7/22/21  Yes Adrián Mcadams MD   vitamin D3 (CHOLECALCIFEROL) 25 MCG (1000 UT) TABS tablet Take 2 tablets by mouth daily 7/22/21  Yes Adrián Mcadams MD   Continuous Blood Gluc  ([address] PED ) ANÍBAL 1 Units by Does not apply route continuous 7/22/21  Yes Adrián Mcadams MD   Continuous Blood Gluc Sensor (DEXCOM G6 SENSOR) MISC 1 Units by Does not apply route continuous 7/22/21  Yes Adrián Mcadams MD   Continuous Blood Gluc  (FREESTYLE MACIEL 14 DAY READER) ANÍBAL 1 Units by Does not apply route continuous Patient is diabetic treated with insulin 9/18/20  Yes Adrián Mcadams MD   Continuous Blood Gluc Sensor (FREESTYLE MACIEL 14 DAY SENSOR) MISC 1 Units by Does not apply route continuous Patient is Diabetic treated with Insulin 9/18/20  Yes Adrián Mcadams MD   BASAGLAR KWIKPEN 100 UNIT/ML injection pen INJECT 30 UNITS INTO THE SKIN EVERY NIGHT 1/3/22   ANA Tinoco CNP   bumetanide (BUMEX) 1 MG tablet take 1 tablet by mouth once daily 10/18/21   Adrián Mcadams MD   vitamin D (ERGOCALCIFEROL) 1.25 MG (48363 UT) CAPS capsule take 1 capsule by mouth every week 7/22/21   Adrián Mcadams MD   fluticasone [facility]) 50 MCG/ACT nasal spray 1 spray by Each Nostril route daily 2/11/21   ANA Tinoco CNP     Additional information:                 David Ferrara MD MD [facility] [place]  Electronically signed 2/18/2022 at 8:38 AM

## 2022-02-18 NOTE — PROGRESS NOTES
Patient admitted to 2E09 Ambulatory for arteriogram.  Patient NPO. Patient accompanied by spouse. Vital signs obtained. Assessment and data collection intiated. Oriented to room. Policies and procedures for 2E explained. All questions answered with no further questions at this time. Fall prevention and safety precautions discussed with patient.

## 2022-02-18 NOTE — OP NOTE
[facility]  Sedation/Analgesia Post Sedation Record        Pt Name: Oran Apgar  MRN: [id number]  YOB: 1970  Procedure Performed By: Kanchan Paz MD MD, TRUMAN/ John , [address]   Primary Care Physician: Estephania Centeno MD    POST-PROCEDURE    Sedation/Anesthesia:  Local Anesthesia and IV Conscious Sedation with continuous O2 monitoring    Estimated Blood Loss: 10 cc     Specimens Removed:  [x]None []Other:      Disposition of Specimen:  []Pathology []Other        Complications:   [x]None Immediate []Other:       Procedure performed: Peripheral angiogram    Post Procedure Diagnosis/Findings:  Non-significant PAD       Recommendations:    Needs better diabetic control  Routine post-cath care.                Kanchan Paz MD MD, TRUMAN/ John , [address]  Electronically signed 2022 at 10:34 AM

## 2022-02-18 NOTE — PROGRESS NOTES
9701 Patient received in IR for procedure. Family stayed up in room. 2324 This procedure has been fully reviewed with the patient and written informed consent has been obtained. 4973 Dr. Vilma Hummel in; spoke to patient. 2313 Procedure started with Dr. Vilma Hummel. 3643 Access of left femoral artery with use of sonosite. 1005 Procedure completed; patient tolerated well. 1011 Left groin sheath removed and manual pressure held. 1025 Report called to [facility] on 2E  1032 Manual pressure discontinued. Hemostasis achieved. 1034 Bacitracin oint, gauze and op site to left femoral site; area soft to touch with no bleeding noted. 1037 Patient on bed; comfort ensured. Left femoral dressing remains dry and intact with area soft. 1040 Patient taken to 2E via bed. Left femoral dressing remains dry and intact with area soft.

## 2022-02-22 ENCOUNTER — OFFICE VISIT (OUTPATIENT)
Dept: FAMILY MEDICINE CLINIC | Age: 52
End: 2022-02-22
Payer: COMMERCIAL

## 2022-02-22 ENCOUNTER — HOSPITAL ENCOUNTER (OUTPATIENT)
Dept: WOUND CARE | Age: 52
Discharge: HOME | End: 2022-02-22
Payer: COMMERCIAL

## 2022-02-22 VITALS
SYSTOLIC BLOOD PRESSURE: 167 MMHG | TEMPERATURE: 98.5 F | DIASTOLIC BLOOD PRESSURE: 80 MMHG | HEART RATE: 77 BPM | BODY MASS INDEX: 38.71 KG/M2 | HEIGHT: 72 IN | RESPIRATION RATE: 14 BRPM | WEIGHT: 285.8 LBS

## 2022-02-22 VITALS
HEART RATE: 80 BPM | RESPIRATION RATE: 18 BRPM | OXYGEN SATURATION: 97 % | TEMPERATURE: 98.3 F | DIASTOLIC BLOOD PRESSURE: 84 MMHG | SYSTOLIC BLOOD PRESSURE: 182 MMHG

## 2022-02-22 DIAGNOSIS — I10 ESSENTIAL HYPERTENSION: ICD-10-CM

## 2022-02-22 DIAGNOSIS — E11.21 DIABETIC NEPHROPATHY ASSOCIATED WITH TYPE 2 DIABETES MELLITUS (HCC): ICD-10-CM

## 2022-02-22 DIAGNOSIS — I73.9 PAD (PERIPHERAL ARTERY DISEASE) (HCC): ICD-10-CM

## 2022-02-22 DIAGNOSIS — M24.572 EQUINUS CONTRACTURE OF LEFT ANKLE: ICD-10-CM

## 2022-02-22 DIAGNOSIS — K21.9 GASTROESOPHAGEAL REFLUX DISEASE, UNSPECIFIED WHETHER ESOPHAGITIS PRESENT: ICD-10-CM

## 2022-02-22 DIAGNOSIS — E78.2 HYPERCHOLESTEROLEMIA WITH HYPERTRIGLYCERIDEMIA: ICD-10-CM

## 2022-02-22 DIAGNOSIS — E55.9 VITAMIN D DEFICIENCY: ICD-10-CM

## 2022-02-22 DIAGNOSIS — L97.522 FOOT ULCER, LEFT, WITH FAT LAYER EXPOSED (HCC): ICD-10-CM

## 2022-02-22 DIAGNOSIS — M21.6X2 FOREFOOT VARUS, ACQUIRED, LEFT: ICD-10-CM

## 2022-02-22 DIAGNOSIS — E11.65 UNCONTROLLED TYPE 2 DIABETES MELLITUS WITH HYPERGLYCEMIA (HCC): ICD-10-CM

## 2022-02-22 DIAGNOSIS — L03.115 CELLULITIS OF RIGHT LEG: Primary | ICD-10-CM

## 2022-02-22 PROCEDURE — 97597 DBRDMT OPN WND 1ST 20 CM/<: CPT

## 2022-02-22 PROCEDURE — 11721 DEBRIDE NAIL 6 OR MORE: CPT

## 2022-02-22 PROCEDURE — 99214 OFFICE O/P EST MOD 30 MIN: CPT | Performed by: NURSE PRACTITIONER

## 2022-02-22 PROCEDURE — 11720 DEBRIDE NAIL 1-5: CPT

## 2022-02-22 RX ORDER — GLIPIZIDE 5 MG/1
TABLET ORAL
Qty: 180 TABLET | Refills: 1 | Status: SHIPPED | OUTPATIENT
Start: 2022-02-22 | End: 2022-08-03

## 2022-02-22 RX ORDER — CLOBETASOL PROPIONATE 0.5 MG/G
OINTMENT TOPICAL ONCE
Status: CANCELLED | OUTPATIENT
Start: 2022-02-22 | End: 2022-02-22

## 2022-02-22 RX ORDER — LIDOCAINE 40 MG/G
CREAM TOPICAL ONCE
Status: CANCELLED | OUTPATIENT
Start: 2022-02-22 | End: 2022-02-22

## 2022-02-22 RX ORDER — BUMETANIDE 1 MG/1
TABLET ORAL
Qty: 90 TABLET | Refills: 0 | Status: SHIPPED | OUTPATIENT
Start: 2022-02-22 | End: 2022-06-06

## 2022-02-22 RX ORDER — LIDOCAINE HYDROCHLORIDE 20 MG/ML
JELLY TOPICAL ONCE
Status: CANCELLED | OUTPATIENT
Start: 2022-02-22 | End: 2022-02-22

## 2022-02-22 RX ORDER — GINSENG 100 MG
CAPSULE ORAL ONCE
Status: CANCELLED | OUTPATIENT
Start: 2022-02-22 | End: 2022-02-22

## 2022-02-22 RX ORDER — GABAPENTIN 100 MG/1
100 CAPSULE ORAL 3 TIMES DAILY
Qty: 270 CAPSULE | Refills: 0 | Status: SHIPPED | OUTPATIENT
Start: 2022-02-22 | End: 2022-05-23

## 2022-02-22 RX ORDER — ATORVASTATIN CALCIUM 80 MG/1
80 TABLET, FILM COATED ORAL DAILY
Qty: 90 TABLET | Refills: 1 | Status: SHIPPED | OUTPATIENT
Start: 2022-02-22 | End: 2022-08-29

## 2022-02-22 RX ORDER — LOSARTAN POTASSIUM 100 MG/1
TABLET ORAL
Qty: 90 TABLET | Refills: 1 | Status: SHIPPED | OUTPATIENT
Start: 2022-02-22 | End: 2022-09-01

## 2022-02-22 RX ORDER — LIDOCAINE HYDROCHLORIDE 40 MG/ML
SOLUTION TOPICAL ONCE
Status: CANCELLED | OUTPATIENT
Start: 2022-02-22 | End: 2022-02-22

## 2022-02-22 RX ORDER — GENTAMICIN SULFATE 1 MG/G
OINTMENT TOPICAL ONCE
Status: CANCELLED | OUTPATIENT
Start: 2022-02-22 | End: 2022-02-22

## 2022-02-22 RX ORDER — METFORMIN HYDROCHLORIDE 500 MG/1
TABLET, EXTENDED RELEASE ORAL
Qty: 360 TABLET | Refills: 1 | Status: SHIPPED | OUTPATIENT
Start: 2022-02-22 | End: 2022-10-04

## 2022-02-22 RX ORDER — BETAMETHASONE DIPROPIONATE 0.05 %
OINTMENT (GRAM) TOPICAL ONCE
Status: CANCELLED | OUTPATIENT
Start: 2022-02-22 | End: 2022-02-22

## 2022-02-22 RX ORDER — BACITRACIN, NEOMYCIN, POLYMYXIN B 400; 3.5; 5 [USP'U]/G; MG/G; [USP'U]/G
OINTMENT TOPICAL ONCE
Status: CANCELLED | OUTPATIENT
Start: 2022-02-22 | End: 2022-02-22

## 2022-02-22 RX ORDER — BACITRACIN ZINC AND POLYMYXIN B SULFATE 500; 1000 [USP'U]/G; [USP'U]/G
OINTMENT TOPICAL ONCE
Status: CANCELLED | OUTPATIENT
Start: 2022-02-22 | End: 2022-02-22

## 2022-02-22 RX ORDER — LIDOCAINE 50 MG/G
OINTMENT TOPICAL ONCE
Status: CANCELLED | OUTPATIENT
Start: 2022-02-22 | End: 2022-02-22

## 2022-02-22 RX ORDER — OMEPRAZOLE 20 MG/1
CAPSULE, DELAYED RELEASE ORAL
Qty: 90 CAPSULE | Refills: 1 | Status: SHIPPED | OUTPATIENT
Start: 2022-02-22 | End: 2022-10-31

## 2022-02-22 NOTE — PROGRESS NOTES
[facility]          Progress Note and Procedure Note      Edwin Harris  MEDICAL RECORD NUMBER:  [id number]  AGE: 45 y.o. GENDER: male  : 1970  EPISODE DATE:  2022    Subjective:     Chief Complaint   Patient presents with    Wound Check     right foot and leg    swelling  C/o thick long nails hallux     HISTORY of PRESENT ILLNESS HPI     Edwin Harris is a 45 y.o. male Established patient, who presents today for wound/ulcer evaluation. History of Wound Context: Patient had recent hospitalization secondary to right foot infection. He is status post right digit amputation (date of surgery 2021). He also has a right calf wound. He is doing well with some compression and there is ulcer improving and the nails are a problem. Wound/Ulcer Pain Timing/Severity: intermittent  Quality of pain: sharp  Severity:  2 / 10   Modifying Factors: Pain worsens with Touch  Associated Signs/Symptoms: drainage and pain    Interval History: Patient is doing much better he still has the wound trying to control the swelling he now has juxta light as well as using compression socks he is using Betadine and other material for the wound he is presenting for evaluation of the wound complaining of the thick fungal toenails and with his diabetes and risk factors we are addressing those today and then his swelling we are continuing to address. But he is improving overall      Patient presents today for follow up on wound/ulcer's progression. The patient is currently not on antibiotics.         PAST MEDICAL HISTORY        Diagnosis Date    CAD (coronary artery disease)     CHF (congestive heart failure) ([facility] [facility] 2019    Diabetes mellitus ([facility] [facility] 's    Hypertension 's       PAST SURGICAL HISTORY    Past Surgical History:   Procedure Laterality Date    CHOLECYSTECTOMY      FIBULA FRACTURE SURGERY Right     screws    TIBIA FRACTURE SURGERY Right     screws    TOE AMPUTATION Right 12/14/2021    RIGHT 5TH TOE AMPUTATION performed by Khris Nash DPM at [address] [place] [address] Pkwy HISTORY    Family History   Problem Relation Age of Onset    Diabetes Brother        SOCIAL HISTORY    Social History     Tobacco Use    Smoking status: Current Every Day Smoker     Packs/day: 0.25     Years: 36.00     Pack years: 9.00     Types: Cigarettes     Start date: 1984    Smokeless tobacco: Never Used    Tobacco comment: \"nicotine pouches\"   Vaping Use    Vaping Use: Never used    Passive vaping exposure: Yes   Substance Use Topics    Alcohol use: Not Currently    Drug use: No       ALLERGIES    Allergies   Allergen Reactions    Morphine      \"goes nuts\"       MEDICATIONS    Current Outpatient Medications on File Prior to Encounter   Medication Sig Dispense Refill    carvedilol (COREG) 6.25 MG tablet Take 1 tablet by mouth 2 times daily 30 tablet 5    losartan (COZAAR) 100 MG tablet take 1 tablet by mouth once daily 30 tablet 0    BASAGLAR KWIKPEN 100 UNIT/ML injection pen INJECT 30 UNITS INTO THE SKIN EVERY NIGHT 15 mL 2    blood glucose test strips (ACCU-CHEK CAT PLUS) strip CHECK BLOOD SUGAR EVERY MORNING AND AFTER DINNER EVERY DAY 60 strip 0    glipiZIDE (GLUCOTROL) 5 MG tablet take 1/2 tablet by mouth twice a day WITH MORNING AND EVENING MEALS 90 tablet 1    atorvastatin (LIPITOR) 80 MG tablet Take 1 tablet by mouth daily take 1 tablet by mouth at bedtime 90 tablet 1    insulin aspart (NOVOLOG FLEXPEN) 100 UNIT/ML injection pen Inject 8 units subcu baseline plus a sliding scale according to group 2 sliding scale insulin.  Max daily dose  70 units daily 5 pen 3    vitamin D (ERGOCALCIFEROL) 1.25 MG (93249 UT) CAPS capsule take 1 capsule by mouth every week 4 capsule 2    omega-3 acid ethyl esters (LOVAZA) 1 g capsule take 2 capsules by mouth twice a day 360 capsule 1    omeprazole (PRILOSEC) 20 MG delayed release capsule take 1 capsule by mouth every morning BEFORE BREAKFAST 90 capsule 1    metFORMIN (GLUCOPHAGE-XR) 500 MG extended release tablet take 4 tablets by mouth once daily - WITH BREAKFAST 360 tablet 0    vitamin D3 (CHOLECALCIFEROL) 25 MCG (1000 UT) TABS tablet Take 2 tablets by mouth daily 180 tablet 5    fluticasone (FLONASE) 50 MCG/ACT nasal spray 1 spray by Each Nostril route daily 1 Bottle 0    bumetanide (BUMEX) 1 MG tablet take 1 tablet by mouth once daily 90 tablet 0    Insulin Pen Needle 32G X 4 MM MISC 1 each by Does not apply route 4 times daily 100 each 3    Accu-Chek Softclix Lancets MISC USE TO CHECK BLOOD SUGAR 100 each 0    Continuous Blood Gluc  (DEXCOM G4 PLAT PED ) ANÍBAL 1 Units by Does not apply route continuous 1 Device 0    Continuous Blood Gluc Sensor (DEXCOM G6 SENSOR) MISC 1 Units by Does not apply route continuous 6 each 1    Continuous Blood Gluc  (FREESTYLE MACIEL 14 DAY READER) ANÍBAL 1 Units by Does not apply route continuous Patient is diabetic treated with insulin 1 Device 0    Continuous Blood Gluc Sensor (FREESTYLE AMCIEL 14 DAY SENSOR) MISC 1 Units by Does not apply route continuous Patient is Diabetic treated with Insulin 6 each 1     No current facility-administered medications on file prior to encounter. REVIEW OF SYSTEMS    Pertinent items are noted in HPI. Objective:      BP (!) 182/84   Pulse 80   Temp 98.3 °F (36.8 °C) (Infrared)   Resp 18   SpO2 97%     Wt Readings from Last 3 Encounters:   02/18/22 289 lb (131.1 kg)   02/09/22 289 lb (131.1 kg)   12/21/21 282 lb (127.9 kg)       PHYSICAL EXAM    General Appearance: alert and oriented to person, place and time    Physical Exam:   VASCULAR: Pedal pulses are palpable bilaterally. CFT less than 3 seconds to all other exposed digits of the bilateral lower extremities. Temperature warm to cool from tibial tuberosity to digits of right foot. Temperature warm to cool from tibial tuberosity to digits of left foot. Numerous varicose veins noted to BLE. Nonpitting edema  MUSCULOSKELETAL: Muscle strength 5/5 and symmetric for all muscle groups crossing the ankle joint bilaterally. No pain on palpation to the bilateral lower extremities. Rectus foot and ankle bilaterally.     NEUROLOGIC: Light touch sensation grossly diminished to the digits bilaterally, light touch sensation is intact to the metatarsal heads bilaterally.     SKIN:  Ulceration to the medial midshaft of the right leg is there is slough and nonviable tissue no signs of any cellulitis a lot of varicosities and chronic venous hypertension induration and there is still not complete control hyperpigmentation is noted bilaterally his skin on his toenails are all thickened yellow crumbly and dystrophic the main concerns of the great toenails he has no ulceration to these no webspace macerations. Amputation site of this patient shows complete healing resolved no signs of any opening there is minimal callusing and the patient has diffuse dry skin                          Wound 08/03/21 Pretibial Right (Active)   Wound Image   02/01/22 0904   Wound Etiology Venous 02/22/22 0817   Dressing Status Intact; Old drainage noted 02/22/22 0817   Wound Cleansed Cleansed with saline 02/22/22 0817   Dressing/Treatment Gauze dressing/dressing sponge 02/18/22 0742   Offloading for Diabetic Foot Ulcers No offloading required 02/01/22 0904   Wound Length (cm) 0.6 cm 02/22/22 0817   Wound Width (cm) 0.6 cm 02/22/22 0817   Wound Depth (cm) 0.01 cm 02/22/22 0817   Wound Surface Area (cm^2) 0.36 cm^2 02/22/22 0817   Change in Wound Size % (l*w) -44 02/22/22 0817   Wound Volume (cm^3) 0.0036 cm^3 02/22/22 0817   Wound Healing % 71 02/22/22 0817   Wound Assessment Dry;Pale granulation tissue;Slough 02/22/22 0817   Drainage Amount None 02/22/22 0817   Drainage Description Serosanguinous 02/01/22 0904   Odor None 02/22/22 0817   Angela-wound Assessment Edematous;Dry/flaky; Blanchable erythema; Intact 02/22/22 0817   Margins Attached edges 02/22/22 0817   Wound Thickness Description not for Pressure Injury Full thickness 02/22/22 0817   Number of days: 203       LABS      CBC:   Lab Results   Component Value Date    WBC 4.8 02/18/2022    HGB 14.8 02/18/2022    HCT 44.1 02/18/2022    MCV 90.0 02/18/2022     02/18/2022     BMP:   Lab Results   Component Value Date     02/18/2022    K 4.1 02/18/2022     02/18/2022    CO2 25 02/18/2022    BUN 15 02/18/2022    CREATININE 0.7 02/18/2022     PT/INR:   Lab Results   Component Value Date    INR 0.91 02/18/2022     Prealbumin: No results found for: PREALBUMIN  Albumin:  Lab Results   Component Value Date    LABALBU 3.9 02/18/2022     Sed Rate:  Lab Results   Component Value Date    SEDRATE 100 12/09/2021     CRP:   Lab Results   Component Value Date    CRP 17.81 12/09/2021     Micro:   Lab Results   Component Value Date    BC No growth-preliminary No growth  12/09/2021    BC No growth-preliminary No growth  12/09/2021      Hemoglobin A1C:   Lab Results   Component Value Date    LABA1C 9.4 12/10/2021       Assessment:     Ulcer Identification:  Ulcer Type: diabetic and non-healing surgical  Contributing Factors: edema and diabetes    Wound: External surgical dehiscence    Depth of Diabetic/Pressure/Non Pressure Ulcers or Wound:  Wound, full thickness    Patient Active Problem List   Diagnosis Code    Cellulitis L03.90    Foot ulcer, left, with fat layer exposed ([facility] [address] L97.522    Forefoot varus, acquired, left M21.6X2    Equinus contracture of left ankle M24.572    Type 2 diabetes mellitus with hyperglycemia, with long-term current use of insulin ([facility]) E11.65, Z79.4    Severe sepsis ([facility]) A41.9, R65.20    Uncontrolled type 2 diabetes mellitus with hyperglycemia ([facility]) E11.65    Hyponatremia E87.1    Hypertension, uncontrolled I10    PFO (patent foramen ovale) Q21.1    Hyperlipidemia E78.5    Smoker F17.200    Morbid obesity ([facility]) E66.01    Chronic diastolic heart failure ([facility]) I50.32    Noncompliance with medications Z91.14    Aortic valve stenosis I35.0    Cellulitis of right leg L03.115    Xerosis of skin L85.3    PAD (peripheral artery disease) ([facility]) I73.9       Procedure Note  Indications:  Based on my examination of this patient's wound(s)/ulcer(s) today, debridement is required to promote healing and evaluate the extent healing. Performed by: Khris Nash DPM    Consent obtained: Yes    Time out taken:  Yes    Pain control: neuropathic anesthesia      Debridement:Non-excisional Debridement to the leg  Non-Excisional debridement to the foot    Using curette the wound(s)/ulcer(s) was/were sharply debrided down through and including the removal of epidermis, dermis and subcutaneous tissue. Devitalized Tissue Debrided:  fibrin and biofilm necrotic tissue    Pre Debridement Measurements:  Are located in the Wound/Ulcer Documentation Flow Sheet    Wound/Ulcer #: 2    Post Debridement Measurements:    Wound/Ulcer Descriptions are listed under Physical Exam above. Wound/Ulcer Descriptions are Pre Debridement except measurements    Percent of Wound/Ulcer Debrided: 100%    Total Surface Area Debrided:  2 sq cm     Bleeding:  Minimal    Hemostasis Achieved: Not needed    Procedural Pain:  0  / 10     Post Procedural Pain:  0 / 10     Response to treatment:  Well tolerated by patient. Plan:     Patient examined and evaluated  Nonexcisional debridement of leg. Note above  Iodoflex to foot to be changed every other day with a dry sterile dressing  Patient to follow-up in 2 weeks. We filled out paperwork for work he will he was returning to work on February 6 now he is going to return to work full duty.     Treatment:   Orders Placed This Encounter   Procedures    Debridement     Non excisional of necrotic tissue     Standing Status:   Standing     Number of Occurrences:   1    Wound care     Remove old dressing and cleanse (ulcers/wound) with wound wash, saline or equivalent solution. Apply iodaflex with mesh intact on both sides. Cut to size of the wound. Cover Iodaflex with gauze and kerlix. Change iodaflex every 48 hours or with unnaboot changes. Standing Status:   Standing     Number of Occurrences:   3    Wound care     Keep pressure dressing in place until MD removes. Ok to reinforce dressing with gauze. Standing Status:   Standing     Number of Occurrences:   1     Hydration of skin and lotion recommended  Antibiotics: No    Follow up: 2 weeks    Please see attached Discharge Instructions    Written patient dismissal instructions given to patient and signed by patient or POA. Discharge Instructions       Visit Discharge/Physician Orders:  -Try to cut back/quit smoking/nicotine pouches to help with wound healing.  - Keep blood sugars controlled to help with wound healing.   - Wound care supplies through [employer]. Call if you need to re-order [phone].  - Continue light duty at work, okay to drive fork lift  - Debridement of right leg  - Toenails trimmed today    Wound Location: Right foot, Right leg     Dressing orders:      1) Gather wound care supplies and arrange on clean table.      2) Wash your hands with soap and water or use alcohol based hand  for 20 seconds (sing \"Happy Birthday\" twice).    3) Cleanse wounds with normal saline or wound cleanser and gauze. Pat dry with clean gauze. Wash dry skin around leg with a wet wash cloth. Apply an oil based lotion to leg avoiding wounds.    4) Right leg- Apply iodoflex to wound. Cover with dry dressing. Wrap with kerlix. Change 3 times a week. Wear compression socks or Juxta Lites daily. Put on in the morning and take off at bedtime     Keep all dressings clean & dry.     Do not shower, take baths or get wound wet, unless otherwise instructed by your Wound Care doctor.      Follow up visit: Omar Kimbrough [facility] Tuesday 03/08/2022 at 8:00      Keep next scheduled appointment.  Please give 24 hour notice if unable to keep appointment. [phone]     If you experience any of the following, please call the Wound Care Service during business hours: Monday through Friday 8:00 am - 4:30 pm  ([phone]).               *Increase in pain              *Temperature over 101              *Increase in drainage from your wound or a foul odor              *Uncontrolled swelling              *Need for compression bandage changes due to slippage, breakthrough drainage     If you need medical attention outside of business hours, please contact your Primary Care Doctor or go to the nearest emergency room.   Electronically signed by Sirisha Spencer DPM on 2/22/2022 at 9:07 AM          Electronically signed by Sirisha Spencer DPM, Eugenia López on 2/22/2022 at 9:07 AM

## 2022-02-22 NOTE — PROGRESS NOTES
Esequiel STANTON has reviewed and agrees with Blair HARVEY's shift  Assessment.     Marcus Carver RN  2/22/2022

## 2022-02-22 NOTE — PROGRESS NOTES
Graham Kearns (:  1970) is a 45 y.o. male,Established patient, here for evaluation of the following chief complaint(s):  Follow-up, Hypertension, Hyperlipidemia, Diabetes (A1cC 12/10/21 = 9.4), and Numbness (Numbness bilateral legs -- discuss options )         ASSESSMENT/PLAN:  1. Uncontrolled type 2 diabetes mellitus with insulin therapy ([address]  -     glipiZIDE (GLUCOTROL) 5 MG tablet; take 1 tablet by mouth twice a day WITH MORNING AND EVENING MEALS, Disp-180 tablet, R-1Normal  -     metFORMIN (GLUCOPHAGE-XR) 500 MG extended release tablet; take 4 tablets by mouth once daily - WITH BREAKFAST, Disp-360 tablet, R-1Normal  -     Microalbumin / Creatinine Urine Ratio; Future  -     Hemoglobin A1C; Future  2. Hypercholesterolemia with hypertriglyceridemia  -     atorvastatin (LIPITOR) 80 MG tablet; Take 1 tablet by mouth daily take 1 tablet by mouth at bedtime, Disp-90 tablet, R-1Normal  3. Essential hypertension  -     losartan (COZAAR) 100 MG tablet; take 1 tablet by mouth once daily, Disp-90 tablet, R-1Normal  4. Vitamin D deficiency  -     Vitamin D 25 Hydroxy; Future  5. Diabetic nephropathy associated with type 2 diabetes mellitus (HCC)  -     gabapentin (NEURONTIN) 100 MG capsule; Take 1 capsule by mouth 3 times daily for 90 days. , Disp-270 capsule, R-0Normal  6. PAD (peripheral artery disease) ([facility])  -     bumetanide (BUMEX) 1 MG tablet; take 1 tablet by mouth once daily, Disp-90 tablet, R-0Normal  7. Gastroesophageal reflux disease, unspecified whether esophagitis present  -     omeprazole (PRILOSEC) 20 MG delayed release capsule; take 1 capsule by mouth every morning BEFORE BREAKFAST, Disp-90 capsule, R-1Normal    Start Gabapentin 100 mg 1 capsule at night. If no side effects after 1 week, can add 1 cap in the am   If symptoms not improved after 1 more week, can add a mid-day dose, for total of 100 mg 3 times per day.    The patient's OARRS was reviewed today  Increase Glipizide to 1 tablet twice daily. Repeat A1c at lab on or after 3/10. Check BP twice daily at home until follow up. Follow up in 1 month        Return in about 4 weeks (around 3/22/2022). Subjective   SUBJECTIVE/OBJECTIVE:  HPI    Diabetes: Metformin 2000 mg daily, Glipizide 5 mg, 1/2 tablet BID, Humalog 8 units plus SS, and Lantus, 30 units at night. Hx microalbumin, taking Losartan 100 mg daily. A1c was 9.4% in Dec. States he cannot have farxiga due to hx pancreatitis. Had Januvia but caused UTI. He has been eating a lot of raw vegetables. He has a Dexcom. Average fasting is 200. Will get down to 130's.  250's post prandial. Today complains of numbness/tingling in both feet. Has been present for long time, worsening over the past several months. never had NCT. Previous PCP had on Gabapentin. Hypercholesterolemia with hypertriglyceridemia: Lipitor 80 mg, Fenofibrate 160 mg daily and Fish oil 1200 mg, 2 caps BID. Vitamin D deficiency: 50,000 units once weekly. HTN: does not check BP at home. Does not use a lot of salt. 167/80 today. No chest pain, mild shortness of breath with activity. Smokes under 1/2 ppd. Used to smoke 3 ppd. PAD: saw Dr Mariola Oneal 2/9/22. Had some non-healing ulcers on right foot/lower leg. Using Iodoflex. Has been debrided in surgery. Podiatry referred to cardiology. Has peripheral arteriogram showed non-significant PAD. Sees podiatry 3/8/22. CAD/CHF: dx in 2019. Taking Coreg 6.25 mg BID, Bumex 1 mg daily (does not take consistently). Going to take ASA 81 mg. Most recent echocardiogram 3/25/2020 showed normal LV size and systolic function. EF estimated 50%. Doppler parameters consistent with abnormal left ventricular relaxation (grade 1 diastolic dysfunction). Mild aortic stenosis. Has cardiology follow up in April. GERD: Omeprazole 20 mg daily. Symptoms are controlled. Review of Systems   Cardiovascular: Positive for leg swelling.    Skin: Positive for wound (Right lower leg).   Neurological: Positive for numbness (And tingling bilateral feet). All other systems reviewed and are negative. Objective   Physical Exam  Vitals and nursing note reviewed. HENT:      Head: Normocephalic. Right Ear: External ear normal.      Left Ear: External ear normal.   Eyes:      Pupils: Pupils are equal, round, and reactive to light. Neck:      Trachea: No tracheal deviation. Cardiovascular:      Rate and Rhythm: Normal rate and regular rhythm. Heart sounds: Normal heart sounds. No murmur heard. No friction rub. No gallop. Comments: Bilateral lower legs with deep purple discoloration, and thickening of the skin. Pulmonary:      Effort: Pulmonary effort is normal. No respiratory distress. Breath sounds: Normal breath sounds. No wheezing or rales. Chest:      Chest wall: No tenderness. Abdominal:      General: Bowel sounds are normal.      Palpations: Abdomen is soft. Tenderness: There is no abdominal tenderness. There is no rebound. Genitourinary:     Penis: Normal.    Musculoskeletal:         General: Normal range of motion. Cervical back: Full passive range of motion without pain, normal range of motion and neck supple. Right lower le+ Edema present. Left lower le+ Edema present. Comments: Visual inspection:  Deformity/amputation: present - right 5th toe  Skin lesions/pre-ulcerative calluses: absent  Edema: right- 2+, left- 2+    Sensory exam:  Monofilament sensation: abnormal -   (minimum of 5 random plantar locations tested, avoiding callused areas - > 1 area with absence of sensation is + for neuropathy)    Plus at least one of the following:  Pulses: normal,   Pinprick: Impaired  Proprioception: Intact  Vibration (128 Hz): N/A   Lymphadenopathy:      Cervical: No cervical adenopathy. Skin:     General: Skin is warm and dry. Findings: No rash.    Neurological:      Mental Status: He is alert and oriented to person, place, and time. Psychiatric:         Judgment: Judgment normal.                  An electronic signature was used to authenticate this note.     --ANA Martínez - CNP

## 2022-02-22 NOTE — LETTER
WORK RELEASE  [facility] WOUND CARE  [facility] [phone]  [facility] [phone]  Dept: [phone]         Murali Ed[address] 6 RECORD NUMBER: [id number]   AGE: 45 y.o. GENDER: male : 1970   TODAYS DATE: 2022       Mr. Boucher was seen in the [address] [place] [address] Road on 2022     May return to work on full time duty as of 2022    Electronically signed by Nela Tavares LPN on [id number] at 2:19 AM    Sincerely,      [address] [place] [address] Pkwy and [facility] Therapy

## 2022-03-04 NOTE — PROCEDURES
[address] [place] [address]                            CARDIAC CATHETERIZATION    PATIENT NAME: Jose Carlos                      :        1970  MED REC NO:   [id number]                           ROOM:       0009  ACCOUNT NO:   [de-identified]                           ADMIT DATE: 2022  PROVIDER:     Glenn Stern MD    DATE OF PROCEDURE:  2022    PERIPHERAL ANGIOGRAM REPORT    PROCEDURES PERFORMED:  Abdominal angiogram and bilateral lower  extremities selective angiogram.    INDICATION FOR STUDY:  Nonhealing wound of the right lower extremity. After informed consent was obtained, the patient was brought to the  special procedure laboratory in a fasting and nonsedated state. Preprocedure timeout was performed. 2% lidocaine was used to  anesthetize the subcutaneous tissue overlying the left femoral artery. Using ultrasound and fluoroscopy guidance, a 5-Croatian sheath was placed  in the left common femoral artery. Once the sheath was in place, a VCF  catheter along with Glidewire was used to get into the distal abdominal  aorta, at the level of the renal bifurcation. Once there, diluted  contrast and DSA images were performed. Once that was done, we brought  the catheter down to the iliac bifurcation and once again, DSA images  with bolus-ho technique were performed to image the femoral and  below-the-knee vessels. ESTIMATED BLOOD LOSS:  Less than 20 mL. MEDICATIONS:  Please see EMR. EQUIPMENTS USED:  Standard 5-Croatian VCF catheter. Distal abdominal aorta was patent with mild diffuse disease. Bilateral renal arteries were patent with mild diffuse disease. Bilateral iliac arteries were patent with mild diffuse disease. Bilateral common femoral arteries were patent with mild diffuse disease. Bilateral superficial femoral arteries were patent with mild diffuse  disease.     Bilateral popliteal arteries were patent with mild diffuse disease. There was two-and-a-half vessel runoff to the foot on the right side  with dominant popliteal and anterior tibial artery. The renal artery is  patent but it is small in caliber and tapers down to a very small vessel  at the level of the ankle. All catheters and wires were removed and left groin sheath was removed  and hemostasis was achieved with manual pressure. SUMMARY:  Mild diffuse peripheral arterial disease. RECOMMENDATIONS:  1. Aggressive medical management. 2.  Needs better diabetic control. 3.  Monitor groin access site. 4.  All procedure details and management plans were discussed in detail  with the patient and family members, and they were in agreement with  plan.         Nguyen Parikh MD    D: 03/03/2022 22:14:07       T: 03/03/2022 23:02:53     [email or web address]_VAL  Job#: [id number]     Doc#: [id number]    CC:

## 2022-03-08 ENCOUNTER — HOSPITAL ENCOUNTER (OUTPATIENT)
Dept: WOUND CARE | Age: 52
Discharge: HOME | End: 2022-03-08
Payer: COMMERCIAL

## 2022-03-08 VITALS
DIASTOLIC BLOOD PRESSURE: 93 MMHG | SYSTOLIC BLOOD PRESSURE: 167 MMHG | WEIGHT: 280 LBS | OXYGEN SATURATION: 98 % | HEIGHT: 72 IN | BODY MASS INDEX: 37.93 KG/M2 | TEMPERATURE: 97.5 F | HEART RATE: 88 BPM | RESPIRATION RATE: 14 BRPM

## 2022-03-08 DIAGNOSIS — E11.65 UNCONTROLLED TYPE 2 DIABETES MELLITUS WITH HYPERGLYCEMIA (HCC): ICD-10-CM

## 2022-03-08 DIAGNOSIS — M21.6X2 FOREFOOT VARUS, ACQUIRED, LEFT: ICD-10-CM

## 2022-03-08 DIAGNOSIS — L97.522 FOOT ULCER, LEFT, WITH FAT LAYER EXPOSED (HCC): ICD-10-CM

## 2022-03-08 DIAGNOSIS — M24.572 EQUINUS CONTRACTURE OF LEFT ANKLE: ICD-10-CM

## 2022-03-08 DIAGNOSIS — L03.115 CELLULITIS OF RIGHT LEG: Primary | ICD-10-CM

## 2022-03-08 PROCEDURE — 17250 CHEM CAUT OF GRANLTJ TISSUE: CPT

## 2022-03-08 PROCEDURE — 97597 DBRDMT OPN WND 1ST 20 CM/<: CPT

## 2022-03-08 RX ORDER — GINSENG 100 MG
CAPSULE ORAL ONCE
OUTPATIENT
Start: 2022-03-08 | End: 2022-03-08

## 2022-03-08 RX ORDER — BACITRACIN ZINC AND POLYMYXIN B SULFATE 500; 1000 [USP'U]/G; [USP'U]/G
OINTMENT TOPICAL ONCE
OUTPATIENT
Start: 2022-03-08 | End: 2022-03-08

## 2022-03-08 RX ORDER — LIDOCAINE HYDROCHLORIDE 40 MG/ML
SOLUTION TOPICAL ONCE
OUTPATIENT
Start: 2022-03-08 | End: 2022-03-08

## 2022-03-08 RX ORDER — CLOBETASOL PROPIONATE 0.5 MG/G
OINTMENT TOPICAL ONCE
OUTPATIENT
Start: 2022-03-08 | End: 2022-03-08

## 2022-03-08 RX ORDER — GENTAMICIN SULFATE 1 MG/G
OINTMENT TOPICAL ONCE
OUTPATIENT
Start: 2022-03-08 | End: 2022-03-08

## 2022-03-08 RX ORDER — BETAMETHASONE DIPROPIONATE 0.05 %
OINTMENT (GRAM) TOPICAL ONCE
OUTPATIENT
Start: 2022-03-08 | End: 2022-03-08

## 2022-03-08 RX ORDER — LIDOCAINE 50 MG/G
OINTMENT TOPICAL ONCE
OUTPATIENT
Start: 2022-03-08 | End: 2022-03-08

## 2022-03-08 RX ORDER — LIDOCAINE HYDROCHLORIDE 20 MG/ML
JELLY TOPICAL ONCE
OUTPATIENT
Start: 2022-03-08 | End: 2022-03-08

## 2022-03-08 RX ORDER — LIDOCAINE 40 MG/G
CREAM TOPICAL ONCE
OUTPATIENT
Start: 2022-03-08 | End: 2022-03-08

## 2022-03-08 RX ORDER — BACITRACIN, NEOMYCIN, POLYMYXIN B 400; 3.5; 5 [USP'U]/G; MG/G; [USP'U]/G
OINTMENT TOPICAL ONCE
OUTPATIENT
Start: 2022-03-08 | End: 2022-03-08

## 2022-03-08 ASSESSMENT — PAIN SCALES - GENERAL: PAINLEVEL_OUTOF10: 0

## 2022-03-08 NOTE — PROGRESS NOTES
Teaching Note:    I was present with the resident during the visit. I discussed the case with the resident and agree with the findings and the plan as documented in the residents note.     JEROME Bass, [address] [place] [address] Surgery       [facility]

## 2022-03-08 NOTE — PROGRESS NOTES
[facility]          Progress Note and Procedure Note      Connor Dennis  MEDICAL RECORD NUMBER:  [id number]  AGE: 45 y.o. GENDER: male  : 1970  EPISODE DATE:  3/8/2022    Subjective:     Chief Complaint   Patient presents with    Wound Check     right leg         HISTORY of PRESENT ILLNESS HPI     Connor Dennis is a 45 y.o. male Established patient who presents today for wound/ulcer evaluation. History of Wound Context: Venous stasis ulceration  Wound/Ulcer Pain Timing/Severity: mild  Quality of pain: aching  Severity:  1 / 10   Modifying Factors: None  Associated Signs/Symptoms: edema and drainage    Interval History:   Patient presents today for follow up on wound/ulcer's progression. The patient is currently not on antibiotics. Current dressing care includes Iodoflex to wound with dry sterile dressings and compression therapy consisting of compression stocking and juxta light wraps. Patient states that he is wearing compression stockings daily. He denies any N/V/F/C/SOB/CP or bilateral calf tenderness. No other pedal complaints at this time.         PAST MEDICAL HISTORY        Diagnosis Date    CAD (coronary artery disease)     CHF (congestive heart failure) ([facility] 2019    Diabetes mellitus ([facility] 's    Hypertension 's       PAST SURGICAL HISTORY    Past Surgical History:   Procedure Laterality Date    CHOLECYSTECTOMY      FIBULA FRACTURE SURGERY Right     screws    TIBIA FRACTURE SURGERY Right     screws    TOE AMPUTATION Right 2021    RIGHT 5TH TOE AMPUTATION performed by María Elena Vasquez DPM at [address] [place], [place] [address]wy HISTORY    Family History   Problem Relation Age of Onset    Diabetes Brother        SOCIAL HISTORY    Social History     Tobacco Use    Smoking status: Current Every Day Smoker     Packs/day: 0.25     Years: 36.00     Pack years: 9.00     Types: Cigarettes     Start date:     Smokeless tobacco: Never Used    Tobacco comment: \"nicotine pouches\" pt states just a couple a day   Vaping Use    Vaping Use: Never used    Passive vaping exposure: Yes   Substance Use Topics    Alcohol use: Not Currently    Drug use: No       ALLERGIES    Allergies   Allergen Reactions    Morphine      \"goes nuts\"       MEDICATIONS    Current Outpatient Medications on File Prior to Encounter   Medication Sig Dispense Refill    glipiZIDE (GLUCOTROL) 5 MG tablet take 1 tablet by mouth twice a day WITH MORNING AND EVENING MEALS 180 tablet 1    atorvastatin (LIPITOR) 80 MG tablet Take 1 tablet by mouth daily take 1 tablet by mouth at bedtime 90 tablet 1    bumetanide (BUMEX) 1 MG tablet take 1 tablet by mouth once daily 90 tablet 0    losartan (COZAAR) 100 MG tablet take 1 tablet by mouth once daily 90 tablet 1    omeprazole (PRILOSEC) 20 MG delayed release capsule take 1 capsule by mouth every morning BEFORE BREAKFAST 90 capsule 1    metFORMIN (GLUCOPHAGE-XR) 500 MG extended release tablet take 4 tablets by mouth once daily - WITH BREAKFAST 360 tablet 1    gabapentin (NEURONTIN) 100 MG capsule Take 1 capsule by mouth 3 times daily for 90 days. 270 capsule 0    carvedilol (COREG) 6.25 MG tablet Take 1 tablet by mouth 2 times daily 30 tablet 5    BASAGLAR KWIKPEN 100 UNIT/ML injection pen INJECT 30 UNITS INTO THE SKIN EVERY NIGHT 15 mL 2    blood glucose test strips (ACCU-CHEK CAT PLUS) strip CHECK BLOOD SUGAR EVERY MORNING AND AFTER DINNER EVERY DAY 60 strip 0    Insulin Pen Needle 32G X 4 MM MISC 1 each by Does not apply route 4 times daily 100 each 3    Accu-Chek Softclix Lancets MISC USE TO CHECK BLOOD SUGAR 100 each 0    insulin aspart (NOVOLOG FLEXPEN) 100 UNIT/ML injection pen Inject 8 units subcu baseline plus a sliding scale according to group 2 sliding scale insulin.  Max daily dose  70 units daily 5 pen 3    vitamin D (ERGOCALCIFEROL) 1.25 MG (74675 UT) CAPS capsule take 1 capsule by mouth every week 4 capsule 2    omega-3 acid ethyl esters (LOVAZA) 1 g capsule take 2 capsules by mouth twice a day 360 capsule 1    vitamin D3 (CHOLECALCIFEROL) 25 MCG (1000 UT) TABS tablet Take 2 tablets by mouth daily 180 tablet 5    Continuous Blood Gluc  (DEXCOM G4 PLAT PED ) ANÍBAL 1 Units by Does not apply route continuous 1 Device 0    Continuous Blood Gluc Sensor (DEXCOM G6 SENSOR) MISC 1 Units by Does not apply route continuous 6 each 1    fluticasone (FLONASE) 50 MCG/ACT nasal spray 1 spray by Each Nostril route daily 1 Bottle 0    Continuous Blood Gluc  (FREESTYLE MACIEL 14 DAY READER) ANÍBAL 1 Units by Does not apply route continuous Patient is diabetic treated with insulin 1 Device 0    Continuous Blood Gluc Sensor (FREESTYLE MACIEL 14 DAY SENSOR) MISC 1 Units by Does not apply route continuous Patient is Diabetic treated with Insulin 6 each 1     No current facility-administered medications on file prior to encounter. REVIEW OF SYSTEMS    Pertinent items are noted in HPI. Objective: There were no vitals taken for this visit. Wt Readings from Last 3 Encounters:   02/22/22 285 lb 12.8 oz (129.6 kg)   02/18/22 289 lb (131.1 kg)   02/09/22 289 lb (131.1 kg)       PHYSICAL EXAM    General Appearance: alert and oriented to person, place and time, well-developed and well-nourished, in no acute distress    Physical Exam:   VASCULAR: Pedal pulses are palpable bilaterally.  CFT less than 3 seconds to all other exposed digits of the bilateral lower extremities.  Temperature warm to warm from tibial tuberosity to digits of right foot.  Temperature warm to cool from tibial tuberosity to digits of left foot. Numerous varicose veins noted to BLE. Pitting edema noted to BLE. MUSCULOSKELETAL: Muscle strength 5/5 and symmetric for all muscle groups crossing the ankle joint bilaterally. No pain on palpation to the bilateral lower extremities.  Rectus foot and ankle bilaterally.     NEUROLOGIC: Light touch sensation grossly diminished to the digits bilaterally, light touch sensation is intact to the metatarsal heads bilaterally.     SKIN:  Full thickness ulceration to the medial midshaft of the right leg. No surrounding erythema or edema noted mild maceration noted to periwound area. Some epithelialization along with biofilm and slough to wound bed. The wound bed appears granular after debridement. Significant hemosiderin deposition noted to bilateral lower extremities. Toenails are all thickened yellow crumbly and dystrophic the main concerns of the great toenails he has no ulceration to these no webspace macerations. Amputation site of this patient shows complete healing resolved no signs of any opening there is minimal callusing and the patient has diffuse dry skin. Wound 08/03/21 Pretibial Right (Active)   Wound Image   03/08/22 0810   Wound Etiology Venous 03/08/22 0810   Dressing Status Intact; Old drainage noted 03/08/22 0810   Wound Cleansed Cleansed with saline 03/08/22 0810   Dressing/Treatment Gauze dressing/dressing sponge 02/22/22 0817   Offloading for Diabetic Foot Ulcers Offloading not required 03/08/22 0810   Wound Length (cm) 0.6 cm 03/08/22 0810   Wound Width (cm) 0.6 cm 03/08/22 0810   Wound Depth (cm) 0.1 cm 03/08/22 0810   Wound Surface Area (cm^2) 0.36 cm^2 03/08/22 0810   Change in Wound Size % (l*w) -44 03/08/22 0810   Wound Volume (cm^3) 0.036 cm^3 03/08/22 0810   Wound Healing % -188 03/08/22 0810   Wound Assessment Granulation tissue; Epithelialization 03/08/22 0810   Drainage Amount Small 03/08/22 0810   Drainage Description Serosanguinous 03/08/22 0810   Odor None 03/08/22 0810   Angela-wound Assessment Maceration 03/08/22 0810   Margins Attached edges 03/08/22 0810   Wound Thickness Description not for Pressure Injury Full thickness 03/08/22 0810   Number of days: 217       LABS      CBC:   Lab Results   Component Value Date    WBC 4.8 02/18/2022    HGB 14.8 02/18/2022    HCT 44.1 02/18/2022    MCV 90.0 02/18/2022     02/18/2022     BMP:   Lab Results   Component Value Date     02/18/2022    K 4.1 02/18/2022     02/18/2022    CO2 25 02/18/2022    BUN 15 02/18/2022    CREATININE 0.7 02/18/2022     PT/INR:   Lab Results   Component Value Date    INR 0.91 02/18/2022     Prealbumin: No results found for: PREALBUMIN  Albumin:  Lab Results   Component Value Date    LABALBU 3.9 02/18/2022     Sed Rate:  Lab Results   Component Value Date    SEDRATE 100 12/09/2021     CRP:   Lab Results   Component Value Date    CRP 17.81 12/09/2021     Micro:   Lab Results   Component Value Date    BC No growth-preliminary No growth  12/09/2021    BC No growth-preliminary No growth  12/09/2021      Hemoglobin A1C:   Lab Results   Component Value Date    LABA1C 9.4 12/10/2021       Assessment:     Ulcer Identification:  Ulcer Type: venous  Contributing Factors: edema, venous stasis and lymphedema    Wound: N/A    Depth of Diabetic/Pressure/Non Pressure Ulcers or Wound:  Wound, full thickness    Patient Active Problem List   Diagnosis Code    Cellulitis L03.90    Foot ulcer, left, with fat layer exposed ([facility] L97.522    Forefoot varus, acquired, left M21.6X2    Equinus contracture of left ankle M24.572    Type 2 diabetes mellitus with hyperglycemia, with long-term current use of insulin ([facility]) E11.65, Z79.4    Severe sepsis ([facility]) A41.9, R65.20    Uncontrolled type 2 diabetes mellitus with hyperglycemia ([facility]) E11.65    Hyponatremia E87.1    Hypertension, uncontrolled I10    PFO (patent foramen ovale) Q21.1    Hyperlipidemia E78.5    Smoker F17.200    Morbid obesity ([facility]) E66.01    Chronic diastolic heart failure ([facility]) I50.32    Noncompliance with medications Z91.14    Aortic valve stenosis I35.0    Cellulitis of right leg L03.115    Xerosis of skin L85.3    PAD (peripheral artery disease) ([facility]) I73.9       Procedure Note  Indications:  Based on my examination of this patient's wound(s)/ulcer(s) today, debridement is required to promote healing and evaluate the extent healing. Performed by: Reid Trammell DPM    Consent obtained: Yes    Time out taken:  Yes    Pain control: None required      Debridement:Non-excisional Debridement    Using curette the wound(s)/ulcer(s) was/were sharply debrided down through and including the removal of epidermis and dermis. Devitalized Tissue Debrided:  fibrin, biofilm and slough    Pre Debridement Measurements:  Are located in the Wound/Ulcer Documentation Flow Sheet    Wound/Ulcer #: 1    Post Debridement Measurements:    Wound/Ulcer Descriptions are listed under Physical Exam above. Wound/Ulcer Descriptions are Pre Debridement except measurements    Percent of Wound/Ulcer Debrided: 90%    Total Surface Area Debrided:  0.36 sq cm     Bleeding:  None    Hemostasis Achieved:  not needed    Procedural Pain:  0  / 10     Post Procedural Pain:  0 / 10     Response to treatment:  Well tolerated by patient. Plan:     Patient examined and evaluated  Performed nonexcisional debridement; see procedure note above  Continue with Iodoflex to wound on medial aspect of right foot to be changed every 1-2 days and covered with dry sterile dressings  Discussed with patient at length the necessity for compression therapy has been stable to for wound to heal  Patient verbalized understanding and agreement with the treatment plan as stated. All of his questions were answered to his satisfaction. Treatment: No orders of the defined types were placed in this encounter. Antibiotics: No    Follow up: 3 weeks    Please see attached Discharge Instructions    Written patient dismissal instructions given to patient and signed by patient or POA.          Discharge Instructions       Visit Discharge/Physician Orders:  -Try to cut back/quit smoking/nicotine pouches to help with wound healing.  - Keep blood sugars controlled to help with wound healing.   - Wound care supplies through [employer]. Call if you need to re-order [phone].  - Continue light duty at work, okay to drive fork lift  - Debridement of right leg       Wound Location: Right foot, Right leg     Dressing orders:      1) Gather wound care supplies and arrange on clean table.      2) Wash your hands with soap and water or use alcohol based hand  for 20 seconds (sing \"Happy Birthday\" twice).    3) Cleanse wounds with normal saline or wound cleanser and gauze. Pat dry with clean gauze. Wash dry skin around leg with a wet wash cloth. Apply an oil based lotion to leg avoiding wounds.    4) Right leg- Apply iodoflex to wound. Cover with dry dressing. Wrap with kerlix. Change 3 times a week. Wear compression socks or Juxta Lites daily. Put on in the morning and take off at bedtime     Keep all dressings clean & dry.     Do not shower, take baths or get wound wet, unless otherwise instructed by your Wound Care doctor.      Follow up visit: Janie Huntley [facility] Tuesday 03/29/2022 at 8:00     Keep next scheduled appointment. Please give 24 hour notice if unable to keep appointment. [phone]     If you experience any of the following, please call the Wound Care Service during business hours: Monday through Friday 8:00 am - 4:30 pm  ([phone]).               *Increase in pain              *Temperature over 101              *Increase in drainage from your wound or a foul odor              *Uncontrolled swelling              *Need for compression bandage changes due to slippage, breakthrough drainage        Electronically signed by Jane Boas, DPM on 3/8/2022 at 8:46 AM

## 2022-03-08 NOTE — PROGRESS NOTES
Esequiel STANTON has reviewed and agrees with Blair HARVEY's shift  Assessment.     Fany Pierson RN  3/8/2022

## 2022-03-08 NOTE — PLAN OF CARE
Problem: Wound:  Goal: Will show signs of wound healing; wound closure and no evidence of infection  Description: Will show signs of wound healing; wound closure and no evidence of infection  Outcome: Ongoing   Patient seen for right leg wound. Wound shows signs of proper closure and healing. No s/s of infection noted. Follow up in 3 weeks. See AVS for order changes. Care plan reviewed with patient. Patient verbalize understanding of the plan of care and contribute to goal setting.

## 2022-03-11 ENCOUNTER — NURSE ONLY (OUTPATIENT)
Dept: LAB | Age: 52
End: 2022-03-11

## 2022-03-11 LAB
AVERAGE GLUCOSE: 219 MG/DL (ref 70–126)
HBA1C MFR BLD: 9.3 % (ref 4.4–6.4)

## 2022-03-14 DIAGNOSIS — E55.9 VITAMIN D DEFICIENCY: ICD-10-CM

## 2022-03-14 RX ORDER — ERGOCALCIFEROL 1.25 MG/1
CAPSULE ORAL
Qty: 4 CAPSULE | Refills: 2 | Status: SHIPPED | OUTPATIENT
Start: 2022-03-14 | End: 2022-05-31

## 2022-03-16 ENCOUNTER — TELEPHONE (OUTPATIENT)
Dept: FAMILY MEDICINE CLINIC | Age: 52
End: 2022-03-16

## 2022-03-16 NOTE — TELEPHONE ENCOUNTER
----- Message from Mirta Runner, APRN - CNP sent at 3/14/2022 12:41 PM EDT -----  A1c was still elevated at 9.3, virtually unchanged from 3 months ago when it was 9.4. At his last visit he was instructed to increase his glipizide to 1 whole tablet twice daily.   He is to follow-up later this month with blood sugar readings

## 2022-03-22 ENCOUNTER — OFFICE VISIT (OUTPATIENT)
Dept: FAMILY MEDICINE CLINIC | Age: 52
End: 2022-03-22
Payer: COMMERCIAL

## 2022-03-22 VITALS
WEIGHT: 284 LBS | TEMPERATURE: 98.1 F | RESPIRATION RATE: 14 BRPM | SYSTOLIC BLOOD PRESSURE: 143 MMHG | BODY MASS INDEX: 38.47 KG/M2 | DIASTOLIC BLOOD PRESSURE: 74 MMHG | HEIGHT: 72 IN | HEART RATE: 75 BPM

## 2022-03-22 DIAGNOSIS — E11.40 NEUROPATHY DUE TO TYPE 2 DIABETES MELLITUS (HCC): ICD-10-CM

## 2022-03-22 PROCEDURE — 99214 OFFICE O/P EST MOD 30 MIN: CPT | Performed by: NURSE PRACTITIONER

## 2022-03-22 PROCEDURE — 3046F HEMOGLOBIN A1C LEVEL >9.0%: CPT | Performed by: NURSE PRACTITIONER

## 2022-03-22 RX ORDER — SEMAGLUTIDE 1.34 MG/ML
0.25 INJECTION, SOLUTION SUBCUTANEOUS WEEKLY
Qty: 3 PEN | Refills: 0 | Status: SHIPPED | OUTPATIENT
Start: 2022-03-22 | End: 2022-05-23 | Stop reason: SDUPTHER

## 2022-03-22 NOTE — PATIENT INSTRUCTIONS
You may receive a survey about your visit with us today. The feedback from our patients helps us identify what is working well and where the service to all patients can be enhanced. Thank you! Increase mealtime insulin to 10 units plus sliding scale. Continue Metformin and Glipizide as current dosing  Add Ozempic 0.25 mg injected weekly x4 weeks,  Then increase to 0.5 mg x4 weeks. Follow up in 2 months.

## 2022-03-22 NOTE — PROGRESS NOTES
Deniz Benedict (:  1970) is a 45 y.o. male,Established patient, here for evaluation of the following chief complaint(s):  Follow-up, Diabetes (A1C 3/11/22 = 9.3), Hypertension, and Hyperlipidemia         ASSESSMENT/PLAN:  1. Uncontrolled type 2 diabetes mellitus with insulin therapy ([facility]  -     Semaglutide,0.25 or 0.5MG/DOS, (OZEMPIC, 0.25 OR 0.5 MG/DOSE,) 2 MG/1.5ML SOPN; Inject 0.25 mg into the skin once a week, Disp-3 pen, R-0Normal  2. Neuropathy due to type 2 diabetes mellitus (HCC)        Increase mealtime insulin to 10 units plus sliding scale. Continue Metformin and Glipizide as current dosing  Add Ozempic 0.25 mg injected weekly x4 weeks,  Then increase to 0.5 mg x4 weeks. Continue gabapentin 100 mg 1 capsule twice daily  Follow up in 2 months. Return in about 2 months (around 2022). Subjective   SUBJECTIVE/OBJECTIVE:  HPI  A1c was 9.3% on 3/11/22.   1 month ago, increased Glipizide to 1 tablet twice daily. Continued Metformin 2000 mg daily, Humalog 8 units plus SS, and Lantus, 30 units at night. Hx microalbumin, taking Losartan 100 mg daily. States he cannot have farxiga due to hx pancreatitis. Had Januvia but caused UTI. He has been eating a lot of raw vegetables. He has a Dexcom. Daily average glucoses are still 266-300. 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 Last visit added Gabapentin 100 mg 1 capsule twice daily for numbness/tinging in both feet, worsnenig over the last seveal months. Still has minor symptoms, but much improved. Review of Systems   All other systems reviewed and are negative. Objective   Physical Exam  Vitals and nursing note reviewed. HENT:      Head: Normocephalic. Right Ear: External ear normal.      Left Ear: External ear normal.   Eyes:      Pupils: Pupils are equal, round, and reactive to light. Neck:      Trachea: No tracheal deviation. Cardiovascular:      Rate and Rhythm: Normal rate and regular rhythm.

## 2022-03-28 ENCOUNTER — TELEPHONE (OUTPATIENT)
Dept: WOUND CARE | Age: 52
End: 2022-03-28

## 2022-03-28 NOTE — TELEPHONE ENCOUNTER
----- Message from Julia Macedo sent at 3/28/2022  3:25 PM EDT -----  [facility] [phone] [place] WANTS TO CXL 3/29 @ 8AM. WILL NEED R/S FOR A LATER DATE

## 2022-04-12 ENCOUNTER — TELEPHONE (OUTPATIENT)
Dept: FAMILY MEDICINE CLINIC | Age: 52
End: 2022-04-12

## 2022-04-12 ENCOUNTER — OFFICE VISIT (OUTPATIENT)
Dept: FAMILY MEDICINE CLINIC | Age: 52
End: 2022-04-12
Payer: COMMERCIAL

## 2022-04-12 VITALS
BODY MASS INDEX: 37.93 KG/M2 | TEMPERATURE: 98 F | HEIGHT: 72 IN | SYSTOLIC BLOOD PRESSURE: 138 MMHG | WEIGHT: 280 LBS | HEART RATE: 89 BPM | DIASTOLIC BLOOD PRESSURE: 66 MMHG | RESPIRATION RATE: 14 BRPM

## 2022-04-12 DIAGNOSIS — I73.9 PAD (PERIPHERAL ARTERY DISEASE) (HCC): ICD-10-CM

## 2022-04-12 DIAGNOSIS — I87.2 CHRONIC VENOUS STASIS DERMATITIS OF BOTH LOWER EXTREMITIES: ICD-10-CM

## 2022-04-12 DIAGNOSIS — L03.115 CELLULITIS OF LEG, RIGHT: Primary | ICD-10-CM

## 2022-04-12 PROCEDURE — 3046F HEMOGLOBIN A1C LEVEL >9.0%: CPT | Performed by: NURSE PRACTITIONER

## 2022-04-12 PROCEDURE — 99214 OFFICE O/P EST MOD 30 MIN: CPT | Performed by: NURSE PRACTITIONER

## 2022-04-12 RX ORDER — SULFAMETHOXAZOLE AND TRIMETHOPRIM 800; 160 MG/1; MG/1
1 TABLET ORAL 2 TIMES DAILY
Qty: 20 TABLET | Refills: 0 | Status: SHIPPED | OUTPATIENT
Start: 2022-04-12 | End: 2022-04-22

## 2022-04-12 ASSESSMENT — ENCOUNTER SYMPTOMS
COLOR CHANGE: 1
SHORTNESS OF BREATH: 0

## 2022-04-12 NOTE — TELEPHONE ENCOUNTER
Spoke with the patient regarding possible infection. Patient was not able to give me a specific dx of what type of infection he typically gets.    Scheduled the patient to see Jovita Sesay today at 11:20 am.

## 2022-04-12 NOTE — TELEPHONE ENCOUNTER
----- Message from Vernerjordan Hernandez sent at 4/12/2022  8:34 AM EDT -----  Subject: Message to Provider    QUESTIONS  Information for Provider? pt is wanting to set up a appt in regards to an   infection he has every few times out of the year. he also wants a blood   test done to check for this infection, that he usually gets done. please   call pt discuss   ---------------------------------------------------------------------------  --------------  CALL BACK INFO  What is the best way for the office to contact you? OK to leave message on   voicemail  Preferred Call Back Phone Number? [phone]  ---------------------------------------------------------------------------  --------------  SCRIPT ANSWERS  Relationship to Patient? Self  Would you describe this as the worst headache of your life? No  Are you having fevers (100.4), chills or sweats? No  Are you having weakness on one side of your body, drooping on one side of   your face or difficult speaking? No  Have you had any injuries to your head? No  Have you recently (14 days) seen a provider for this issue?  Yes

## 2022-04-12 NOTE — PATIENT INSTRUCTIONS
You may receive a survey about your visit with us today. The feedback from our patients helps us identify what is working well and where the service to all patients can be enhanced. Thank you! Bactrim 1 tab twice daily for 10 days  Follow up with Dr Sha Castelan next week if possible  Keep follow up 4/21 with cardiology. Let office know if symptoms don't improve in a couple days, may need to repeat Venous doppler to rule out clot. Worsening symptoms, go to ER.

## 2022-04-12 NOTE — PROGRESS NOTES
Kevin Diego (:  1970) is a 45 y.o. male,Established patient, here for evaluation of the following chief complaint(s):  Leg Pain (Right leg pain - redness with warm to touch x 2 days)         ASSESSMENT/PLAN:  1. Cellulitis of leg, right  -     sulfamethoxazole-trimethoprim (BACTRIM DS;SEPTRA DS) 800-160 MG per tablet; Take 1 tablet by mouth 2 times daily for 10 days, Disp-20 tablet, R-0Normal  2. Chronic venous stasis dermatitis of both lower extremities  3. PAD (peripheral artery disease) ([facility]  4. Uncontrolled type 2 diabetes mellitus with insulin therapy ([facility]    Bactrim 1 tab twice daily for 10 days  Follow up with Dr Kameron Keane next week if possible  Keep follow up  with cardiology. Let office know if symptoms don't improve in a couple days, may need to repeat Venous doppler to rule out clot. Worsening symptoms, go to ER. Discussed importance of diabetic control, and continuing to work towards that goal    Return in about 6 weeks (around 2022). Subjective   SUBJECTIVE/OBJECTIVE:  HPI    Right leg pain, redness in upper, inner thigh, radiating down leg. No more swollen than normal.  Acute symptoms started yesterday. Pain is a 5/10 to touch the leg, yesterday it was a 9/0. Pain is worse with sitting around, walking makes it better. Chills yesterday, unsure of fevers. Denies any chest pain or shortness of breath. Slept most of the day yesterday in bed. He does work very long hours. He states the symptoms are exactly like the ones that started in 2021. Patient has longstanding history of uncontrolled diabetes, chronic venous insufficiency with stasis dermatitis. Admitted for cellulitis in Dec 2021. 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 Treated with bactrim. Venous Dopplers at that time negative for DVT. 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   Vascularity right lower extremity otherwise unremarkable. In light of the history of gangrene in the right lower extremity aortofemoral angiography was recommended for further evaluation. He followed up in wound care, who then referred him to cardiology. First saw Dr. Tressa Carey in February 2022, and angiography which showed mild diffuse peripheral arterial disease. He was recommended aggressive medical management and better diabetic control. He does take a baby aspirin daily. Continues to see Dr Reddy Jones through wound clinic. Last follow up 3/8/22. Was supposed to be seen 3/29 due to work. Uncontrolled diabetes: Last seen 3/22/2022. Currently taking glipizide 5 mg 1 tablet twice daily, Metformin 2000 mg daily, Humalog 8 units plus sliding scale with meals and Lantus 30 units at night. Added Ozempic 0.25 mg injection weekly x4 weeks, then increase to 0.5 mg x 4 weeks. He is still on the lowest dose. Reports blood sugars coming down. Actually had a 108 recently. He does have CGM. HgbA1c was 9.3 on 3/11/2022      Review of Systems   Constitutional: Positive for chills. Negative for fever. Respiratory: Negative for shortness of breath. Cardiovascular: Positive for leg swelling. Negative for chest pain. Skin: Positive for color change and wound. All other systems reviewed and are negative. Objective   Physical Exam  Vitals and nursing note reviewed. HENT:      Head: Normocephalic. Right Ear: External ear normal.      Left Ear: External ear normal.   Eyes:      Pupils: Pupils are equal, round, and reactive to light. Neck:      Trachea: No tracheal deviation. Cardiovascular:      Rate and Rhythm: Normal rate and regular rhythm. Heart sounds: Normal heart sounds. No murmur heard. No friction rub. No gallop. Pulmonary:      Effort: Pulmonary effort is normal. No respiratory distress. Breath sounds: Normal breath sounds. No wheezing or rales. Chest:      Chest wall: No tenderness.    Abdominal: General: Bowel sounds are normal.      Palpations: Abdomen is soft. Tenderness: There is no abdominal tenderness. There is no rebound. Genitourinary:     Penis: Normal.    Musculoskeletal:         General: Normal range of motion. Cervical back: Full passive range of motion without pain, normal range of motion and neck supple. Legs:       Comments: Bilat lower legs with chronic swelling, venous stasis dermatitis. Right outer lower leg sligthly worse than left. Lymphadenopathy:      Cervical: No cervical adenopathy. Skin:     General: Skin is warm and dry. Findings: No rash. Neurological:      Mental Status: He is alert and oriented to person, place, and time. Psychiatric:         Judgment: Judgment normal.                  An electronic signature was used to authenticate this note.     --ANA Sanchez - CNP

## 2022-04-20 ENCOUNTER — TELEPHONE (OUTPATIENT)
Dept: FAMILY MEDICINE CLINIC | Age: 52
End: 2022-04-20

## 2022-04-26 NOTE — TELEPHONE ENCOUNTER
30 days only.  Needs appt MTM referral from: Transitions of Care (recent hospital discharge or ED visit)    MTM referral outreach attempt #2 on April 26, 2022 at 9:24 AM      Outcome: Patient not reachable after several attempts, will route to [facility] Pharmacist/Provider as an FYI.  [facility] scheduling number is [phone].  Thank you for the referral.    Belinda Nuñez [facility]

## 2022-05-23 ENCOUNTER — NURSE ONLY (OUTPATIENT)
Dept: LAB | Age: 52
End: 2022-05-23

## 2022-05-23 ENCOUNTER — OFFICE VISIT (OUTPATIENT)
Dept: FAMILY MEDICINE CLINIC | Age: 52
End: 2022-05-23
Payer: COMMERCIAL

## 2022-05-23 VITALS
DIASTOLIC BLOOD PRESSURE: 79 MMHG | SYSTOLIC BLOOD PRESSURE: 153 MMHG | WEIGHT: 284.6 LBS | HEART RATE: 77 BPM | HEIGHT: 72 IN | BODY MASS INDEX: 38.55 KG/M2

## 2022-05-23 DIAGNOSIS — E55.9 VITAMIN D DEFICIENCY: ICD-10-CM

## 2022-05-23 DIAGNOSIS — I10 HYPERTENSION, UNCONTROLLED: ICD-10-CM

## 2022-05-23 DIAGNOSIS — E11.40 NEUROPATHY DUE TO TYPE 2 DIABETES MELLITUS (HCC): ICD-10-CM

## 2022-05-23 DIAGNOSIS — E11.21 DIABETIC NEPHROPATHY ASSOCIATED WITH TYPE 2 DIABETES MELLITUS (HCC): ICD-10-CM

## 2022-05-23 DIAGNOSIS — I73.9 PAD (PERIPHERAL ARTERY DISEASE) (HCC): ICD-10-CM

## 2022-05-23 DIAGNOSIS — I87.2 CHRONIC VENOUS STASIS DERMATITIS OF BOTH LOWER EXTREMITIES: ICD-10-CM

## 2022-05-23 DIAGNOSIS — I10 ESSENTIAL HYPERTENSION: ICD-10-CM

## 2022-05-23 LAB
BACTERIA: ABNORMAL
BILIRUBIN URINE: NEGATIVE
BLOOD, URINE: NEGATIVE
CASTS: ABNORMAL /LPF
CASTS: ABNORMAL /LPF
CHARACTER, URINE: CLEAR
CHOLESTEROL, FASTING: 151 MG/DL (ref 100–199)
COLOR: YELLOW
CREATININE, URINE: 63.5 MG/DL
CRYSTALS: ABNORMAL
EPITHELIAL CELLS, UA: ABNORMAL /HPF
GLUCOSE, URINE: NEGATIVE MG/DL
HBA1C MFR BLD: 8.1 %
HDLC SERPL-MCNC: 24 MG/DL
KETONES, URINE: NEGATIVE
LDL CHOLESTEROL CALCULATED: ABNORMAL MG/DL
LEUKOCYTE ESTERASE, URINE: ABNORMAL
MICROALBUMIN UR-MCNC: 103.62 MG/DL
MICROALBUMIN/CREAT UR-RTO: 1632 MG/G (ref 0–30)
MISCELLANEOUS LAB TEST RESULT: ABNORMAL
NITRITE, URINE: NEGATIVE
PH UA: 5.5 (ref 5–9)
PROTEIN UA: 100 MG/DL
RBC URINE: ABNORMAL /HPF
RENAL EPITHELIAL, UA: ABNORMAL
SPECIFIC GRAVITY UA: 1.01 (ref 1–1.03)
TRIGLYCERIDE, FASTING: 562 MG/DL (ref 0–199)
UROBILINOGEN, URINE: 1 EU/DL (ref 0–1)
VITAMIN D 25-HYDROXY: 32 NG/ML (ref 30–100)
WBC UA: ABNORMAL /HPF
YEAST: ABNORMAL

## 2022-05-23 PROCEDURE — 83036 HEMOGLOBIN GLYCOSYLATED A1C: CPT | Performed by: NURSE PRACTITIONER

## 2022-05-23 PROCEDURE — 99214 OFFICE O/P EST MOD 30 MIN: CPT | Performed by: NURSE PRACTITIONER

## 2022-05-23 PROCEDURE — 3046F HEMOGLOBIN A1C LEVEL >9.0%: CPT | Performed by: NURSE PRACTITIONER

## 2022-05-23 RX ORDER — CARVEDILOL 6.25 MG/1
6.25 TABLET ORAL 2 TIMES DAILY
Qty: 180 TABLET | Refills: 1 | Status: SHIPPED | OUTPATIENT
Start: 2022-05-23

## 2022-05-23 RX ORDER — SEMAGLUTIDE 1.34 MG/ML
1 INJECTION, SOLUTION SUBCUTANEOUS WEEKLY
Qty: 3 PEN | Refills: 3 | Status: SHIPPED | OUTPATIENT
Start: 2022-05-23 | End: 2022-08-29 | Stop reason: DRUGHIGH

## 2022-05-23 RX ORDER — GABAPENTIN 100 MG/1
CAPSULE ORAL
Qty: 270 CAPSULE | Refills: 0 | Status: SHIPPED | OUTPATIENT
Start: 2022-05-23 | End: 2022-08-12

## 2022-05-23 NOTE — PROGRESS NOTES
Dipika Estrella (:  1970) is a 45 y.o. male,Established patient, here for evaluation of the following chief complaint(s):  Follow-up (HTN; HLD; A1C 9.3; cellulitits )         ASSESSMENT/PLAN:  1. Uncontrolled type 2 diabetes mellitus with insulin therapy ([facility]  -     Semaglutide,0.25 or 0.5MG/DOS, (OZEMPIC, 0.25 OR 0.5 MG/DOSE,) 2 MG/1.5ML SOPN; Inject 1 mg into the skin once a week Inject 0.5 mg into skin weekly for 4 weeks, then increase to 1 mg weekly until follow up, Disp-3 pen, R-3Normal  -     carvedilol (COREG) 6.25 MG tablet; Take 1 tablet by mouth 2 times daily, Disp-180 tablet, R-1Normal  2. PAD (peripheral artery disease) (HCC)  -     carvedilol (COREG) 6.25 MG tablet; Take 1 tablet by mouth 2 times daily, Disp-180 tablet, R-1Normal  3. Essential hypertension  -     carvedilol (COREG) 6.25 MG tablet; Take 1 tablet by mouth 2 times daily, Disp-180 tablet, R-1Normal  4. Chronic venous stasis dermatitis of both lower extremities  5. Neuropathy due to type 2 diabetes mellitus (HCC)    Increase Ozempic to 0.5 mg injected weekly x4 weeks, then increase to 1 mg injected weekly. Continue other medications as prescribed. Carvedilol refilled. · Check your blood pressure at least 2 times daily. Check while sitting down, feet flat on the ground, and arm at the level of your heart  · Please let us know if you blood pressure readings are consistently 130/80 or higher  · Limit your salt intake to 2 GM daily  · Drink plenty of water - half of  Your body weight in ounces daily  · Exercise - Aim for 30-45 minutes daily of aerobic exercise. Can run, jog, walk, swim, weight lift - anything to get the heart rate elevated. Can even break it up into 15 minutes sessions. May need increased dose due to elevated BP. HR stable at 77. Return in about 3 months (around 2022). Subjective   SUBJECTIVE/OBJECTIVE:  HPI    Uncontrolled diabetes: Last seen 2022.   Currently taking glipizide 5 mg 1 with Bactrim, symptoms improved and have not returned. Taking Gabapentin 100 mg 1 cap twice daily for for numbness/tinging in both feet, which is effective at controlling symptoms. Review of Systems   Cardiovascular: Positive for leg swelling (chronic). Skin: Positive for wound. All other systems reviewed and are negative. Objective   Physical Exam  Vitals and nursing note reviewed. HENT:      Head: Normocephalic. Right Ear: External ear normal.      Left Ear: External ear normal.   Eyes:      Pupils: Pupils are equal, round, and reactive to light. Neck:      Trachea: No tracheal deviation. Cardiovascular:      Rate and Rhythm: Normal rate and regular rhythm. Heart sounds: Normal heart sounds. No murmur heard. No friction rub. No gallop. Pulmonary:      Effort: Pulmonary effort is normal. No respiratory distress. Breath sounds: Normal breath sounds. No wheezing or rales. Chest:      Chest wall: No tenderness. Abdominal:      General: Bowel sounds are normal.      Palpations: Abdomen is soft. Tenderness: There is no abdominal tenderness. There is no rebound. Genitourinary:     Penis: Normal.    Musculoskeletal:         General: Normal range of motion. Cervical back: Full passive range of motion without pain, normal range of motion and neck supple. Lymphadenopathy:      Cervical: No cervical adenopathy. Skin:     General: Skin is warm and dry. Findings: No rash. Neurological:      Mental Status: He is alert and oriented to person, place, and time. Psychiatric:         Judgment: Judgment normal.                  An electronic signature was used to authenticate this note.     --Ana Akhtar, APRMARKIE - CNP

## 2022-05-23 NOTE — PATIENT INSTRUCTIONS
Increase Ozempic to 0.5 mg injected weekly x4 weeks, then increase to 1 mg injected weekly. Continue other medications as prescribed. Carvedilol refilled. · Check your blood pressure at least 2 times daily. Check while sitting down, feet flat on the ground, and arm at the level of your heart  · Please let us know if you blood pressure readings are consistently 130/80 or higher  · Limit your salt intake to 2 GM daily  · Drink plenty of water - half of  Your body weight in ounces daily  · Exercise - Aim for 30-45 minutes daily of aerobic exercise. Can run, jog, walk, swim, weight lift - anything to get the heart rate elevated. Can even break it up into 15 minutes sessions. May need increased dose due to elevated BP. '  HR stable at 77.

## 2022-05-23 NOTE — TELEPHONE ENCOUNTER
Please approve or deny     Last Visit Date:  5/23/2022       Next Visit Date:    8/23/2022 TRANSFER - IN REPORT:    Verbal report received from Komal(name) on [facility] [place]  being received from ER(unit) for routine progression of care      Report consisted of patients Situation, Background, Assessment and   Recommendations(SBAR). Information from the following report(s) SBAR was reviewed with the receiving nurse. Opportunity for questions and clarification was provided.

## 2022-05-25 ENCOUNTER — TELEPHONE (OUTPATIENT)
Dept: FAMILY MEDICINE CLINIC | Age: 52
End: 2022-05-25

## 2022-05-25 DIAGNOSIS — E78.5 HYPERLIPIDEMIA, UNSPECIFIED HYPERLIPIDEMIA TYPE: ICD-10-CM

## 2022-05-25 DIAGNOSIS — E55.9 VITAMIN D DEFICIENCY: ICD-10-CM

## 2022-05-25 DIAGNOSIS — E78.2 HYPERCHOLESTEROLEMIA WITH HYPERTRIGLYCERIDEMIA: Primary | ICD-10-CM

## 2022-05-25 DIAGNOSIS — E11.65 UNCONTROLLED TYPE 2 DIABETES MELLITUS WITH HYPERGLYCEMIA (HCC): Primary | ICD-10-CM

## 2022-05-25 NOTE — TELEPHONE ENCOUNTER
Continue Virgie. If he never tried Crestor, I would change the Lipitor 80 to Crestor 40 mg daily. Crestor works better than Lipitor on the triglycerides. Hopefully that change, plus the better control of the diabetes will bring the triglycerides down.   Do blood test as recommended

## 2022-05-25 NOTE — TELEPHONE ENCOUNTER
----- Message from Jolly Phoenix, APRN - CNP sent at 5/24/2022 12:11 PM EDT -----  His vitamin D is 32, low normal.  If he is taking any vitamin D, I would double it. If he is not I recommend vitamin D3 1000 IU daily. His urine microalbumin is 1632, which is the highest its been in the last 5-6 years. He is already on losartan, which is what we use to protect the kidneys in this case, and good diabetic control is the most important-which he is working on. Will really benefit from diabetic diet to help get these numbers better controlled.

## 2022-05-25 NOTE — TELEPHONE ENCOUNTER
Looking back at his previous Vit D levels, this is the first time he's been >30 in a few years, so continue current supplements. Will add BMP and PTH to Dr Christina Wilson recommended Lipid recheck in 2 months to make sure his parathyroid is functioning properly, and that is not affecting his Vit D levels.

## 2022-05-25 NOTE — TELEPHONE ENCOUNTER
Miriam Robertson MD   5/24/2022  3:15 PM EDT Back to Top        I would add Lovaza 2 g PO BID to his regimen and recheck lipid profile in 2 months.  His elevated triglycerides are probably related also to uncontrol diabetes.  As the diabetes gets better so will those levels.  If then still elevated will consider fenifibrates

## 2022-05-25 NOTE — TELEPHONE ENCOUNTER
Spoke with the patient regarding results. Patient will  prescription for Lovaza. Patient takes Vitamin D 21783 units weekly. And otc Vitamin D 100 twice daily. He says he as been doing this for approx. 1 year. Patient not sure why it's still very low.    Please advise

## 2022-05-29 DIAGNOSIS — E55.9 VITAMIN D DEFICIENCY: ICD-10-CM

## 2022-05-31 RX ORDER — ERGOCALCIFEROL 1.25 MG/1
CAPSULE ORAL
Qty: 4 CAPSULE | Refills: 2 | Status: SHIPPED | OUTPATIENT
Start: 2022-05-31 | End: 2022-08-25

## 2022-06-03 RX ORDER — ROSUVASTATIN CALCIUM 40 MG/1
40 TABLET, COATED ORAL DAILY
Qty: 30 TABLET | Refills: 3 | Status: SHIPPED | OUTPATIENT
Start: 2022-06-03 | End: 2022-08-29 | Stop reason: SDUPTHER

## 2022-06-03 NOTE — TELEPHONE ENCOUNTER
Patient called and notified. Medication sent to pharmacy of choice. States understanding. No further questions or concerns at this time.

## 2022-06-05 DIAGNOSIS — I73.9 PAD (PERIPHERAL ARTERY DISEASE) (HCC): ICD-10-CM

## 2022-06-06 ENCOUNTER — TELEPHONE (OUTPATIENT)
Dept: WOUND CARE | Age: 52
End: 2022-06-06

## 2022-06-06 RX ORDER — BUMETANIDE 1 MG/1
TABLET ORAL
Qty: 90 TABLET | Refills: 0 | Status: SHIPPED | OUTPATIENT
Start: 2022-06-06 | End: 2022-08-29 | Stop reason: SDUPTHER

## 2022-06-14 DIAGNOSIS — E78.2 HYPERCHOLESTEROLEMIA WITH HYPERTRIGLYCERIDEMIA: ICD-10-CM

## 2022-06-14 RX ORDER — OMEGA-3-ACID ETHYL ESTERS 1 G/1
CAPSULE, LIQUID FILLED ORAL
Qty: 360 CAPSULE | Refills: 1 | Status: SHIPPED | OUTPATIENT
Start: 2022-06-14 | End: 2022-08-29 | Stop reason: SDUPTHER

## 2022-08-03 RX ORDER — GLIPIZIDE 5 MG/1
TABLET ORAL
Qty: 180 TABLET | Refills: 1 | Status: SHIPPED | OUTPATIENT
Start: 2022-08-03

## 2022-08-09 ENCOUNTER — PATIENT MESSAGE (OUTPATIENT)
Dept: FAMILY MEDICINE CLINIC | Age: 52
End: 2022-08-09

## 2022-08-10 NOTE — TELEPHONE ENCOUNTER
From: Conchita Stephen  To: Latesha Deras  Sent: 8/9/2022 6:23 PM EDT  Subject: A1c    You wanted me to have another A1C done before I come in on August 23 I do not have any papers on what blood test you need.  Please send in the blood work labs needed and it will be the one across from [facility] on [employer] and [address]

## 2022-08-12 DIAGNOSIS — E11.21 DIABETIC NEPHROPATHY ASSOCIATED WITH TYPE 2 DIABETES MELLITUS (HCC): ICD-10-CM

## 2022-08-12 RX ORDER — GABAPENTIN 100 MG/1
CAPSULE ORAL
Qty: 270 CAPSULE | Refills: 0 | Status: SHIPPED | OUTPATIENT
Start: 2022-08-12 | End: 2022-11-10

## 2022-08-23 ENCOUNTER — NURSE ONLY (OUTPATIENT)
Dept: LAB | Age: 52
End: 2022-08-23

## 2022-08-23 ENCOUNTER — TELEPHONE (OUTPATIENT)
Dept: FAMILY MEDICINE CLINIC | Age: 52
End: 2022-08-23

## 2022-08-23 DIAGNOSIS — E78.5 HYPERLIPIDEMIA, UNSPECIFIED HYPERLIPIDEMIA TYPE: ICD-10-CM

## 2022-08-23 DIAGNOSIS — E11.65 UNCONTROLLED TYPE 2 DIABETES MELLITUS WITH HYPERGLYCEMIA (HCC): ICD-10-CM

## 2022-08-23 DIAGNOSIS — E55.9 VITAMIN D DEFICIENCY: ICD-10-CM

## 2022-08-23 LAB
ANION GAP SERPL CALCULATED.3IONS-SCNC: 11 MEQ/L (ref 8–16)
AVERAGE GLUCOSE: 156 MG/DL (ref 70–126)
BUN BLDV-MCNC: 17 MG/DL (ref 7–22)
CALCIUM SERPL-MCNC: 9.5 MG/DL (ref 8.5–10.5)
CHLORIDE BLD-SCNC: 105 MEQ/L (ref 98–111)
CHOLESTEROL, FASTING: 143 MG/DL (ref 100–199)
CO2: 29 MEQ/L (ref 23–33)
CREAT SERPL-MCNC: 0.8 MG/DL (ref 0.4–1.2)
GFR SERPL CREATININE-BSD FRML MDRD: > 90 ML/MIN/1.73M2
GLUCOSE BLD-MCNC: 148 MG/DL (ref 70–108)
HBA1C MFR BLD: 7.2 % (ref 4.4–6.4)
HDLC SERPL-MCNC: 27 MG/DL
LDL CHOLESTEROL CALCULATED: 71 MG/DL
POTASSIUM SERPL-SCNC: 4.6 MEQ/L (ref 3.5–5.2)
PTH INTACT: 25.3 PG/ML (ref 15–65)
SODIUM BLD-SCNC: 145 MEQ/L (ref 135–145)
TRIGLYCERIDE, FASTING: 227 MG/DL (ref 0–199)

## 2022-08-23 NOTE — TELEPHONE ENCOUNTER
----- Message from ANA Cleary CNP sent at 8/23/2022  4:06 PM EDT -----  Most lab work within acceptable range. His HgbA1c is 7.2%!!!!!! great job! Cholesterol levels better, triglycerides much improved even though still a little bit high. HDL still low at 27. He should be taking omega-3 2 caps twice daily. 3/31/2022    Marlo Mercado (:  1957) is a 76 y.o. female, here for evaluation of the following medical concerns:    Hypertension  Pertinent negatives include no chest pain, headaches, neck pain, palpitations or shortness of breath. 58-year-old female with a history of essential hypertension, hypothyroidism and hyperlipidemia for preoperative clearance. Benjamin Nunez is scheduled to have a TRKR in 2 weeks. She voices no complaints. Right upper quadrant pain: Well-controlled at this time. Dyspnea: Stable at this time. Hyperglycemia: Hemoglobin A1c was obtained on 2020. It was 5.8. It is 5.9 today. Hypertension:compliant with Norvasc. BP elevated today. Menopausal symptoms: The patient symptoms are well controlled via estradiol which she takes        Hyperlipidemia: In regards to her hyperlipidemia the patient is compliant with pravastatin 40 mg orally daily        Hypothyroidism: In regards to her hypothyroidism the patient is compliant with Synthroid 150 mcg daily      Obesity: The patient's body mass index is presently 33. 18. Review of Systems   Constitutional: Negative for chills, diaphoresis, fatigue and fever. HENT: Negative for congestion, dental problem, drooling, ear discharge, ear pain, facial swelling, hearing loss, mouth sores, nosebleeds, postnasal drip, rhinorrhea, sinus pressure, sinus pain, sneezing, sore throat, tinnitus, trouble swallowing and voice change. Eyes: Negative for photophobia, pain, discharge, redness, itching and visual disturbance. Respiratory: Negative for apnea, cough, chest tightness, shortness of breath and wheezing. Cardiovascular: Negative for chest pain, palpitations and leg swelling. Gastrointestinal: Negative for abdominal distention, abdominal pain, blood in stool, constipation, diarrhea, nausea, rectal pain and vomiting.    Endocrine: Negative for cold intolerance, heat intolerance, polydipsia, polyphagia and polyuria. Genitourinary: Negative for decreased urine volume, difficulty urinating, dysuria, flank pain, frequency, genital sores, hematuria and urgency. Musculoskeletal: Negative for arthralgias, back pain, gait problem, joint swelling, myalgias, neck pain and neck stiffness. Skin: Negative for color change, rash and wound. Allergic/Immunologic: Negative for environmental allergies and food allergies. Neurological: Negative for dizziness, tremors, seizures, syncope, facial asymmetry, speech difficulty, weakness, light-headedness, numbness and headaches. Hematological: Negative for adenopathy. Does not bruise/bleed easily. Psychiatric/Behavioral: Negative for agitation, confusion, decreased concentration, hallucinations, self-injury, sleep disturbance and suicidal ideas. The patient is not nervous/anxious. Prior to Visit Medications    Medication Sig Taking? Authorizing Provider   clotrimazole-betamethasone (LOTRISONE) 1-0.05 % cream Apply topically 2 times daily. Gilberto Wilver Figueroa DO   amLODIPine (NORVASC) 5 MG tablet TAKE 1 TABLET BY MOUTH EVERY DAY  Leann Haro MD   pravastatin (PRAVACHOL) 40 MG tablet TAKE 1 TABLET BY MOUTH EVERY DAY  Adalberto Henson MD   levothyroxine (SYNTHROID) 150 MCG tablet TAKE 1 TABLET BY MOUTH EVERY DAY  Jose Alejandro Ernandez MD   DAR 0.05 MG/24HR Place 1 patch onto the skin Twice a Week  Alexis Figueroa DO   erythromycin [facility]) 5 MG/GM ophthalmic ointment   Historical Provider, MD        Allergies   Allergen Reactions    Antiseptic Products, Misc.  Rash     duraprep       Past Medical History:   Diagnosis Date    Arthritis     both knees    Hyperlipidemia     meds > 10 yrs    Hypertension     meds > 4 yrs    Hyperthyroidism     Hypothyroidism     meds > 20 yrs    Palpitations 7/30/2021    Thyroid goiter        Past Surgical History:   Procedure Laterality Date    APPENDECTOMY  1981    with left oophorectomy    BREAST BIOPSY Left 2012    x 2 / both benign    CARPAL TUNNEL RELEASE Right 2021    CARPAL TUNNEL RELEASE/REPAIR RIGHT performed by Denae García MD at [facility] Right 2021    CARPAL TUNNEL RELEASE Left 8/10/2021    CARPAL TUNNEL RELEASE/ REPAIR LEFT, PAT AT [facility] performed by Denae García MD at [address]      COLONOSCOPY  07    COLONOSCOPY  [id number]    Ronaldo Carreon (polyps)    COLONOSCOPY  16    w/polypectomy     COLONOSCOPY N/A 2019    COLORECTAL CANCER SCREENING, HIGH RISK performed by Po Austin MD [facility] 20[facility]    COLORECTAL CANCER SCREENING, HIGH RISK performed by Dylan Ohara MD at [facility] Left 2020    FLEXIBLE CYSTOSCOPY LEFT DOUBLE J [address] (RECENT SURGERY 19) KUB ON ARRIVAL performed by Monet Mullen MD at [address] STONE Left 2019    CYSTOSCOPY LEFT RETROGRADE PYELOGRAM  LEFT DOUBLE J STENT performed by Kristin Iyer MD at [facility] [address], DIAGNOSTIC     [address] REPLACEMENT  2019    LTKR    KNEE ARTHROSCOPY Bilateral 1987 2003    left X2 and Right X1    LITHOTRIPSY Left 2019    LEFT ESWL performed by Kristin Iyer MD at [facility] Left 2019    LEFT TOTAL KNEE REPLACEMENT- [address] performed by Sandra Newsome MD at [address] Marital status:      Spouse name: Not on file    Number of children: Not on file    Years of education: Not on file    Highest education level: Not on file   Occupational History    Not on file   Tobacco Use    Smoking status: Former Smoker     Packs/day: 0.25     Years: 5.00     Pack years: 1.25     Types: Cigarettes     Quit date:      Years since quittin.2    Smokeless tobacco: Never Used   Vaping Use    Vaping Use: Never used   Substance and Sexual Activity    Alcohol use: Yes     Comment: occ.  Drug use: No    Sexual activity: Not on file   Other Topics Concern    Not on file   Social History Narrative    Not on file     Social Determinants of Health     Financial Resource Strain: Low Risk     Difficulty of Paying Living Expenses: Not hard at all   Food Insecurity: No Food Insecurity    Worried About Running Out of Food in the Last Year: Never true    Ruba of Food in the Last Year: Never true   Transportation Needs: No Transportation Needs    Lack of Transportation (Medical): No    Lack of Transportation (Non-Medical): No   Physical Activity:     Days of Exercise per Week: Not on file    Minutes of Exercise per Session: Not on file   Stress:     Feeling of Stress : Not on file   Social Connections:     Frequency of Communication with Friends and Family: Not on file    Frequency of Social Gatherings with Friends and Family: Not on file    Attends Temple Services: Not on file    Active Member of [address] [place] [address] or Organizations: Not on file    Attends Club or Organization Meetings: Not on file    Marital Status: Not on file   Intimate Partner Violence:     Fear of Current or Ex-Partner: Not on file    Emotionally Abused: Not on file    Physically Abused: Not on file    Sexually Abused: Not on file   Housing Stability:     Unable to Pay for Housing in the Last Year: Not on file    Number of [place] in the Last Year: Not on file    Unstable Housing in the Last Year: Not on file        Family History   Problem Relation Age of Onset    Heart Disease Mother     Cancer Mother         RENAL CELL    COPD Mother     Heart Attack Mother     Diabetes Other     Breast Cancer Other     Colon Cancer Father     Macular Degen Father     Cancer Brother         prostate cancer    No Known Problems Son        There were no vitals filed for this visit.   Estimated body mass index is 32.23 kg/m² as calculated from the following:    Height as of 2/21/22: 5' 8\" (1.727 m). Weight as of 2/21/22: 212 lb (96.2 kg). Physical Exam  Constitutional:       General: She is not in acute distress. Appearance: She is well-developed. HENT:      Head: Normocephalic. Right Ear: External ear normal.      Left Ear: External ear normal.   Eyes:      Conjunctiva/sclera: Conjunctivae normal.   Neck:      Vascular: No JVD. Trachea: No tracheal deviation. Cardiovascular:      Rate and Rhythm: Normal rate and regular rhythm. Heart sounds: Normal heart sounds. Pulmonary:      Effort: Pulmonary effort is normal. No respiratory distress. Breath sounds: Normal breath sounds. No wheezing or rales. Chest:      Chest wall: No tenderness. Abdominal:      General: Bowel sounds are normal. There is no distension. Palpations: Abdomen is soft. There is no mass. Tenderness: There is no abdominal tenderness. There is no guarding or rebound. Musculoskeletal:         General: No tenderness or deformity. Cervical back: Neck supple. Lymphadenopathy:      Cervical: No cervical adenopathy. Skin:     General: Skin is warm and dry. Coloration: Skin is not pale. Findings: No erythema or rash. Neurological:      Mental Status: She is alert and oriented to person, place, and time. Motor: No abnormal muscle tone. Psychiatric:         Thought Content: Thought content normal.         Judgment: Judgment normal.         ASSESSMENT/PLAN:        Essential hypertension  Continue Norvasc 5 mg po daily bisoprolol dc'd due to headaches. Pt will inform me of bp measurements over the next 2 weeks. Will either add lotrel or a low dose HCTZ. Hypothyroidism, unspecified type  Continue Synthroid 150 mcg daily           Hyperlipidemia, unspecified hyperlipidemia typecontinue pravastatin          Hyperglycemia  -Encouraged aerobic exercise daily. ds.        Class 1 obesity due to excess calories with serious comorbidity and body mass index (BMI)= 33.18 as stated per #1. Right upper quadrant pain: Resolved. Monitoring. Postmenopausal symptomscontinue estradiol. Preoperative clearance the patient is medically clear for surgery. No follow-ups on file. An  electronic signature was used to authenticate this note.     --Jimmy Benitez MD on 3/27/2022 at 7:13 PM

## 2022-08-25 RX ORDER — ERGOCALCIFEROL 1.25 MG/1
CAPSULE ORAL
Qty: 4 CAPSULE | Refills: 2 | Status: SHIPPED | OUTPATIENT
Start: 2022-08-25

## 2022-08-29 ENCOUNTER — OFFICE VISIT (OUTPATIENT)
Dept: FAMILY MEDICINE CLINIC | Age: 52
End: 2022-08-29
Payer: COMMERCIAL

## 2022-08-29 VITALS
BODY MASS INDEX: 37 KG/M2 | TEMPERATURE: 98.1 F | WEIGHT: 273.2 LBS | HEIGHT: 72 IN | SYSTOLIC BLOOD PRESSURE: 138 MMHG | HEART RATE: 85 BPM | DIASTOLIC BLOOD PRESSURE: 76 MMHG

## 2022-08-29 DIAGNOSIS — E11.9 TYPE 2 DIABETES MELLITUS WITHOUT COMPLICATION, WITH LONG-TERM CURRENT USE OF INSULIN (HCC): ICD-10-CM

## 2022-08-29 DIAGNOSIS — I73.9 PAD (PERIPHERAL ARTERY DISEASE) (HCC): ICD-10-CM

## 2022-08-29 DIAGNOSIS — E78.2 HYPERCHOLESTEROLEMIA WITH HYPERTRIGLYCERIDEMIA: ICD-10-CM

## 2022-08-29 DIAGNOSIS — Z79.4 TYPE 2 DIABETES MELLITUS WITHOUT COMPLICATION, WITH LONG-TERM CURRENT USE OF INSULIN (HCC): ICD-10-CM

## 2022-08-29 PROCEDURE — 3051F HG A1C>EQUAL 7.0%<8.0%: CPT | Performed by: NURSE PRACTITIONER

## 2022-08-29 PROCEDURE — 99214 OFFICE O/P EST MOD 30 MIN: CPT | Performed by: NURSE PRACTITIONER

## 2022-08-29 RX ORDER — BUMETANIDE 1 MG/1
TABLET ORAL
Qty: 90 TABLET | Refills: 1 | Status: SHIPPED | OUTPATIENT
Start: 2022-08-29

## 2022-08-29 RX ORDER — OMEGA-3-ACID ETHYL ESTERS 1 G/1
CAPSULE, LIQUID FILLED ORAL
Qty: 360 CAPSULE | Refills: 1 | Status: SHIPPED | OUTPATIENT
Start: 2022-08-29

## 2022-08-29 RX ORDER — ROSUVASTATIN CALCIUM 40 MG/1
40 TABLET, COATED ORAL DAILY
Qty: 90 TABLET | Refills: 1 | Status: SHIPPED | OUTPATIENT
Start: 2022-08-29

## 2022-08-29 SDOH — ECONOMIC STABILITY: FOOD INSECURITY: WITHIN THE PAST 12 MONTHS, YOU WORRIED THAT YOUR FOOD WOULD RUN OUT BEFORE YOU GOT MONEY TO BUY MORE.: NEVER TRUE

## 2022-08-29 SDOH — ECONOMIC STABILITY: FOOD INSECURITY: WITHIN THE PAST 12 MONTHS, THE FOOD YOU BOUGHT JUST DIDN'T LAST AND YOU DIDN'T HAVE MONEY TO GET MORE.: NEVER TRUE

## 2022-08-29 ASSESSMENT — PATIENT HEALTH QUESTIONNAIRE - PHQ9
2. FEELING DOWN, DEPRESSED OR HOPELESS: 0
SUM OF ALL RESPONSES TO PHQ QUESTIONS 1-9: 0
SUM OF ALL RESPONSES TO PHQ QUESTIONS 1-9: 0
1. LITTLE INTEREST OR PLEASURE IN DOING THINGS: 0
SUM OF ALL RESPONSES TO PHQ QUESTIONS 1-9: 0
SUM OF ALL RESPONSES TO PHQ9 QUESTIONS 1 & 2: 0
SUM OF ALL RESPONSES TO PHQ QUESTIONS 1-9: 0

## 2022-08-29 ASSESSMENT — SOCIAL DETERMINANTS OF HEALTH (SDOH): HOW HARD IS IT FOR YOU TO PAY FOR THE VERY BASICS LIKE FOOD, HOUSING, MEDICAL CARE, AND HEATING?: NOT HARD AT ALL

## 2022-08-29 NOTE — PROGRESS NOTES
Latisha Rollins (:  1970) is a 47 y.o. male,Established patient, here for evaluation of the following chief complaint(s):  3 Month Follow-Up (DM)         ASSESSMENT/PLAN:  1. Type 2 diabetes mellitus without complication, with long-term current use of insulin ([facility]  2. Hypercholesterolemia with hypertriglyceridemia  -     omega-3 acid ethyl esters (LOVAZA) 1 g capsule; take 2 capsules by mouth twice a day, Disp-360 capsule, R-1Normal  -     rosuvastatin (CRESTOR) 40 MG tablet; Take 1 tablet by mouth daily, Disp-90 tablet, R-1Normal  3. PAD (peripheral artery disease) ([facility])  -     bumetanide (BUMEX) 1 MG tablet; take 1 tablet by mouth once daily, Disp-90 tablet, R-1Normal    Increase Ozempic to 1 mg injected weekly. Great job on A1c! Continue glipizide, metformin, Humalog and Lantus at previous doses for diabetic control. If A1c continues to go down, first medication to wean or eliminate will be the mealtime Humalog. Continue rosuvastatin for cholesterol. Continue losartan, carvedilol, and Bumex for blood pressure, PAD, and swelling. Follow up in 3 months. Return in about 3 months (around 2022). Subjective   SUBJECTIVE/OBJECTIVE:  HPI    Diabetes: Last seen 22  Currently taking glipizide 5 mg 1 tablet twice daily, Metformin 2000 mg daily, Ozempic 0.5 mg injected weekly, Humalog 10 units plus sliding scale with meals and Lantus 30 units at night. Averaging a total of 10-13 units with meals. He has lost 11 lbs since May. He is trying to watch what he eats, but not diabetic diet specifically. He does have CGM. Most recent A1c was 7.2%, was 8.1% in May, 9.3% on 3/11/2022. Patient takes rosuvastatin 40 mg daily for hyperlipidemia with hypertriglyceridemia. Patient has longstanding history of uncontrolled diabetes, chronic venous insufficiency with stasis dermatitis. Admitted for cellulitis in Dec 2021. 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 Treated with bactrim. Venous Dopplers at that time negative for DVT. 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 Vascularity right lower extremity otherwise unremarkable. In light of the history of gangrene in the right lower extremity aortofemoral angiography was recommended for further evaluation. He followed up in wound care, who then referred him to cardiology. First saw Dr. Keyona Steven in February 2022, and angiography which showed mild diffuse peripheral arterial disease. He was recommended aggressive medical management and better diabetic control. He does take a baby aspirin daily. Takes Losartan, Bumex, Carvedilol. BP today is 1138/76. He doesn't routinely check his BP at home. He smokes 1 pack of cigarettes every 3-4 days. Denies chest pain or shortness of breath. According to cardiology note in February, he was recommended follow-up 3 months, however Chey Nehemiah feels he does not need to see them anymore, and is asked us to refill his carvedilol. Previously seen by Dr Leonard Haywood through wound clinic. Last follow up 3/8/22. Now seeing as needed. I  treated for cellulitis 4/12/22 with Bactrim, symptoms improved and have not returned. Taking Gabapentin 100 mg 1 cap twice daily for for numbness/tinging in both feet, which is effective at controlling symptoms. Review of Systems   All other systems reviewed and are negative. Objective   Physical Exam  Vitals and nursing note reviewed. HENT:      Head: Normocephalic. Right Ear: External ear normal.      Left Ear: External ear normal.   Eyes:      Pupils: Pupils are equal, round, and reactive to light. Neck:      Trachea: No tracheal deviation. Cardiovascular:      Rate and Rhythm: Normal rate and regular rhythm. Heart sounds: Normal heart sounds. No murmur heard. No friction rub. No gallop. Pulmonary:      Effort: Pulmonary effort is normal. No respiratory distress. Breath sounds: Normal breath sounds. No wheezing or rales. Chest:      Chest wall: No tenderness. Abdominal:      General: Bowel sounds are normal.      Palpations: Abdomen is soft. Tenderness: There is no abdominal tenderness. There is no rebound. Genitourinary:     Penis: Normal.    Musculoskeletal:         General: Normal range of motion. Cervical back: Full passive range of motion without pain, normal range of motion and neck supple. Lymphadenopathy:      Cervical: No cervical adenopathy. Skin:     General: Skin is warm and dry. Findings: No rash. Neurological:      Mental Status: He is alert and oriented to person, place, and time. Psychiatric:         Judgment: Judgment normal.                An electronic signature was used to authenticate this note.     --ANA Srinivasan - CNP

## 2022-08-29 NOTE — PATIENT INSTRUCTIONS
Increase Ozempic to 1 mg injected weekly. Great job on A1c! Other meds the same. Follow up in 3 months.

## 2022-08-31 DIAGNOSIS — I10 ESSENTIAL HYPERTENSION: ICD-10-CM

## 2022-09-01 RX ORDER — LOSARTAN POTASSIUM 100 MG/1
TABLET ORAL
Qty: 90 TABLET | Refills: 1 | Status: SHIPPED | OUTPATIENT
Start: 2022-09-01

## 2022-09-30 DIAGNOSIS — E11.65 UNCONTROLLED DIABETES MELLITUS WITH HYPERGLYCEMIA (HCC): ICD-10-CM

## 2022-10-04 RX ORDER — METFORMIN HYDROCHLORIDE 500 MG/1
TABLET, EXTENDED RELEASE ORAL
Qty: 360 TABLET | Refills: 1 | Status: SHIPPED | OUTPATIENT
Start: 2022-10-04

## 2022-10-31 DIAGNOSIS — K21.9 GASTROESOPHAGEAL REFLUX DISEASE, UNSPECIFIED WHETHER ESOPHAGITIS PRESENT: ICD-10-CM

## 2022-10-31 RX ORDER — OMEPRAZOLE 20 MG/1
CAPSULE, DELAYED RELEASE ORAL
Qty: 90 CAPSULE | Refills: 1 | Status: SHIPPED | OUTPATIENT
Start: 2022-10-31

## 2022-11-08 DIAGNOSIS — I10 ESSENTIAL HYPERTENSION: ICD-10-CM

## 2022-11-08 DIAGNOSIS — I73.9 PAD (PERIPHERAL ARTERY DISEASE) (HCC): ICD-10-CM

## 2022-11-10 RX ORDER — CARVEDILOL 6.25 MG/1
TABLET ORAL
Qty: 180 TABLET | Refills: 1 | Status: SHIPPED | OUTPATIENT
Start: 2022-11-10

## 2022-11-16 DIAGNOSIS — E55.9 VITAMIN D DEFICIENCY: ICD-10-CM

## 2022-11-17 RX ORDER — ERGOCALCIFEROL 1.25 MG/1
CAPSULE ORAL
Qty: 4 CAPSULE | Refills: 2 | Status: SHIPPED | OUTPATIENT
Start: 2022-11-17

## 2022-11-17 RX ORDER — INSULIN GLARGINE 100 [IU]/ML
INJECTION, SOLUTION SUBCUTANEOUS
Qty: 15 ML | Refills: 2 | Status: SHIPPED | OUTPATIENT
Start: 2022-11-17

## 2023-03-21 ENCOUNTER — PATIENT MESSAGE (OUTPATIENT)
Dept: FAMILY MEDICINE CLINIC | Age: 53
End: 2023-03-21

## 2023-03-21 ENCOUNTER — OFFICE VISIT (OUTPATIENT)
Dept: FAMILY MEDICINE CLINIC | Age: 53
End: 2023-03-21

## 2023-03-21 VITALS
SYSTOLIC BLOOD PRESSURE: 164 MMHG | BODY MASS INDEX: 39.44 KG/M2 | HEART RATE: 86 BPM | RESPIRATION RATE: 16 BRPM | WEIGHT: 291.2 LBS | DIASTOLIC BLOOD PRESSURE: 92 MMHG | OXYGEN SATURATION: 98 % | HEIGHT: 72 IN | TEMPERATURE: 98.1 F

## 2023-03-21 DIAGNOSIS — I73.9 PAD (PERIPHERAL ARTERY DISEASE) (HCC): ICD-10-CM

## 2023-03-21 DIAGNOSIS — E11.65 UNCONTROLLED TYPE 2 DIABETES MELLITUS WITH HYPERGLYCEMIA (HCC): Primary | ICD-10-CM

## 2023-03-21 DIAGNOSIS — I10 ESSENTIAL HYPERTENSION: ICD-10-CM

## 2023-03-21 DIAGNOSIS — E11.21 DIABETIC NEPHROPATHY ASSOCIATED WITH TYPE 2 DIABETES MELLITUS (HCC): ICD-10-CM

## 2023-03-21 LAB — HBA1C MFR BLD: 12.6 % (ref 4.3–5.7)

## 2023-03-21 RX ORDER — GLIPIZIDE 5 MG/1
5 TABLET ORAL
Qty: 180 TABLET | Refills: 1 | Status: SHIPPED | OUTPATIENT
Start: 2023-03-21

## 2023-03-21 RX ORDER — METFORMIN HYDROCHLORIDE 500 MG/1
TABLET, EXTENDED RELEASE ORAL
Qty: 360 TABLET | Refills: 1 | Status: SHIPPED | OUTPATIENT
Start: 2023-03-21

## 2023-03-21 RX ORDER — HYDROCHLOROTHIAZIDE 25 MG/1
25 TABLET ORAL EVERY MORNING
Qty: 30 TABLET | Refills: 5 | Status: SHIPPED | OUTPATIENT
Start: 2023-03-21

## 2023-03-21 RX ORDER — GABAPENTIN 100 MG/1
100 CAPSULE ORAL 2 TIMES DAILY
Qty: 180 CAPSULE | Refills: 0 | Status: SHIPPED | OUTPATIENT
Start: 2023-03-21 | End: 2023-06-19

## 2023-03-21 SDOH — ECONOMIC STABILITY: HOUSING INSECURITY
IN THE LAST 12 MONTHS, WAS THERE A TIME WHEN YOU DID NOT HAVE A STEADY PLACE TO SLEEP OR SLEPT IN A SHELTER (INCLUDING NOW)?: NO

## 2023-03-21 SDOH — ECONOMIC STABILITY: FOOD INSECURITY: WITHIN THE PAST 12 MONTHS, THE FOOD YOU BOUGHT JUST DIDN'T LAST AND YOU DIDN'T HAVE MONEY TO GET MORE.: NEVER TRUE

## 2023-03-21 SDOH — ECONOMIC STABILITY: INCOME INSECURITY: HOW HARD IS IT FOR YOU TO PAY FOR THE VERY BASICS LIKE FOOD, HOUSING, MEDICAL CARE, AND HEATING?: NOT HARD AT ALL

## 2023-03-21 SDOH — ECONOMIC STABILITY: FOOD INSECURITY: WITHIN THE PAST 12 MONTHS, YOU WORRIED THAT YOUR FOOD WOULD RUN OUT BEFORE YOU GOT MONEY TO BUY MORE.: NEVER TRUE

## 2023-03-21 ASSESSMENT — PATIENT HEALTH QUESTIONNAIRE - PHQ9
1. LITTLE INTEREST OR PLEASURE IN DOING THINGS: 0
SUM OF ALL RESPONSES TO PHQ9 QUESTIONS 1 & 2: 0
SUM OF ALL RESPONSES TO PHQ QUESTIONS 1-9: 0
2. FEELING DOWN, DEPRESSED OR HOPELESS: 0
SUM OF ALL RESPONSES TO PHQ QUESTIONS 1-9: 0

## 2023-03-21 NOTE — PATIENT INSTRUCTIONS
HTN: Add hydrochlorothiazide one tablet daily. Home BP monitoring 2 times daily for 2 weeks and report to office with numbers. Follow up in 4 weeks. Thank you   Thank you for trusting us with your healthcare needs. You may receive a survey regarding today's visit. It would help us out if you would take a few moments to provide your feedback. We value your input. Please bring in ALL medications BOTTLES, including inhalers, herbal supplements, over the counter, prescribed & non-prescribed medicine. The office would like actual medication bottles and a list.   Please note our OFFICE POLICIES:   Prior to getting your labs drawn, please check with your insurance company for benefits and eligibility of lab services. Often, insurance companies cover certain tests for preventative visits only. It is patient's responsibility to see what is covered. We are unable to change a diagnosis after the test has been performed. Lab orders will not be re-printed. Please hold onto your original lab orders and take them to your lab to be completed. If you no show your scheduled appointment three times, you will be dismissed from this practice. Reschedules must be completed 24 hours prior to your schedule appointment. If the list below has been completed, PLEASE FAX RECORDS TO OUR OFFICE @ [phone].  Once the records have been received we will update your records at our office:  Health Maintenance Due   Topic Date Due    HIV screen  Never done    Diabetic retinal exam  Never done    Hepatitis C screen  Never done    Colorectal Cancer Screen  Never done    Diabetic foot exam  03/25/2021    COVID-19 Vaccine (4 - Booster for Moderna series) 03/24/2022    Flu vaccine (1) 08/01/2022

## 2023-03-21 NOTE — PROGRESS NOTES
Health Maintenance Due   Topic Date Due    HIV screen  Never done    Diabetic retinal exam  Never done    Hepatitis C screen  Never done    Colorectal Cancer Screen  Never done    Diabetic foot exam  03/25/2021    COVID-19 Vaccine (4 - Booster for Moderna series) 03/24/2022    Flu vaccine (1) 08/01/2022
I saw and evaluated the patient, performing the key elements of the service. I discussed the findings, assessment and plan with the resident and agree with the resident's findings and plan as documented in the resident's note.
grossly non-focal.  Psychiatric: Alert and oriented, thought content appropriate, normal insight  Capillary Refill: Brisk,< 3 seconds   Peripheral Pulses: +2 palpable, equal bilaterally       Vitals:    03/21/23 0818 03/21/23 0828   BP: (!) 160/92 (!) 164/92   Site: Left Upper Arm Left Upper Arm   Position: Sitting Sitting   Cuff Size: Large Adult Large Adult   Pulse: 86    Resp: 16    Temp: 98.1 °F (36.7 °C)    TempSrc: Temporal    SpO2: 98%    Weight: 291 lb 3.2 oz (132.1 kg)    Height: 6' (1.829 m)          An electronic signature was used to authenticate this note.     --Lázaro Fonseca MD

## 2023-03-22 NOTE — TELEPHONE ENCOUNTER
From: Bety Downing  To: Jeff Montano MD  Sent: 3/21/2023 5:58 PM EDT  Subject: Georgeana Reusing    I was Jardience before and the out come was painful from the yeast infection I was for sure you said we was going to try Ellerbe Tavo I will not do Georgeana Reusing again please feel free to call me if needed [phone]     Thank You  Padmini Mead

## 2023-03-23 NOTE — TELEPHONE ENCOUNTER
Patient requesting trying Sherrye Frees. Reports he had yeast infection on empagliflozin. Told patient they are of the same drug class and Sherrye Frees was not preferred. Will order and try to get patient started on farxiga.   Electronically signed by Gabbi Cullen MD on 3/23/2023 at 9:25 AM

## 2023-03-24 NOTE — TELEPHONE ENCOUNTER
Can we please have patient schedule with first available resident this coming week to discuss diabetes medication options?   Thank you,  Electronically signed by Nayeli Clark MD on 3/24/2023 at 10:49 AM

## 2023-04-04 ENCOUNTER — HOSPITAL ENCOUNTER (EMERGENCY)
Age: 53
Discharge: LEFT BEFORE DISCHARGE | End: 2023-04-04
Payer: COMMERCIAL

## 2023-04-04 VITALS
WEIGHT: 287 LBS | TEMPERATURE: 99.1 F | DIASTOLIC BLOOD PRESSURE: 78 MMHG | BODY MASS INDEX: 38.92 KG/M2 | HEART RATE: 92 BPM | OXYGEN SATURATION: 95 % | SYSTOLIC BLOOD PRESSURE: 167 MMHG | RESPIRATION RATE: 20 BRPM

## 2023-04-04 DIAGNOSIS — L03.119 CELLULITIS AND ABSCESS OF LEG: Primary | ICD-10-CM

## 2023-04-04 DIAGNOSIS — L02.419 CELLULITIS AND ABSCESS OF LEG: Primary | ICD-10-CM

## 2023-04-04 DIAGNOSIS — E11.65 UNCONTROLLED TYPE 2 DIABETES MELLITUS WITH HYPERGLYCEMIA (HCC): ICD-10-CM

## 2023-04-04 LAB
BASOPHILS ABSOLUTE: 0 THOU/MM3 (ref 0–0.1)
BASOPHILS NFR BLD AUTO: 0.6 %
BUN BLD-MCNC: 31 MG/DL (ref 8–26)
CHLORIDE BLD-SCNC: 94 MEQ/L (ref 98–109)
CO2 BLD CALC-SCNC: 29 MEQ/L (ref 23–33)
CREAT BLD-MCNC: 1.4 MG/DL (ref 0.5–1.2)
EOSINOPHIL NFR BLD AUTO: 0.4 %
EOSINOPHILS ABSOLUTE: 0 THOU/MM3 (ref 0–0.4)
ERYTHROCYTE [DISTWIDTH] IN BLOOD BY AUTOMATED COUNT: 13.8 % (ref 11.5–14.5)
GFR SERPL CREATININE-BSD FRML MDRD: 60 ML/MIN/1.73M2
GLUCOSE BLD-MCNC: 312 MG/DL (ref 70–108)
HCT VFR BLD AUTO: 44.3 % (ref 42–52)
HGB BLD-MCNC: 15.3 GM/DL (ref 14–18)
IMM GRANULOCYTES # BLD AUTO: 0.02 THOU/MM3 (ref 0–0.07)
IMM GRANULOCYTES NFR BLD AUTO: 0.4 %
LYMPHOCYTES ABSOLUTE: 1 THOU/MM3 (ref 1–4.8)
LYMPHOCYTES NFR BLD AUTO: 20.8 %
MCH RBC QN AUTO: 29.7 PG (ref 27–31)
MCHC RBC AUTO-ENTMCNC: 34.5 GM/DL (ref 33–37)
MCV RBC AUTO: 86 FL (ref 80–94)
MONOCYTES ABSOLUTE: 0.6 THOU/MM3 (ref 0.4–1.3)
MONOCYTES NFR BLD AUTO: 12.5 %
NEUTROPHILS NFR BLD AUTO: 65.3 %
NRBC BLD AUTO-RTO: 0 /100 WBC
PLATELET # BLD AUTO: 213 THOU/MM3 (ref 130–400)
PMV BLD AUTO: 10.9 FL (ref 7.4–10.4)
POTASSIUM BLD-SCNC: 4.3 MEQ/L (ref 3.5–4.9)
RBC # BLD AUTO: 5.15 MILL/MM3 (ref 4.7–6.1)
SEGMENTED NEUTROPHILS ABSOLUTE COUNT: 3.3 THOU/MM3 (ref 1.8–7.7)
SODIUM BLD-SCNC: 131 MEQ/L (ref 138–146)
WBC # BLD AUTO: 5 THOU/MM3 (ref 4.8–10.8)

## 2023-04-04 PROCEDURE — 80051 ELECTROLYTE PANEL: CPT

## 2023-04-04 PROCEDURE — 82565 ASSAY OF CREATININE: CPT

## 2023-04-04 PROCEDURE — 84520 ASSAY OF UREA NITROGEN: CPT

## 2023-04-04 PROCEDURE — 99213 OFFICE O/P EST LOW 20 MIN: CPT

## 2023-04-04 PROCEDURE — 82947 ASSAY GLUCOSE BLOOD QUANT: CPT

## 2023-04-04 PROCEDURE — 85025 COMPLETE CBC W/AUTO DIFF WBC: CPT

## 2023-04-04 RX ORDER — CLINDAMYCIN HYDROCHLORIDE 300 MG/1
300 CAPSULE ORAL 3 TIMES DAILY
Qty: 30 CAPSULE | Refills: 0 | Status: SHIPPED | OUTPATIENT
Start: 2023-04-04 | End: 2023-04-14

## 2023-04-04 ASSESSMENT — PAIN DESCRIPTION - ORIENTATION: ORIENTATION: LEFT

## 2023-04-04 ASSESSMENT — PAIN DESCRIPTION - PAIN TYPE: TYPE: ACUTE PAIN

## 2023-04-04 ASSESSMENT — ENCOUNTER SYMPTOMS
VOMITING: 0
NAUSEA: 0
SHORTNESS OF BREATH: 0

## 2023-04-04 ASSESSMENT — PAIN - FUNCTIONAL ASSESSMENT
PAIN_FUNCTIONAL_ASSESSMENT: 0-10
PAIN_FUNCTIONAL_ASSESSMENT: ACTIVITIES ARE NOT PREVENTED

## 2023-04-04 ASSESSMENT — PAIN SCALES - GENERAL: PAINLEVEL_OUTOF10: 5

## 2023-04-04 ASSESSMENT — PAIN DESCRIPTION - FREQUENCY: FREQUENCY: CONTINUOUS

## 2023-04-04 ASSESSMENT — PAIN DESCRIPTION - LOCATION: LOCATION: HIP

## 2023-04-04 ASSESSMENT — PAIN DESCRIPTION - DESCRIPTORS: DESCRIPTORS: DISCOMFORT

## 2023-04-04 NOTE — Clinical Note
The patient has decided to leave against medical advice, because he refuses transfer to the ER. He has normal mental status and adequate capacity to make medical decisions. The patient refuses hospital admission and wants to be discharged. T he risks have been explained to the patient, including worsening illness, chronic pain, permanent disability, and death. The benefits of admission have also been explained, including the availability and proximity of nurses, physicians, monitoring , diagnostic testing, and treatment. The patient was able to understand and state the risks and benefits of hospital admission. This was witnessed by nurse Verito Mccoy and me. He had the opportunity to ask questions about his medical condition. The patient was treated to the extent that he would allow, and knows that he may return for care at any time.

## 2023-04-04 NOTE — ED TRIAGE NOTES
Patient to room with c/o cyst to left hip, first noticed two days ago. States, \"My wife tried to pop it yesterday, but not much came out. \" Minimal drainage at this time.

## 2023-04-04 NOTE — ED PROVIDER NOTES
Pneumococcal, PPSV23, PNEUMOVAX 23, (age 2y+), SC/IM, 0.5mL 2021    TDaP, ADACEL (age 10y-63y), Rhae Dimes (age 10y+), IM, 0.5mL 2021    Zoster Recombinant (Shingrix) 2020, 2021       FAMILY HISTORY     Patient's family history includes Diabetes in his brother. SOCIAL HISTORY     Patient  reports that he has quit smoking. His smoking use included cigarettes. He started smoking about 39 years ago. He has a 9.00 pack-year smoking history. He has quit using smokeless tobacco. He reports current alcohol use. He reports that he does not use drugs. PHYSICAL EXAM     ED TRIAGE VITALS  BP: (!) 167/78, Temp: 99.1 °F (37.3 °C), Heart Rate: 92, Resp: 20, SpO2: 95 %,Estimated body mass index is 38.92 kg/m² as calculated from the following:    Height as of 3/21/23: 6' (1.829 m). Weight as of this encounter: 287 lb (130.2 kg). ,No LMP for male patient. Physical Exam  Vitals and nursing note reviewed. Constitutional:       General: He is not in acute distress. Appearance: He is well-developed. He is not diaphoretic. Eyes:      Conjunctiva/sclera:      Right eye: Right conjunctiva is not injected. Left eye: Left conjunctiva is not injected. Pupils: Pupils are equal.   Cardiovascular:      Rate and Rhythm: Normal rate and regular rhythm. Heart sounds: No murmur heard. Pulmonary:      Effort: Pulmonary effort is normal. No respiratory distress. Breath sounds: Normal breath sounds. Musculoskeletal:      Cervical back: Normal range of motion. Right lower le+ Edema present. Left lower le+ Edema present. Comments: Erythema noted to the lower portion of bilateral lower extremities with multiple open ulcer type wounds to each extremity as well   Skin:     General: Skin is warm. Findings: Abscess present. No rash. Neurological:      Mental Status: He is alert and oriented to person, place, and time.    Psychiatric:         Behavior: Behavior

## 2023-05-16 ENCOUNTER — OFFICE VISIT (OUTPATIENT)
Dept: FAMILY MEDICINE CLINIC | Age: 53
End: 2023-05-16
Payer: COMMERCIAL

## 2023-05-16 VITALS
OXYGEN SATURATION: 100 % | DIASTOLIC BLOOD PRESSURE: 80 MMHG | TEMPERATURE: 97 F | BODY MASS INDEX: 37.98 KG/M2 | HEART RATE: 81 BPM | SYSTOLIC BLOOD PRESSURE: 152 MMHG | WEIGHT: 280.4 LBS | RESPIRATION RATE: 16 BRPM | HEIGHT: 72 IN

## 2023-05-16 DIAGNOSIS — E11.65 UNCONTROLLED TYPE 2 DIABETES MELLITUS WITH HYPERGLYCEMIA (HCC): ICD-10-CM

## 2023-05-16 DIAGNOSIS — I10 ESSENTIAL HYPERTENSION: Primary | ICD-10-CM

## 2023-05-16 PROCEDURE — 99214 OFFICE O/P EST MOD 30 MIN: CPT | Performed by: STUDENT IN AN ORGANIZED HEALTH CARE EDUCATION/TRAINING PROGRAM

## 2023-05-16 PROCEDURE — 3078F DIAST BP <80 MM HG: CPT | Performed by: STUDENT IN AN ORGANIZED HEALTH CARE EDUCATION/TRAINING PROGRAM

## 2023-05-16 PROCEDURE — 3046F HEMOGLOBIN A1C LEVEL >9.0%: CPT | Performed by: STUDENT IN AN ORGANIZED HEALTH CARE EDUCATION/TRAINING PROGRAM

## 2023-05-16 PROCEDURE — 3074F SYST BP LT 130 MM HG: CPT | Performed by: STUDENT IN AN ORGANIZED HEALTH CARE EDUCATION/TRAINING PROGRAM

## 2023-05-16 RX ORDER — SEMAGLUTIDE 1.34 MG/ML
INJECTION, SOLUTION SUBCUTANEOUS
Qty: 1 ADJUSTABLE DOSE PRE-FILLED PEN SYRINGE | Refills: 0 | Status: SHIPPED | OUTPATIENT
Start: 2023-05-16 | End: 2023-07-15

## 2023-05-16 RX ORDER — AMLODIPINE BESYLATE 5 MG/1
5 TABLET ORAL DAILY
Qty: 30 TABLET | Refills: 5 | Status: SHIPPED | OUTPATIENT
Start: 2023-05-16

## 2023-05-16 ASSESSMENT — ENCOUNTER SYMPTOMS
COLOR CHANGE: 1
DIARRHEA: 0
EYE PAIN: 0
SHORTNESS OF BREATH: 0
PHOTOPHOBIA: 0
NAUSEA: 0
WHEEZING: 0
SORE THROAT: 0
BACK PAIN: 0
BLOOD IN STOOL: 0
CONSTIPATION: 0
COUGH: 0

## 2023-05-16 NOTE — PROGRESS NOTES
[address] [facility] [address]  Dept: [phone]  Dept Fax: [phone]  Loc: [phone]      Assessment & Plan   DX: Uncontrolled diabetes mellitis, hypertension. Will continue current medication regiment in addition to the following additions: Will restart ozempic 0.25 or 0.5MG/DOS today. Will start Amlodipine 5 MG tablet today. Patient is agreeable with plan and will schedule follow up in one month. Patient Instructions   Thank you   Thank you for trusting us with your healthcare needs. You may receive a survey regarding today's visit. It would help us out if you would take a few moments to provide your feedback. We value your input. Please bring in ALL medications BOTTLES, including inhalers, herbal supplements, over the counter, prescribed & non-prescribed medicine. The office would like actual medication bottles and a list.   Please note our OFFICE POLICIES:   Prior to getting your labs drawn, please check with your insurance company for benefits and eligibility of lab services. Often, insurance companies cover certain tests for preventative visits only. It is patient's responsibility to see what is covered. We are unable to change a diagnosis after the test has been performed. Lab orders will not be re-printed. Please hold onto your original lab orders and take them to your lab to be completed. If you no show your scheduled appointment three times, you will be dismissed from this practice. Reschedules must be completed 24 hours prior to your schedule appointment. If the list below has been completed, PLEASE FAX RECORDS TO OUR OFFICE @ [phone].  Once the records have been received we will update your records at our office:  Health Maintenance Due   Topic Date Due    HIV screen  Never done    Diabetic retinal exam  Never done    Hepatitis C screen  Never done    Colorectal Cancer
[id number] [facility] Elvira ARMENTA [address] [address]  Dept: [phone]  Loc: [phone] (:  1970) is a 47 y.o. male,Established patient, here for evaluation of the following chief complaint(s):  1 Month Follow-Up (HTN)      ASSESSMENT/PLAN:  1. Essential hypertension  -     amLODIPine (NORVASC) 5 MG tablet; Take 1 tablet by mouth daily, Disp-30 tablet, R-5Normal  2. Uncontrolled type 2 diabetes mellitus with hyperglycemia (HCC)  -     Semaglutide,0.25 or 0.5MG/DOS, (OZEMPIC, 0.25 OR 0.5 MG/DOSE,) 2 MG/1.5ML SOPN; Inject 0.25 mg into the skin once a week for 30 days, THEN 0.5 mg once a week., Disp-1 Adjustable Dose Pre-filled Pen Syringe, R-0Normal    Essential hypertension: Chronic, uncontrolled. Continue losartan 100 and hydrochlorothiazide 25. Add amlodipine 5 mg. Patient declines home blood pressure monitoring. Uncontrolled type 2 diabetes with hyperglycemia and neuropathy of the feet bilaterally. Patient not interested in checking sugars. Continue glipizide 5 BID, metformin 2000 mg daily, actos 30mg. Will reattempt Ozempic, previously discontinued due to lack of availability. SGLT2's resulted in horrible yeast infections  Patient has struggled to afford GLP-1's as well as availability issues. Patient reports he has struggled to afford insulin as well. Not needing omeprazole if avoiding spicy foods. Patient appears disinterested in his care: We will continue to evaluate for possible confounding of underlying depression with future visits. Return in about 1 month (around 2023) for htn and dm. SUBJECTIVE/OBJECTIVE:  HPI  Presents for 1 month follow-up of hypertension and diabetes. Patient states he has no interest in improving these stating \"I do not care. \"  Reports taking all of his medications except insulin. Not testing sugars, states he is not interested.     Review of
[address] [facility] [place] WJayesh [address] [address]  Dept: [phone]  Loc: [phone]      Please see Resident note for complete HPI.      Follow up HTN and DM    ROS per Resident    Lab Results   Component Value Date    WBC 5.0 04/04/2023    HGB 15.3 04/04/2023    HCT 44.3 04/04/2023    MCV 86 04/04/2023     04/04/2023     Lab Results   Component Value Date     (L) 04/04/2023    K 4.3 04/04/2023     08/23/2022    CO2 29 08/23/2022    BUN 17 08/23/2022    CREATININE 1.4 (H) 04/04/2023    GLUCOSE 148 (H) 08/23/2022    CALCIUM 9.5 08/23/2022    PROT 7.0 02/18/2022    LABALBU 3.9 02/18/2022    BILITOT 0.4 02/18/2022    ALKPHOS 71 02/18/2022    AST 25 02/18/2022    ALT 39 02/18/2022    LABGLOM 60 04/04/2023     Lab Results   Component Value Date    LABA1C 12.6 (H) 03/21/2023     No results found for: EAG  Lab Results   Component Value Date    LABMICR 103.62 05/23/2022     Lab Results   Component Value Date    TSH 2.060 07/22/2021    T4FREE 1.04 03/28/2016     Lab Results   Component Value Date    CHOL 165 02/18/2022    CHOL 228 (H) 07/22/2021    CHOL 243 (H) 09/18/2020     Lab Results   Component Value Date    TRIG 542 (H) 02/18/2022    TRIG 657 (H) 07/22/2021    TRIG 1,157 (H) 09/18/2020     Lab Results   Component Value Date    HDL 27 08/23/2022    HDL 24 05/23/2022    HDL 23 02/18/2022     Lab Results   Component Value Date    LDLCALC 71 08/23/2022 [address] SEE BELOW 05/23/2022 [address] SEE BELOW 02/18/2022     No results found for: LABVLDL, VLDL  No results found for: CHOLHDLRATIO  No results found for: PSA, PSADIA    Health Maintenance Due   Topic Date Due    HIV screen  Never done    Diabetic retinal exam  Never done    Hepatitis C screen  Never done    Colorectal Cancer Screen  Never done    Diabetic foot exam  03/25/2021    COVID-19 Vaccine (4 - Booster for Moderna series) 03/24/2022    Diabetic Alb to Cr ratio
Health Maintenance Due   Topic Date Due    HIV screen  Never done    Diabetic retinal exam  Never done    Hepatitis C screen  Never done    Colorectal Cancer Screen  Never done    Diabetic foot exam  03/25/2021    COVID-19 Vaccine (4 - Booster for Moderna series) 03/24/2022    Diabetic Alb to Cr ratio (uACR) test  05/23/2023
5/16/2023 10:24 AM

## 2023-05-16 NOTE — PATIENT INSTRUCTIONS
Thank you   Thank you for trusting us with your healthcare needs. You may receive a survey regarding today's visit. It would help us out if you would take a few moments to provide your feedback. We value your input. Please bring in ALL medications BOTTLES, including inhalers, herbal supplements, over the counter, prescribed & non-prescribed medicine. The office would like actual medication bottles and a list.   Please note our OFFICE POLICIES:   Prior to getting your labs drawn, please check with your insurance company for benefits and eligibility of lab services. Often, insurance companies cover certain tests for preventative visits only. It is patient's responsibility to see what is covered. We are unable to change a diagnosis after the test has been performed. Lab orders will not be re-printed. Please hold onto your original lab orders and take them to your lab to be completed. If you no show your scheduled appointment three times, you will be dismissed from this practice. Reschedules must be completed 24 hours prior to your schedule appointment. If the list below has been completed, PLEASE FAX RECORDS TO OUR OFFICE @ [phone].  Once the records have been received we will update your records at our office:  Health Maintenance Due   Topic Date Due    HIV screen  Never done    Diabetic retinal exam  Never done    Hepatitis C screen  Never done    Colorectal Cancer Screen  Never done    Diabetic foot exam  03/25/2021    COVID-19 Vaccine (4 - Booster for Moderna series) 03/24/2022    Diabetic Alb to Cr ratio (uACR) test  05/23/2023

## 2023-05-21 DIAGNOSIS — E11.65 UNCONTROLLED TYPE 2 DIABETES MELLITUS WITH HYPERGLYCEMIA (HCC): ICD-10-CM

## 2023-05-23 RX ORDER — PIOGLITAZONEHYDROCHLORIDE 30 MG/1
TABLET ORAL
Qty: 30 TABLET | Refills: 1 | Status: SHIPPED | OUTPATIENT
Start: 2023-05-23

## 2023-05-23 NOTE — TELEPHONE ENCOUNTER
Patient's last appointment was : 5/16/23  Patient's next appointment is :  NONE  Last refilled: 3/29/23  [address] Verified    Lab Results   Component Value Date    LABA1C 12.6 (H) 03/21/2023     Lab Results   Component Value Date    CHOL 165 02/18/2022    TRIG 542 (H) 02/18/2022    HDL 27 08/23/2022    LDLCALC 71 08/23/2022     Lab Results   Component Value Date     (L) 04/04/2023    K 4.3 04/04/2023     08/23/2022    CO2 29 08/23/2022    BUN 17 08/23/2022    CREATININE 1.4 (H) 04/04/2023    GLUCOSE 148 (H) 08/23/2022    CALCIUM 9.5 08/23/2022    PROT 7.0 02/18/2022    LABALBU 3.9 02/18/2022    BILITOT 0.4 02/18/2022    ALKPHOS 71 02/18/2022    AST 25 02/18/2022    ALT 39 02/18/2022    LABGLOM 60 04/04/2023     Lab Results   Component Value Date    TSH 2.060 07/22/2021    T4FREE 1.04 03/28/2016     Lab Results   Component Value Date    WBC 5.0 04/04/2023    HGB 15.3 04/04/2023    HCT 44.3 04/04/2023    MCV 86 04/04/2023     04/04/2023

## 2023-08-20 DIAGNOSIS — E11.65 UNCONTROLLED TYPE 2 DIABETES MELLITUS WITH HYPERGLYCEMIA (HCC): ICD-10-CM

## 2023-08-22 RX ORDER — METFORMIN HYDROCHLORIDE 500 MG/1
TABLET, EXTENDED RELEASE ORAL
Qty: 360 TABLET | Refills: 1 | Status: SHIPPED | OUTPATIENT
Start: 2023-08-22

## 2023-08-22 NOTE — TELEPHONE ENCOUNTER
Script sent to pharmacy.   Thank you,  Electronically signed by Gi Mckeon MD on 8/22/2023 at 8:03 AM

## 2023-08-25 ENCOUNTER — OFFICE VISIT (OUTPATIENT)
Dept: FAMILY MEDICINE CLINIC | Age: 53
End: 2023-08-25
Payer: COMMERCIAL

## 2023-08-25 VITALS
WEIGHT: 272.8 LBS | SYSTOLIC BLOOD PRESSURE: 122 MMHG | HEART RATE: 81 BPM | HEIGHT: 72 IN | TEMPERATURE: 97.8 F | BODY MASS INDEX: 36.95 KG/M2 | DIASTOLIC BLOOD PRESSURE: 65 MMHG | OXYGEN SATURATION: 98 %

## 2023-08-25 DIAGNOSIS — E11.65 TYPE 2 DIABETES MELLITUS WITH HYPERGLYCEMIA, WITH LONG-TERM CURRENT USE OF INSULIN (HCC): ICD-10-CM

## 2023-08-25 DIAGNOSIS — E11.21 DIABETIC NEPHROPATHY ASSOCIATED WITH TYPE 2 DIABETES MELLITUS (HCC): ICD-10-CM

## 2023-08-25 DIAGNOSIS — Z79.4 TYPE 2 DIABETES MELLITUS WITH HYPERGLYCEMIA, WITH LONG-TERM CURRENT USE OF INSULIN (HCC): ICD-10-CM

## 2023-08-25 DIAGNOSIS — K21.9 GASTROESOPHAGEAL REFLUX DISEASE, UNSPECIFIED WHETHER ESOPHAGITIS PRESENT: ICD-10-CM

## 2023-08-25 DIAGNOSIS — E11.65 UNCONTROLLED TYPE 2 DIABETES MELLITUS WITH HYPERGLYCEMIA (HCC): ICD-10-CM

## 2023-08-25 DIAGNOSIS — E78.2 HYPERCHOLESTEROLEMIA WITH HYPERTRIGLYCERIDEMIA: ICD-10-CM

## 2023-08-25 DIAGNOSIS — I10 ESSENTIAL HYPERTENSION: Primary | ICD-10-CM

## 2023-08-25 DIAGNOSIS — Z11.4 SCREENING FOR HIV WITHOUT PRESENCE OF RISK FACTORS: ICD-10-CM

## 2023-08-25 LAB — HBA1C MFR BLD: 9.3 %

## 2023-08-25 PROCEDURE — 99214 OFFICE O/P EST MOD 30 MIN: CPT | Performed by: STUDENT IN AN ORGANIZED HEALTH CARE EDUCATION/TRAINING PROGRAM

## 2023-08-25 PROCEDURE — 3074F SYST BP LT 130 MM HG: CPT | Performed by: STUDENT IN AN ORGANIZED HEALTH CARE EDUCATION/TRAINING PROGRAM

## 2023-08-25 PROCEDURE — 3078F DIAST BP <80 MM HG: CPT | Performed by: STUDENT IN AN ORGANIZED HEALTH CARE EDUCATION/TRAINING PROGRAM

## 2023-08-25 PROCEDURE — 3046F HEMOGLOBIN A1C LEVEL >9.0%: CPT | Performed by: STUDENT IN AN ORGANIZED HEALTH CARE EDUCATION/TRAINING PROGRAM

## 2023-08-25 PROCEDURE — 83036 HEMOGLOBIN GLYCOSYLATED A1C: CPT | Performed by: STUDENT IN AN ORGANIZED HEALTH CARE EDUCATION/TRAINING PROGRAM

## 2023-08-25 RX ORDER — PIOGLITAZONEHYDROCHLORIDE 30 MG/1
30 TABLET ORAL DAILY
Qty: 90 TABLET | Refills: 1 | Status: CANCELLED | OUTPATIENT
Start: 2023-08-25

## 2023-08-25 RX ORDER — OMEPRAZOLE 20 MG/1
20 CAPSULE, DELAYED RELEASE ORAL
Qty: 30 CAPSULE | Refills: 0 | Status: SHIPPED | OUTPATIENT
Start: 2023-08-25

## 2023-08-25 RX ORDER — DULAGLUTIDE 0.75 MG/.5ML
0.75 INJECTION, SOLUTION SUBCUTANEOUS WEEKLY
Qty: 4 ADJUSTABLE DOSE PRE-FILLED PEN SYRINGE | Refills: 0 | Status: SHIPPED | OUTPATIENT
Start: 2023-08-25

## 2023-08-25 RX ORDER — CARVEDILOL 12.5 MG/1
12.5 TABLET ORAL 2 TIMES DAILY
Qty: 180 TABLET | Refills: 1 | Status: SHIPPED | OUTPATIENT
Start: 2023-08-25

## 2023-08-25 RX ORDER — DULAGLUTIDE 3 MG/.5ML
3 INJECTION, SOLUTION SUBCUTANEOUS WEEKLY
Qty: 4 ADJUSTABLE DOSE PRE-FILLED PEN SYRINGE | Refills: 0 | Status: SHIPPED | OUTPATIENT
Start: 2023-08-25

## 2023-08-25 RX ORDER — GABAPENTIN 100 MG/1
100 CAPSULE ORAL 2 TIMES DAILY
Qty: 180 CAPSULE | Refills: 0 | Status: SHIPPED | OUTPATIENT
Start: 2023-08-25 | End: 2023-11-23

## 2023-08-25 RX ORDER — SPIRONOLACTONE 25 MG/1
25 TABLET ORAL DAILY
Qty: 30 TABLET | Refills: 1 | Status: CANCELLED | OUTPATIENT
Start: 2023-08-25

## 2023-08-25 RX ORDER — ROSUVASTATIN CALCIUM 40 MG/1
40 TABLET, COATED ORAL DAILY
Qty: 90 TABLET | Refills: 3 | Status: SHIPPED | OUTPATIENT
Start: 2023-08-25

## 2023-08-25 RX ORDER — GLIPIZIDE 10 MG/1
10 TABLET ORAL
Qty: 90 TABLET | Refills: 3 | Status: SHIPPED | OUTPATIENT
Start: 2023-08-25

## 2023-08-25 RX ORDER — PIOGLITAZONEHYDROCHLORIDE 30 MG/1
30 TABLET ORAL DAILY
Qty: 90 TABLET | Refills: 3 | Status: SHIPPED | OUTPATIENT
Start: 2023-08-25

## 2023-08-25 RX ORDER — CARVEDILOL 6.25 MG/1
6.25 TABLET ORAL 2 TIMES DAILY
Qty: 180 TABLET | Refills: 3 | Status: CANCELLED | OUTPATIENT
Start: 2023-08-25

## 2023-08-25 RX ORDER — DULAGLUTIDE 1.5 MG/.5ML
1.5 INJECTION, SOLUTION SUBCUTANEOUS WEEKLY
Qty: 4 ADJUSTABLE DOSE PRE-FILLED PEN SYRINGE | Refills: 0 | Status: SHIPPED | OUTPATIENT
Start: 2023-08-25

## 2023-08-25 NOTE — PROGRESS NOTES
[address] [facility] [address] [place] [address]  Dept: [phone]  Loc: [address] (:  1970) is a 46 y.o. male,Established patient, here for evaluation of the following chief complaint(s):  Medication Refill (Pt here for rx refill on omeprazole. )      ASSESSMENT/PLAN:  1. Essential hypertension  -     carvedilol (COREG) 12.5 MG tablet; Take 1 tablet by mouth 2 times daily, Disp-180 tablet, R-1Normal  -     CBC with Auto Differential; Future  -     Comprehensive Metabolic Panel; Future  2. Type 2 diabetes mellitus with hyperglycemia, with long-term current use of insulin (HCC)  -     Dulaglutide (TRULICITY) 0.22 SV/6.9OH SOPN; Inject 0.75 mg into the skin once a week, Disp-4 Adjustable Dose Pre-filled Pen Syringe, R-0Normal  -     POCT glycosylated hemoglobin (Hb A1C)  -     glipiZIDE (GLUCOTROL) 10 MG tablet; Take 1 tablet by mouth 2 times daily (before meals), Disp-90 tablet, R-3Normal  -     pioglitazone (ACTOS) 30 MG tablet; Take 1 tablet by mouth daily, Disp-90 tablet, R-3Normal  -     dulaglutide (TRULICITY) 1.5 AD/3.7OU SC injection; Inject 0.5 mLs into the skin once a week, Disp-4 Adjustable Dose Pre-filled Pen Syringe, R-0After completes one month of 0.75 dose. Normal  -     Dulaglutide (TRULICITY) 3 HN/8.7DE SOPN; Inject 3 mg into the skin once a week, Disp-4 Adjustable Dose Pre-filled Pen Syringe, R-0After completes one month of 1.5 dose. Normal  -     Comprehensive Metabolic Panel; Future  -     Microalbumin / Creatinine Urine Ratio; Future  -     HM DIABETES FOOT EXAM  -     Hemoglobin A1C; Future  3. Uncontrolled type 2 diabetes mellitus with hyperglycemia (HCC)  -     Dulaglutide (TRULICITY) 9.00 AD/9.2WN SOPN; Inject 0.75 mg into the skin once a week, Disp-4 Adjustable Dose Pre-filled Pen Syringe, R-0Normal  -     glipiZIDE (GLUCOTROL) 10 MG tablet;  Take 1 tablet by mouth 2 times daily (before

## 2023-08-25 NOTE — PROGRESS NOTES
Attending attestation:  I personally performed and participated key or critical portions of the evaluation and management including personally performing the exam and medical decision making. I verify the accuracy of the documentation by the resident with the following addition or changes:  Here for follow-up today. Dizzy on amlodipine. Repeat blood pressure today seated is actually normal. Will D/C amlodipine and bump beta blocker slightly to keep medication list short. Check home readings and bring these in. Blood sugars are markedly improved from prior. Lost weight on GLP-1 but now off since out of stock. Will call in alternative GLP-1 and bump glipizide slightly but continue off insulin. Would usually re-check in 1 month but compromised on 3 month follow-up. Encouraged low carb diet. Approximately 50 grams per meal or about one hamburger bun. Admits 6 hotdogs for dinner and 4 pieces of toast for breakfast so current diet is really high carb. Also having heartburn. Grandson with recurrent h pylori. Will test prior to resuming PPI; has been off this for a bit. Does report diabetes is chronic and does not care about his. Denies depression. He is actually doing quit a bit better than he was though. Re-check in 3 months.         Electronically signed by Hudson Hartman MD on 8/25/2023 at 11:26 AM

## 2023-08-25 NOTE — PROGRESS NOTES
After pharmacist chart review, the following recommendations are made:    -Patient due for A1c (last was 12.6% on 3/21/23)    -Resume insulin therapy   Patient reported he was not taking insulin glargine 30 units and insulin aspart 8 units TID AC plus group 2 SS    -Stop glipizide if patient is compliant with restarting insulin regimen   Reduce likelihood for hypoglycemic episodes    -Monitor fasting blood glucose daily, at minimum    -If patient NOT compliant with resuming insulin, glipizide may be increased to 7.5 mg twice daily    -Stop pioglitazone d/t symptomatic CHF    -If hypertension remains uncontrolled, add spironolactone 25 mg daily   CHF benefit and amlodipine may increase peripheral edema in CHF    Cisco Urena, PharmD    For Pharmacy Admin Tracking Only    Program: [facility]  CPA in place:  No  Recommendation Provided To: Provider: 1 via Note to Provider  Intervention Detail: Dose Adjustment: 4, reason: Therapy Optimization  Intervention Accepted By: Provider: 1  Gap Closed?: Yes   Time Spent (min): 30

## 2023-09-25 DIAGNOSIS — K21.9 GASTROESOPHAGEAL REFLUX DISEASE, UNSPECIFIED WHETHER ESOPHAGITIS PRESENT: ICD-10-CM

## 2023-09-26 RX ORDER — OMEPRAZOLE 20 MG/1
20 CAPSULE, DELAYED RELEASE ORAL
Qty: 30 CAPSULE | Refills: 0 | Status: SHIPPED | OUTPATIENT
Start: 2023-09-26

## 2023-09-26 NOTE — TELEPHONE ENCOUNTER
This medication refill is regarding a electronic request. Refill requested by  [employer] . Requested Prescriptions     Pending Prescriptions Disp Refills    omeprazole (PRILOSEC) 20 MG delayed release capsule [Pharmacy Med Name: OMEPRAZOLE DR 20 MG CAPSULE] 30 capsule 0     Sig: take 1 capsule by mouth EVERY MORNING BEFORE BREAKFAST       Date of last visit: 8/25/2023   Date of next visit: None  Date of last refill: 8/25/2023  30/0      Last Lipid Panel:    Lab Results   Component Value Date/Time    CHOL 165 02/18/2022 07:22 AM    TRIG 542 02/18/2022 07:22 AM    HDL 27 08/23/2022 10:45 AM    LDLCALC 71 08/23/2022 10:45 AM     Last CMP:   Lab Results   Component Value Date     (L) 04/04/2023    K 4.3 04/04/2023     08/23/2022    CO2 29 08/23/2022    BUN 17 08/23/2022    CREATININE 1.4 (H) 04/04/2023    GLUCOSE 148 (H) 08/23/2022    CALCIUM 9.5 08/23/2022    PROT 7.0 02/18/2022    LABALBU 3.9 02/18/2022    BILITOT 0.4 02/18/2022    ALKPHOS 71 02/18/2022    AST 25 02/18/2022    ALT 39 02/18/2022    LABGLOM 60 04/04/2023       Last Thyroid:    Lab Results   Component Value Date    TSH 2.060 07/22/2021    T4FREE 1.04 03/28/2016     Last Hemoglobin A1C:    Lab Results   Component Value Date/Time    LABA1C 9.3 08/25/2023 11:48 AM    AVGG 156 08/23/2022 10:45 AM       Rx verified, ordered and set to EP.

## 2023-11-07 DIAGNOSIS — Z79.899 HIGH RISK MEDICATION USE: Primary | ICD-10-CM

## 2023-11-07 DIAGNOSIS — K21.9 GASTROESOPHAGEAL REFLUX DISEASE, UNSPECIFIED WHETHER ESOPHAGITIS PRESENT: ICD-10-CM

## 2023-11-07 RX ORDER — OMEPRAZOLE 20 MG/1
20 CAPSULE, DELAYED RELEASE ORAL
Qty: 90 CAPSULE | Refills: 1 | Status: SHIPPED | OUTPATIENT
Start: 2023-11-07

## 2023-11-07 NOTE — TELEPHONE ENCOUNTER
Patient's last appointment was : 8/25/2023 with our Pepe Prince  Patient's next appointment is : Visit date not found   Last refilled on: 9/26/2023  Which pharmacy does the script need sent to: [facility] [address]      Lab Results   Component Value Date    LABA1C 9.3 08/25/2023     Lab Results   Component Value Date    CHOL 165 02/18/2022    TRIG 542 (H) 02/18/2022    HDL 27 08/23/2022    LDLCALC 71 08/23/2022     Lab Results   Component Value Date     (L) 04/04/2023    K 4.3 04/04/2023     08/23/2022    CO2 29 08/23/2022    BUN 17 08/23/2022    CREATININE 1.4 (H) 04/04/2023    GLUCOSE 148 (H) 08/23/2022    CALCIUM 9.5 08/23/2022    PROT 7.0 02/18/2022    LABALBU 3.9 02/18/2022    BILITOT 0.4 02/18/2022    ALKPHOS 71 02/18/2022    AST 25 02/18/2022    ALT 39 02/18/2022    LABGLOM 60 04/04/2023     Lab Results   Component Value Date    TSH 2.060 07/22/2021    T4FREE 1.04 03/28/2016     Lab Results   Component Value Date    WBC 5.0 04/04/2023    HGB 15.3 04/04/2023    HCT 44.3 04/04/2023    MCV 86 04/04/2023     04/04/2023

## 2023-11-22 ENCOUNTER — NURSE ONLY (OUTPATIENT)
Dept: LAB | Age: 53
End: 2023-11-22

## 2023-11-22 DIAGNOSIS — Z79.4 TYPE 2 DIABETES MELLITUS WITH HYPERGLYCEMIA, WITH LONG-TERM CURRENT USE OF INSULIN (HCC): ICD-10-CM

## 2023-11-22 DIAGNOSIS — Z11.4 SCREENING FOR HIV WITHOUT PRESENCE OF RISK FACTORS: ICD-10-CM

## 2023-11-22 DIAGNOSIS — E11.65 TYPE 2 DIABETES MELLITUS WITH HYPERGLYCEMIA, WITH LONG-TERM CURRENT USE OF INSULIN (HCC): ICD-10-CM

## 2023-11-22 DIAGNOSIS — E11.65 UNCONTROLLED TYPE 2 DIABETES MELLITUS WITH HYPERGLYCEMIA (HCC): ICD-10-CM

## 2023-11-22 DIAGNOSIS — K21.9 GASTROESOPHAGEAL REFLUX DISEASE, UNSPECIFIED WHETHER ESOPHAGITIS PRESENT: ICD-10-CM

## 2023-11-22 DIAGNOSIS — I10 ESSENTIAL HYPERTENSION: ICD-10-CM

## 2023-11-22 LAB
ALBUMIN SERPL BCG-MCNC: 3.4 G/DL (ref 3.5–5.1)
ALP SERPL-CCNC: 92 U/L (ref 38–126)
ALT SERPL W/O P-5'-P-CCNC: 12 U/L (ref 11–66)
ANION GAP SERPL CALC-SCNC: 9 MEQ/L (ref 8–16)
AST SERPL-CCNC: 10 U/L (ref 5–40)
BASOPHILS ABSOLUTE: 0.1 THOU/MM3 (ref 0–0.1)
BASOPHILS NFR BLD AUTO: 1.1 %
BILIRUB SERPL-MCNC: 0.2 MG/DL (ref 0.3–1.2)
BUN SERPL-MCNC: 23 MG/DL (ref 7–22)
CALCIUM SERPL-MCNC: 9.1 MG/DL (ref 8.5–10.5)
CHLORIDE SERPL-SCNC: 96 MEQ/L (ref 98–111)
CO2 SERPL-SCNC: 28 MEQ/L (ref 23–33)
CREAT SERPL-MCNC: 1 MG/DL (ref 0.4–1.2)
DEPRECATED MEAN GLUCOSE BLD GHB EST-ACNC: 210 MG/DL (ref 70–126)
DEPRECATED RDW RBC AUTO: 43.5 FL (ref 35–45)
EOSINOPHIL NFR BLD AUTO: 3.9 %
EOSINOPHILS ABSOLUTE: 0.2 THOU/MM3 (ref 0–0.4)
ERYTHROCYTE [DISTWIDTH] IN BLOOD BY AUTOMATED COUNT: 13.4 % (ref 11.5–14.5)
GFR SERPL CREATININE-BSD FRML MDRD: > 60 ML/MIN/1.73M2
GLUCOSE SERPL-MCNC: 316 MG/DL (ref 70–108)
HBA1C MFR BLD HPLC: 9 % (ref 4.4–6.4)
HCT VFR BLD AUTO: 41.3 % (ref 42–52)
HGB BLD-MCNC: 13.5 GM/DL (ref 14–18)
IMM GRANULOCYTES # BLD AUTO: 0.01 THOU/MM3 (ref 0–0.07)
IMM GRANULOCYTES NFR BLD AUTO: 0.2 %
LYMPHOCYTES ABSOLUTE: 2 THOU/MM3 (ref 1–4.8)
LYMPHOCYTES NFR BLD AUTO: 35.1 %
MCH RBC QN AUTO: 28.8 PG (ref 26–33)
MCHC RBC AUTO-ENTMCNC: 32.7 GM/DL (ref 32.2–35.5)
MCV RBC AUTO: 88.2 FL (ref 80–94)
MONOCYTES ABSOLUTE: 0.4 THOU/MM3 (ref 0.4–1.3)
MONOCYTES NFR BLD AUTO: 8 %
NEUTROPHILS NFR BLD AUTO: 51.7 %
NRBC BLD AUTO-RTO: 0 /100 WBC
PLATELET # BLD AUTO: 241 THOU/MM3 (ref 130–400)
PMV BLD AUTO: 11 FL (ref 9.4–12.4)
POTASSIUM SERPL-SCNC: 4.3 MEQ/L (ref 3.5–5.2)
PROT SERPL-MCNC: 7.5 G/DL (ref 6.1–8)
RBC # BLD AUTO: 4.68 MILL/MM3 (ref 4.7–6.1)
SEGMENTED NEUTROPHILS ABSOLUTE COUNT: 2.9 THOU/MM3 (ref 1.8–7.7)
SODIUM SERPL-SCNC: 133 MEQ/L (ref 135–145)
WBC # BLD AUTO: 5.6 THOU/MM3 (ref 4.8–10.8)

## 2023-11-24 LAB — HIV 1+2 AB+HIV1 P24 AG SERPL QL IA: NONREACTIVE

## 2023-11-27 ENCOUNTER — NURSE ONLY (OUTPATIENT)
Dept: LAB | Age: 53
End: 2023-11-27

## 2023-11-27 DIAGNOSIS — E11.65 TYPE 2 DIABETES MELLITUS WITH HYPERGLYCEMIA, WITH LONG-TERM CURRENT USE OF INSULIN (HCC): ICD-10-CM

## 2023-11-27 DIAGNOSIS — K21.9 GASTROESOPHAGEAL REFLUX DISEASE, UNSPECIFIED WHETHER ESOPHAGITIS PRESENT: ICD-10-CM

## 2023-11-27 DIAGNOSIS — Z79.4 TYPE 2 DIABETES MELLITUS WITH HYPERGLYCEMIA, WITH LONG-TERM CURRENT USE OF INSULIN (HCC): ICD-10-CM

## 2023-11-27 DIAGNOSIS — E11.65 UNCONTROLLED TYPE 2 DIABETES MELLITUS WITH HYPERGLYCEMIA (HCC): ICD-10-CM

## 2023-11-27 DIAGNOSIS — E78.2 HYPERCHOLESTEROLEMIA WITH HYPERTRIGLYCERIDEMIA: ICD-10-CM

## 2023-11-27 LAB
CHOLESTEROL, FASTING: 161 MG/DL (ref 100–199)
CREAT UR-MCNC: 99.6 MG/DL
HDLC SERPL-MCNC: 30 MG/DL
LDLC SERPL CALC-MCNC: ABNORMAL MG/DL
MICROALBUMIN UR-MCNC: 396.56 MG/DL
MICROALBUMIN/CREAT RATIO PNL UR: 3982 MG/G (ref 0–30)
TRIGLYCERIDE, FASTING: 438 MG/DL (ref 0–199)

## 2023-11-28 ENCOUNTER — TELEPHONE (OUTPATIENT)
Dept: FAMILY MEDICINE CLINIC | Age: 53
End: 2023-11-28

## 2023-11-28 LAB — H PYLORI AG STL QL IA: NORMAL

## 2023-11-28 NOTE — TELEPHONE ENCOUNTER
----- Message from Balta Diaz MD sent at 11/27/2023  1:16 PM EST -----  A1c still elevated at 9 from 9.3. Recommend healthy diet as discussed in prior visits, also recommend regular exercise 30 min daily at least 5 days per week. HIV negative. Sodium slightly low. Will discuss at next appointment.   Thanks,  Electronically signed by Author Danette MD on 11/27/2023 at 1:15 PM

## 2023-11-28 NOTE — TELEPHONE ENCOUNTER
----- Message from Chris Ramirez MD sent at 11/27/2023  1:21 PM EST -----  Elevated sugars worsening kidney function, microalbumin worse at 3982 from 1632, already on an ARB. Cholesterol with worsening triglycerides at 438 from 227. Again strongly recommend  healthy diet changes and regular exercises. Would patient be interested in dietitian? Will discuss at next appointment. Thank you,  Electronically signed by Vilma Painter MD on 11/27/2023 at 1:20 PM      ----- Message from Chris Ramirez MD sent at 11/27/2023  1:16 PM EST -----  A1c still elevated at 9 from 9.3. Recommend healthy diet as discussed in prior visits, also recommend regular exercise 30 min daily at least 5 days per week. HIV negative. Sodium slightly low. Will discuss at next appointment.   Thanks,  Electronically signed by Vilma Painter MD on 11/27/2023 at 1:15 PM

## 2023-11-30 DIAGNOSIS — Z79.4 TYPE 2 DIABETES MELLITUS WITH HYPERGLYCEMIA, WITH LONG-TERM CURRENT USE OF INSULIN (HCC): Primary | ICD-10-CM

## 2023-11-30 DIAGNOSIS — E11.65 TYPE 2 DIABETES MELLITUS WITH HYPERGLYCEMIA, WITH LONG-TERM CURRENT USE OF INSULIN (HCC): Primary | ICD-10-CM

## 2023-11-30 NOTE — PROGRESS NOTES
Patient called and unable to get a hold of Trulicity due to lack of availability. Will trial Mounjaro and follow-up as scheduled 12/14/2023.

## 2023-12-14 ENCOUNTER — OFFICE VISIT (OUTPATIENT)
Dept: FAMILY MEDICINE CLINIC | Age: 53
End: 2023-12-14
Payer: COMMERCIAL

## 2023-12-14 VITALS
HEIGHT: 72 IN | BODY MASS INDEX: 38.19 KG/M2 | SYSTOLIC BLOOD PRESSURE: 160 MMHG | DIASTOLIC BLOOD PRESSURE: 90 MMHG | WEIGHT: 282 LBS | HEART RATE: 83 BPM | TEMPERATURE: 98.5 F | RESPIRATION RATE: 12 BRPM | OXYGEN SATURATION: 96 %

## 2023-12-14 DIAGNOSIS — I10 ESSENTIAL HYPERTENSION: Primary | ICD-10-CM

## 2023-12-14 DIAGNOSIS — Z79.4 TYPE 2 DIABETES MELLITUS WITH HYPERGLYCEMIA, WITH LONG-TERM CURRENT USE OF INSULIN (HCC): ICD-10-CM

## 2023-12-14 DIAGNOSIS — E11.65 TYPE 2 DIABETES MELLITUS WITH HYPERGLYCEMIA, WITH LONG-TERM CURRENT USE OF INSULIN (HCC): ICD-10-CM

## 2023-12-14 PROCEDURE — 99214 OFFICE O/P EST MOD 30 MIN: CPT | Performed by: STUDENT IN AN ORGANIZED HEALTH CARE EDUCATION/TRAINING PROGRAM

## 2023-12-14 PROCEDURE — 3052F HG A1C>EQUAL 8.0%<EQUAL 9.0%: CPT | Performed by: STUDENT IN AN ORGANIZED HEALTH CARE EDUCATION/TRAINING PROGRAM

## 2023-12-14 PROCEDURE — 3077F SYST BP >= 140 MM HG: CPT | Performed by: STUDENT IN AN ORGANIZED HEALTH CARE EDUCATION/TRAINING PROGRAM

## 2023-12-14 PROCEDURE — 3080F DIAST BP >= 90 MM HG: CPT | Performed by: STUDENT IN AN ORGANIZED HEALTH CARE EDUCATION/TRAINING PROGRAM

## 2023-12-14 RX ORDER — TIRZEPATIDE 5 MG/.5ML
5 INJECTION, SOLUTION SUBCUTANEOUS WEEKLY
Qty: 4 ADJUSTABLE DOSE PRE-FILLED PEN SYRINGE | Refills: 0 | Status: SHIPPED | OUTPATIENT
Start: 2023-12-14

## 2023-12-14 RX ORDER — HYDROCHLOROTHIAZIDE 25 MG/1
25 TABLET ORAL EVERY MORNING
Qty: 30 TABLET | Refills: 5 | Status: CANCELLED | OUTPATIENT
Start: 2023-12-14

## 2023-12-14 RX ORDER — LOSARTAN POTASSIUM 100 MG/1
100 TABLET ORAL DAILY
Qty: 90 TABLET | Refills: 0 | Status: SHIPPED | OUTPATIENT
Start: 2023-12-14

## 2023-12-14 NOTE — PATIENT INSTRUCTIONS
Thank you   Thank you for trusting us with your healthcare needs. You may receive a survey regarding today's visit. It would help us out if you would take a few moments to provide your feedback. We value your input. Please bring in ALL medications BOTTLES, including inhalers, herbal supplements, over the counter, prescribed & non-prescribed medicine. The office would like actual medication bottles and a list.   Please note our OFFICE POLICIES:   A. While Dr. Maryam Smith is in [place], you may have a Video Visit with him, as long as you are in the state of [place] or [place]. You must have a PCP listed on your account. If you do not,you will not be able to have an appointment virtually with him. Lizett Smith is only licensed in [place] and [place]. You must be in one of these states to do a video visit. C.    If the list below has been completed, PLEASE FAX RECORDS TO OUR OFFICE @ [phone].  Once the records have been received we will update your records at our office:  Health Maintenance Due   Topic Date Due    Diabetic retinal exam  Never done    Hepatitis C screen  Never done    Colorectal Cancer Screen  Never done    Pneumococcal 0-64 years Vaccine (2 - PCV) 07/22/2022    Flu vaccine (1) 08/01/2023    COVID-19 Vaccine (4 - 2023-24 season) 09/01/2023

## 2024-01-05 ENCOUNTER — HOSPITAL ENCOUNTER (EMERGENCY)
Age: 54
Discharge: HOME | End: 2024-01-05
Payer: COMMERCIAL

## 2024-01-05 VITALS
SYSTOLIC BLOOD PRESSURE: 191 MMHG | DIASTOLIC BLOOD PRESSURE: 105 MMHG | TEMPERATURE: 98.2 F | OXYGEN SATURATION: 100 % | RESPIRATION RATE: 20 BRPM | HEART RATE: 89 BPM

## 2024-01-05 DIAGNOSIS — L02.91 ABSCESS: Primary | ICD-10-CM

## 2024-01-05 PROCEDURE — 87186 SC STD MICRODIL/AGAR DIL: CPT

## 2024-01-05 PROCEDURE — 87070 CULTURE OTHR SPECIMN AEROBIC: CPT

## 2024-01-05 PROCEDURE — 87077 CULTURE AEROBIC IDENTIFY: CPT

## 2024-01-05 PROCEDURE — 87147 CULTURE TYPE IMMUNOLOGIC: CPT

## 2024-01-05 PROCEDURE — 99213 OFFICE O/P EST LOW 20 MIN: CPT

## 2024-01-05 PROCEDURE — 87205 SMEAR GRAM STAIN: CPT

## 2024-01-05 PROCEDURE — 99213 OFFICE O/P EST LOW 20 MIN: CPT | Performed by: NURSE PRACTITIONER

## 2024-01-05 RX ORDER — DOXYCYCLINE HYCLATE 100 MG
100 TABLET ORAL 2 TIMES DAILY
Qty: 14 TABLET | Refills: 0 | Status: SHIPPED | OUTPATIENT
Start: 2024-01-05 | End: 2024-01-12

## 2024-01-05 ASSESSMENT — PAIN SCALES - GENERAL: PAINLEVEL_OUTOF10: 4

## 2024-01-05 NOTE — ED TRIAGE NOTES
Pt to UC with c/o a wound on his right hand that appeared 2 days ago. Pt denies any known injury.

## 2024-01-05 NOTE — DISCHARGE INSTRUCTIONS
Medications as prescribed.  Please continue warm moist compresses twice daily.  Return with any new or severe symptoms.

## 2024-01-05 NOTE — ED PROVIDER NOTES
[facility] URGENT CARE  UrgentCare Encounter      CHIEFCOMPLAINT       Chief Complaint   Patient presents with    Wound Check     Right hand        Nurses Notes reviewed and I agree except as noted in the HPI.  HISTORY OF PRESENT ILLNESS   Murali Neff is a 53 y.o. male who presents to urgent care with complaints of right hand pain.  He states that he had a small \"pimple\" on his right hand this morning.  His hand has continued to swell and get painful throughout the day.  REVIEW OF SYSTEMS     Review of Systems   Musculoskeletal:         Swelling pain to the right hand.       PAST MEDICAL HISTORY         Diagnosis Date    CAD (coronary artery disease) 2019    CHF (congestive heart failure) ([facility]) 02/12/2019    Diabetes mellitus ([facility]) 1990's    Hypertension 1990's       SURGICAL HISTORY     Patient  has a past surgical history that includes Fibula Fracture Surgery (Right); Tibia fracture surgery (Right); Cholecystectomy; and Toe amputation (Right, 12/14/2021).    CURRENT MEDICATIONS       Discharge Medication List as of 1/5/2024  6:04 PM        CONTINUE these medications which have NOT CHANGED    Details   losartan (COZAAR) 100 MG tablet Take 1 tablet by mouth daily, Disp-90 tablet, R-0Normal      Tirzepatide (MOUNJARO) 5 MG/0.5ML SOPN SC injection Inject 0.5 mLs into the skin once a week, Disp-4 Adjustable Dose Pre-filled Pen Syringe, R-0Normal      Tirzepatide (MOUNJARO) 7.5 MG/0.5ML SOPN SC injection Inject 0.5 mLs into the skin once a week, Disp-4 Adjustable Dose Pre-filled Pen Syringe, R-0Normal      omeprazole (PRILOSEC) 20 MG delayed release capsule Take 1 capsule by mouth every morning (before breakfast), Disp-90 capsule, R-1Normal      gabapentin (NEURONTIN) 100 MG capsule Take 1 capsule by mouth in the morning and at bedtime for 90 days., Disp-180 capsule, R-0Normal      rosuvastatin (CRESTOR) 40 MG tablet Take 1 tablet by mouth daily, Disp-90 tablet, R-3Normal      carvedilol (COREG) 12.5 MG

## 2024-01-07 LAB
BACTERIA SPEC AEROBE CULT: ABNORMAL
BACTERIA SPEC AEROBE CULT: ABNORMAL
GRAM STN SPEC: ABNORMAL
ORGANISM: ABNORMAL

## 2024-01-26 ENCOUNTER — APPOINTMENT (OUTPATIENT)
Dept: GENERAL RADIOLOGY | Age: 54
End: 2024-01-26
Payer: COMMERCIAL

## 2024-01-26 ENCOUNTER — HOSPITAL ENCOUNTER (EMERGENCY)
Age: 54
Discharge: HOME | End: 2024-01-26
Attending: EMERGENCY MEDICINE
Payer: COMMERCIAL

## 2024-01-26 VITALS
DIASTOLIC BLOOD PRESSURE: 101 MMHG | HEART RATE: 87 BPM | WEIGHT: 280 LBS | SYSTOLIC BLOOD PRESSURE: 196 MMHG | BODY MASS INDEX: 37.93 KG/M2 | TEMPERATURE: 98.4 F | HEIGHT: 72 IN | OXYGEN SATURATION: 94 % | RESPIRATION RATE: 16 BRPM

## 2024-01-26 DIAGNOSIS — L03.119 CELLULITIS AND ABSCESS OF HAND: Primary | ICD-10-CM

## 2024-01-26 DIAGNOSIS — L02.519 CELLULITIS AND ABSCESS OF HAND: Primary | ICD-10-CM

## 2024-01-26 PROCEDURE — 73130 X-RAY EXAM OF HAND: CPT

## 2024-01-26 PROCEDURE — 99283 EMERGENCY DEPT VISIT LOW MDM: CPT

## 2024-01-26 RX ORDER — LIDOCAINE HCL/EPINEPHRINE/PF 2%-1:200K
20 VIAL (ML) INJECTION ONCE
Status: DISCONTINUED | OUTPATIENT
Start: 2024-01-26 | End: 2024-01-26

## 2024-01-26 RX ORDER — DOXYCYCLINE HYCLATE 100 MG
100 TABLET ORAL 2 TIMES DAILY
Qty: 14 TABLET | Refills: 0 | Status: SHIPPED | OUTPATIENT
Start: 2024-01-26 | End: 2024-02-02

## 2024-01-26 ASSESSMENT — PAIN - FUNCTIONAL ASSESSMENT: PAIN_FUNCTIONAL_ASSESSMENT: 0-10

## 2024-01-26 ASSESSMENT — ENCOUNTER SYMPTOMS
COLOR CHANGE: 1
CHEST TIGHTNESS: 0
GASTROINTESTINAL NEGATIVE: 1
EYES NEGATIVE: 1
COUGH: 0
NAUSEA: 0
VOMITING: 0

## 2024-01-26 ASSESSMENT — PAIN DESCRIPTION - PAIN TYPE: TYPE: ACUTE PAIN

## 2024-01-26 NOTE — ED TRIAGE NOTES
Patient presents to ER with complaints of right hand abscess that has been ongoing for 3 weeks. Patient reports he was seen at urgent care around the beginning of January and given an antibiotic. Patient reports abscess has not gone away.

## 2024-01-26 NOTE — ED PROVIDER NOTES
[facility] EMERGENCY DEPT  EMERGENCY DEPARTMENT ENCOUNTER          Pt Name: Murali Neff  MRN: [id number]  Birthdate 1970  Date of evaluation: 1/26/2024  Physician: Tripp Chow DPM  Supervising Attending Physician: Palomo Bucio DO       CHIEF COMPLAINT       Chief Complaint   Patient presents with    Abscess     Right hand         HISTORY OF PRESENT ILLNESS    HPI  Murali Neff is a 53 y.o. male who presents to the emergency department from home, by private vehicle for evaluation of abscess. Patient relates on January 5, 2024 he went to an urgent care for an abscess on his right hand. Patient states at this time there was no x-ray taken and he was given a prescription for an antibiotic. Patient states he has completed the full course of the antibiotic with no improvement in the abscess. Patient states he believes he may have gotten a splinter in his hand which caused the abscess. Patient relates the abscess and hand is not painful. Patient denies any change in function of the right hand and denies any drainage or malodor from the abscess. Patient denies nausea, vomiting, fever, chills, shortness of breath, and chest pain. The patient has no other acute complaints at this time. 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      REVIEW OF SYSTEMS   Review of Systems   Constitutional:  Negative for activity change, chills, fatigue and fever.   HENT: Negative.     Eyes: Negative.    Respiratory:  Negative for cough and chest tightness.    Cardiovascular:  Negative for chest pain.   Gastrointestinal: Negative.  Negative for nausea and vomiting.   Musculoskeletal: Negative.    Skin:  Positive for color change and wound.   Neurological: Negative.    Psychiatric/Behavioral: Negative.           PAST MEDICAL AND SURGICAL HISTORY     Past Medical History:   Diagnosis Date    CAD (coronary artery disease) 2019    CHF (congestive heart failure) ([facility]) 02/12/2019    Diabetes mellitus ([facility])  01/26/24 1121   BP: (!) 196/101   Pulse: 87   Resp: 16   Temp: 98.4 °F (36.9 °C)   SpO2: 94%       Physical Exam  Constitutional:       Appearance: Normal appearance.   HENT:      Head: Normocephalic.   Eyes:      Pupils: Pupils are equal, round, and reactive to light.   Cardiovascular:      Rate and Rhythm: Normal rate.   Pulmonary:      Effort: Pulmonary effort is normal.   Musculoskeletal:         General: Swelling present. No deformity or signs of injury. Normal range of motion.      Cervical back: Normal range of motion.   Skin:     General: Skin is warm and dry.      Capillary Refill: Capillary refill takes less than 2 seconds.      Findings: Erythema and lesion present.      Comments: There is an abscess appreciated on the dorsal aspect of the hand just proximal to the fifth digit. The area is mildly fluctuant. There is no open wound. No drainage or malodor noted. There is mild erythema surrounding the abscess.   Neurological:      General: No focal deficit present.      Mental Status: He is alert and oriented to person, place, and time.   Psychiatric:         Mood and Affect: Mood normal.         Behavior: Behavior normal.           ED RESULTS   Laboratory results (none if blank):  Labs Reviewed - No data to display  All laboratory results are individually reviewed and interpreted by me in the clinical context of this patient.  See ED course below for results interpretation if applicable.  (A negative COVID-19 test should be interpreted as COVID no longer suspected unless otherwise noted in this encounter documentation note)  (Any cultures that may have been sent were not resulted at the time of this patient ED visit)      Radiologic studies results available at the moment of this note (None if blank):  XR HAND RIGHT (MIN 3 VIEWS)   Final Result   1. No acute bony abnormality            **This report has been created using voice recognition software.  It may contain minor errors which are inherent in voice

## 2024-01-26 NOTE — DISCHARGE INSTRUCTIONS
You were seen and evaluated in the ED today for complaint of abscess on the right hand.    You were diagnosed with cellulitis and abscess of the hand.    A prescription for doxycycline was sent to your pharmacy today.  Please begin taking this medication as directed.    Please follow-up with the [facility] as soon as possible for a visit in 2 days.    Take your medication as indicated, if you are given an antibiotic then make sure you get the prescription filled and take the antibiotics until finished.  Drink plenty of water while taking the antibiotics.  Avoid drinking alcohol or drinks that have caffeine in it while taking antibiotics.       For pain use ibuprofen (Motrin / Advil) or acetaminophen (Tylenol), unless prescribed medications that have acetaminophen in it.  You can take over the counter acetaminophen tablets (1 - 2 tablets of the 500-mg strength every 6 hours) or ibuprofen tablets (2 tablets every 4 hours).    PLEASE RETURN TO THE EMERGENCY DEPARTMENT IMMEDIATELY for worsening symptoms, white drainage from the wound, redness or streaking, or if you develop any concerning symptoms such as: high fever not relieved by acetaminophen (Tylenol) and/or ibuprofen (Motrin / Advil), chills, shortness of breath, chest pain, feeling of your heart fluttering or racing, persistent nausea and/or vomiting, numbness, weakness or tingling in the arms or legs or change in color of the extremities, changes in mental status, persistent headache, blurry vision, unable to follow up with your physician, or other any other care or concern.

## 2024-02-05 DIAGNOSIS — E11.21 DIABETIC NEPHROPATHY ASSOCIATED WITH TYPE 2 DIABETES MELLITUS (HCC): ICD-10-CM

## 2024-02-06 NOTE — TELEPHONE ENCOUNTER
Murali A Sexton called requesting a refill on the following medications:  Requested Prescriptions     Pending Prescriptions Disp Refills    gabapentin (NEURONTIN) 100 MG capsule [Pharmacy Med Name: GABAPENTIN 100 MG CAPSULE] 180 capsule 0     Sig: Take 1 capsule by mouth 2 times daily.       Date of last visit: 12/14/2023  Date of next visit (if applicable):Visit date not found  Date of last refill: 8/25/23  Pharmacy Name: [facility]      Thanks,  Akanksha Lewis MA

## 2024-02-07 RX ORDER — GABAPENTIN 100 MG/1
100 CAPSULE ORAL 2 TIMES DAILY
Qty: 60 CAPSULE | Refills: 1 | Status: SHIPPED | OUTPATIENT
Start: 2024-02-07 | End: 2024-04-07

## 2024-03-18 DIAGNOSIS — I10 ESSENTIAL HYPERTENSION: ICD-10-CM

## 2024-03-19 NOTE — TELEPHONE ENCOUNTER
Patient's last appointment was : 12/14/2023 with our   Patient's next appointment is : Visit date not found   Last refilled on: 12/14/2023  Which pharmacy does the script need sent to: [facility] [address]      Lab Results   Component Value Date    LABA1C 9.0 (H) 11/22/2023     Lab Results   Component Value Date    CHOL 165 02/18/2022    TRIG 542 (H) 02/18/2022    HDL 30 11/27/2023    LDLCALC SEE BELOW 11/27/2023     Lab Results   Component Value Date     02/06/2024    K 5.0 02/06/2024    CL 99 02/06/2024    CO2 27 02/06/2024    BUN 21 02/06/2024    CREATININE 1.0 02/06/2024    GLUCOSE 184 (H) 02/06/2024    CALCIUM 9.2 02/06/2024    PROT 8.3 (H) 02/06/2024    LABALBU 3.7 02/06/2024    BILITOT 0.3 02/06/2024    ALKPHOS 65 02/06/2024    AST 14 02/06/2024    ALT 13 02/06/2024    LABGLOM >60 02/06/2024     Lab Results   Component Value Date    TSH 2.060 07/22/2021    T4FREE 1.04 03/28/2016     Lab Results   Component Value Date    WBC 6.5 02/06/2024    HGB 14.0 02/06/2024    HCT 45.2 02/06/2024    MCV 91.3 02/06/2024     02/06/2024

## 2024-03-20 DIAGNOSIS — Z79.4 TYPE 2 DIABETES MELLITUS WITH HYPERGLYCEMIA, WITH LONG-TERM CURRENT USE OF INSULIN (HCC): ICD-10-CM

## 2024-03-20 DIAGNOSIS — E11.65 TYPE 2 DIABETES MELLITUS WITH HYPERGLYCEMIA, WITH LONG-TERM CURRENT USE OF INSULIN (HCC): ICD-10-CM

## 2024-03-21 NOTE — TELEPHONE ENCOUNTER
Will do one month supply, please have patient schedule follow up within the month with PCP or any available resident to follow up and recheck A1c  Thank you,  Electronically signed by Arnol Acevedo MD on 3/21/2024 at 9:43 AM

## 2024-03-21 NOTE — TELEPHONE ENCOUNTER
Patient stated he will schedule a follow up on mychart when he takes a look at his work schedule. He also stated he clicked the mounjaro 7.5 mg because it wouldn't let him click anything else. He says is it supposed to be a higher dose this time?

## 2024-03-21 NOTE — TELEPHONE ENCOUNTER
Patient's last appointment was : 12/14/2023 with our   Patient's next appointment is : Visit date not found   Last refilled on: 1-11-24  Which pharmacy does the script need sent to: [facility] [address]      Lab Results   Component Value Date    LABA1C 9.0 (H) 11/22/2023     Lab Results   Component Value Date    CHOL 165 02/18/2022    TRIG 542 (H) 02/18/2022    HDL 30 11/27/2023    LDLCALC SEE BELOW 11/27/2023     Lab Results   Component Value Date     02/06/2024    K 5.0 02/06/2024    CL 99 02/06/2024    CO2 27 02/06/2024    BUN 21 02/06/2024    CREATININE 1.0 02/06/2024    GLUCOSE 184 (H) 02/06/2024    CALCIUM 9.2 02/06/2024    PROT 8.3 (H) 02/06/2024    LABALBU 3.7 02/06/2024    BILITOT 0.3 02/06/2024    ALKPHOS 65 02/06/2024    AST 14 02/06/2024    ALT 13 02/06/2024    LABGLOM >60 02/06/2024     Lab Results   Component Value Date    TSH 2.060 07/22/2021    T4FREE 1.04 03/28/2016     Lab Results   Component Value Date    WBC 6.5 02/06/2024    HGB 14.0 02/06/2024    HCT 45.2 02/06/2024    MCV 91.3 02/06/2024     02/06/2024

## 2024-03-23 RX ORDER — LOSARTAN POTASSIUM 100 MG/1
100 TABLET ORAL DAILY
Qty: 14 TABLET | Refills: 0 | Status: SHIPPED | OUTPATIENT
Start: 2024-03-23

## 2024-03-23 NOTE — TELEPHONE ENCOUNTER
Patient last seen 12/2023 by me with requested 4 week follow up.  Will provide 2 weeks supply so patient may schedule follow up.  Can see any available resident.  Thank you,  Electronically signed by Arnol Acevedo MD on 3/23/2024 at 10:33 AM

## 2024-03-23 NOTE — TELEPHONE ENCOUNTER
Next dose sent to pharmacy.  Patient will need to be seen in person prior to next refill for DM and HTN.  Thank you,  Electronically signed by Arnol Acevedo MD on 3/23/2024 at 10:36 AM

## 2024-03-28 DIAGNOSIS — E11.65 TYPE 2 DIABETES MELLITUS WITH HYPERGLYCEMIA, WITH LONG-TERM CURRENT USE OF INSULIN (HCC): ICD-10-CM

## 2024-03-28 DIAGNOSIS — E11.65 UNCONTROLLED TYPE 2 DIABETES MELLITUS WITH HYPERGLYCEMIA (HCC): ICD-10-CM

## 2024-03-28 DIAGNOSIS — I10 ESSENTIAL HYPERTENSION: ICD-10-CM

## 2024-03-28 DIAGNOSIS — Z79.4 TYPE 2 DIABETES MELLITUS WITH HYPERGLYCEMIA, WITH LONG-TERM CURRENT USE OF INSULIN (HCC): ICD-10-CM

## 2024-03-28 NOTE — TELEPHONE ENCOUNTER
Patient's last appointment was : 12/14/2023 with our   Patient's next appointment is : Visit date not found   Last refilled on: 08/25/2023  Which pharmacy does the script need sent to: [facility] [address]      Lab Results   Component Value Date    LABA1C 9.0 (H) 11/22/2023     Lab Results   Component Value Date    CHOL 165 02/18/2022    TRIG 542 (H) 02/18/2022    HDL 30 11/27/2023    LDLCALC SEE BELOW 11/27/2023     Lab Results   Component Value Date     02/06/2024    K 5.0 02/06/2024    CL 99 02/06/2024    CO2 27 02/06/2024    BUN 21 02/06/2024    CREATININE 1.0 02/06/2024    GLUCOSE 184 (H) 02/06/2024    CALCIUM 9.2 02/06/2024    PROT 8.3 (H) 02/06/2024    LABALBU 3.7 02/06/2024    BILITOT 0.3 02/06/2024    ALKPHOS 65 02/06/2024    AST 14 02/06/2024    ALT 13 02/06/2024    LABGLOM >60 02/06/2024     Lab Results   Component Value Date    TSH 2.060 07/22/2021    T4FREE 1.04 03/28/2016     Lab Results   Component Value Date    WBC 6.5 02/06/2024    HGB 14.0 02/06/2024    HCT 45.2 02/06/2024    MCV 91.3 02/06/2024     02/06/2024

## 2024-03-31 RX ORDER — CARVEDILOL 12.5 MG/1
12.5 TABLET ORAL 2 TIMES DAILY
Qty: 60 TABLET | Refills: 0 | Status: SHIPPED | OUTPATIENT
Start: 2024-03-31

## 2024-03-31 RX ORDER — GLIPIZIDE 10 MG/1
20 TABLET ORAL
Qty: 120 TABLET | Refills: 0 | Status: SHIPPED | OUTPATIENT
Start: 2024-03-31

## 2024-04-01 ENCOUNTER — TELEPHONE (OUTPATIENT)
Dept: FAMILY MEDICINE CLINIC | Age: 54
End: 2024-04-01

## 2024-04-01 NOTE — TELEPHONE ENCOUNTER
Dr. Curtis,   Murali A Sexton called and states that he is unable to get the Mounjaro 7.5 mg and states that there is a pill that has the same ingredients in it but is unsure of the name of the medication but is wanting to know if he can get the pill form. Please Advise.

## 2024-04-04 ENCOUNTER — PATIENT MESSAGE (OUTPATIENT)
Dept: FAMILY MEDICINE CLINIC | Age: 54
End: 2024-04-04

## 2024-04-05 NOTE — TELEPHONE ENCOUNTER
Attempted to call patient with no reply.  Would recommend patient call other pharmacies to see if Mounjaro is available and if so would be happy to send to another pharmacy.  Thank you,  Electronically signed by Arnol Acevedo MD on 4/5/2024 at 2:06 PM

## 2024-04-07 RX ORDER — ORAL SEMAGLUTIDE 3 MG/1
1 TABLET ORAL DAILY
Qty: 30 TABLET | Refills: 1 | Status: SHIPPED | OUTPATIENT
Start: 2024-04-07

## 2024-04-08 NOTE — TELEPHONE ENCOUNTER
Pt informed and verbalized understanding.     Has tried all pharmacy in the area and even in [place] and is unable to get it.

## 2024-04-09 NOTE — TELEPHONE ENCOUNTER
Spoke with patient. Informed me that he has been off of this for a month and half. Also let me know that [facility] just got it in and is filling it. Informed patient he can discuss oral treatment at next visit with Dr Acevedo. Verbalized understanding but is frustrated.

## 2024-04-09 NOTE — TELEPHONE ENCOUNTER
Noted and frustration is understood. It is frustrating as availability for these medications is becoming harder to find.  It is also important that we discussed these options in person so we can discuss the risks and benefits as well as how to change medications if switching therapies.  Patient is also not followed up as recommended.  Thank you,  Electronically signed by Arnol Acevedo MD on 4/9/2024 at 5:48 PM

## 2024-04-15 ENCOUNTER — OFFICE VISIT (OUTPATIENT)
Dept: FAMILY MEDICINE CLINIC | Age: 54
End: 2024-04-15
Payer: COMMERCIAL

## 2024-04-15 VITALS
BODY MASS INDEX: 38.36 KG/M2 | OXYGEN SATURATION: 100 % | WEIGHT: 283.2 LBS | TEMPERATURE: 97.8 F | DIASTOLIC BLOOD PRESSURE: 76 MMHG | SYSTOLIC BLOOD PRESSURE: 138 MMHG | HEIGHT: 72 IN | HEART RATE: 78 BPM

## 2024-04-15 DIAGNOSIS — Z79.4 TYPE 2 DIABETES MELLITUS WITH HYPERGLYCEMIA, WITH LONG-TERM CURRENT USE OF INSULIN (HCC): ICD-10-CM

## 2024-04-15 DIAGNOSIS — E11.65 TYPE 2 DIABETES MELLITUS WITH HYPERGLYCEMIA, WITH LONG-TERM CURRENT USE OF INSULIN (HCC): ICD-10-CM

## 2024-04-15 DIAGNOSIS — I10 ESSENTIAL HYPERTENSION: Primary | ICD-10-CM

## 2024-04-15 DIAGNOSIS — E11.65 UNCONTROLLED TYPE 2 DIABETES MELLITUS WITH HYPERGLYCEMIA (HCC): ICD-10-CM

## 2024-04-15 LAB — HBA1C MFR BLD: 8.9 %

## 2024-04-15 PROCEDURE — 3078F DIAST BP <80 MM HG: CPT | Performed by: STUDENT IN AN ORGANIZED HEALTH CARE EDUCATION/TRAINING PROGRAM

## 2024-04-15 PROCEDURE — 83036 HEMOGLOBIN GLYCOSYLATED A1C: CPT | Performed by: STUDENT IN AN ORGANIZED HEALTH CARE EDUCATION/TRAINING PROGRAM

## 2024-04-15 PROCEDURE — 99214 OFFICE O/P EST MOD 30 MIN: CPT | Performed by: STUDENT IN AN ORGANIZED HEALTH CARE EDUCATION/TRAINING PROGRAM

## 2024-04-15 PROCEDURE — 3052F HG A1C>EQUAL 8.0%<EQUAL 9.0%: CPT | Performed by: STUDENT IN AN ORGANIZED HEALTH CARE EDUCATION/TRAINING PROGRAM

## 2024-04-15 PROCEDURE — 3075F SYST BP GE 130 - 139MM HG: CPT | Performed by: STUDENT IN AN ORGANIZED HEALTH CARE EDUCATION/TRAINING PROGRAM

## 2024-04-15 RX ORDER — LOSARTAN POTASSIUM 100 MG/1
100 TABLET ORAL DAILY
Qty: 90 TABLET | Refills: 3 | Status: SHIPPED | OUTPATIENT
Start: 2024-04-15

## 2024-04-15 RX ORDER — METFORMIN HYDROCHLORIDE 500 MG/1
TABLET, EXTENDED RELEASE ORAL
Qty: 360 TABLET | Refills: 3 | Status: SHIPPED | OUTPATIENT
Start: 2024-04-15

## 2024-04-15 RX ORDER — GLIPIZIDE 10 MG/1
20 TABLET ORAL
Qty: 120 TABLET | Refills: 0 | Status: SHIPPED | OUTPATIENT
Start: 2024-04-15

## 2024-04-15 SDOH — ECONOMIC STABILITY: FOOD INSECURITY: WITHIN THE PAST 12 MONTHS, YOU WORRIED THAT YOUR FOOD WOULD RUN OUT BEFORE YOU GOT MONEY TO BUY MORE.: NEVER TRUE

## 2024-04-15 SDOH — ECONOMIC STABILITY: FOOD INSECURITY: WITHIN THE PAST 12 MONTHS, THE FOOD YOU BOUGHT JUST DIDN'T LAST AND YOU DIDN'T HAVE MONEY TO GET MORE.: NEVER TRUE

## 2024-04-15 SDOH — ECONOMIC STABILITY: INCOME INSECURITY: HOW HARD IS IT FOR YOU TO PAY FOR THE VERY BASICS LIKE FOOD, HOUSING, MEDICAL CARE, AND HEATING?: NOT HARD AT ALL

## 2024-04-15 ASSESSMENT — PATIENT HEALTH QUESTIONNAIRE - PHQ9
SUM OF ALL RESPONSES TO PHQ QUESTIONS 1-9: 2
SUM OF ALL RESPONSES TO PHQ QUESTIONS 1-9: 2
2. FEELING DOWN, DEPRESSED OR HOPELESS: SEVERAL DAYS
SUM OF ALL RESPONSES TO PHQ QUESTIONS 1-9: 2
SUM OF ALL RESPONSES TO PHQ9 QUESTIONS 1 & 2: 2
SUM OF ALL RESPONSES TO PHQ QUESTIONS 1-9: 2

## 2024-04-15 NOTE — PROGRESS NOTES
[facility] MEDICINE PRACTICE  [address]  [facility] [address]  Dept: [phone]  Loc: [phone]      Murali Neff (:  1970) is a 53 y.o. male,Established patient, here for evaluation of the following chief complaint(s):  Follow-up (Pt has no new concerns or complaints. )      ASSESSMENT/PLAN:  1. Essential hypertension  -     losartan (COZAAR) 100 MG tablet; Take 1 tablet by mouth daily, Disp-90 tablet, R-3Normal  2. Type 2 diabetes mellitus with hyperglycemia, with long-term current use of insulin (HCC)  -     POCT glycosylated hemoglobin (Hb A1C)  -     glipiZIDE (GLUCOTROL) 10 MG tablet; Take 2 tablets by mouth 2 times daily (before meals), Disp-120 tablet, R-0Normal  3. Uncontrolled type 2 diabetes mellitus with hyperglycemia (HCC)  -     POCT glycosylated hemoglobin (Hb A1C)  -     glipiZIDE (GLUCOTROL) 10 MG tablet; Take 2 tablets by mouth 2 times daily (before meals), Disp-120 tablet, R-0Normal  -     metFORMIN (GLUCOPHAGE-XR) 500 MG extended release tablet; TAKE 4 TABLETS BY MOUTH ONCE A DAY WITH A MEAL, Disp-360 tablet, R-3Normal    Continue glipizide 10 mg daily and metformin metformin 2000 mg daily.  Patient to complete current dose of Mounjaro and due to availability will transition to Rybelsus.  Rybelsus 3 mg for 1 month then increase to 7 mg tablets for 1 month.  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    Hypertension controlled 138/76, Continue losartan 100 mg, carvedolol 12.5 BID.    Patient with peripheral artery disease, he reports improvement in his legs.  Patient declines further management or referral to vascular.  Discussed the concern about potential future ulcerations.    Return in about 3 months (around 7/15/2024) for

## 2024-04-17 NOTE — PROGRESS NOTES
Attending Physician Note    I saw and evaluated the patient, performing the key elements of the service.  I discussed the findings, assessment and plan with the resident and agree with the resident's findings and plan as documented in the resident's note.  GC modifier added.    Brief summary:  Type 2 DM, uncontrolled - cont metformin.  Change from tirzepatide to po semaglutide due to availability.  Cont glipizide.  Discontinue pioglitazone due to coexistent diastolic HF.  Consider SGLT2 inhibitor.    HTN - BP jsut above goal.  Cont losartan.  Discussed ways to decrease missed doses of carvedilol.    LE angiogram 2/22 showed non-significant PAD.  Definitely has PVD based on physical exam findings.  Pt reports having had vein stripping previously, also toe amputation.  He declines any evaluation/treatment for this issue at present.

## 2024-04-23 DIAGNOSIS — I10 ESSENTIAL HYPERTENSION: ICD-10-CM

## 2024-04-23 RX ORDER — CARVEDILOL 12.5 MG/1
12.5 TABLET ORAL 2 TIMES DAILY
Qty: 60 TABLET | Refills: 0 | Status: SHIPPED | OUTPATIENT
Start: 2024-04-23

## 2024-04-23 NOTE — TELEPHONE ENCOUNTER
Patient's last appointment was : 4/15/2024 with our   Patient's next appointment is : Visit date not found   Last refilled on: 03/31/2024  Which pharmacy does the script need sent to: [facility] on [address]      Lab Results   Component Value Date    LABA1C 8.9 04/15/2024     Lab Results   Component Value Date    CHOL 165 02/18/2022    TRIG 542 (H) 02/18/2022    HDL 30 11/27/2023    LDLCALC SEE BELOW 11/27/2023     Lab Results   Component Value Date     02/06/2024    K 5.0 02/06/2024    CL 99 02/06/2024    CO2 27 02/06/2024    BUN 21 02/06/2024    CREATININE 1.0 02/06/2024    GLUCOSE 184 (H) 02/06/2024    CALCIUM 9.2 02/06/2024    PROT 8.3 (H) 02/06/2024    BILITOT 0.3 02/06/2024    ALKPHOS 65 02/06/2024    AST 14 02/06/2024    ALT 13 02/06/2024    LABGLOM >60 02/06/2024     Lab Results   Component Value Date    TSH 2.060 07/22/2021    T4FREE 1.04 03/28/2016     Lab Results   Component Value Date    WBC 6.5 02/06/2024    HGB 14.0 02/06/2024    HCT 45.2 02/06/2024    MCV 91.3 02/06/2024     02/06/2024

## 2024-05-15 NOTE — TELEPHONE ENCOUNTER
Patient's last appointment was : 4/15/2024 with our   Patient's next appointment is : Visit date not found   Last refilled on: 5/7/2024  Which pharmacy does the script need sent to: [facility] [address]      Lab Results   Component Value Date    LABA1C 8.9 04/15/2024     Lab Results   Component Value Date    CHOL 165 02/18/2022    TRIG 542 (H) 02/18/2022    HDL 30 11/27/2023     Lab Results   Component Value Date     02/06/2024    K 5.0 02/06/2024    CL 99 02/06/2024    CO2 27 02/06/2024    BUN 21 02/06/2024    CREATININE 1.0 02/06/2024    GLUCOSE 184 (H) 02/06/2024    CALCIUM 9.2 02/06/2024    BILITOT 0.3 02/06/2024    ALKPHOS 65 02/06/2024    AST 14 02/06/2024    ALT 13 02/06/2024    LABGLOM >60 02/06/2024     Lab Results   Component Value Date    TSH 2.060 07/22/2021    T4FREE 1.04 03/28/2016     Lab Results   Component Value Date    WBC 6.5 02/06/2024    HGB 14.0 02/06/2024    HCT 45.2 02/06/2024    MCV 91.3 02/06/2024     02/06/2024

## 2024-05-16 RX ORDER — ORAL SEMAGLUTIDE 3 MG/1
1 TABLET ORAL DAILY
Qty: 30 TABLET | Refills: 1 | Status: SHIPPED | OUTPATIENT
Start: 2024-05-16

## 2024-05-23 DIAGNOSIS — E11.21 DIABETIC NEPHROPATHY ASSOCIATED WITH TYPE 2 DIABETES MELLITUS (HCC): ICD-10-CM

## 2024-05-24 RX ORDER — GABAPENTIN 100 MG/1
100 CAPSULE ORAL 2 TIMES DAILY
Qty: 60 CAPSULE | Refills: 1 | Status: SHIPPED | OUTPATIENT
Start: 2024-05-24 | End: 2024-08-30

## 2024-05-24 NOTE — TELEPHONE ENCOUNTER
Patient's last appointment was : 4/15/2024 with our   Patient's next appointment is : Visit date not found  Last refilled on: 02/07/2024  Which pharmacy does the script need sent to: [facility] [address]      Lab Results   Component Value Date    LABA1C 8.9 04/15/2024     Lab Results   Component Value Date    CHOL 165 02/18/2022    TRIG 542 (H) 02/18/2022    HDL 30 11/27/2023     Lab Results   Component Value Date     02/06/2024    K 5.0 02/06/2024    CL 99 02/06/2024    CO2 27 02/06/2024    BUN 21 02/06/2024    CREATININE 1.0 02/06/2024    GLUCOSE 184 (H) 02/06/2024    CALCIUM 9.2 02/06/2024    BILITOT 0.3 02/06/2024    ALKPHOS 65 02/06/2024    AST 14 02/06/2024    ALT 13 02/06/2024    LABGLOM >60 02/06/2024     Lab Results   Component Value Date    TSH 2.060 07/22/2021    T4FREE 1.04 03/28/2016     Lab Results   Component Value Date    WBC 6.5 02/06/2024    HGB 14.0 02/06/2024    HCT 45.2 02/06/2024    MCV 91.3 02/06/2024     02/06/2024

## 2024-06-02 DIAGNOSIS — I10 ESSENTIAL HYPERTENSION: ICD-10-CM

## 2024-06-03 RX ORDER — CARVEDILOL 12.5 MG/1
12.5 TABLET ORAL 2 TIMES DAILY
Qty: 60 TABLET | Refills: 0 | Status: SHIPPED | OUTPATIENT
Start: 2024-06-03

## 2024-06-29 DIAGNOSIS — E11.65 UNCONTROLLED TYPE 2 DIABETES MELLITUS WITH HYPERGLYCEMIA (HCC): ICD-10-CM

## 2024-06-29 DIAGNOSIS — I10 ESSENTIAL HYPERTENSION: ICD-10-CM

## 2024-06-29 DIAGNOSIS — Z79.4 TYPE 2 DIABETES MELLITUS WITH HYPERGLYCEMIA, WITH LONG-TERM CURRENT USE OF INSULIN (HCC): ICD-10-CM

## 2024-06-29 DIAGNOSIS — E11.65 TYPE 2 DIABETES MELLITUS WITH HYPERGLYCEMIA, WITH LONG-TERM CURRENT USE OF INSULIN (HCC): ICD-10-CM

## 2024-07-01 NOTE — TELEPHONE ENCOUNTER
Patient's last appointment was : 4/15/2024 with our   Patient's next appointment is : Visit Date Not Found  Last refilled on: 4-15-24  Which pharmacy does the script need sent to: [facility]      Lab Results   Component Value Date    LABA1C 8.9 04/15/2024     Lab Results   Component Value Date    CHOL 165 02/18/2022    TRIG 542 (H) 02/18/2022    HDL 30 11/27/2023     Lab Results   Component Value Date     02/06/2024    K 5.0 02/06/2024    CL 99 02/06/2024    CO2 27 02/06/2024    BUN 21 02/06/2024    CREATININE 1.0 02/06/2024    GLUCOSE 184 (H) 02/06/2024    CALCIUM 9.2 02/06/2024    BILITOT 0.3 02/06/2024    ALKPHOS 65 02/06/2024    AST 14 02/06/2024    ALT 13 02/06/2024    LABGLOM >60 02/06/2024     Lab Results   Component Value Date    TSH 2.060 07/22/2021    T4FREE 1.04 03/28/2016     Lab Results   Component Value Date    WBC 6.5 02/06/2024    HGB 14.0 02/06/2024    HCT 45.2 02/06/2024    MCV 91.3 02/06/2024     02/06/2024

## 2024-07-01 NOTE — TELEPHONE ENCOUNTER
Patient's last appointment was : 4/15/2024 with our   Patient's next appointment is : Visit date not found   Last refilled on: 6/3/2024  Which pharmacy does the script need sent to: [facility] [address]      Lab Results   Component Value Date    LABA1C 8.9 04/15/2024     Lab Results   Component Value Date    CHOL 165 02/18/2022    TRIG 542 (H) 02/18/2022    HDL 30 11/27/2023     Lab Results   Component Value Date     02/06/2024    K 5.0 02/06/2024    CL 99 02/06/2024    CO2 27 02/06/2024    BUN 21 02/06/2024    CREATININE 1.0 02/06/2024    GLUCOSE 184 (H) 02/06/2024    CALCIUM 9.2 02/06/2024    BILITOT 0.3 02/06/2024    ALKPHOS 65 02/06/2024    AST 14 02/06/2024    ALT 13 02/06/2024    LABGLOM >60 02/06/2024     Lab Results   Component Value Date    TSH 2.060 07/22/2021    T4FREE 1.04 03/28/2016     Lab Results   Component Value Date    WBC 6.5 02/06/2024    HGB 14.0 02/06/2024    HCT 45.2 02/06/2024    MCV 91.3 02/06/2024     02/06/2024

## 2024-07-02 RX ORDER — CARVEDILOL 12.5 MG/1
12.5 TABLET ORAL 2 TIMES DAILY
Qty: 60 TABLET | Refills: 0 | Status: SHIPPED | OUTPATIENT
Start: 2024-07-02

## 2024-07-02 RX ORDER — GLIPIZIDE 10 MG/1
20 TABLET ORAL 2 TIMES DAILY
Qty: 120 TABLET | Refills: 0 | Status: SHIPPED | OUTPATIENT
Start: 2024-07-02

## 2024-09-25 DIAGNOSIS — K21.9 GASTROESOPHAGEAL REFLUX DISEASE, UNSPECIFIED WHETHER ESOPHAGITIS PRESENT: ICD-10-CM

## 2024-09-26 ENCOUNTER — TELEPHONE (OUTPATIENT)
Dept: FAMILY MEDICINE CLINIC | Age: 54
End: 2024-09-26

## 2024-09-26 NOTE — TELEPHONE ENCOUNTER
----- Message from Angela PERALTA sent at 9/24/2024  2:30 PM EDT -----  Regarding: E[facility] Appointment Request  E[facility] Appointment Request    Patient needs appointment for ECC Appointment Type: Existing Condition Follow Up/ Medication Follow Up    Patient Requested Dates(s): As soon as possible  Patient Requested Time: Morning  Provider Name: Batool Nolen MD    Reason for Appointment Request: Established Patient - Available appointments did not meet patient need  --------------------------------------------------------------------------------------------------------------------------    Relationship to Patient: Self     Call Back Information: OK to leave message on voicemail  Preferred Call Back Number: Phone [phone]

## 2024-09-27 NOTE — TELEPHONE ENCOUNTER
Patient's last appointment was: 4/15/2024  with our   Patient's next appointment is: none found  with our Dr.   Last refilled on: 6/2024  Which pharmacy does the script need sent to: [facility] [address]      Lab Results   Component Value Date    LABA1C 8.9 04/15/2024     Lab Results   Component Value Date    CHOL 165 02/18/2022    TRIG 542 (H) 02/18/2022    HDL 30 11/27/2023     Lab Results   Component Value Date     02/06/2024    K 5.0 02/06/2024    CL 99 02/06/2024    CO2 27 02/06/2024    BUN 21 02/06/2024    CREATININE 1.0 02/06/2024    GLUCOSE 184 (H) 02/06/2024    CALCIUM 9.2 02/06/2024    BILITOT 0.3 02/06/2024    ALKPHOS 65 02/06/2024    AST 14 02/06/2024    ALT 13 02/06/2024    LABGLOM >60 02/06/2024     Lab Results   Component Value Date    TSH 2.060 07/22/2021    T4FREE 1.04 03/28/2016     Lab Results   Component Value Date    WBC 6.5 02/06/2024    HGB 14.0 02/06/2024    HCT 45.2 02/06/2024    MCV 91.3 02/06/2024     02/06/2024

## 2024-10-01 RX ORDER — ORAL SEMAGLUTIDE 3 MG/1
1 TABLET ORAL DAILY
Qty: 30 TABLET | Refills: 1 | Status: SHIPPED | OUTPATIENT
Start: 2024-10-01

## 2024-10-07 DIAGNOSIS — E11.21 DIABETIC NEPHROPATHY ASSOCIATED WITH TYPE 2 DIABETES MELLITUS (HCC): ICD-10-CM

## 2024-10-07 NOTE — TELEPHONE ENCOUNTER
Patient's last appointment was: 4/15/2024  with our   Patient's next appointment is: 10/29/2024  with our Dr. House  Last refilled on: 5/24/2024  Which pharmacy does the script need sent to: [facility] [address]      Lab Results   Component Value Date    LABA1C 8.9 04/15/2024     Lab Results   Component Value Date    CHOL 165 02/18/2022    TRIG 542 (H) 02/18/2022    HDL 30 11/27/2023     Lab Results   Component Value Date     02/06/2024    K 5.0 02/06/2024    CL 99 02/06/2024    CO2 27 02/06/2024    BUN 21 02/06/2024    CREATININE 1.0 02/06/2024    GLUCOSE 184 (H) 02/06/2024    CALCIUM 9.2 02/06/2024    BILITOT 0.3 02/06/2024    ALKPHOS 65 02/06/2024    AST 14 02/06/2024    ALT 13 02/06/2024    LABGLOM >60 02/06/2024     Lab Results   Component Value Date    TSH 2.060 07/22/2021    T4FREE 1.04 03/28/2016     Lab Results   Component Value Date    WBC 6.5 02/06/2024    HGB 14.0 02/06/2024    HCT 45.2 02/06/2024    MCV 91.3 02/06/2024     02/06/2024

## 2024-10-08 RX ORDER — GABAPENTIN 100 MG/1
100 CAPSULE ORAL 2 TIMES DAILY
Qty: 60 CAPSULE | Refills: 0 | Status: SHIPPED | OUTPATIENT
Start: 2024-10-08 | End: 2025-01-14

## 2024-10-29 ENCOUNTER — LAB (OUTPATIENT)
Dept: LAB | Age: 54
End: 2024-10-29

## 2024-10-29 ENCOUNTER — TELEPHONE (OUTPATIENT)
Dept: FAMILY MEDICINE CLINIC | Age: 54
End: 2024-10-29

## 2024-10-29 ENCOUNTER — OFFICE VISIT (OUTPATIENT)
Dept: FAMILY MEDICINE CLINIC | Age: 54
End: 2024-10-29
Payer: COMMERCIAL

## 2024-10-29 VITALS
OXYGEN SATURATION: 99 % | BODY MASS INDEX: 39.47 KG/M2 | TEMPERATURE: 98.5 F | RESPIRATION RATE: 16 BRPM | DIASTOLIC BLOOD PRESSURE: 96 MMHG | HEART RATE: 100 BPM | HEIGHT: 72 IN | SYSTOLIC BLOOD PRESSURE: 164 MMHG | WEIGHT: 291.4 LBS

## 2024-10-29 DIAGNOSIS — Z79.4 TYPE 2 DIABETES MELLITUS WITH HYPERGLYCEMIA, WITH LONG-TERM CURRENT USE OF INSULIN (HCC): ICD-10-CM

## 2024-10-29 DIAGNOSIS — Z11.59 NEED FOR HEPATITIS C SCREENING TEST: ICD-10-CM

## 2024-10-29 DIAGNOSIS — E11.42 DIABETIC POLYNEUROPATHY ASSOCIATED WITH TYPE 2 DIABETES MELLITUS (HCC): ICD-10-CM

## 2024-10-29 DIAGNOSIS — E11.65 UNCONTROLLED TYPE 2 DIABETES MELLITUS WITH HYPERGLYCEMIA (HCC): ICD-10-CM

## 2024-10-29 DIAGNOSIS — Z12.11 COLON CANCER SCREENING: ICD-10-CM

## 2024-10-29 DIAGNOSIS — I10 ESSENTIAL HYPERTENSION: ICD-10-CM

## 2024-10-29 DIAGNOSIS — L03.116 BILATERAL LOWER LEG CELLULITIS: ICD-10-CM

## 2024-10-29 DIAGNOSIS — L03.115 BILATERAL LOWER LEG CELLULITIS: ICD-10-CM

## 2024-10-29 DIAGNOSIS — E11.21 DIABETIC NEPHROPATHY ASSOCIATED WITH TYPE 2 DIABETES MELLITUS (HCC): ICD-10-CM

## 2024-10-29 DIAGNOSIS — E78.2 HYPERCHOLESTEROLEMIA WITH HYPERTRIGLYCERIDEMIA: ICD-10-CM

## 2024-10-29 DIAGNOSIS — E11.65 TYPE 2 DIABETES MELLITUS WITH HYPERGLYCEMIA, WITH LONG-TERM CURRENT USE OF INSULIN (HCC): ICD-10-CM

## 2024-10-29 DIAGNOSIS — I73.9 PAD (PERIPHERAL ARTERY DISEASE) (HCC): ICD-10-CM

## 2024-10-29 DIAGNOSIS — E11.65 TYPE 2 DIABETES MELLITUS WITH HYPERGLYCEMIA, WITH LONG-TERM CURRENT USE OF INSULIN (HCC): Primary | ICD-10-CM

## 2024-10-29 DIAGNOSIS — Z79.4 TYPE 2 DIABETES MELLITUS WITH HYPERGLYCEMIA, WITH LONG-TERM CURRENT USE OF INSULIN (HCC): Primary | ICD-10-CM

## 2024-10-29 LAB
ALBUMIN SERPL BCG-MCNC: 3.4 G/DL (ref 3.5–5.1)
ALP SERPL-CCNC: 137 U/L (ref 38–126)
ALT SERPL W/O P-5'-P-CCNC: 17 U/L (ref 11–66)
ANION GAP SERPL CALC-SCNC: 11 MEQ/L (ref 8–16)
AST SERPL-CCNC: 13 U/L (ref 5–40)
BILIRUB SERPL-MCNC: 0.2 MG/DL (ref 0.3–1.2)
BUN SERPL-MCNC: 18 MG/DL (ref 7–22)
CALCIUM SERPL-MCNC: 9.1 MG/DL (ref 8.5–10.5)
CHLORIDE SERPL-SCNC: 95 MEQ/L (ref 98–111)
CHOLESTEROL, FASTING: 324 MG/DL (ref 100–199)
CO2 SERPL-SCNC: 29 MEQ/L (ref 23–33)
CREAT SERPL-MCNC: 0.9 MG/DL (ref 0.4–1.2)
GFR SERPL CREATININE-BSD FRML MDRD: > 90 ML/MIN/1.73M2
GLUCOSE SERPL-MCNC: 278 MG/DL (ref 70–108)
HCV IGG SERPL QL IA: NEGATIVE
HDLC SERPL-MCNC: 24 MG/DL
LDLC SERPL CALC-MCNC: ABNORMAL MG/DL
MAGNESIUM SERPL-MCNC: 1.7 MG/DL (ref 1.6–2.4)
POTASSIUM SERPL-SCNC: 4.8 MEQ/L (ref 3.5–5.2)
PROT SERPL-MCNC: 7.3 G/DL (ref 6.1–8)
SODIUM SERPL-SCNC: 135 MEQ/L (ref 135–145)
TRIGLYCERIDE, FASTING: 2228 MG/DL (ref 0–199)
VIT B12 SERPL-MCNC: 397 PG/ML (ref 211–911)

## 2024-10-29 PROCEDURE — 3080F DIAST BP >= 90 MM HG: CPT

## 2024-10-29 PROCEDURE — 3052F HG A1C>EQUAL 8.0%<EQUAL 9.0%: CPT

## 2024-10-29 PROCEDURE — 99214 OFFICE O/P EST MOD 30 MIN: CPT

## 2024-10-29 PROCEDURE — 3077F SYST BP >= 140 MM HG: CPT

## 2024-10-29 RX ORDER — ROSUVASTATIN CALCIUM 40 MG/1
40 TABLET, COATED ORAL DAILY
Qty: 90 TABLET | Refills: 3 | Status: SHIPPED | OUTPATIENT
Start: 2024-10-29

## 2024-10-29 RX ORDER — GLIPIZIDE 10 MG/1
20 TABLET ORAL 2 TIMES DAILY
Qty: 120 TABLET | Refills: 0 | Status: CANCELLED | OUTPATIENT
Start: 2024-10-29

## 2024-10-29 RX ORDER — DOXYCYCLINE HYCLATE 100 MG
100 TABLET ORAL 2 TIMES DAILY
Qty: 20 TABLET | Refills: 0 | Status: SHIPPED | OUTPATIENT
Start: 2024-10-29 | End: 2024-11-08

## 2024-10-29 RX ORDER — ORAL SEMAGLUTIDE 3 MG/1
1 TABLET ORAL DAILY
Qty: 30 TABLET | Refills: 1 | Status: CANCELLED | OUTPATIENT
Start: 2024-10-29

## 2024-10-29 RX ORDER — ORAL SEMAGLUTIDE 7 MG/1
7 TABLET ORAL DAILY
Qty: 30 TABLET | Refills: 5 | Status: SHIPPED | OUTPATIENT
Start: 2024-10-29

## 2024-10-29 RX ORDER — EZETIMIBE 10 MG/1
10 TABLET ORAL DAILY
Qty: 90 TABLET | Refills: 1 | Status: SHIPPED | OUTPATIENT
Start: 2024-10-29

## 2024-10-29 RX ORDER — GABAPENTIN 100 MG/1
100 CAPSULE ORAL 2 TIMES DAILY
Qty: 180 CAPSULE | Refills: 0 | Status: SHIPPED | OUTPATIENT
Start: 2024-10-29 | End: 2025-01-27

## 2024-10-29 NOTE — PROGRESS NOTES
Health Maintenance Due   Topic Date Due    Diabetic retinal exam  Never done    Hepatitis C screen  Never done    Colorectal Cancer Screen  Never done    Pneumococcal 0-64 years Vaccine (2 of 2 - PCV) 07/22/2022    Flu vaccine (1) 08/01/2024    Diabetic foot exam  08/25/2024    COVID-19 Vaccine (4 - 2023-24 season) 09/01/2024    Diabetic Alb to Cr ratio (uACR) test  11/27/2024    Lipids  11/27/2024       
ill-appearing, toxic-appearing or diaphoretic.   HENT:      Head: Normocephalic and atraumatic.   Cardiovascular:      Rate and Rhythm: Normal rate and regular rhythm.      Pulses:           Dorsalis pedis pulses are 1+ on the right side and 1+ on the left side.        Posterior tibial pulses are 1+ on the right side and 1+ on the left side.      Heart sounds: Normal heart sounds. No murmur heard.     No friction rub. No gallop.   Pulmonary:      Effort: Pulmonary effort is normal. No respiratory distress.      Breath sounds: Normal breath sounds. No stridor. No wheezing, rhonchi or rales.   Chest:      Chest wall: No tenderness.   Musculoskeletal:      Right lower le+ Edema present.      Left lower le+ Edema present.   Feet:      Right foot:      Protective Sensation: 4 sites tested.  2 sites sensed.      Skin integrity: Skin breakdown and erythema present.      Toenail Condition: Right toenails are abnormally thick. Fungal disease present.     Left foot:      Protective Sensation: 4 sites tested.  2 sites sensed.      Skin integrity: Skin breakdown and erythema present.      Toenail Condition: Left toenails are abnormally thick. Fungal disease present.     Comments: Likely fungal infection in bilateral toenails  Reduced sensation along big toe and bottom of his feet bilaterally with rough skin   Skin:            Comments: Purulent cellulitis on bilateral lower legs that is weeping with taut skin and erythema, warm to touch not tender to palpation    Neurological:      Mental Status: He is alert.     Visual inspection:  Deformity/amputation: present - amputation of middle right toe  Skin lesions/pre-ulcerative calluses: present - on big toe bilateral  Edema: right- trace, left- trace    Sensory exam:  Monofilament sensation: abnormal - no sensation on big toe bilateral  (minimum of 5 random plantar locations tested, avoiding callused areas - > 1 area with absence of sensation is + for neuropathy)    Plus at 
  Refills as above with increase of Rybelsus.    He will get blood work today  ASA is a good idea -- discuss in follow up. H/o GERD and given doxycycline today      2 weeks for leg recheck    Orders Placed:  Orders Placed This Encounter   Procedures    Fecal DNA Colorectal cancer screening (Cologuard)    Hepatitis C Antibody    Lipid, Fasting    Comprehensive Metabolic Panel    Vitamin B12    Magnesium     Medications Prescribed:  Orders Placed This Encounter   Medications    gabapentin (NEURONTIN) 100 MG capsule     Sig: Take 1 capsule by mouth 2 times daily for 90 days.     Dispense:  180 capsule     Refill:  0    rosuvastatin (CRESTOR) 40 MG tablet     Sig: Take 1 tablet by mouth daily     Dispense:  90 tablet     Refill:  3    Semaglutide (RYBELSUS) 7 MG TABS     Sig: Take 7 mg by mouth daily     Dispense:  30 tablet     Refill:  5    doxycycline hyclate (VIBRA-TABS) 100 MG tablet     Sig: Take 1 tablet by mouth 2 times daily for 10 days     Dispense:  20 tablet     Refill:  0       No future appointments.    Health Maintenance Due   Topic Date Due    Diabetic retinal exam  Never done    Hepatitis C screen  Never done    Colorectal Cancer Screen  Never done    Pneumococcal 0-64 years Vaccine (2 of 2 - PCV) 07/22/2022    Flu vaccine (1) 08/01/2024    Diabetic foot exam  08/25/2024    COVID-19 Vaccine (4 - 2023-24 season) 09/01/2024    Diabetic Alb to Cr ratio (uACR) test  11/27/2024    Lipids  11/27/2024       Attending Physician Statement  I have discussed the case, including pertinent history and exam findings with the resident. I also have seen the patient and performed key portions of the examination.  I agree with the documented assessment and plan as documented by the resident.  GE modifier added to this encounter      Lita Barboza MD 10/29/2024 9:16 AM

## 2024-10-29 NOTE — TELEPHONE ENCOUNTER
----- Message from Dr. Katya House, DO sent at 10/29/2024  1:20 PM EDT -----  Please let patient know glucose was elevated at 278.  Would highly recommend adhering to diabetes therapy, limiting sugary drinks, alcohol, well-balanced diet to include more fruits and vegetables and less carbohydrates would also recommend 30 minutes of exercise daily.  Patient's lipid panel did show an elevated cholesterol of 324 with an elevated triglyceride of 2228. I would like to add zetia 10 mg daily to help lower your triglyceride level along with continuing your crestor and lifestyle modifications. Rest of the labs are unremarkable would recommend repeating labs in 8 weeks.   Thank you Dr. House

## 2024-10-29 NOTE — PATIENT INSTRUCTIONS
Thank you   Thank you for trusting us with your healthcare needs. You may receive a survey regarding today's visit. It would help us out if you would take a few moments to provide your feedback. We value your input.  Please bring in ALL medications BOTTLES, including inhalers, herbal supplements, over the counter, prescribed & non-prescribed medicine. The office would like actual medication bottles and a list.         4.  Prior to getting your labs drawn, check with your insurance company for benefits and eligibility of lab services.  Often, insurance companies cover certain tests for preventative visits only.  It is patient's responsibility to    see what is covered.    5.  If the list below has been completed, PLEASE FAX RECORDS TO OUR OFFICE @ [phone]. Once the records have been received we will update your records at our office:  Health Maintenance Due   Topic Date Due    Diabetic retinal exam  Never done    Hepatitis C screen  Never done    Colorectal Cancer Screen  Never done    Pneumococcal 0-64 years Vaccine (2 of 2 - PCV) 07/22/2022    Flu vaccine (1) 08/01/2024    Diabetic foot exam  08/25/2024    COVID-19 Vaccine (4 - 2023-24 season) 09/01/2024    Diabetic Alb to Cr ratio (uACR) test  11/27/2024    Lipids  11/27/2024

## 2024-11-01 LAB
CREAT UR-MCNC: 110.5 MG/DL
MICROALBUMIN UR-MCNC: 364.34 MG/DL
MICROALBUMIN/CREAT RATIO PNL UR: 3297 MG/G (ref 0–30)

## 2024-11-08 ENCOUNTER — TELEPHONE (OUTPATIENT)
Dept: FAMILY MEDICINE CLINIC | Age: 54
End: 2024-11-08

## 2024-11-08 NOTE — TELEPHONE ENCOUNTER
----- Message from Dr. Katya House, DO sent at 11/7/2024  4:22 PM EST -----  Hello,  Please let pt know that his kidney test was not in normal limits and its very important to prevent it from getting worse it is possible for him to end of up on dialysis. Would recommend a kidney and ultrasound and another urine test.  Thank you,

## 2024-11-14 ENCOUNTER — LAB (OUTPATIENT)
Dept: LAB | Age: 54
End: 2024-11-14

## 2024-11-14 ENCOUNTER — TELEPHONE (OUTPATIENT)
Dept: FAMILY MEDICINE CLINIC | Age: 54
End: 2024-11-14

## 2024-11-14 DIAGNOSIS — R80.1 PERSISTENT PROTEINURIA: ICD-10-CM

## 2024-11-14 DIAGNOSIS — R80.1 PERSISTENT PROTEINURIA: Primary | ICD-10-CM

## 2024-11-14 LAB
HBV SURFACE AB SER QL IA: NEGATIVE
HBV SURFACE AG SERPL QL IA: NEGATIVE

## 2024-11-14 NOTE — TELEPHONE ENCOUNTER
----- Message from Dr. Batool Nolen MD sent at 11/14/2024  3:59 PM EST -----  His protein is very high  Please I am sending new test and bring him back for follow up after all done.

## 2024-11-14 NOTE — TELEPHONE ENCOUNTER
This nurse spoke with patient and he is going to get labs done this evening or tomorrow. I tried to make f/u appt but he said he works 7 days a week. I stressed to him that this is very serious and could mean nephrotic disease and we needed to follow up and rule this out. He said he didn't care about his kidneys. Liset stressed how imnprtant it was to follow up and he declined making an appt at this time.

## 2024-11-15 ENCOUNTER — LAB (OUTPATIENT)
Dept: LAB | Age: 54
End: 2024-11-15

## 2024-11-15 DIAGNOSIS — R80.1 PERSISTENT PROTEINURIA: ICD-10-CM

## 2024-11-15 LAB
C3C SERPL-MCNC: 197 MG/DL (ref 90–180)
C4 SERPL-MCNC: 31 MG/DL (ref 10–40)
HBV CORE IGG+IGM SERPL QL IA: NONREACTIVE
RPR SER QL: NONREACTIVE

## 2024-11-16 ENCOUNTER — LAB (OUTPATIENT)
Dept: LAB | Age: 54
End: 2024-11-16

## 2024-11-16 DIAGNOSIS — R80.1 PERSISTENT PROTEINURIA: ICD-10-CM

## 2024-11-16 LAB
HOURS COLLECTED: 24 HRS
PROT 24H UR-MCNC: 8663 MG/24 HR (ref 42–225)
PROT UR-MCNC: 481.3 MG/DL
URINE VOLUME: 1800 ML

## 2024-11-17 DIAGNOSIS — E11.21 NEPHROTIC SYNDROME DUE TO DIABETES MELLITUS (HCC): Primary | ICD-10-CM

## 2024-11-17 LAB — NUCLEAR IGG SER QL IA: NORMAL

## 2024-11-18 ENCOUNTER — TELEPHONE (OUTPATIENT)
Dept: FAMILY MEDICINE CLINIC | Age: 54
End: 2024-11-18

## 2024-11-18 NOTE — TELEPHONE ENCOUNTER
----- Message from Dr. Batool Nolen MD sent at 11/17/2024  2:43 PM EST -----  Results discussed with pt and plan set.  Referred back to Nephrology, Dr. Ziegler, started on Farxiga 10 mg, continue ARB, statin and GLP 1

## 2024-11-18 NOTE — TELEPHONE ENCOUNTER
I would suggest seeing one of us back in office to recheck.  If the swelling is still fairly soft, it is possible that we could re-aspirate   Noted

## 2024-11-19 ENCOUNTER — TELEPHONE (OUTPATIENT)
Dept: FAMILY MEDICINE CLINIC | Age: 54
End: 2024-11-19

## 2024-11-19 LAB
ALBUMIN SERPL ELPH-MCNC: 2.93 G/DL (ref 3.75–5.01)
ALPHA1 GLOB SERPL ELPH-MCNC: 0.39 G/DL (ref 0.19–0.46)
ALPHA2 GLOB SERPL ELPH-MCNC: 1.27 G/DL (ref 0.48–1.05)
B-GLOBULIN SERPL ELPH-MCNC: 1.03 G/DL (ref 0.48–1.1)
GAMMA GLOB SERPL ELPH-MCNC: 1.39 G/DL (ref 0.62–1.51)
INTERPRETATION SERPL IFE-IMP: ABNORMAL
M PROTEIN SERPL ELPH-MCNC: ABNORMAL G/DL
MONOCLON BAND OBS SERPL: ABNORMAL
NONINV COLON CA DNA+OCC BLD SCRN STL QL: NORMAL
PATHOLOGY STUDY: ABNORMAL
PROT SERPL-MCNC: 7 G/DL (ref 6.3–8.2)

## 2024-11-19 RX ORDER — DAPAGLIFLOZIN 5 MG/1
5 TABLET, FILM COATED ORAL EVERY MORNING
Qty: 30 TABLET | Refills: 3 | Status: SHIPPED | OUTPATIENT
Start: 2024-11-19

## 2024-11-19 NOTE — TELEPHONE ENCOUNTER
Received call from patient regarding prescription for Jardiance. Patient stated that he cannot take. Asked if he was having an allergic reaction to medication, patient stated no. Stated that he saw it was filled and the last time he took the medication \"the little glenys downstairs got really red and irritated\" Patient unsure when was taking medication last. Would like another medication to replace Jardiance. Please advise.

## 2024-11-20 LAB — CRYOGLOB SER QL: NORMAL

## 2024-11-21 ENCOUNTER — TELEPHONE (OUTPATIENT)
Dept: FAMILY MEDICINE CLINIC | Age: 54
End: 2024-11-21

## 2024-11-21 NOTE — TELEPHONE ENCOUNTER
----- Message from Dr. Katya House, DO sent at 11/20/2024  4:16 PM EST -----  Hello,   Please let pt know that sample was not able to be processed for the cologuard and they will be contacting him to initiate a new sample.  Thank you

## 2024-12-16 LAB — NONINV COLON CA DNA+OCC BLD SCRN STL QL: POSITIVE

## 2024-12-17 ENCOUNTER — TELEPHONE (OUTPATIENT)
Dept: FAMILY MEDICINE CLINIC | Age: 54
End: 2024-12-17

## 2024-12-17 DIAGNOSIS — R19.5 POSITIVE COLORECTAL CANCER SCREENING USING COLOGUARD TEST: Primary | ICD-10-CM

## 2024-12-17 NOTE — PROGRESS NOTES
Cologuard test was positive meaning it should be followed with a colonoscopy. A positive Cologuard result means that the test detected DNA and/or hemoglobin in your stool that could be associated with colorectal cancer. It does NOT mean that he has cancer but it does mean we need to do a coloscopy I will put mir referral for him he should receive a call for it to get scheduled.

## 2024-12-17 NOTE — TELEPHONE ENCOUNTER
Dr. House,   Spoke with Murali Neff and he was informed and then states that he is not going to do a Colonoscopy. I informed him that there are risks of not doing the Colonoscopy and \"He said he is not messing around with that Shit.\"

## 2024-12-17 NOTE — TELEPHONE ENCOUNTER
----- Message from Dr. Katya House, DO sent at 12/17/2024  1:52 PM EST -----  Hello,   Please let pt know that his cologuard test was positive meaning it should be followed with a colonoscopy. A positive Cologuard result means that the test detected DNA and/or hemoglobin in your stool that could be associated with colorectal cancer. It does NOT mean that he has cancer but it does mean we need to do a coloscopy I will put mir referral for him he should receive a call for it to get scheduled.   Thank you

## 2025-01-20 ENCOUNTER — OFFICE VISIT (OUTPATIENT)
Dept: NEPHROLOGY | Age: 55
End: 2025-01-20
Payer: COMMERCIAL

## 2025-01-20 VITALS
SYSTOLIC BLOOD PRESSURE: 193 MMHG | WEIGHT: 284 LBS | HEART RATE: 85 BPM | OXYGEN SATURATION: 98 % | DIASTOLIC BLOOD PRESSURE: 91 MMHG | HEIGHT: 73 IN | BODY MASS INDEX: 37.64 KG/M2

## 2025-01-20 DIAGNOSIS — E11.21 DIABETIC NEPHROPATHY ASSOCIATED WITH TYPE 2 DIABETES MELLITUS (HCC): Primary | ICD-10-CM

## 2025-01-20 DIAGNOSIS — R80.9 NEPHROTIC RANGE PROTEINURIA: ICD-10-CM

## 2025-01-20 DIAGNOSIS — I10 PRIMARY HYPERTENSION: ICD-10-CM

## 2025-01-20 PROCEDURE — 99204 OFFICE O/P NEW MOD 45 MIN: CPT | Performed by: INTERNAL MEDICINE

## 2025-01-20 PROCEDURE — 3080F DIAST BP >= 90 MM HG: CPT | Performed by: INTERNAL MEDICINE

## 2025-01-20 PROCEDURE — 3077F SYST BP >= 140 MM HG: CPT | Performed by: INTERNAL MEDICINE

## 2025-01-20 RX ORDER — CARVEDILOL 25 MG/1
25 TABLET ORAL 2 TIMES DAILY
Qty: 60 TABLET | Refills: 3 | Status: SHIPPED | OUTPATIENT
Start: 2025-01-20

## 2025-01-20 NOTE — PROGRESS NOTES
[facility]  [facility]  [address]  [address]  [place] [address]  Dept: [phone]  Loc: [phone]  Outpatient Consult Note  1/20/2025 3:31 PM        Pt Name:    Murali Neff  YOB: 1970  Primary Care Physician:  Batool Nolen MD     Chief Complaint:   Chief Complaint   Patient presents with    New Patient     Batool Nolen for Nephrotic syndrome d/t DM (prev Ziegler pt, last seen 7/23/20)        Background Information/HPI   The patient is a 54 y.o. white  malepatient who was sent in for evaluation of elevated creatinine.  Patient has hx HTN and DM. Reports history of diabetes for many years. Chronically uncontrolled DM. AIC was over 12% in the past.  years. No Hx PPM or AICD. Ex-smoking, 3ppd x 45+ years, quit about 2+ years. No hx CVA or cancer. Denies any hx kidney stones. Has taken ibuprofen in the past.  He has neuropathy. He works in [employer]. Says he forgets to take his evening dose of medications sometimes. Discussed      Allergies:  Farxiga [dapagliflozin], Jardiance [empagliflozin], and Morphine        Past Medical History:  Past Medical History:   Diagnosis Date    CAD (coronary artery disease) 2019    CHF (congestive heart failure) ([facility]) 02/12/2019    Diabetes mellitus (HCC) 1990's    Hypertension 1990's        Past Surgical History:  Past Surgical History:   Procedure Laterality Date    CHOLECYSTECTOMY      FIBULA FRACTURE SURGERY Right     screws    TIBIA FRACTURE SURGERY Right     screws    TOE AMPUTATION Right 12/14/2021    RIGHT 5TH TOE AMPUTATION performed by Corby French DPM at [facility] OR        Family History:  Family History   Problem Relation Age of Onset    Diabetes Brother         Social History:  Social History     Socioeconomic History    Marital status:      Spouse name: Steph    Number of children: Not on file    Years of education: Not on file    Highest education level: Not on file

## 2025-03-12 ENCOUNTER — LAB (OUTPATIENT)
Dept: LAB | Age: 55
End: 2025-03-12

## 2025-03-12 DIAGNOSIS — I10 PRIMARY HYPERTENSION: ICD-10-CM

## 2025-03-12 DIAGNOSIS — E11.21 DIABETIC NEPHROPATHY ASSOCIATED WITH TYPE 2 DIABETES MELLITUS (HCC): ICD-10-CM

## 2025-03-12 DIAGNOSIS — R80.9 NEPHROTIC RANGE PROTEINURIA: ICD-10-CM

## 2025-03-12 LAB
25(OH)D3 SERPL-MCNC: 10 NG/ML (ref 30–100)
ANION GAP SERPL CALC-SCNC: 8 MEQ/L (ref 8–16)
BACTERIA: ABNORMAL
BILIRUB UR QL STRIP: NEGATIVE
BUN SERPL-MCNC: 19 MG/DL (ref 8–23)
CALCIUM SERPL-MCNC: 9.1 MG/DL (ref 8.6–10)
CASTS #/AREA URNS LPF: ABNORMAL /LPF
CASTS #/AREA URNS LPF: ABNORMAL /LPF
CHARACTER UR: CLEAR
CHARCOAL URNS QL MICRO: ABNORMAL
CHLORIDE SERPL-SCNC: 101 MEQ/L (ref 98–111)
CO2 SERPL-SCNC: 29 MEQ/L (ref 22–29)
COLOR UR: YELLOW
CREAT SERPL-MCNC: 1.2 MG/DL (ref 0.7–1.2)
CREAT UR-MCNC: 116 MG/DL
CRYSTALS URNS QL MICRO: ABNORMAL
EPITHELIAL CELLS, UA: ABNORMAL /HPF
GFR SERPL CREATININE-BSD FRML MDRD: 71 ML/MIN/1.73M2
GLUCOSE SERPL-MCNC: 269 MG/DL (ref 74–109)
GLUCOSE UR QL STRIP.AUTO: >= 1000 MG/DL
HGB UR QL STRIP.AUTO: ABNORMAL
KETONES UR QL STRIP.AUTO: NEGATIVE
LEUKOCYTE ESTERASE UR QL STRIP.AUTO: NEGATIVE
NITRITE UR QL STRIP.AUTO: NEGATIVE
PH UR STRIP.AUTO: 6.5 [PH] (ref 5–9)
PHOSPHATE SERPL-MCNC: 3.5 MG/DL (ref 2.5–4.5)
POTASSIUM SERPL-SCNC: 4.6 MEQ/L (ref 3.5–5.2)
PROT UR STRIP.AUTO-MCNC: >= 300 MG/DL
PROT UR-MCNC: 786 MG/DL
PROT/CREAT 24H UR: 6.78 MG/G{CREAT}
PTH-INTACT SERPL-MCNC: 56 PG/ML (ref 15–65)
RBC #/AREA URNS HPF: ABNORMAL /HPF
RENAL EPI CELLS #/AREA URNS HPF: ABNORMAL /[HPF]
SODIUM SERPL-SCNC: 138 MEQ/L (ref 135–145)
SP GR UR REFRACT.AUTO: 1.03 (ref 1–1.03)
UROBILINOGEN UR QL STRIP.AUTO: 0.2 EU/DL (ref 0–1)
WBC #/AREA URNS HPF: ABNORMAL /HPF
YEAST LIKE FUNGI URNS QL MICRO: ABNORMAL

## 2025-03-15 LAB — GLOMERULAR BASEMENT MEMBRANE ANTIBODY: NORMAL

## 2025-03-16 LAB
ANCA AB PATTERN SER IF-IMP: NORMAL
ANCA IGG TITR SER IF: NORMAL {TITER}

## 2025-03-17 ENCOUNTER — RESULTS FOLLOW-UP (OUTPATIENT)
Dept: LAB | Age: 55
End: 2025-03-17

## 2025-03-18 NOTE — TELEPHONE ENCOUNTER
Spoke to pt, he stated that he works 5 am - 5 pm seven days a week. He stated that it will be hard for him to get time off and at this point he does not care anymore. He can not do a virtual and wants to know if you would call him to discuss his labs. He still has to sched the US Dup and that will be another appointment and time off.

## 2025-03-26 DIAGNOSIS — K21.9 GASTROESOPHAGEAL REFLUX DISEASE, UNSPECIFIED WHETHER ESOPHAGITIS PRESENT: ICD-10-CM

## 2025-03-26 RX ORDER — OMEPRAZOLE 20 MG/1
20 CAPSULE, DELAYED RELEASE ORAL
Qty: 90 CAPSULE | Refills: 1 | Status: SHIPPED | OUTPATIENT
Start: 2025-03-26

## 2025-03-26 NOTE — TELEPHONE ENCOUNTER
Patient's last appointment was: 10/29/2024  with our   Patient's next appointment is: Visit date not found  with our DrJayesh   Last refilled on: 10/01/2024  Which pharmacy does the script need sent to: [facility]      Lab Results   Component Value Date    LABA1C 8.9 04/15/2024     Lab Results   Component Value Date    CHOL 165 02/18/2022    TRIG 542 (H) 02/18/2022    HDL 24 10/29/2024     Lab Results   Component Value Date     03/12/2025    K 4.6 03/12/2025     03/12/2025    CO2 29 03/12/2025    BUN 19 03/12/2025    CREATININE 1.2 03/12/2025    GLUCOSE 269 (H) 03/12/2025    CALCIUM 9.1 03/12/2025    BILITOT 0.2 (L) 10/29/2024    ALKPHOS 137 (H) 10/29/2024    AST 13 10/29/2024    ALT 17 10/29/2024    LABGLOM 71 03/12/2025     Lab Results   Component Value Date    TSH 2.060 07/22/2021    T4FREE 1.04 03/28/2016     Lab Results   Component Value Date    WBC 6.5 02/06/2024    HGB 14.0 02/06/2024    HCT 45.2 02/06/2024    MCV 91.3 02/06/2024     02/06/2024          Urine test results are back.   Let mother know that there is no sign of infection.  No sign of diabetes or blood.  If she has recurrent urinary urgency she should make an appointment. I would doubt this will be the case.  Sometimes over-the-counter cold medicines can cause urinary urgency. Avoid these if taking them.

## 2025-03-31 ENCOUNTER — TELEPHONE (OUTPATIENT)
Dept: FAMILY MEDICINE CLINIC | Age: 55
End: 2025-03-31

## 2025-04-18 DIAGNOSIS — E78.2 HYPERCHOLESTEROLEMIA WITH HYPERTRIGLYCERIDEMIA: ICD-10-CM

## 2025-04-18 DIAGNOSIS — Z79.899 HIGH RISK MEDICATION USE: Primary | ICD-10-CM

## 2025-04-18 RX ORDER — EZETIMIBE 10 MG/1
10 TABLET ORAL DAILY
Qty: 90 TABLET | Refills: 1 | OUTPATIENT
Start: 2025-04-18

## 2025-04-18 NOTE — TELEPHONE ENCOUNTER
Informed patient he would need to be seen before we can refill medications, he states he will schedule when he can.   Primary Nurse Gerber Swain RN and Ehsan Sam RN performed a dual skin assessment on this patient Impairment noted- see below. Delfino score is 13. Redness to bottom, bilateral heel and elbow redness, left elbow scab, bloody drainage around rodriguez insertion site and some scattered bruising bilateral legs. 6046: Bedside and Verbal shift change report given to Danica De Dios RN (oncoming nurse) by Rashad Clayton RN (offgoing nurse). Report included the following information SBAR, Kardex, Procedure Summary, Intake/Output, MAR, Accordion and Recent Results.

## 2025-04-18 NOTE — TELEPHONE ENCOUNTER
Patient's last appointment was: 10/29/2024  with our   Patient's next appointment is: Visit date not found    Last refilled on: 10/29/2024  Which pharmacy does the script need sent to: [facility]      Lab Results   Component Value Date    LABA1C 8.9 04/15/2024     Lab Results   Component Value Date    CHOL 165 02/18/2022    TRIG 542 (H) 02/18/2022    HDL 24 10/29/2024     Lab Results   Component Value Date     03/12/2025    K 4.6 03/12/2025     03/12/2025    CO2 29 03/12/2025    BUN 19 03/12/2025    CREATININE 1.2 03/12/2025    GLUCOSE 269 (H) 03/12/2025    CALCIUM 9.1 03/12/2025    BILITOT 0.2 (L) 10/29/2024    ALKPHOS 137 (H) 10/29/2024    AST 13 10/29/2024    ALT 17 10/29/2024    LABGLOM 71 03/12/2025     Lab Results   Component Value Date    TSH 2.060 07/22/2021    T4FREE 1.04 03/28/2016     Lab Results   Component Value Date    WBC 6.5 02/06/2024    HGB 14.0 02/06/2024    HCT 45.2 02/06/2024    MCV 91.3 02/06/2024     02/06/2024

## 2025-04-18 NOTE — PROGRESS NOTES
Order placed for cmp to be done before pt comes for next appointment to adjust medications   Electronically signed by Katya House DO on 4/18/2025 at 9:25 AM

## 2025-05-04 DIAGNOSIS — Z79.4 TYPE 2 DIABETES MELLITUS WITH HYPERGLYCEMIA, WITH LONG-TERM CURRENT USE OF INSULIN (HCC): ICD-10-CM

## 2025-05-04 DIAGNOSIS — E11.65 TYPE 2 DIABETES MELLITUS WITH HYPERGLYCEMIA, WITH LONG-TERM CURRENT USE OF INSULIN (HCC): ICD-10-CM

## 2025-05-04 DIAGNOSIS — E11.65 UNCONTROLLED TYPE 2 DIABETES MELLITUS WITH HYPERGLYCEMIA (HCC): ICD-10-CM

## 2025-05-06 RX ORDER — ORAL SEMAGLUTIDE 7 MG/1
7 TABLET ORAL DAILY
Qty: 30 TABLET | Refills: 5 | OUTPATIENT
Start: 2025-05-06

## 2025-05-06 NOTE — TELEPHONE ENCOUNTER
Patient's last appointment was: 10/29/2024  with our   Patient's next appointment is: Visit date not found  with our DrJayesh   Last refilled on: 10/29/2024  Which pharmacy does the script need sent to: [facility] [place]      Lab Results   Component Value Date    LABA1C 8.9 04/15/2024     Lab Results   Component Value Date    CHOL 165 02/18/2022    TRIG 542 (H) 02/18/2022    HDL 24 10/29/2024     Lab Results   Component Value Date     03/12/2025    K 4.6 03/12/2025     03/12/2025    CO2 29 03/12/2025    BUN 19 03/12/2025    CREATININE 1.2 03/12/2025    GLUCOSE 269 (H) 03/12/2025    CALCIUM 9.1 03/12/2025    BILITOT 0.2 (L) 10/29/2024    ALKPHOS 137 (H) 10/29/2024    AST 13 10/29/2024    ALT 17 10/29/2024    LABGLOM 71 03/12/2025     Lab Results   Component Value Date    TSH 2.060 07/22/2021    T4FREE 1.04 03/28/2016     Lab Results   Component Value Date    WBC 6.5 02/06/2024    HGB 14.0 02/06/2024    HCT 45.2 02/06/2024    MCV 91.3 02/06/2024     02/06/2024

## 2025-05-08 RX ORDER — CARVEDILOL 25 MG/1
25 TABLET ORAL 2 TIMES DAILY
Qty: 60 TABLET | Refills: 5 | OUTPATIENT
Start: 2025-05-08

## 2025-05-09 ENCOUNTER — TELEMEDICINE (OUTPATIENT)
Dept: FAMILY MEDICINE CLINIC | Age: 55
End: 2025-05-09
Payer: COMMERCIAL

## 2025-05-09 DIAGNOSIS — E55.9 VITAMIN D DEFICIENCY: ICD-10-CM

## 2025-05-09 DIAGNOSIS — E11.65 UNCONTROLLED TYPE 2 DIABETES MELLITUS WITH HYPERGLYCEMIA (HCC): Primary | ICD-10-CM

## 2025-05-09 PROCEDURE — 99212 OFFICE O/P EST SF 10 MIN: CPT | Performed by: FAMILY MEDICINE

## 2025-05-09 SDOH — ECONOMIC STABILITY: FOOD INSECURITY: WITHIN THE PAST 12 MONTHS, YOU WORRIED THAT YOUR FOOD WOULD RUN OUT BEFORE YOU GOT MONEY TO BUY MORE.: PATIENT DECLINED

## 2025-05-09 SDOH — ECONOMIC STABILITY: INCOME INSECURITY: IN THE LAST 12 MONTHS, WAS THERE A TIME WHEN YOU WERE NOT ABLE TO PAY THE MORTGAGE OR RENT ON TIME?: PATIENT DECLINED

## 2025-05-09 SDOH — ECONOMIC STABILITY: TRANSPORTATION INSECURITY
IN THE PAST 12 MONTHS, HAS THE LACK OF TRANSPORTATION KEPT YOU FROM MEDICAL APPOINTMENTS OR FROM GETTING MEDICATIONS?: PATIENT DECLINED

## 2025-05-09 SDOH — ECONOMIC STABILITY: FOOD INSECURITY: WITHIN THE PAST 12 MONTHS, THE FOOD YOU BOUGHT JUST DIDN'T LAST AND YOU DIDN'T HAVE MONEY TO GET MORE.: PATIENT DECLINED

## 2025-05-09 SDOH — ECONOMIC STABILITY: TRANSPORTATION INSECURITY
IN THE PAST 12 MONTHS, HAS LACK OF TRANSPORTATION KEPT YOU FROM MEETINGS, WORK, OR FROM GETTING THINGS NEEDED FOR DAILY LIVING?: PATIENT DECLINED

## 2025-05-09 NOTE — PROGRESS NOTES
2025    TELEHEALTH EVALUATION -- Audio/Visual  .  HPI:    Murali Neff (:  1970) has requested an audio/video evaluation for the following concern(s): diabetes, vitamin D deficiency    Vitamin D found to be 10.    Back in January he was referred to Dr. Gomez, nephrologist, due to nephrotic syndrome.  He was diagnosed with nephrotic proteinuria/diabetes nephropathy due to his chronically uncontrolled diabetes mellitus with a hemoglobin A1c of 12% .  Dr. Gomez's concern is the risk of kidney function decline with so much proteinuria.  He has tried in the past Farxiga or Jardiance but he did not tolerate either one.  At the present time he is taking Tresiba 10 units subcu with escalation of 2 units every 3 days if the average blood sugar is above 130, Glucotrol 10 mg every day and metformin  mg 4 tablets by mouth once daily.         Prior to Visit Medications    Medication Sig Taking? Authorizing Provider   vitamin D3 (CHOLECALCIFEROL) 25 MCG (1000 UT) TABS tablet Take 1 tablet by mouth daily Yes Batool Nolen MD   Insulin Pen Needle (MEIJER PEN NEEDLES) 31G X 6 MM MISC 1 each by Does not apply route daily  Batool Nolen MD   Accu-Chek Softclix Lancets MISC 1 Units by Miscell. (Med.Supl.;Non-Drugs) route in the morning and at bedtime USE TO CHECK BLOOD SUGAR  Batool Nolen MD   blood glucose test strips (ACCU-CHEK CAT PLUS) strip 1 each by Miscell. (Med.Supl.;Non-Drugs) route 2 times daily As needed.CHECK BLOOD SUGAR EVERY MORNING AND AFTER DINNER EVERY DAY  Batool Nolen MD   Insulin Degludec (TRESIBA FLEXTOUCH) 100 UNIT/ML SOPN Inject 10 units subcu daily.  Add 2 units every 3 days if the average fasting glucose is above 130.  Batool Nolen MD   ezetimibe (ZETIA) 10 MG tablet Take 1 tablet by mouth daily  Batool Nolen MD   gabapentin (NEURONTIN) 100 MG capsule Take 1 capsule by mouth 2 times daily for 90 days.  Batool Nolen MD   glipiZIDE (GLUCOTROL) 10 MG tablet Take 2 tablets by mouth 2 times

## 2025-05-12 ENCOUNTER — PATIENT MESSAGE (OUTPATIENT)
Dept: FAMILY MEDICINE CLINIC | Age: 55
End: 2025-05-12

## 2025-05-14 DIAGNOSIS — E11.21 DIABETIC NEPHROPATHY ASSOCIATED WITH TYPE 2 DIABETES MELLITUS (HCC): ICD-10-CM

## 2025-05-14 DIAGNOSIS — E11.65 UNCONTROLLED TYPE 2 DIABETES MELLITUS WITH HYPERGLYCEMIA (HCC): ICD-10-CM

## 2025-05-14 DIAGNOSIS — Z79.4 TYPE 2 DIABETES MELLITUS WITH HYPERGLYCEMIA, WITH LONG-TERM CURRENT USE OF INSULIN (HCC): ICD-10-CM

## 2025-05-14 DIAGNOSIS — E78.2 HYPERCHOLESTEROLEMIA WITH HYPERTRIGLYCERIDEMIA: ICD-10-CM

## 2025-05-14 DIAGNOSIS — E11.65 TYPE 2 DIABETES MELLITUS WITH HYPERGLYCEMIA, WITH LONG-TERM CURRENT USE OF INSULIN (HCC): ICD-10-CM

## 2025-05-14 DIAGNOSIS — I10 ESSENTIAL HYPERTENSION: ICD-10-CM

## 2025-05-14 NOTE — TELEPHONE ENCOUNTER
Received call from patient, stated that he needed refills on medications. States that Dr. Nolen was supposed to be getting him on insulin as well since the Semaglutide is not working. Please advise.

## 2025-05-14 NOTE — TELEPHONE ENCOUNTER
Patient's last appointment was: 5/9/2025  with our   Patient's next appointment is: 7/14/2025  with our Dr. Nolen  Last refilled on: 2024  Which pharmacy does the script need sent to: [facility] [address]      Lab Results   Component Value Date    LABA1C 8.9 04/15/2024     Lab Results   Component Value Date    CHOL 165 02/18/2022    TRIG 542 (H) 02/18/2022    HDL 24 10/29/2024     Lab Results   Component Value Date     03/12/2025    K 4.6 03/12/2025     03/12/2025    CO2 29 03/12/2025    BUN 19 03/12/2025    CREATININE 1.2 03/12/2025    GLUCOSE 269 (H) 03/12/2025    CALCIUM 9.1 03/12/2025    BILITOT 0.2 (L) 10/29/2024    ALKPHOS 137 (H) 10/29/2024    AST 13 10/29/2024    ALT 17 10/29/2024    LABGLOM 71 03/12/2025     Lab Results   Component Value Date    TSH 2.060 07/22/2021    T4FREE 1.04 03/28/2016     Lab Results   Component Value Date    WBC 6.5 02/06/2024    HGB 14.0 02/06/2024    HCT 45.2 02/06/2024    MCV 91.3 02/06/2024     02/06/2024

## 2025-05-15 RX ORDER — ROSUVASTATIN CALCIUM 40 MG/1
40 TABLET, COATED ORAL DAILY
Qty: 90 TABLET | Refills: 1 | Status: SHIPPED | OUTPATIENT
Start: 2025-05-15

## 2025-05-15 RX ORDER — GLIPIZIDE 10 MG/1
20 TABLET ORAL 2 TIMES DAILY
Qty: 120 TABLET | Refills: 1 | Status: SHIPPED | OUTPATIENT
Start: 2025-05-15

## 2025-05-15 RX ORDER — LOSARTAN POTASSIUM 100 MG/1
100 TABLET ORAL DAILY
Qty: 90 TABLET | Refills: 1 | Status: SHIPPED | OUTPATIENT
Start: 2025-05-15

## 2025-05-15 RX ORDER — GABAPENTIN 100 MG/1
100 CAPSULE ORAL 2 TIMES DAILY
Qty: 180 CAPSULE | Refills: 1 | Status: SHIPPED | OUTPATIENT
Start: 2025-05-15 | End: 2025-08-13

## 2025-05-15 RX ORDER — EZETIMIBE 10 MG/1
10 TABLET ORAL DAILY
Qty: 90 TABLET | Refills: 1 | Status: SHIPPED | OUTPATIENT
Start: 2025-05-15

## 2025-05-20 ENCOUNTER — PATIENT MESSAGE (OUTPATIENT)
Dept: FAMILY MEDICINE CLINIC | Age: 55
End: 2025-05-20

## 2025-05-20 DIAGNOSIS — E11.65 UNCONTROLLED TYPE 2 DIABETES MELLITUS WITH HYPERGLYCEMIA (HCC): Primary | ICD-10-CM

## 2025-05-20 RX ORDER — INSULIN DEGLUDEC 100 U/ML
INJECTION, SOLUTION SUBCUTANEOUS
Qty: 12 ADJUSTABLE DOSE PRE-FILLED PEN SYRINGE | Refills: 3 | Status: SHIPPED | OUTPATIENT
Start: 2025-05-20

## 2025-05-21 NOTE — TELEPHONE ENCOUNTER
Patient called in stating he was able to  insulin, but he needs needles sent in. Patient had 1 needle to use today. Please advise. Thank you!

## 2025-05-26 RX ORDER — LANCETS
1 EACH MISCELLANEOUS 2 TIMES DAILY
Qty: 180 EACH | Refills: 3 | Status: SHIPPED | OUTPATIENT
Start: 2025-05-26

## 2025-05-26 RX ORDER — BLOOD SUGAR DIAGNOSTIC
1 STRIP MISCELLANEOUS 2 TIMES DAILY
Qty: 180 STRIP | Refills: 3 | Status: SHIPPED | OUTPATIENT
Start: 2025-05-26

## 2025-06-04 RX ORDER — LANCETS
1 EACH MISCELLANEOUS 2 TIMES DAILY
Qty: 180 EACH | Refills: 3 | Status: SHIPPED | OUTPATIENT
Start: 2025-06-04

## 2025-06-05 RX ORDER — PEN NEEDLE, DIABETIC 31 G X1/4"
1 NEEDLE, DISPOSABLE MISCELLANEOUS DAILY
Qty: 100 EACH | Refills: 3 | Status: SHIPPED | OUTPATIENT
Start: 2025-06-05

## 2025-06-05 RX ORDER — CHOLECALCIFEROL (VITAMIN D3) 25 MCG
1000 TABLET ORAL DAILY
Qty: 30 TABLET | Refills: 0 | Status: SHIPPED | OUTPATIENT
Start: 2025-06-05

## 2025-07-07 RX ORDER — CHOLECALCIFEROL (VITAMIN D3) 25 MCG
1 TABLET ORAL DAILY
Qty: 30 TABLET | Refills: 0 | Status: SHIPPED | OUTPATIENT
Start: 2025-07-07

## 2025-07-07 NOTE — TELEPHONE ENCOUNTER
Medication Refill Request       Please advise .    Patient's last appointment was: 5/9/2025  with our Dr.  Patient's next appointment is: 7/14/2025  with our . Tamanna  Last refilled on: 06/05/2025  Which pharmacy does the script need sent to: [facility] [address]       Lab Results   Component Value Date    LABA1C 8.9 04/15/2024     Lab Results   Component Value Date    CHOL 165 02/18/2022    TRIG 542 (H) 02/18/2022    HDL 24 10/29/2024     Lab Results   Component Value Date     03/12/2025    K 4.6 03/12/2025     03/12/2025    CO2 29 03/12/2025    BUN 19 03/12/2025    CREATININE 1.2 03/12/2025    GLUCOSE 269 (H) 03/12/2025    CALCIUM 9.1 03/12/2025    BILITOT 0.2 (L) 10/29/2024    ALKPHOS 137 (H) 10/29/2024    AST 13 10/29/2024    ALT 17 10/29/2024    LABGLOM 71 03/12/2025     Lab Results   Component Value Date    TSH 2.060 07/22/2021    T4FREE 1.04 03/28/2016     Lab Results   Component Value Date    WBC 6.5 02/06/2024    HGB 14.0 02/06/2024    HCT 45.2 02/06/2024    MCV 91.3 02/06/2024     02/06/2024

## 2025-07-14 ENCOUNTER — OFFICE VISIT (OUTPATIENT)
Dept: FAMILY MEDICINE CLINIC | Age: 55
End: 2025-07-14
Payer: COMMERCIAL

## 2025-07-14 VITALS
WEIGHT: 292.4 LBS | SYSTOLIC BLOOD PRESSURE: 160 MMHG | DIASTOLIC BLOOD PRESSURE: 84 MMHG | HEIGHT: 73 IN | TEMPERATURE: 97.3 F | BODY MASS INDEX: 38.75 KG/M2 | HEART RATE: 79 BPM

## 2025-07-14 DIAGNOSIS — Z91.148 NON COMPLIANCE W MEDICATION REGIMEN: ICD-10-CM

## 2025-07-14 DIAGNOSIS — E11.65 UNCONTROLLED TYPE 2 DIABETES MELLITUS WITH HYPERGLYCEMIA (HCC): Primary | ICD-10-CM

## 2025-07-14 PROCEDURE — 3079F DIAST BP 80-89 MM HG: CPT | Performed by: FAMILY MEDICINE

## 2025-07-14 PROCEDURE — 99214 OFFICE O/P EST MOD 30 MIN: CPT | Performed by: FAMILY MEDICINE

## 2025-07-14 PROCEDURE — 3077F SYST BP >= 140 MM HG: CPT | Performed by: FAMILY MEDICINE

## 2025-07-14 PROCEDURE — 3046F HEMOGLOBIN A1C LEVEL >9.0%: CPT | Performed by: FAMILY MEDICINE

## 2025-07-14 RX ORDER — METFORMIN HYDROCHLORIDE 500 MG/1
TABLET, EXTENDED RELEASE ORAL
Qty: 360 TABLET | Refills: 3 | Status: SHIPPED | OUTPATIENT
Start: 2025-07-14

## 2025-07-14 RX ORDER — ROSUVASTATIN CALCIUM 40 MG/1
40 TABLET, COATED ORAL DAILY
Qty: 90 TABLET | Refills: 1 | Status: SHIPPED | OUTPATIENT
Start: 2025-07-14

## 2025-07-14 RX ORDER — GABAPENTIN 100 MG/1
100 CAPSULE ORAL 2 TIMES DAILY
Qty: 180 CAPSULE | Refills: 1 | Status: SHIPPED | OUTPATIENT
Start: 2025-07-14 | End: 2026-01-10

## 2025-07-14 RX ORDER — INSULIN LISPRO 100 [IU]/ML
7 INJECTION, SOLUTION INTRAVENOUS; SUBCUTANEOUS
Qty: 5 ADJUSTABLE DOSE PRE-FILLED PEN SYRINGE | Refills: 5 | Status: SHIPPED | OUTPATIENT
Start: 2025-07-14

## 2025-07-14 RX ORDER — GLIPIZIDE 10 MG/1
10 TABLET, FILM COATED, EXTENDED RELEASE ORAL DAILY
Qty: 90 TABLET | Refills: 1 | Status: SHIPPED | OUTPATIENT
Start: 2025-07-14

## 2025-07-14 RX ORDER — OMEPRAZOLE 20 MG/1
20 CAPSULE, DELAYED RELEASE ORAL
Qty: 90 CAPSULE | Refills: 1 | Status: SHIPPED | OUTPATIENT
Start: 2025-07-14

## 2025-07-14 RX ORDER — INSULIN GLARGINE 100 [IU]/ML
20 INJECTION, SOLUTION SUBCUTANEOUS NIGHTLY
Qty: 5 ADJUSTABLE DOSE PRE-FILLED PEN SYRINGE | Refills: 5 | Status: SHIPPED | OUTPATIENT
Start: 2025-07-14

## 2025-07-14 ASSESSMENT — PATIENT HEALTH QUESTIONNAIRE - PHQ9
1. LITTLE INTEREST OR PLEASURE IN DOING THINGS: NOT AT ALL
2. FEELING DOWN, DEPRESSED OR HOPELESS: NOT AT ALL
1. LITTLE INTEREST OR PLEASURE IN DOING THINGS: NOT AT ALL
SUM OF ALL RESPONSES TO PHQ QUESTIONS 1-9: 0
SUM OF ALL RESPONSES TO PHQ9 QUESTIONS 1 & 2: 0
SUM OF ALL RESPONSES TO PHQ QUESTIONS 1-9: 0
2. FEELING DOWN, DEPRESSED OR HOPELESS: NOT AT ALL
SUM OF ALL RESPONSES TO PHQ QUESTIONS 1-9: 0
SUM OF ALL RESPONSES TO PHQ QUESTIONS 1-9: 0

## 2025-07-14 NOTE — PROGRESS NOTES
[facility]  [address]  [facility] [address]  Dept: [phone]  Dept Fax: [phone]  Loc: [phone]     Provider:  Batool Nolen MD    Patient:  Murali Neff  YOB: 1970      Visit Date:  7/14/2025     Reason For Visit:   Chief Complaint   Patient presents with    Discuss Labs     Patient states that he has no recent labs done or imaging.    Discuss Medications     Insulin        Murali is a 55 y.o. male who comes today to the office for follow up DM.  He did not the blood test ordered.    He is not injecting the Tresiba because he had several side effects and did not like it. He wants to go back to [facility].  He is not taking his medications as prescribed.  He has different reason for not taking them. Some gave him side effects, some were too expensive.  He is taking Glipizide 10 mg 1 PO BID, but sometimes he just take it once daily instead 2 x daily.  In January I referred him to Dr. Ziegler (Nephrology) due to his proteinuria.  He went once and Dr. Ziegler ordered a work up.  Murali does not want to go for follow up. \"If I need dialysis I wont do it anyway\", \"Murali, those are your kidneys we need to protect them\", \"I don't mind dying\".  He was diagnosed with nephrotic proteinuria/diabetes nephropathy due to his chronically uncontrolled DM with a HbA1c of 12% .  Dr. Gomez's concern is the risk of kidney function decline with so much proteinuria.  He has tried in the past Farxiga or Jardiance but he did not tolerate either one.   Blood pressure is elevated today.  160/84      Significant Past Medical History:    Past Medical History:   Diagnosis Date    CAD (coronary artery disease) 2019    CHF (congestive heart failure) ([facility]) 02/12/2019    Diabetes mellitus (HCC) 1990's    Hypertension 1990's           Allergies:  is allergic to farxiga [dapagliflozin], jardiance [empagliflozin], morphine, and tresiba [insulin

## 2025-07-16 ENCOUNTER — LAB (OUTPATIENT)
Dept: LAB | Age: 55
End: 2025-07-16

## 2025-07-16 DIAGNOSIS — E11.65 UNCONTROLLED TYPE 2 DIABETES MELLITUS WITH HYPERGLYCEMIA (HCC): ICD-10-CM

## 2025-07-16 DIAGNOSIS — Z79.899 HIGH RISK MEDICATION USE: ICD-10-CM

## 2025-07-16 LAB
ALBUMIN SERPL BCG-MCNC: 3.4 G/DL (ref 3.4–4.9)
ALP SERPL-CCNC: 69 U/L (ref 40–129)
ALT SERPL W/O P-5'-P-CCNC: 18 U/L (ref 10–50)
ANION GAP SERPL CALC-SCNC: 10 MEQ/L (ref 8–16)
AST SERPL-CCNC: 19 U/L (ref 10–50)
BILIRUB SERPL-MCNC: 0.5 MG/DL (ref 0.3–1.2)
BUN SERPL-MCNC: 27 MG/DL (ref 8–23)
CALCIUM SERPL-MCNC: 9.6 MG/DL (ref 8.6–10)
CHLORIDE SERPL-SCNC: 100 MEQ/L (ref 98–111)
CO2 SERPL-SCNC: 28 MEQ/L (ref 22–29)
CREAT SERPL-MCNC: 1.4 MG/DL (ref 0.7–1.2)
DEPRECATED MEAN GLUCOSE BLD GHB EST-ACNC: 315 MG/DL (ref 70–126)
GFR SERPL CREATININE-BSD FRML MDRD: 59 ML/MIN/1.73M2
GLUCOSE SERPL-MCNC: 209 MG/DL (ref 74–109)
HBA1C MFR BLD HPLC: 12.5 % (ref 4–6)
POTASSIUM SERPL-SCNC: 5.2 MEQ/L (ref 3.5–5.2)
PROT SERPL-MCNC: 7.3 G/DL (ref 6.4–8.3)
SODIUM SERPL-SCNC: 138 MEQ/L (ref 135–145)

## 2025-07-17 RX ORDER — AMLODIPINE BESYLATE 5 MG/1
5 TABLET ORAL DAILY
Qty: 90 TABLET | Refills: 1 | Status: SHIPPED | OUTPATIENT
Start: 2025-07-17 | End: 2025-07-17

## 2025-07-17 RX ORDER — AMLODIPINE BESYLATE 5 MG/1
5 TABLET ORAL DAILY
Qty: 90 TABLET | Refills: 1 | Status: SHIPPED | OUTPATIENT
Start: 2025-07-17

## 2025-07-18 SDOH — ECONOMIC STABILITY: FOOD INSECURITY: WITHIN THE PAST 12 MONTHS, YOU WORRIED THAT YOUR FOOD WOULD RUN OUT BEFORE YOU GOT MONEY TO BUY MORE.: NEVER TRUE

## 2025-07-18 SDOH — ECONOMIC STABILITY: FOOD INSECURITY: WITHIN THE PAST 12 MONTHS, THE FOOD YOU BOUGHT JUST DIDN'T LAST AND YOU DIDN'T HAVE MONEY TO GET MORE.: NEVER TRUE

## (undated) DEVICE — BANDAGE COMPR E SGL LAYERED CLSR BGE W/ CLP W4INXL15FT

## (undated) DEVICE — SYRINGE IRRIG 60ML SFT PLIABLE BLB EZ TO GRP 1 HND USE W/

## (undated) DEVICE — SUTURE MCRYL SZ 4-0 L27IN ABSRB UD L19MM PS-2 1/2 CIR PRIM Y426H

## (undated) DEVICE — SYRINGE MED 10ML LUERLOCK TIP W/O SFTY DISP

## (undated) DEVICE — PREP SOL PVP IODINE 4%  4 OZ/BTL

## (undated) DEVICE — SOLUTION SURG PREP POV IOD 7.5% 4 OZ

## (undated) DEVICE — GAUZE,SPONGE,8"X4",12PLY,XRAY,STRL,LF: Brand: MEDLINE

## (undated) DEVICE — GLOVE ORANGE PI 8   MSG9080

## (undated) DEVICE — BANDAGE,GAUZE,4.5"X4.1YD,STERILE,LF: Brand: MEDLINE

## (undated) DEVICE — SUTURE PROL SZ 3-0 L18IN NONABSORBABLE BLU L30MM FS-1 3/8 8663G

## (undated) DEVICE — HYPODERMIC SAFETY NEEDLE: Brand: MAGELLAN

## (undated) DEVICE — PACK-MAJOR

## (undated) DEVICE — DRAPE,U/SHT,SPLIT,FILM,60X84,STERILE: Brand: MEDLINE

## (undated) DEVICE — PENCIL SMK EVAC ALL IN 1 DSGN ENH VISIBILITY IMPROVED AIR

## (undated) DEVICE — DRAPE,EXTREMITY,89X128,STERILE: Brand: MEDLINE

## (undated) DEVICE — SHEET, ORTHO, SPLIT, STERILE: Brand: MEDLINE

## (undated) DEVICE — SUTURE VCRL + SZ 3-0 L27IN ABSRB UD L26MM SH 1/2 CIR VCP416H

## (undated) DEVICE — DRESSING ALG W3XL4IN TRNSPAR ANTIMIC WND CNTCT LAYR W/

## (undated) DEVICE — IMPREGNATED GAUZE DRESSING: Brand: CUTICERIN 7.5X7.5CM CTN 50

## (undated) DEVICE — BASIC SINGLE BASIN BTC-LF: Brand: MEDLINE INDUSTRIES, INC.

## (undated) DEVICE — PRECISION (9.0 X 0.51 X 31.0MM)

## (undated) DEVICE — PACK PROCEDURE SURG SET UP SRMC

## (undated) DEVICE — Z DISCONTINUED BY MEDLINE USE 2711682 TRAY SKIN PREP DRY W/ PREM GLV

## (undated) DEVICE — BANDAGE COMPR W4INXL12FT E DISP ESMARCH EVEN

## (undated) DEVICE — GOWN,SIRUS,NONRNF,SETINSLV,XL,20/CS: Brand: MEDLINE